# Patient Record
Sex: FEMALE | Race: WHITE | Employment: OTHER | URBAN - METROPOLITAN AREA
[De-identification: names, ages, dates, MRNs, and addresses within clinical notes are randomized per-mention and may not be internally consistent; named-entity substitution may affect disease eponyms.]

---

## 2017-07-14 PROBLEM — I48.0 PAF (PAROXYSMAL ATRIAL FIBRILLATION) (HCC): Status: ACTIVE | Noted: 2017-07-14

## 2019-05-29 ENCOUNTER — HOSPITAL ENCOUNTER (OUTPATIENT)
Dept: CT IMAGING | Age: 79
Discharge: HOME | End: 2019-05-29
Attending: SURGERY

## 2019-05-29 DIAGNOSIS — I65.23 BILATERAL CAROTID ARTERY STENOSIS: ICD-10-CM

## 2019-05-29 RX ORDER — SODIUM CHLORIDE 0.9 % (FLUSH) 0.9 %
10 SYRINGE (ML) INJECTION
Status: COMPLETED | OUTPATIENT
Start: 2019-05-29 | End: 2019-05-29

## 2019-05-29 RX ADMIN — Medication 10 ML: at 14:31

## 2019-06-10 ENCOUNTER — HOSPITAL ENCOUNTER (OUTPATIENT)
Dept: SURGERY | Age: 79
Discharge: HOME | End: 2019-06-10

## 2019-06-10 ENCOUNTER — ANESTHESIA EVENT (OUTPATIENT)
Dept: SURGERY | Age: 79
DRG: 039 | End: 2019-06-10
Payer: MEDICARE

## 2019-06-10 VITALS
TEMPERATURE: 98.3 F | WEIGHT: 162.19 LBS | HEIGHT: 63 IN | HEART RATE: 89 BPM | SYSTOLIC BLOOD PRESSURE: 129 MMHG | OXYGEN SATURATION: 95 % | RESPIRATION RATE: 16 BRPM | BODY MASS INDEX: 28.74 KG/M2 | DIASTOLIC BLOOD PRESSURE: 72 MMHG

## 2019-06-10 NOTE — PERIOP NOTES
Patient verified name and     Order for consent was found in EHR and matches case posting; patient verified. Type 111 surgery, walk in  assessment complete. Labs per surgeon: cbc,bmp, hgba1c results from 2019 are noted in care everywhere. Saúl Saver at Dr Chip Garzon notified and order received that those lab values are acceptable and no needed to re-draw additional lab work. Type and screen to be done on day of surgery  Labs per anesthesia protocol: POC glucose  EKG: EKG from 19 noted in [facility]. Cardiac clearance note including order to hold Plavix from [address] [place] [address] noted in [facility]     Hibiclens and instructions given per hospital policy. Patient provided with and instructed on educational handouts including Guide to Surgery, Pain Management, Hand Hygiene, Blood Transfusion Education, and [facility] Brochure. Patient answered medical/surgical history questions at their best of ability. All prior to admission medications documented in [facility]. Original medication prescription bottle was visualized during patient appointment. Patient instructed to hold all vitamins 7 days prior to surgery and NSAIDS 5 days prior to surgery, patient verbalized understanding. Prescription medications to hold: Metformin day of surgery    Patient instructed to continue previous medications as prescribed prior to surgery and to take the following medications the day of surgery according to anesthesia guidelines with a small sip of water: Norpace, Propanol, Amlodipine, Zetia, Aspirin and Isosorbide. Patient teach back successful and patient demonstrates knowledge of instructions.

## 2019-06-12 NOTE — PERIOP NOTES
Component Name  6/1/2019 10/18/2018 2/28/2018 7/17/2017 1/26/2017 8/24/2016 3/30/2016 7/20/2015 4/3/2015 8/29/2014 5/27/2014 5/19/2014     138 139 139 138 139 136 139 144 140 139 136 138   5.0 4.5 4.3 4.2 4.3 4.5 3.8 5.6 (H) 3.6 4.4 4 4.2   104 105 103 104 102 99 (L) 107 106 104 103 102 102   24 23 25 24 26 28 21             12 12 14 16 15 16 12 16 12 14 14 14   10.4 10.2 10.1 10.0 9.7 10.3 9.5 10.7 (H) 9.7 9.6 9.8 9.7   1.03 0.91 0.94 0.97 0.89 0.94 0.76 1.09 0.82 0.9 0.83 0.93   128 (H) 89 159 (H) 91 86 71 (L) 67 (L) 121 (H) 143 104 104 184   10 11 11 10 11 9 11 12 16 12 11 12   53 (L) 61 60 58 (L) 64 60 78             61 71 69 67 74                 23 27   23 20 21   17   22 18 16   25 32   27 20 19   14   16 16 13   92 87   80 79 88   83   93 97 92   7.6 7.4   7.5 6.9 7.4               3.9 3.9   3.9 3.5 4.0   4.0   3.7 3.9 3.5   0.4 0.5   0.6 0.2 0.4   0.4   0.3 0.4 0.3                 26 20 25 24 24                 49 (L) 69 62 68 59                 60 83 74 82 72                 7.7   7.3 7.5 7.2   Sodium   Potassium   Chloride   CO2   BUN   Calcium   Creatinine   Glucose   Anion Gap   eGFR Non-   eGFR    AST   ALT   Alkaline Phosphatase   Protein, Total   Albumin   Bilirubin, Total   Co2   Gfr Mdrd Non Af Amer   Gfr Mdrd Af Amer   Protein

## 2019-06-12 NOTE — PERIOP NOTES
Component Name  6/1/2019 10/18/2018 7/17/2017 1/26/2017 8/24/2016 3/30/2016 4/3/2015 5/19/2014     92.5 92.9 89.8 89.6 90.6 88.8 81.9 83.2   15.1 15.7 13.4 13.7 13.1 14.7 16.1 15.4   9.4 8.9 11.3 8.5 10.1 7.9 9.8 9.7   16.0 15.8 16.0 15.0 16.0 14.6 14.4 14.8   230 226 211 237 213 199 215 221   48.1 (H) 49.5 (H) 48.4 (H) 44.6 48.4 (H) 44.0 42.5 43.5   33.4 32.0 33.0 33.6 33.2 33.2 33.8 34   5.20 5.32 (H) 5.39 (H) 4.98 5.34 (H) 4.95 5.19 5.23   30.9 29.7 29.6 30.1 30.1 29.5 27.7 28.3   6.7 (L) 6.6 (L) 6.7 (L) 6.9 7.4 8.0 8 7.7   MCV   RDW   WBC   Hemoglobin   Platelets   Hematocrit   MCHC   RBC   MCH

## 2019-06-13 ENCOUNTER — ANESTHESIA (OUTPATIENT)
Dept: SURGERY | Age: 79
DRG: 039 | End: 2019-06-13
Payer: MEDICARE

## 2019-06-13 ENCOUNTER — APPOINTMENT (OUTPATIENT)
Dept: ULTRASOUND IMAGING | Age: 79
DRG: 039 | End: 2019-06-13
Attending: SURGERY
Payer: MEDICARE

## 2019-06-13 ENCOUNTER — HOSPITAL ENCOUNTER (INPATIENT)
Age: 79
LOS: 1 days | Discharge: HOME | DRG: 039 | End: 2019-06-14
Attending: SURGERY | Admitting: SURGERY
Payer: MEDICARE

## 2019-06-13 DIAGNOSIS — I65.21 CAROTID STENOSIS, RIGHT: Primary | ICD-10-CM

## 2019-06-13 DIAGNOSIS — I65.29 CAROTID ARTERY STENOSIS: ICD-10-CM

## 2019-06-13 LAB
ABO + RH BLD: NORMAL
ACT BLD: 131 SECS (ref 70–128)
BLOOD GROUP ANTIBODIES SERPL: NORMAL
GLUCOSE BLD STRIP.AUTO-MCNC: 138 MG/DL (ref 65–100)
GLUCOSE BLD STRIP.AUTO-MCNC: 99 MG/DL (ref 65–100)
SPECIMEN EXP DATE BLD: NORMAL

## 2019-06-13 PROCEDURE — C1768 GRAFT, VASCULAR: HCPCS | Performed by: SURGERY

## 2019-06-13 PROCEDURE — 85347 COAGULATION TIME ACTIVATED: CPT

## 2019-06-13 PROCEDURE — 77030032490 HC SLV COMPR SCD KNE COVD -B: Performed by: SURGERY

## 2019-06-13 PROCEDURE — 77030002986 HC SUT PROL J&J -A: Performed by: SURGERY

## 2019-06-13 PROCEDURE — 77030020407 HC IV BLD WRMR ST 3M -A: Performed by: ANESTHESIOLOGY

## 2019-06-13 PROCEDURE — 77030018836 HC SOL IRR NACL ICUM -A: Performed by: SURGERY

## 2019-06-13 PROCEDURE — 76060000036 HC ANESTHESIA 2.5 TO 3 HR: Performed by: SURGERY

## 2019-06-13 PROCEDURE — 74011250636 HC RX REV CODE- 250/636

## 2019-06-13 PROCEDURE — 77030016570 HC BLNKT BAIR HGGR 3M -B: Performed by: ANESTHESIOLOGY

## 2019-06-13 PROCEDURE — 77030031139 HC SUT VCRL2 J&J -A: Performed by: SURGERY

## 2019-06-13 PROCEDURE — 77030002933 HC SUT MCRYL J&J -A: Performed by: SURGERY

## 2019-06-13 PROCEDURE — 77030020263 HC SOL INJ SOD CL0.9% LFCR 1000ML

## 2019-06-13 PROCEDURE — 74011000250 HC RX REV CODE- 250: Performed by: ANESTHESIOLOGY

## 2019-06-13 PROCEDURE — 03CK0ZZ EXTIRPATION OF MATTER FROM RIGHT INTERNAL CAROTID ARTERY, OPEN APPROACH: ICD-10-PCS | Performed by: SURGERY

## 2019-06-13 PROCEDURE — 76010000222 HC CV SURG 2 TO 2.5 HR INTENSV-TIER 1: Performed by: SURGERY

## 2019-06-13 PROCEDURE — 77030018824 HC SHNT CAR ARGY COVD -B: Performed by: SURGERY

## 2019-06-13 PROCEDURE — 74011250636 HC RX REV CODE- 250/636: Performed by: ANESTHESIOLOGY

## 2019-06-13 PROCEDURE — 77030013567 HC DRN WND RESERV BARD -A: Performed by: SURGERY

## 2019-06-13 PROCEDURE — 94760 N-INVAS EAR/PLS OXIMETRY 1: CPT

## 2019-06-13 PROCEDURE — 77030002987 HC SUT PROL J&J -B: Performed by: SURGERY

## 2019-06-13 PROCEDURE — 77030002996 HC SUT SLK J&J -A: Performed by: SURGERY

## 2019-06-13 PROCEDURE — 77030037088 HC TUBE ENDOTRACH ORAL NSL COVD-A: Performed by: ANESTHESIOLOGY

## 2019-06-13 PROCEDURE — 03UK0KZ SUPPLEMENT RIGHT INTERNAL CAROTID ARTERY WITH NONAUTOLOGOUS TISSUE SUBSTITUTE, OPEN APPROACH: ICD-10-PCS | Performed by: SURGERY

## 2019-06-13 PROCEDURE — 77030012406 HC DRN WND PENRS BARD -A: Performed by: SURGERY

## 2019-06-13 PROCEDURE — 86900 BLOOD TYPING SEROLOGIC ABO: CPT

## 2019-06-13 PROCEDURE — 74011250637 HC RX REV CODE- 250/637: Performed by: ANESTHESIOLOGY

## 2019-06-13 PROCEDURE — 77030018673: Performed by: SURGERY

## 2019-06-13 PROCEDURE — 77030034888 HC SUT PROL 2 J&J -B: Performed by: SURGERY

## 2019-06-13 PROCEDURE — 77010033678 HC OXYGEN DAILY

## 2019-06-13 PROCEDURE — 03CM0ZZ EXTIRPATION OF MATTER FROM RIGHT EXTERNAL CAROTID ARTERY, OPEN APPROACH: ICD-10-PCS | Performed by: SURGERY

## 2019-06-13 PROCEDURE — 77030039425 HC BLD LARYNG TRULITE DISP TELE -A: Performed by: ANESTHESIOLOGY

## 2019-06-13 PROCEDURE — 77030014008 HC SPNG HEMSTAT J&J -C: Performed by: SURGERY

## 2019-06-13 PROCEDURE — 65610000001 HC ROOM ICU GENERAL

## 2019-06-13 PROCEDURE — 74011250636 HC RX REV CODE- 250/636: Performed by: NURSE PRACTITIONER

## 2019-06-13 PROCEDURE — 76210000016 HC OR PH I REC 1 TO 1.5 HR: Performed by: SURGERY

## 2019-06-13 PROCEDURE — 03CH0ZZ EXTIRPATION OF MATTER FROM RIGHT COMMON CAROTID ARTERY, OPEN APPROACH: ICD-10-PCS | Performed by: SURGERY

## 2019-06-13 PROCEDURE — 77030013794 HC KT TRNSDUC BLD EDWD -B: Performed by: ANESTHESIOLOGY

## 2019-06-13 PROCEDURE — 74011000250 HC RX REV CODE- 250: Performed by: SURGERY

## 2019-06-13 PROCEDURE — 74011000250 HC RX REV CODE- 250

## 2019-06-13 PROCEDURE — 77030039266 HC ADH SKN EXOFIN S2SG -A: Performed by: SURGERY

## 2019-06-13 PROCEDURE — 74011000258 HC RX REV CODE- 258

## 2019-06-13 PROCEDURE — 77030010512 HC APPL CLP LIG J&J -C: Performed by: SURGERY

## 2019-06-13 PROCEDURE — 82962 GLUCOSE BLOOD TEST: CPT

## 2019-06-13 PROCEDURE — 77030005401 HC CATH RAD ARRO -A: Performed by: ANESTHESIOLOGY

## 2019-06-13 PROCEDURE — 74011250636 HC RX REV CODE- 250/636: Performed by: SURGERY

## 2019-06-13 PROCEDURE — 77030019908 HC STETH ESOPH SIMS -A: Performed by: ANESTHESIOLOGY

## 2019-06-13 PROCEDURE — 74011250637 HC RX REV CODE- 250/637: Performed by: SURGERY

## 2019-06-13 PROCEDURE — 77030013292 HC BOWL MX PRSM J&J -A: Performed by: ANESTHESIOLOGY

## 2019-06-13 DEVICE — XENOSURE BIOLOGIC PATCH, 0.8CM X 8CM, EIFU
Type: IMPLANTABLE DEVICE | Site: CAROTID | Status: FUNCTIONAL
Brand: XENOSURE BIOLOGIC PATCH

## 2019-06-13 RX ORDER — GLYCOPYRROLATE 0.2 MG/ML
INJECTION INTRAMUSCULAR; INTRAVENOUS AS NEEDED
Status: DISCONTINUED | OUTPATIENT
Start: 2019-06-13 | End: 2019-06-13 | Stop reason: HOSPADM

## 2019-06-13 RX ORDER — ISOSORBIDE MONONITRATE 30 MG/1
30 TABLET, EXTENDED RELEASE ORAL DAILY
Status: DISCONTINUED | OUTPATIENT
Start: 2019-06-14 | End: 2019-06-14 | Stop reason: HOSPADM

## 2019-06-13 RX ORDER — SODIUM CHLORIDE, SODIUM LACTATE, POTASSIUM CHLORIDE, CALCIUM CHLORIDE 600; 310; 30; 20 MG/100ML; MG/100ML; MG/100ML; MG/100ML
75 INJECTION, SOLUTION INTRAVENOUS CONTINUOUS
Status: DISCONTINUED | OUTPATIENT
Start: 2019-06-13 | End: 2019-06-13 | Stop reason: HOSPADM

## 2019-06-13 RX ORDER — SODIUM CHLORIDE 0.9 % (FLUSH) 0.9 %
5-40 SYRINGE (ML) INJECTION AS NEEDED
Status: DISCONTINUED | OUTPATIENT
Start: 2019-06-13 | End: 2019-06-13 | Stop reason: HOSPADM

## 2019-06-13 RX ORDER — CEFAZOLIN SODIUM/WATER 2 G/20 ML
2 SYRINGE (ML) INTRAVENOUS ONCE
Status: COMPLETED | OUTPATIENT
Start: 2019-06-13 | End: 2019-06-13

## 2019-06-13 RX ORDER — HYDROCODONE BITARTRATE AND ACETAMINOPHEN 5; 325 MG/1; MG/1
1 TABLET ORAL
Status: DISCONTINUED | OUTPATIENT
Start: 2019-06-13 | End: 2019-06-14 | Stop reason: HOSPADM

## 2019-06-13 RX ORDER — PROPOFOL 10 MG/ML
INJECTION, EMULSION INTRAVENOUS AS NEEDED
Status: DISCONTINUED | OUTPATIENT
Start: 2019-06-13 | End: 2019-06-13 | Stop reason: HOSPADM

## 2019-06-13 RX ORDER — SODIUM CHLORIDE 0.9 % (FLUSH) 0.9 %
5-40 SYRINGE (ML) INJECTION EVERY 8 HOURS
Status: DISCONTINUED | OUTPATIENT
Start: 2019-06-13 | End: 2019-06-14 | Stop reason: HOSPADM

## 2019-06-13 RX ORDER — SODIUM CHLORIDE 0.9 % (FLUSH) 0.9 %
5-40 SYRINGE (ML) INJECTION EVERY 8 HOURS
Status: DISCONTINUED | OUTPATIENT
Start: 2019-06-13 | End: 2019-06-13 | Stop reason: HOSPADM

## 2019-06-13 RX ORDER — LIDOCAINE HYDROCHLORIDE 10 MG/ML
0.1 INJECTION INFILTRATION; PERINEURAL AS NEEDED
Status: DISCONTINUED | OUTPATIENT
Start: 2019-06-13 | End: 2019-06-13 | Stop reason: HOSPADM

## 2019-06-13 RX ORDER — LIDOCAINE HYDROCHLORIDE 10 MG/ML
INJECTION INFILTRATION; PERINEURAL AS NEEDED
Status: DISCONTINUED | OUTPATIENT
Start: 2019-06-13 | End: 2019-06-13 | Stop reason: HOSPADM

## 2019-06-13 RX ORDER — MIDAZOLAM HYDROCHLORIDE 1 MG/ML
2 INJECTION, SOLUTION INTRAMUSCULAR; INTRAVENOUS ONCE
Status: DISCONTINUED | OUTPATIENT
Start: 2019-06-13 | End: 2019-06-13 | Stop reason: HOSPADM

## 2019-06-13 RX ORDER — LIDOCAINE HYDROCHLORIDE 20 MG/ML
INJECTION, SOLUTION EPIDURAL; INFILTRATION; INTRACAUDAL; PERINEURAL AS NEEDED
Status: DISCONTINUED | OUTPATIENT
Start: 2019-06-13 | End: 2019-06-13 | Stop reason: HOSPADM

## 2019-06-13 RX ORDER — GUAIFENESIN 100 MG/5ML
81 LIQUID (ML) ORAL DAILY
Status: DISCONTINUED | OUTPATIENT
Start: 2019-06-14 | End: 2019-06-14 | Stop reason: HOSPADM

## 2019-06-13 RX ORDER — FAMOTIDINE 20 MG/1
20 TABLET, FILM COATED ORAL ONCE
Status: COMPLETED | OUTPATIENT
Start: 2019-06-13 | End: 2019-06-13

## 2019-06-13 RX ORDER — HEPARIN SODIUM 1000 [USP'U]/ML
INJECTION, SOLUTION INTRAVENOUS; SUBCUTANEOUS AS NEEDED
Status: DISCONTINUED | OUTPATIENT
Start: 2019-06-13 | End: 2019-06-13 | Stop reason: HOSPADM

## 2019-06-13 RX ORDER — PROPRANOLOL HYDROCHLORIDE 40 MG/1
80 TABLET ORAL DAILY
Status: DISCONTINUED | OUTPATIENT
Start: 2019-06-14 | End: 2019-06-14 | Stop reason: HOSPADM

## 2019-06-13 RX ORDER — SODIUM CHLORIDE 9 MG/ML
75 INJECTION, SOLUTION INTRAVENOUS CONTINUOUS
Status: DISCONTINUED | OUTPATIENT
Start: 2019-06-13 | End: 2019-06-14 | Stop reason: HOSPADM

## 2019-06-13 RX ORDER — ONDANSETRON 2 MG/ML
4 INJECTION INTRAMUSCULAR; INTRAVENOUS
Status: DISCONTINUED | OUTPATIENT
Start: 2019-06-13 | End: 2019-06-14 | Stop reason: HOSPADM

## 2019-06-13 RX ORDER — MORPHINE SULFATE 2 MG/ML
2 INJECTION, SOLUTION INTRAMUSCULAR; INTRAVENOUS
Status: DISCONTINUED | OUTPATIENT
Start: 2019-06-13 | End: 2019-06-14 | Stop reason: HOSPADM

## 2019-06-13 RX ORDER — HYDROMORPHONE HYDROCHLORIDE 2 MG/ML
0.5 INJECTION, SOLUTION INTRAMUSCULAR; INTRAVENOUS; SUBCUTANEOUS
Status: DISCONTINUED | OUTPATIENT
Start: 2019-06-13 | End: 2019-06-13 | Stop reason: HOSPADM

## 2019-06-13 RX ORDER — HEPARIN SODIUM 5000 [USP'U]/ML
INJECTION, SOLUTION INTRAVENOUS; SUBCUTANEOUS AS NEEDED
Status: DISCONTINUED | OUTPATIENT
Start: 2019-06-13 | End: 2019-06-13 | Stop reason: HOSPADM

## 2019-06-13 RX ORDER — PROTAMINE SULFATE 10 MG/ML
INJECTION, SOLUTION INTRAVENOUS AS NEEDED
Status: DISCONTINUED | OUTPATIENT
Start: 2019-06-13 | End: 2019-06-13 | Stop reason: HOSPADM

## 2019-06-13 RX ORDER — ROCURONIUM BROMIDE 10 MG/ML
INJECTION, SOLUTION INTRAVENOUS AS NEEDED
Status: DISCONTINUED | OUTPATIENT
Start: 2019-06-13 | End: 2019-06-13 | Stop reason: HOSPADM

## 2019-06-13 RX ORDER — OXYCODONE HYDROCHLORIDE 5 MG/1
10 TABLET ORAL
Status: DISCONTINUED | OUTPATIENT
Start: 2019-06-13 | End: 2019-06-13 | Stop reason: HOSPADM

## 2019-06-13 RX ORDER — EPHEDRINE SULFATE 50 MG/ML
INJECTION, SOLUTION INTRAVENOUS AS NEEDED
Status: DISCONTINUED | OUTPATIENT
Start: 2019-06-13 | End: 2019-06-13 | Stop reason: HOSPADM

## 2019-06-13 RX ORDER — BUPIVACAINE HYDROCHLORIDE 2.5 MG/ML
INJECTION, SOLUTION EPIDURAL; INFILTRATION; INTRACAUDAL AS NEEDED
Status: DISCONTINUED | OUTPATIENT
Start: 2019-06-13 | End: 2019-06-13 | Stop reason: HOSPADM

## 2019-06-13 RX ORDER — NEOSTIGMINE METHYLSULFATE 1 MG/ML
INJECTION INTRAVENOUS AS NEEDED
Status: DISCONTINUED | OUTPATIENT
Start: 2019-06-13 | End: 2019-06-13 | Stop reason: HOSPADM

## 2019-06-13 RX ORDER — FENTANYL CITRATE 50 UG/ML
100 INJECTION, SOLUTION INTRAMUSCULAR; INTRAVENOUS ONCE
Status: DISCONTINUED | OUTPATIENT
Start: 2019-06-13 | End: 2019-06-13 | Stop reason: HOSPADM

## 2019-06-13 RX ORDER — ONDANSETRON 2 MG/ML
INJECTION INTRAMUSCULAR; INTRAVENOUS AS NEEDED
Status: DISCONTINUED | OUTPATIENT
Start: 2019-06-13 | End: 2019-06-13 | Stop reason: HOSPADM

## 2019-06-13 RX ORDER — SODIUM CHLORIDE 9 MG/ML
INJECTION, SOLUTION INTRAVENOUS
Status: DISCONTINUED | OUTPATIENT
Start: 2019-06-13 | End: 2019-06-13 | Stop reason: HOSPADM

## 2019-06-13 RX ORDER — SODIUM CHLORIDE 0.9 % (FLUSH) 0.9 %
5-40 SYRINGE (ML) INJECTION AS NEEDED
Status: DISCONTINUED | OUTPATIENT
Start: 2019-06-13 | End: 2019-06-14 | Stop reason: HOSPADM

## 2019-06-13 RX ORDER — AMLODIPINE BESYLATE 5 MG/1
5 TABLET ORAL DAILY
Status: DISCONTINUED | OUTPATIENT
Start: 2019-06-14 | End: 2019-06-14 | Stop reason: HOSPADM

## 2019-06-13 RX ORDER — METFORMIN HYDROCHLORIDE 500 MG/1
500 TABLET ORAL 2 TIMES DAILY WITH MEALS
Status: DISCONTINUED | OUTPATIENT
Start: 2019-06-13 | End: 2019-06-14 | Stop reason: HOSPADM

## 2019-06-13 RX ORDER — OXYCODONE HYDROCHLORIDE 5 MG/1
5 TABLET ORAL
Status: DISCONTINUED | OUTPATIENT
Start: 2019-06-13 | End: 2019-06-13 | Stop reason: HOSPADM

## 2019-06-13 RX ORDER — FENTANYL CITRATE 50 UG/ML
INJECTION, SOLUTION INTRAMUSCULAR; INTRAVENOUS AS NEEDED
Status: DISCONTINUED | OUTPATIENT
Start: 2019-06-13 | End: 2019-06-13 | Stop reason: HOSPADM

## 2019-06-13 RX ORDER — CEFAZOLIN SODIUM/WATER 2 G/20 ML
2 SYRINGE (ML) INTRAVENOUS EVERY 8 HOURS
Status: COMPLETED | OUTPATIENT
Start: 2019-06-13 | End: 2019-06-14

## 2019-06-13 RX ADMIN — HYDROMORPHONE HYDROCHLORIDE 0.5 MG: 2 INJECTION INTRAMUSCULAR; INTRAVENOUS; SUBCUTANEOUS at 13:18

## 2019-06-13 RX ADMIN — Medication 2 G: at 10:33

## 2019-06-13 RX ADMIN — GLYCOPYRROLATE 0.3 MG: 0.2 INJECTION INTRAMUSCULAR; INTRAVENOUS at 12:34

## 2019-06-13 RX ADMIN — NEOSTIGMINE METHYLSULFATE 3 MG: 1 INJECTION INTRAVENOUS at 12:34

## 2019-06-13 RX ADMIN — SODIUM CHLORIDE 75 ML/HR: 900 INJECTION, SOLUTION INTRAVENOUS at 14:45

## 2019-06-13 RX ADMIN — PROPOFOL 110 MG: 10 INJECTION, EMULSION INTRAVENOUS at 10:48

## 2019-06-13 RX ADMIN — Medication 10 ML: at 23:15

## 2019-06-13 RX ADMIN — HEPARIN SODIUM 8000 UNITS: 1000 INJECTION, SOLUTION INTRAVENOUS; SUBCUTANEOUS at 11:29

## 2019-06-13 RX ADMIN — PROPOFOL 30 MG: 10 INJECTION, EMULSION INTRAVENOUS at 11:55

## 2019-06-13 RX ADMIN — FENTANYL CITRATE 50 MCG: 50 INJECTION, SOLUTION INTRAMUSCULAR; INTRAVENOUS at 10:36

## 2019-06-13 RX ADMIN — PROTAMINE SULFATE 40 MG: 10 INJECTION, SOLUTION INTRAVENOUS at 12:13

## 2019-06-13 RX ADMIN — PROMETHAZINE HYDROCHLORIDE 3.25 MG: 25 INJECTION INTRAMUSCULAR; INTRAVENOUS at 13:10

## 2019-06-13 RX ADMIN — EPHEDRINE SULFATE 10 MG: 50 INJECTION, SOLUTION INTRAVENOUS at 10:58

## 2019-06-13 RX ADMIN — EPHEDRINE SULFATE 10 MG: 50 INJECTION, SOLUTION INTRAVENOUS at 11:07

## 2019-06-13 RX ADMIN — ROCURONIUM BROMIDE 50 MG: 10 INJECTION, SOLUTION INTRAVENOUS at 10:48

## 2019-06-13 RX ADMIN — Medication 2 G: at 19:50

## 2019-06-13 RX ADMIN — HYDROCODONE BITARTRATE AND ACETAMINOPHEN 1 TABLET: 5; 325 TABLET ORAL at 18:37

## 2019-06-13 RX ADMIN — MORPHINE SULFATE 2 MG: 2 INJECTION, SOLUTION INTRAMUSCULAR; INTRAVENOUS at 23:44

## 2019-06-13 RX ADMIN — ONDANSETRON 4 MG: 2 INJECTION INTRAMUSCULAR; INTRAVENOUS at 12:20

## 2019-06-13 RX ADMIN — PROTAMINE SULFATE 10 MG: 10 INJECTION, SOLUTION INTRAVENOUS at 12:12

## 2019-06-13 RX ADMIN — PROPOFOL 20 MG: 10 INJECTION, EMULSION INTRAVENOUS at 12:28

## 2019-06-13 RX ADMIN — FENTANYL CITRATE 50 MCG: 50 INJECTION, SOLUTION INTRAMUSCULAR; INTRAVENOUS at 10:48

## 2019-06-13 RX ADMIN — SODIUM CHLORIDE, SODIUM LACTATE, POTASSIUM CHLORIDE, AND CALCIUM CHLORIDE 75 ML/HR: 600; 310; 30; 20 INJECTION, SOLUTION INTRAVENOUS at 09:50

## 2019-06-13 RX ADMIN — LIDOCAINE HYDROCHLORIDE 70 MG: 20 INJECTION, SOLUTION EPIDURAL; INFILTRATION; INTRACAUDAL; PERINEURAL at 10:48

## 2019-06-13 RX ADMIN — FAMOTIDINE 20 MG: 20 TABLET ORAL at 09:50

## 2019-06-13 RX ADMIN — Medication 10 ML: at 15:09

## 2019-06-13 RX ADMIN — METFORMIN HYDROCHLORIDE 500 MG: 500 TABLET ORAL at 17:28

## 2019-06-13 RX ADMIN — HYDROMORPHONE HYDROCHLORIDE 0.5 MG: 2 INJECTION INTRAMUSCULAR; INTRAVENOUS; SUBCUTANEOUS at 13:25

## 2019-06-13 RX ADMIN — SODIUM CHLORIDE: 9 INJECTION, SOLUTION INTRAVENOUS at 10:55

## 2019-06-13 RX ADMIN — ROCURONIUM BROMIDE 10 MG: 10 INJECTION, SOLUTION INTRAVENOUS at 11:53

## 2019-06-13 NOTE — ANESTHESIA PROCEDURE NOTES
Arterial Line Placement    Start time: 6/13/2019 10:43 AM  End time: 6/13/2019 10:47 AM  Performed by: Savannah Thorne MD  Authorized by: Savannah Thorne MD     Pre-Procedure  Indications:  Arterial pressure monitoring and blood sampling  Preanesthetic Checklist: patient identified, risks and benefits discussed, anesthesia consent, patient being monitored and patient being monitored      Procedure:   Prep:  ChloraPrep  Seldinger Technique?: Yes    Orientation:  Left  Location:  Radial artery  Catheter size:  20 G  Number of attempts:  1  Cont Cardiac Output Sensor: No      Assessment:   Post-procedure:  Line secured and sterile dressing applied  Patient Tolerance:  Patient tolerated the procedure well with no immediate complications

## 2019-06-13 NOTE — ANESTHESIA PREPROCEDURE EVALUATION
Relevant Problems   No relevant active problems       Anesthetic History               Review of Systems / Medical History  Patient summary reviewed, nursing notes reviewed and pertinent labs reviewed    Pulmonary    COPD: mild               Neuro/Psych         TIA     Cardiovascular    Hypertension: well controlled        Dysrhythmias : PVC  CAD and CABG    Exercise tolerance: >4 METS     GI/Hepatic/Renal     GERD: well controlled           Endo/Other    Diabetes: type 2    Arthritis     Other Findings   Comments: Tonsillar mass on CT- seen by ENT- she is scheduled for tonsillectomy after carotid- no vocal cord or laryngeal involvement per his nasal endoscopy report         Physical Exam    Airway  Mallampati: II  TM Distance: > 6 cm  Neck ROM: normal range of motion   Mouth opening: Normal     Cardiovascular    Rhythm: regular  Rate: normal         Dental    Dentition: Full upper dentures and Lower partial plate     Pulmonary        Wheezes:bibasilar         Abdominal         Other Findings            Anesthetic Plan    ASA: 3  Anesthesia type: general    Monitoring Plan: Arterial line      Induction: Intravenous  Anesthetic plan and risks discussed with: Patient

## 2019-06-13 NOTE — BRIEF OP NOTE
[address]   [address] [address]  [phone] FAX: [phone]    Brief Op Note Template Note    Pre-Op Diagnosis: Carotid stenosis, right [I65.21]    Post-Op Diagnosis:  Carotid stenosis, right [I65.21]    Procedures: Procedure(s):  CAROTID ARTERY ENDARTERECTOMY RIGHT    Surgeon: Cindy Garces MD    Assistants: Surgeon(s): Roberto Rg MD      Anesthesia:  General     Findings: Right ICA plaque    Tourniquet Time:  * No tourniquets in log *    Estimated Blood Loss:     10 cc         Specimens: Right ICA plaque           Implants:    Implant Name Type Inv. Item Serial No.  Lot No. LRB No. Used [facility] VASC PERIPATCH 0.8X8CM Mozella Gayathri  PATCH VASC [address] 0.8X8CM [facility] [employer] [id number] Right 1 Implanted       Complications: None               Signed: Cindy Garces MD      Elements of this note have been dictated using speech recognition software. As a result, errors of speech recognition may have occurred.

## 2019-06-13 NOTE — PROGRESS NOTES
Initial visit was made, emotional support and a spiritual presence were provided for the patient and her family.  card was left with the patient.         [employer]

## 2019-06-13 NOTE — PROGRESS NOTES
Received to room 3107 from PACU. Patient alert and oriented. Neuro exam complete and WNL, see documentation for details. NSR on monitor. Respirations even, non labored. SAT 98% on 2L NC. Breath sounds clear to auscultation. Currently denies pain. Ice pack in place to R Carotid incision. Incision well approximated, minimal swelling and bruising noted. Temporal pulse palpable. Arterial waveform dampened, Dr. Mary Kate Damon states may D/C line. Will continue to monitor.

## 2019-06-13 NOTE — PROGRESS NOTES
Dual skin assessment complete with Alycia Hewitt RN. No skin breakdown noted. R carotid incision C/D/I, minimal swelling noted.

## 2019-06-13 NOTE — PROGRESS NOTES
Pt seen in ICU s/p CAROTID ARTERY ENDARTERECTOMY RIGHT by Dr. Daniel Paige. Pt is alert and oriented currently. Confirms demographics. Son, DIL, and GS at bedside. No needs voiced for d/c at present. CM to follow for any needs per MD.     Care Management Interventions  PCP Verified by CM: Yes(confirms Dr. Frederic Peña as PCP)  Mode of Transport at Discharge:  Other (see comment)  Transition of Care Consult (CM Consult): Discharge Planning  Discharge Durable Medical Equipment: (glucometer)  Current Support Network: Own Home, Lives Alone(sonFernanda, lives nearby -[phone])  Confirm Follow Up Transport: Self  Plan discussed with Pt/Family/Caregiver: Yes  Freedom of Choice Offered: Yes  [employer] Information Provided?: (confirms MCR/supplemental plan - able to get rx)  Discharge Location  Discharge Placement: Home

## 2019-06-13 NOTE — PERIOP NOTES
TRANSFER - OUT REPORT:    Verbal report given to Berna(name) on [address] [place] [address]  being transferred to [facility](unit) for routine post - op       Report consisted of patients Situation, Background, Assessment and   Recommendations(SBAR). Information from the following report(s) OR Summary was reviewed with the receiving nurse. Lines:   Peripheral IV 06/13/19 Left Hand (Active)   Site Assessment Clean, dry, & intact 6/13/2019  1:38 PM   Phlebitis Assessment 0 6/13/2019  1:38 PM   Infiltration Assessment 0 6/13/2019  1:38 PM   Dressing Status Clean, dry, & intact 6/13/2019  1:38 PM   Dressing Type Transparent;Tape 6/13/2019  1:38 PM   Hub Color/Line Status Patent; Infusing 6/13/2019  1:38 PM   Alcohol Cap Used No 6/13/2019  1:38 PM       Peripheral IV 06/13/19 Right Wrist (Active)   Site Assessment Clean, dry, & intact 6/13/2019  1:38 PM   Phlebitis Assessment 0 6/13/2019  1:38 PM   Infiltration Assessment 0 6/13/2019  1:38 PM   Dressing Status Clean, dry, & intact 6/13/2019  1:38 PM   Dressing Type Transparent;Tape 6/13/2019  1:38 PM   Hub Color/Line Status Patent; Infusing;Green 6/13/2019  1:38 PM   Alcohol Cap Used No 6/13/2019  1:38 PM       Arterial Line 06/13/19 Left Radial artery (Active)   Site Assessment Clean, dry, & intact 6/13/2019  1:38 PM   Dressing Status Clean, dry, & intact 6/13/2019  1:38 PM   Dressing Type Transparent 6/13/2019  1:38 PM   Line Status Intact and in place 6/13/2019  1:38 PM   Treatment Zeroed or re-zeroed 6/13/2019  1:00 PM   Affected Extremity/Extremities Pulses palpable;Color distal to insertion site pink (or appropriate for race) 6/13/2019  1:00 PM        Opportunity for questions and clarification was provided. Patient transported with:   Monitor  O2 @ 2 liters  Registered Nurse  Tech    VTE prophylaxis orders have been written for [address] [place] [address]. Patient and family given floor number and nurses name.   Family updated re: pt status after security code verified.

## 2019-06-13 NOTE — ANESTHESIA POSTPROCEDURE EVALUATION
Procedure(s):  CAROTID ARTERY ENDARTERECTOMY RIGHT. general    Anesthesia Post Evaluation      Multimodal analgesia: multimodal analgesia used between 6 hours prior to anesthesia start to PACU discharge  Patient location during evaluation: PACU  Patient participation: complete - patient participated  Level of consciousness: responsive to verbal stimuli and awake  Pain management: adequate  Airway patency: patent  Anesthetic complications: no  Cardiovascular status: acceptable  Respiratory status: acceptable, spontaneous ventilation and nonlabored ventilation  Hydration status: acceptable  Post anesthesia nausea and vomiting:  none      Vitals Value Taken Time   /60 6/13/2019  1:33 PM   Temp 37 °C (98.6 °F) 6/13/2019  1:00 PM   Pulse 57 6/13/2019  1:37 PM   Resp 17 6/13/2019  1:00 PM   SpO2 97 % 6/13/2019  1:37 PM   Vitals shown include unvalidated device data.

## 2019-06-13 NOTE — PROGRESS NOTES
TRANSFER - IN REPORT:    Verbal report received from [address] [place] [address] RN(name) on [address]  being received from [facility]) for routine post - op      Report consisted of patients Situation, Background, Assessment and   Recommendations(SBAR). Information from the following report(s) SBAR, Kardex, Procedure Summary, MAR and Cardiac Rhythm NSR was reviewed with the receiving nurse. Opportunity for questions and clarification was provided. Assessment completed upon patients arrival to unit and care assumed.

## 2019-06-14 VITALS
HEIGHT: 60 IN | BODY MASS INDEX: 31.77 KG/M2 | DIASTOLIC BLOOD PRESSURE: 63 MMHG | HEART RATE: 72 BPM | SYSTOLIC BLOOD PRESSURE: 140 MMHG | WEIGHT: 161.82 LBS | RESPIRATION RATE: 16 BRPM | TEMPERATURE: 98.5 F | OXYGEN SATURATION: 93 %

## 2019-06-14 PROCEDURE — 74011250637 HC RX REV CODE- 250/637: Performed by: SURGERY

## 2019-06-14 PROCEDURE — 77030020263 HC SOL INJ SOD CL0.9% LFCR 1000ML

## 2019-06-14 PROCEDURE — 94760 N-INVAS EAR/PLS OXIMETRY 1: CPT

## 2019-06-14 PROCEDURE — 74011250636 HC RX REV CODE- 250/636: Performed by: SURGERY

## 2019-06-14 PROCEDURE — 77010033678 HC OXYGEN DAILY

## 2019-06-14 RX ORDER — HYDROCODONE BITARTRATE AND ACETAMINOPHEN 5; 325 MG/1; MG/1
1 TABLET ORAL
Qty: 30 TAB | Refills: 0 | Status: SHIPPED | OUTPATIENT
Start: 2019-06-14 | End: 2019-06-19

## 2019-06-14 RX ADMIN — PROPRANOLOL HYDROCHLORIDE 80 MG: 40 TABLET ORAL at 07:39

## 2019-06-14 RX ADMIN — MORPHINE SULFATE 2 MG: 2 INJECTION, SOLUTION INTRAMUSCULAR; INTRAVENOUS at 04:03

## 2019-06-14 RX ADMIN — METFORMIN HYDROCHLORIDE 500 MG: 500 TABLET ORAL at 07:39

## 2019-06-14 RX ADMIN — SODIUM CHLORIDE 75 ML/HR: 900 INJECTION, SOLUTION INTRAVENOUS at 04:07

## 2019-06-14 RX ADMIN — ASPIRIN 81 MG 81 MG: 81 TABLET ORAL at 07:39

## 2019-06-14 RX ADMIN — Medication 2 G: at 06:00

## 2019-06-14 RX ADMIN — ISOSORBIDE MONONITRATE 30 MG: 30 TABLET, EXTENDED RELEASE ORAL at 07:39

## 2019-06-14 RX ADMIN — HYDROCODONE BITARTRATE AND ACETAMINOPHEN 1 TABLET: 5; 325 TABLET ORAL at 06:49

## 2019-06-14 RX ADMIN — Medication 10 ML: at 05:31

## 2019-06-14 RX ADMIN — AMLODIPINE BESYLATE 5 MG: 5 TABLET ORAL at 07:40

## 2019-06-14 NOTE — CONSULTS
LEAPFROG PROTOCOL NOTE    Sarah Tinajero  6/14/2019    The patient is currently in the critical care setting managed by Dr. Chris Mathew with right carotid artery endarterectomy. The patient's chart is reviewed and the patient is discussed with the staff. O2 sat 92% on room air. Patient is currently hemodynamically stable. Patient has no needs identified for Intensivist management in the critical care setting at this time. Please notify us if can be of assistance. No charge billed to the patient. Thank you.     Miguel Horta NP

## 2019-06-14 NOTE — PROGRESS NOTES
Discharge instructions provided to pt. Opportunity provided for questions. Per Dr. Sukhdev Flores, pt to stop taking plavix. Pt educated on med list.      Prescription for Norco provided to pt. Pt requesting to take scheduled cardiac/BP meds prior to discharge. Reviewed follow up appts.

## 2019-06-14 NOTE — PROGRESS NOTES
Bedside shift change report given to Brandi Carpio (oncoming nurse) by LEISA Husain (offgoing nurse). Report included the following information SBAR, Kardex, Procedure Summary, Intake/Output, MAR, Accordion, Recent Results, Med Rec Status, Cardiac Rhythm NSR and Alarm Parameters .

## 2019-06-14 NOTE — PROGRESS NOTES
[address]   [address] FAX: [phone]         VASCULAR SURGERY FLOOR PROGRESS NOTE    Admit Date: 2019  POD: 1 Day Post-Op    Procedure:  Procedure(s):  CAROTID ARTERY ENDARTERECTOMY RIGHT    Subjective:     Patient has no new complaints and has no complaints. Objective:     Vitals:  Blood pressure 159/65, pulse 75, temperature 98.5 °F (36.9 °C), resp. rate 16, height 5' (1.524 m), weight 161 lb 13.1 oz (73.4 kg), SpO2 92 %. Temp (24hrs), Av.1 °F (36.7 °C), Min:97.5 °F (36.4 °C), Max:98.6 °F (37 °C)      Intake / Output:    Intake/Output Summary (Last 24 hours) at 2019 0704  Last data filed at 2019 0531  Gross per 24 hour   Intake 2407.5 ml   Output 200 ml   Net 2207.5 ml       Physical Exam:    Constitutional: she appears well-developed. No distress. HENT:   Head: Atraumatic. Eyes: Pupils are equal, round, and reactive to light. Neck: Normal range of motion. Cardiovascular: Regular rhythm. Pulmonary/Chest: Effort normal and breath sounds normal. No respiratory distress. Abdominal: Soft. Bowel sounds are normal. she exhibits no distension. There is no tenderness. There is no guarding. No hernia. Musculoskeletal: Normal range of motion. Neurological: She is alert. CN II- XII grossly intact  Vascular: neruo intact    Labs: No results for input(s): HGB, WBC, K, GLU, HGBEXT in the last 72 hours.     No lab exists for component:  CREA    Data Review     Assessment:     Patient Active Problem List    Diagnosis Date Noted    Carotid stenosis, right 2019    PAF (paroxysmal atrial fibrillation) ([facility] [address] 2017    S/P CABG (coronary artery bypass graft) 2016    Subclavian arterial stenosis ([facility] [place] [address] 10/21/2015    HLD (hyperlipidemia) 10/21/2015    Coronary atherosclerosis of native coronary artery 10/21/2015    Type II diabetes mellitus ([facility] [address] 10/21/2015    Essential hypertension 10/21/2015    Carotid stenosis without infarct 10/21/2015    Calculus of ureter rt ureter 01/11/2013       Plan/Recommendations/Medical Decision Making:     D/c home follow up 2 weeks  Elements of this note have been dictated using speech recognition software. As a result, errors of speech recognition may have occurred.

## 2019-06-14 NOTE — PROGRESS NOTES
Bedside shift report received from Antoine, [facility]. Pt resting quietly in bed, oriented x4, follows commands appropriately. Afebrile, NSR on monitor, SBP 140s, O2 sat 96% on RA. Skin assessed. R neck incision intact with dermabond. Pt c/o pain - medicated as seen in MAR.

## 2019-06-14 NOTE — INTERDISCIPLINARY ROUNDS
Interdisciplinary team rounds were held 6/14/2019 with the following team members:Care Management, Nursing, Nurse Practitioner, Nutrition, Palliative Care, Pharmacy, Physical Therapy, Physician, Respiratory Therapy and Clinical Coordinator. Plan of care discussed. See clinical pathway and/or care plan for interventions and desired outcomes.

## 2019-06-14 NOTE — OP NOTES
[facility]  OPERATIVE REPORT    Name:  Linus Crowe  MR#:  [id number]  :  1940  ACCOUNT #:  [de-identified]  DATE OF SERVICE:  2019    CLINICAL SERVICE:  Vascular Surgery. PREOPERATIVE DIAGNOSIS:  Symptomatic right carotid stenosis, greater than 80%. POSTOPERATIVE DIAGNOSIS:  Symptomatic right carotid stenosis, greater than 80%. PROCEDURE PERFORMED:  Right carotid endarterectomy with Bovine pericardial patch closure. SURGEON:  Winnie Shepherd MD    ASSISTANT:      ANESTHESIA:  General.    COMPLICATIONS:  None. SPECIMENS REMOVED:  Right carotid plaque. IMPLANTS:      ESTIMATED BLOOD LOSS:      INDICATION FOR PROCEDURE:  This is a 82-year-old female with history of carotid disease who presented to my office with worsening TIA symptoms on the right. She underwent CTA of the neck confirming she had 80% stenosis of ICA. 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 I had a long discussion with the ENT doctor. We elected to proceed with an endarterectomy first and four weeks postop to undergo a biopsy of that lesion. At that time, she will be offered Plavix and dual antiplatelet therapy. PROCEDURE IN DETAIL:  After getting informed consent, the patient was brought to the operating room. General anesthesia was then induced. Preop antibiotics were given before skin incision. The patient's right neck was then prepped and draped in normals sterile fashion. Incision was made over the anterior portion of the sternocleidomastoid. We dissected down to the subcutaneous tissue. Retractors were used to retract the sternocleidomastoid laterally. We exposed in the carotid sheet. The common carotid artery was then taken controlled with John tourniquets. The superior thyroid was also controlled with vessel loops and the external controlled with John tourniquets. Self-retaining retractors were then used for the distal exposure.   We were able to identify the vagus nerve and hypoglossal nerve and kept that out of harms way. The distal ICA was controlled with vessel loops. The patient was heparinized with 8000 units of heparin. We got proximal and distal control of the ICA and the common appeared occluded. We used an 11-blade on the distal common and we extended with Gifford scissors to the heavily diseased proximal ICA occlusion. There was normal-appearing intima. A #8 [place] shunt was then placed into the ICA and to the common, which were checked for Doppler signals. We then started endarterectomy plane at the distal common carotid artery with a [place] elevator. We transected it distally. We did eversion technique to remove the plaque from the external.  We then fed up the plaque in one piece, leaving a smooth endpoint. We then used two tacking incisions on the posterior aspect of the distal end point. All remaining fine debris were removed with fine forceps. We then brought into the field a Bovine pericardial patch  cm and did a patch angioplasty with 6-0 Prolene proximally. We cut the patch to appropriate size to rent it distally. Prior to tying up, we removed the shunt. We backbled the ICA and we backbled the ECA. We placed our anastomosis. We saw flow first into the external carotid artery, then to the common, and ICA. We then checked for Doppler signals of the ICA, external, and common, which were all normal.  We reversed the patient with 50 mg of Protamine. We held pressure for 5 minutes. ACT was 138. We then closed the wound with the 2-0 Vicryl, 3-0 Vicryl, and 4-0 Monocryl. The patient was extubated and returned to the PACU in stable condition.         MD ABRAN Ricks/[email or web address]/  D:  06/13/2019 12:57  T:  06/13/2019 22:43  JOB #:  [id number]

## 2019-06-14 NOTE — PROGRESS NOTES
Pt escorted to car via wheelchair. Daughter to take pt home. Prescription for norco in pt's possession.

## 2019-06-14 NOTE — DISCHARGE INSTRUCTIONS
Patient Education        Carotid Endarterectomy: What to Expect at Home  Your Recovery  A carotid endarterectomy (say \"paulina-RAW-aniyah rodrigueshj-qvk-grs-LACEY-jun-cheryl\") is surgery to remove fatty build-up (plaque) from one of the carotid arteries. There are two carotid arteries--one on each side of the neck--that supply blood to the brain. When plaque builds up in either artery, it can make it hard for blood to flow to the brain. This surgery may lower your risk of having a stroke. You may have a sore throat for a few days. You can expect the cut (incision) in your neck to be sore for about a week. The area around the incision may also be swollen and bruised at first. The area in front of the incision may be numb. This usually gets better after 6 to 12 months. Your doctor closed the incision in your neck with stitches. The stitches will be removed 7 to 10 days after surgery, or you may have stitches that dissolve on their own. You may feel more tired than usual for several weeks after surgery. You will probably be able to go back to work or your usual activities in 1 to 2 weeks. This care sheet gives you a general idea about how long it will take for you to recover. But each person recovers at a different pace. Follow the steps below to feel better as quickly as possible. How can you care for yourself at home? Activity    · Rest when you feel tired. Getting enough sleep will help you recover.     · Try to walk each day. Start by walking a little more than you did the day before. Bit by bit, increase the amount you walk. Walking boosts blood flow and helps prevent pneumonia and constipation.     · Avoid strenuous activities, such as bicycle riding, jogging, weight lifting, or aerobic exercise. Your doctor will tell you when it's okay to do strenuous activity.     · For 1 to 2 weeks, avoid lifting anything that would make you strain.  This may include a child, heavy grocery bags and milk containers, a heavy briefcase or backpack, cat litter or dog food bags, or a vacuum .     · Ask your doctor when you can drive again.     · You will probably need to take 1 to 2 weeks off from work. It depends on the type of work you do and how you feel.     · You may shower and take baths as usual. But do not soak the incision for the first 2 weeks, or until your doctor tells you it is okay. Pat the incision dry.     · Your doctor will tell you when you can have sex again. Diet    · You can eat your normal diet. If your stomach is upset, try bland, low-fat foods like plain rice, broiled chicken, toast, and yogurt.     · Drink plenty of fluids (unless your doctor tells you not to).     · You may notice that your bowel movements are not regular right after your surgery. This is common. Try to avoid constipation and straining with bowel movements. You may want to take a fiber supplement every day. If you have not had a bowel movement after a couple of days, ask your doctor about taking a mild laxative. Medicines    · Your doctor will tell you if and when you can restart your medicines. He or she will also give you instructions about taking any new medicines.     · If you take blood thinners, such as warfarin (Coumadin), clopidogrel (Plavix), or aspirin, be sure to talk to your doctor. He or she will tell you if and when to start taking those medicines again. Make sure that you understand exactly what your doctor wants you to do.     · Your doctor may advise you to take aspirin when you go home. This helps prevent blood clots. Take your medicines exactly as prescribed. Call your doctor if you think you are having a problem with your medicine.     · Be safe with medicines. Take pain medicines exactly as directed. ? If the doctor gave you a prescription medicine for pain, take it as prescribed. ? If you are not taking a prescription pain medicine, ask your doctor if you can take an over-the-counter medicine.   ? Do not take two or more pain medicines at the same time unless the doctor told you to. Many pain medicines have acetaminophen, which is Tylenol. Too much acetaminophen (Tylenol) can be harmful.     · If you think your pain medicine is making you sick to your stomach:  ? Take your medicine after meals (unless your doctor has told you not to). ? Ask your doctor for a different pain medicine.     · If your doctor prescribed antibiotics, take them as directed. Do not stop taking them just because you feel better. You need to take the full course of antibiotics.     · If you take a blood thinner, such as aspirin, be sure you get instructions about how to take your medicine safely. Blood thinners can cause serious bleeding problems. Incision care    · If you have strips of tape over your incision, leave the tape on for a week or until it falls off.     · Wash the area daily with water and pat it dry. Other cleaning products, such as hydrogen peroxide, can make the wound heal more slowly. You may cover the area with a gauze bandage if it weeps or rubs against clothing. Change the bandage every day.     · Keep the area clean and dry. Follow-up care is a key part of your treatment and safety. Be sure to make and go to all appointments, and call your doctor if you are having problems. It's also a good idea to know your test results and keep a list of the medicines you take. When should you call for help? Call 911 anytime you think you may need emergency care. For example, call if:    · You passed out (lost consciousness).     · You have severe trouble breathing.     · You have a tight bulge in your neck on the side where the surgery was done.     · You have symptoms of a stroke. These may include:  ? Sudden numbness, tingling, weakness, or loss of movement in your face, arm, or leg, especially on only one side of your body. ? Sudden vision changes. ? Sudden trouble speaking.   ? Sudden confusion or trouble understanding simple statements. ? Sudden problems with walking or balance. ? A sudden, severe headache that is different from past headaches.     · You have chest pain or pressure. This may occur with:  ? Sweating. ? Shortness of breath. ? Nausea or vomiting. ? Pain that spreads from the chest to the neck, jaw, or one or both shoulders or arms. ? Dizziness or lightheadedness. ? A fast or uneven pulse. After calling 911, chew 1 adult-strength or 2 to 4 low-dose aspirin. Wait for an ambulance. Do not try to drive yourself.    Call your doctor now or seek immediate medical care if:    · You have pain that does not get better after you take pain medicine.     · You have loose stitches, or your incision comes open.     · Bright red blood has soaked through the bandage over your incision.     · You have signs of infection, such as:  ? Increased pain, swelling, warmth, or redness. ? Red streaks leading from the incision. ? Pus draining from the incision. ? A fever.    Watch closely for any changes in your health, and be sure to contact your doctor if you have any problems. Where can you learn more? Go to http://jd-tyrell.info/. Enter Z588 in the search box to learn more about \"Carotid Endarterectomy: What to Expect at Home. \"  Current as of: July 22, 2018  Content Version: 11.9  © 8059-2881 [employer]. Care instructions adapted under license by [employer] (which disclaims liability or warranty for this information). If you have questions about a medical condition or this instruction, always ask your healthcare professional. [place] [address] any warranty or liability for your use of this information.

## 2019-07-25 ENCOUNTER — HOSPITAL ENCOUNTER (OUTPATIENT)
Dept: SURGERY | Age: 79
Discharge: HOME | End: 2019-07-25

## 2019-07-25 VITALS — HEIGHT: 63 IN | BODY MASS INDEX: 29.06 KG/M2 | WEIGHT: 164 LBS

## 2019-07-25 NOTE — PERIOP NOTES
Patient verified name and . Order for consent was found in EHR and matches case posting; patient verifies procedure. Type 1b surgery, PAT phone  assessment complete. Orders was received. Labs per surgeon: none  Labs per anesthesia protocol: none      Patient answered medical/surgical history questions at their best of ability. All prior to admission medications documented in [facility]. Patient instructed to take the following medications the day of surgery according to anesthesia guidelines with a small sip of water: Amlodipine, ASA 81mg, Norpace, Imdur and Inderal; . Hold all vitamins 7 days prior to surgery and NSAIDS 5 days prior to surgery. Prescription meds to hold:none    Patient instructed on the following:  Arrive at A Entrance, time of arrival to be called the day before by 1700  NPO after midnight including gum, mints, and ice chips  Responsible adult must drive patient to the hospital, stay during surgery, and patient will need supervision 24 hours after anesthesia  Use hibiclens (states she has some left from surgery in 2019) in shower the night before surgery and on the morning of surgery  All piercings must be removed prior to arrival.    Leave all valuables (money and jewelry) at home but bring insurance card and ID on       DOS. Do not wear make-up, nail polish, lotions, cologne, perfumes, powders, or oil on skin. Patient teach back successful and patient demonstrates knowledge of instruction.

## 2019-07-31 ENCOUNTER — ANESTHESIA EVENT (OUTPATIENT)
Dept: SURGERY | Age: 79
End: 2019-07-31
Payer: MEDICARE

## 2019-08-01 ENCOUNTER — HOSPITAL ENCOUNTER (OUTPATIENT)
Age: 79
Setting detail: OUTPATIENT SURGERY
Discharge: HOME | End: 2019-08-01
Attending: OTOLARYNGOLOGY | Admitting: OTOLARYNGOLOGY
Payer: MEDICARE

## 2019-08-01 ENCOUNTER — ANESTHESIA (OUTPATIENT)
Dept: SURGERY | Age: 79
End: 2019-08-01
Payer: MEDICARE

## 2019-08-01 VITALS
SYSTOLIC BLOOD PRESSURE: 130 MMHG | DIASTOLIC BLOOD PRESSURE: 62 MMHG | OXYGEN SATURATION: 94 % | RESPIRATION RATE: 18 BRPM | BODY MASS INDEX: 28.18 KG/M2 | HEART RATE: 66 BPM | TEMPERATURE: 97.5 F | WEIGHT: 159.1 LBS

## 2019-08-01 LAB — GLUCOSE BLD STRIP.AUTO-MCNC: 158 MG/DL (ref 65–100)

## 2019-08-01 PROCEDURE — 77030039425 HC BLD LARYNG TRULITE DISP TELE -A: Performed by: ANESTHESIOLOGY

## 2019-08-01 PROCEDURE — 74011250636 HC RX REV CODE- 250/636: Performed by: ANESTHESIOLOGY

## 2019-08-01 PROCEDURE — 77030002996 HC SUT SLK J&J -A: Performed by: OTOLARYNGOLOGY

## 2019-08-01 PROCEDURE — 77030012840 HC ELECTRD COAG SUC CNMD -C: Performed by: OTOLARYNGOLOGY

## 2019-08-01 PROCEDURE — 77030020782 HC GWN BAIR PAWS FLX 3M -B: Performed by: ANESTHESIOLOGY

## 2019-08-01 PROCEDURE — 88304 TISSUE EXAM BY PATHOLOGIST: CPT

## 2019-08-01 PROCEDURE — 74011250636 HC RX REV CODE- 250/636

## 2019-08-01 PROCEDURE — 74011000250 HC RX REV CODE- 250: Performed by: ANESTHESIOLOGY

## 2019-08-01 PROCEDURE — 76210000026 HC REC RM PH II 1 TO 1.5 HR: Performed by: OTOLARYNGOLOGY

## 2019-08-01 PROCEDURE — 74011250636 HC RX REV CODE- 250/636: Performed by: OTOLARYNGOLOGY

## 2019-08-01 PROCEDURE — 76010000138 HC OR TIME 0.5 TO 1 HR: Performed by: OTOLARYNGOLOGY

## 2019-08-01 PROCEDURE — 82962 GLUCOSE BLOOD TEST: CPT

## 2019-08-01 PROCEDURE — 88305 TISSUE EXAM BY PATHOLOGIST: CPT

## 2019-08-01 PROCEDURE — 76210000063 HC OR PH I REC FIRST 0.5 HR: Performed by: OTOLARYNGOLOGY

## 2019-08-01 PROCEDURE — 77030037088 HC TUBE ENDOTRACH ORAL NSL COVD-A: Performed by: ANESTHESIOLOGY

## 2019-08-01 PROCEDURE — 77030032490 HC SLV COMPR SCD KNE COVD -B: Performed by: OTOLARYNGOLOGY

## 2019-08-01 PROCEDURE — 77030011267 HC ELECTRD BLD COVD -A: Performed by: OTOLARYNGOLOGY

## 2019-08-01 PROCEDURE — 76060000032 HC ANESTHESIA 0.5 TO 1 HR: Performed by: OTOLARYNGOLOGY

## 2019-08-01 RX ORDER — DEXAMETHASONE SODIUM PHOSPHATE 4 MG/ML
6 INJECTION, SOLUTION INTRA-ARTICULAR; INTRALESIONAL; INTRAMUSCULAR; INTRAVENOUS; SOFT TISSUE ONCE
Status: COMPLETED | OUTPATIENT
Start: 2019-08-01 | End: 2019-08-01

## 2019-08-01 RX ORDER — MIDAZOLAM HYDROCHLORIDE 1 MG/ML
2 INJECTION, SOLUTION INTRAMUSCULAR; INTRAVENOUS
Status: DISCONTINUED | OUTPATIENT
Start: 2019-08-01 | End: 2019-08-01 | Stop reason: HOSPADM

## 2019-08-01 RX ORDER — FENTANYL CITRATE 50 UG/ML
INJECTION, SOLUTION INTRAMUSCULAR; INTRAVENOUS AS NEEDED
Status: DISCONTINUED | OUTPATIENT
Start: 2019-08-01 | End: 2019-08-01 | Stop reason: HOSPADM

## 2019-08-01 RX ORDER — FENTANYL CITRATE 50 UG/ML
100 INJECTION, SOLUTION INTRAMUSCULAR; INTRAVENOUS ONCE
Status: DISCONTINUED | OUTPATIENT
Start: 2019-08-01 | End: 2019-08-01 | Stop reason: HOSPADM

## 2019-08-01 RX ORDER — LIDOCAINE HYDROCHLORIDE 20 MG/ML
INJECTION, SOLUTION EPIDURAL; INFILTRATION; INTRACAUDAL; PERINEURAL AS NEEDED
Status: DISCONTINUED | OUTPATIENT
Start: 2019-08-01 | End: 2019-08-01 | Stop reason: HOSPADM

## 2019-08-01 RX ORDER — PROPOFOL 10 MG/ML
INJECTION, EMULSION INTRAVENOUS AS NEEDED
Status: DISCONTINUED | OUTPATIENT
Start: 2019-08-01 | End: 2019-08-01 | Stop reason: HOSPADM

## 2019-08-01 RX ORDER — SUCCINYLCHOLINE CHLORIDE 20 MG/ML
INJECTION INTRAMUSCULAR; INTRAVENOUS AS NEEDED
Status: DISCONTINUED | OUTPATIENT
Start: 2019-08-01 | End: 2019-08-01 | Stop reason: HOSPADM

## 2019-08-01 RX ORDER — SODIUM CHLORIDE, SODIUM LACTATE, POTASSIUM CHLORIDE, CALCIUM CHLORIDE 600; 310; 30; 20 MG/100ML; MG/100ML; MG/100ML; MG/100ML
50 INJECTION, SOLUTION INTRAVENOUS CONTINUOUS
Status: DISCONTINUED | OUTPATIENT
Start: 2019-08-01 | End: 2019-08-01 | Stop reason: HOSPADM

## 2019-08-01 RX ORDER — MIDAZOLAM HYDROCHLORIDE 1 MG/ML
2 INJECTION, SOLUTION INTRAMUSCULAR; INTRAVENOUS ONCE
Status: DISCONTINUED | OUTPATIENT
Start: 2019-08-01 | End: 2019-08-01 | Stop reason: HOSPADM

## 2019-08-01 RX ORDER — LIDOCAINE HYDROCHLORIDE 10 MG/ML
0.1 INJECTION INFILTRATION; PERINEURAL AS NEEDED
Status: DISCONTINUED | OUTPATIENT
Start: 2019-08-01 | End: 2019-08-01 | Stop reason: HOSPADM

## 2019-08-01 RX ORDER — OXYCODONE HYDROCHLORIDE 5 MG/1
5 TABLET ORAL
Status: DISCONTINUED | OUTPATIENT
Start: 2019-08-01 | End: 2019-08-01 | Stop reason: HOSPADM

## 2019-08-01 RX ORDER — HYDROMORPHONE HYDROCHLORIDE 2 MG/ML
0.5 INJECTION, SOLUTION INTRAMUSCULAR; INTRAVENOUS; SUBCUTANEOUS
Status: DISCONTINUED | OUTPATIENT
Start: 2019-08-01 | End: 2019-08-01 | Stop reason: HOSPADM

## 2019-08-01 RX ORDER — ALBUTEROL SULFATE 0.83 MG/ML
2.5 SOLUTION RESPIRATORY (INHALATION) AS NEEDED
Status: DISCONTINUED | OUTPATIENT
Start: 2019-08-01 | End: 2019-08-01 | Stop reason: HOSPADM

## 2019-08-01 RX ORDER — LIDOCAINE HYDROCHLORIDE 10 MG/ML
INJECTION INFILTRATION; PERINEURAL AS NEEDED
Status: DISCONTINUED | OUTPATIENT
Start: 2019-08-01 | End: 2019-08-01 | Stop reason: HOSPADM

## 2019-08-01 RX ORDER — SODIUM CHLORIDE, SODIUM LACTATE, POTASSIUM CHLORIDE, CALCIUM CHLORIDE 600; 310; 30; 20 MG/100ML; MG/100ML; MG/100ML; MG/100ML
75 INJECTION, SOLUTION INTRAVENOUS CONTINUOUS
Status: DISCONTINUED | OUTPATIENT
Start: 2019-08-01 | End: 2019-08-01 | Stop reason: HOSPADM

## 2019-08-01 RX ADMIN — SUCCINYLCHOLINE CHLORIDE 160 MG: 20 INJECTION INTRAMUSCULAR; INTRAVENOUS at 08:03

## 2019-08-01 RX ADMIN — SODIUM CHLORIDE, SODIUM LACTATE, POTASSIUM CHLORIDE, AND CALCIUM CHLORIDE: 600; 310; 30; 20 INJECTION, SOLUTION INTRAVENOUS at 07:15

## 2019-08-01 RX ADMIN — HYDROMORPHONE HYDROCHLORIDE 0.5 MG: 2 INJECTION INTRAMUSCULAR; INTRAVENOUS; SUBCUTANEOUS at 08:55

## 2019-08-01 RX ADMIN — FENTANYL CITRATE 25 MCG: 50 INJECTION, SOLUTION INTRAMUSCULAR; INTRAVENOUS at 08:11

## 2019-08-01 RX ADMIN — DEXAMETHASONE SODIUM PHOSPHATE 6 MG: 4 INJECTION, SOLUTION INTRAMUSCULAR; INTRAVENOUS at 10:30

## 2019-08-01 RX ADMIN — LIDOCAINE HYDROCHLORIDE 60 MG: 20 INJECTION, SOLUTION EPIDURAL; INFILTRATION; INTRACAUDAL; PERINEURAL at 08:02

## 2019-08-01 RX ADMIN — HYDROMORPHONE HYDROCHLORIDE 0.5 MG: 2 INJECTION INTRAMUSCULAR; INTRAVENOUS; SUBCUTANEOUS at 08:50

## 2019-08-01 RX ADMIN — PROPOFOL 150 MG: 10 INJECTION, EMULSION INTRAVENOUS at 08:02

## 2019-08-01 RX ADMIN — PROMETHAZINE HYDROCHLORIDE 3.25 MG: 25 INJECTION INTRAMUSCULAR; INTRAVENOUS at 10:08

## 2019-08-01 RX ADMIN — FENTANYL CITRATE 75 MCG: 50 INJECTION, SOLUTION INTRAMUSCULAR; INTRAVENOUS at 08:02

## 2019-08-01 NOTE — ANESTHESIA POSTPROCEDURE EVALUATION
Procedure(s):  TONSILLECTOMY  DIRECT LARYNGOSCOPY/ TELESCOPIC DIRECT LARYNGOSCOPY/ TELESCOPIC BRONCHOSCOPY/ ESOPHAGOSCOPY/ RIGHT LARYNGEAL BIOPSY.     general    Anesthesia Post Evaluation      Multimodal analgesia: multimodal analgesia used between 6 hours prior to anesthesia start to PACU discharge  Patient location during evaluation: PACU  Patient participation: complete - patient participated  Level of consciousness: awake and responsive to verbal stimuli  Pain management: adequate  Airway patency: patent  Anesthetic complications: no  Cardiovascular status: acceptable  Respiratory status: acceptable, spontaneous ventilation and nonlabored ventilation  Hydration status: acceptable  Post anesthesia nausea and vomiting:  none      Vitals Value Taken Time   /85 8/1/2019  8:45 AM   Temp 36.4 °C (97.5 °F) 8/1/2019  8:35 AM   Pulse 60 8/1/2019  8:45 AM   Resp 18 8/1/2019  8:45 AM   SpO2 95 % 8/1/2019  8:45 AM

## 2019-08-01 NOTE — BRIEF OP NOTE
BRIEF OPERATIVE NOTE    Date of Procedure: 8/1/2019   Preoperative Diagnosis: Lesion of tonsil [J35.9]  Postoperative Diagnosis: Lesion of tonsil [J35.9]    Procedure(s):  TONSILLECTOMY  DIRECT LARYNGOSCOPY/ TELESCOPIC DIRECT LARYNGOSCOPY/ TELESCOPIC BRONCHOSCOPY/ ESOPHAGOSCOPY/ DL with right laryngeal  BIOPSY  Surgeon(s) and Role:     * Scotty Lewis MD - Primary         Surgical Assistant: none    Surgical Staff:  Circ-1: Petros Rincon RN  Scrub Tech-1: Manny Vega  Scrub Tech-2: Brandi Mccollum  Event Time In Time Out   Incision Start 0805    Incision Close 0827      Anesthesia: General   Estimated Blood Loss: 10 cc  Specimens:   ID Type Source Tests Collected by Time Destination   1 : right laryngeal cyst Preservative   Scotty Lewis MD 8/1/2019 1624 Pathology   2 : tonsils Preservative   Scotty Lewis MD 8/1/2019 0820 Pathology      Findings: Lesion involving most of right tonsil  Cystic right anterior laryngeal [id number]    Complications: none  Implants: * No implants in log *

## 2019-08-01 NOTE — DISCHARGE INSTRUCTIONS
Tonsillectomy: What to Expect at Home  Your Recovery  A tonsillectomy is surgery to remove the tonsils. Sometimes the adenoids are removed during the same surgery. The tonsils and adenoids are in the throat. Your doctor did the surgery through your mouth. Most adults have a lot of throat pain for 1 to 2 weeks or longer. The pain may get worse before it gets better. The pain in your throat can also make your ears hurt. You may have good days and bad days. Most people find that they have the most pain in the first 8 days. You probably will feel tired for 1 to 2 weeks. You may have bad breath for up to 2 weeks. You may be able to go back to work or your usual routine in 1 to 2 weeks. There will be a white coating in your throat where the tonsils were. The coating is like a scab, and it usually starts to come off in 5 to 10 days. It is usually gone in 10 to 16 days. You may see some blood in your spit as the coating comes off. After surgery, you may snore or breathe through your mouth at night. This usually gets better 1 to 2 weeks after surgery. Mouth breathing can make your mouth and throat dry or sore. Place a humidifier by your bed when you sleep. This may make it easier for you to breathe. Follow the directions for cleaning the machine. At first, your voice may sound different. Your voice probably will return to normal in 2 to 6 weeks. It is common for people to lose weight after this surgery, because it can hurt to swallow food at first. As long as you are drinking plenty of liquids, this is okay. You will probably gain the weight back when you begin to eat normally again. This care sheet gives you a general idea about how long it will take for you to recover. But each person recovers at a different pace. Follow the steps below to get better as quickly as possible. How can you care for yourself at home? Activity    · Rest when you feel tired.  Getting enough sleep will help you recover.     · Try to walk each day. Start by walking a little more than you did the day before. Bit by bit, increase the amount you walk. Walking boosts blood flow and helps prevent pneumonia and constipation.     · Avoid strenuous activities, such as bicycle riding, jogging, weight lifting, or aerobic exercise, for 2 weeks or until your doctor says it is okay.     · For 2 weeks, avoid lifting anything that would make you strain. This may include a child, heavy grocery bags and milk containers, a heavy briefcase or backpack, cat litter or dog food bags, or a vacuum .     · Avoid dirt, dust, and heat for 2 weeks after surgery. These things can irritate your throat.     · For about 1 week, try to avoid crowds or people who you know have a cold or the flu. This can help prevent you from getting an infection.     · You may bathe as usual.     · Ask your doctor when you can drive again.     · You will probably need to take 1 to 2 weeks off from work. It depends on the type of work you do and how you feel. Diet    · Drink plenty of fluids to avoid becoming dehydrated.     · If it is painful to swallow, start out with Popsicles, ice cream, or cold or room-temperature drinks. Do not eat or drink red food items, such as red juice or red Jell-O. The color may make you think you are bleeding. Avoid hot drinks, soda pop, orange or tomato juice, and other acidic foods that can sting the throat. These may make throat pain worse and cause bleeding.     · For 2 weeks, choose soft foods like pudding, yogurt, canned or cooked fruit, scrambled eggs, and mashed potatoes. Avoid eating hard or scratchy foods like chips or raw vegetables.     · You may notice that your bowel movements are not regular right after your surgery. This is common. Try to avoid constipation and straining with bowel movements. You may want to take a fiber supplement every day.  If you have not had a bowel movement after a couple of days, ask your doctor about taking a mild laxative. Medicines    · Your doctor will tell you if and when you can restart your medicines. He or she will also give you instructions about taking any new medicines.     · If you take blood thinners, such as warfarin (Coumadin), clopidogrel (Plavix), or aspirin, be sure to talk to your doctor. He or she will tell you if and when to start taking those medicines again. Make sure that you understand exactly what your doctor wants you to do.     · Be safe with medicines. Take pain medicines exactly as directed. ? If the doctor gave you a prescription medicine for pain, take it as prescribed. ? If you are not taking a prescription pain medicine, ask your doctor if you can take an over-the-counter medicine.     · If you think your pain medicine is making you sick to your stomach:  ? Take your pain medicine after meals (unless your doctor has told you not to). ? Ask your doctor for a different pain medicine.     · If your doctor prescribed antibiotics, take them as directed. Do not stop taking them just because you feel better. You need to take the full course of antibiotics. Follow-up care is a key part of your treatment and safety. Be sure to make and go to all appointments, and call your doctor if you are having problems. It's also a good idea to know your test results and keep a list of the medicines you take. When should you call for help? Call 911 anytime you think you may need emergency care. For example, call if:    · You passed out (lost consciousness).     · You have severe trouble breathing.     · You have a lot of bleeding.    Call your doctor now or seek immediate medical care if:    · You have signs of infection, such as:  ? Increased pain, swelling, warmth, or redness. ? Red streaks leading from the area. ? Pus draining from the area.   ? A fever.     · You are bleeding.     · You are too sick to your stomach to drink any fluids.     · You cannot keep down fluids.     · You have new pain, or your pain gets worse.    Watch closely for changes in your health, and be sure to contact your doctor if:    · You do not get better as expected. Where can you learn more? Go to http://jd-tyrell.info/. Enter I553 in the search box to learn more about \"Tonsillectomy: What to Expect at Home. \"  Current as of: October 21, 2018  Content Version: 12.1  © 1129-3455 [employer]. Care instructions adapted under license by [employer] (which disclaims liability or warranty for this information). If you have questions about a medical condition or this instruction, always ask your healthcare professional. [address] any warranty or liability for your use of this information. Patient Education        Bronchoscopy: What to Expect at Home  Your Recovery    Bronchoscopy lets your doctor look at your airway through a tube called a bronchoscope. Afterward, you may feel tired for 1 or 2 days. Your mouth may feel very dry for several hours after the procedure. You may also have a sore throat and a hoarse voice for a few days. Sucking on throat lozenges or gargling with warm salt water may help soothe your sore throat. If a sample of tissue (biopsy) was taken, you may spit up a small amount of blood or have bloody saliva. This is normal.  This care sheet gives you a general idea about how long it will take for you to recover. But each person recovers at a different pace. Follow the steps below to get better as quickly as possible. How can you care for yourself at home? Activity    · Do not eat anything for 2 hours after the procedure.     · Rest when you feel tired. Getting enough sleep will help you recover.     · Avoid strenuous activities, such as bicycle riding, jogging, weight lifting, or aerobic exercise, until your doctor says it is okay.     · Ask your doctor when you can drive again. Diet    · You can eat your normal diet.  If your stomach is upset, try bland, low-fat foods like plain rice, broiled chicken, toast, and yogurt.     · If it is painful to swallow, start out with cold drinks, flavored ice pops, and ice cream. Next, try soft foods like pudding, yogurt, canned or cooked fruit, scrambled eggs, and mashed potatoes. Avoid eating hard or scratchy foods like chips or raw vegetables. Avoid orange or tomato juice and other acidic foods that can sting the throat.     · Drink plenty of fluids to avoid becoming dehydrated (unless your doctor tells you not to). Medicines    · Take pain medicines exactly as directed. ? If the doctor gave you a prescription medicine for pain, take it as prescribed. ? If you are not taking a prescription pain medicine, ask your doctor if you can take an over-the-counter medicine.     · If you think your pain medicine is making you sick to your stomach:  ? Take your medicine after meals (unless your doctor has told you not to). ? Ask your doctor for a different pain medicine.     · If your doctor prescribed antibiotics, take them as directed. Do not stop taking them just because you feel better. You need to take the full course of antibiotics. Follow-up care is a key part of your treatment and safety. Be sure to make and go to all appointments, and call your doctor if you are having problems. It's also a good idea to know your test results and keep a list of the medicines you take. When should you call for help? Call 911 anytime you think you may need emergency care.  For example, call if:    · You passed out (lost consciousness).     · You have sudden chest pain and shortness of breath.     · You cough up large amounts of bright red blood.     · You have severe pain in your chest.     · You have severe trouble breathing.    Call your doctor now or seek immediate medical care if:    · You cough up more than a few tablespoons of blood.     · You have pain that does not get better after you take pain medicine.     · You have a fever over 100°F.     · You still sound hoarse after a few days.     · You have bubbles under the skin around the collarbone. These may crackle and pop when you press on them.    Watch closely for changes in your health, and be sure to contact your doctor if you have any problems. Where can you learn more? Go to http://jd-tyrell.info/. Enter U354 in the search box to learn more about \"Bronchoscopy: What to Expect at Home. \"  Current as of: September 5, 2018  Content Version: 12.1  © 9664-8554 [employer]. Care instructions adapted under license by [employer] (which disclaims liability or warranty for this information). If you have questions about a medical condition or this instruction, always ask your healthcare professional. [address] any warranty or liability for your use of this information.

## 2019-08-01 NOTE — ANESTHESIA PREPROCEDURE EVALUATION
Relevant Problems   No relevant active problems       Anesthetic History               Review of Systems / Medical History  Patient summary reviewed, nursing notes reviewed and pertinent labs reviewed    Pulmonary    COPD: mild               Neuro/Psych         TIA     Cardiovascular    Hypertension: well controlled        Dysrhythmias : PVC  CAD and CABG    Exercise tolerance: >4 METS     GI/Hepatic/Renal     GERD: well controlled           Endo/Other    Diabetes: type 2    Arthritis     Other Findings   Comments: Tonsillar mass on CT- seen by ENT- she is scheduled for tonsillectomy after carotid- no vocal cord or laryngeal involvement per his nasal endoscopy report           Physical Exam    Airway  Mallampati: II  TM Distance: > 6 cm  Neck ROM: normal range of motion   Mouth opening: Normal     Cardiovascular    Rhythm: regular  Rate: normal         Dental    Dentition: Full upper dentures and Lower partial plate     Pulmonary        Wheezes:bibasilar         Abdominal         Other Findings            Anesthetic Plan    ASA: 3  Anesthesia type: general          Induction: Intravenous  Anesthetic plan and risks discussed with: Patient

## 2019-08-02 NOTE — OP NOTES
[address] [address]  OPERATIVE REPORT    Name:  Robert Ahn  MR#:  [id number]  :  1940  ACCOUNT #:  [de-identified]  DATE OF SERVICE:  2019    PREOPERATIVE DIAGNOSIS:  Right tonsil lesion. POSTOPERATIVE DIAGNOSES:  Right tonsil lesion and right laryngeal lesion. PROCEDURE PERFORMED:  Direct laryngoscopy, telescopic direct laryngoscopy, telescopic bronchoscopy, esophagoscopy, direct laryngoscopy with right laryngeal biopsy, and tonsillectomy. SURGEON:  Alirio Jang. Surendra Hutchins MD    ASSISTANT:  none. ANESTHESIA:  General.    COMPLICATIONS:  none. SPECIMENS REMOVED:  Right laryngeal lesion and tonsils . IMPLANTS:  none. ESTIMATED BLOOD LOSS:  10 mL. FINDINGS:  The patient had 3+ enlarged right tonsil with significant induration and obvious tonsillar lesion. The left tonsil was 1 to 2+, enlarged. She had a cystic area in the anterior right ventricle, which was filled with fluid. OPERATION:  The patient was placed in supine position. General anesthesia was induced. Direct laryngoscopy was performed. No lesions were noted of the base of tongue, vallecula, epiglottis, posterior or lateral pharyngeal walls, postcricoid area or piriform sinus. Telescopic laryngoscopy was performed. The vocal cords appeared clear. There was a cystic mass approximately 3 to 4 mm in the right anterior ventricle. Telescopic bronchoscopy was performed. There were no lesions identified. The tracheal arches were easily visualized with no edema, erythema, or cobblestoning. The patient was intubated by myself. Cervical esophagoscopy was performed without difficulty. No lesions were identified. Then, direct laryngoscopy was performed. The lesion in the right ventricle was removed and was cystic in nature with fluid. The vocal cords appeared normal.  Then, a Chiqui-Neo mouth gag was inserted and the right tonsil was retracted from the fossa and removed with Juan Carlos-coated Bovie.   Hemostasis was maintained with electrocautery. Procedure was repeated on the left tonsil. After maintaining hemostasis in the tonsillar fossa, the nasopharynx was vigorously irrigated with normal saline. There being no further bleeding, the patient was awakened and transferred to recovery room in good condition.       Deb Bernard MD      [id number]  D:  08/01/2019 9:34  T:  08/01/2019 10:30  JOB #:  [id number]

## 2019-10-02 ENCOUNTER — HOSPITAL ENCOUNTER (OUTPATIENT)
Dept: SURGERY | Age: 79
Discharge: HOME | End: 2019-10-02
Payer: MEDICARE

## 2019-10-02 ENCOUNTER — ANESTHESIA EVENT (OUTPATIENT)
Dept: SURGERY | Age: 79
DRG: 068 | End: 2019-10-02
Payer: MEDICARE

## 2019-10-02 VITALS
DIASTOLIC BLOOD PRESSURE: 69 MMHG | SYSTOLIC BLOOD PRESSURE: 142 MMHG | HEART RATE: 98 BPM | HEIGHT: 63 IN | WEIGHT: 159.13 LBS | OXYGEN SATURATION: 98 % | RESPIRATION RATE: 18 BRPM | TEMPERATURE: 98.3 F | BODY MASS INDEX: 28.2 KG/M2

## 2019-10-02 LAB
ANION GAP SERPL CALC-SCNC: 7 MMOL/L (ref 7–16)
BUN SERPL-MCNC: 13 MG/DL (ref 8–23)
CALCIUM SERPL-MCNC: 9.3 MG/DL (ref 8.3–10.4)
CHLORIDE SERPL-SCNC: 106 MMOL/L (ref 98–107)
CO2 SERPL-SCNC: 28 MMOL/L (ref 21–32)
CREAT SERPL-MCNC: 0.87 MG/DL (ref 0.6–1)
ERYTHROCYTE [DISTWIDTH] IN BLOOD BY AUTOMATED COUNT: 13.9 % (ref 11.9–14.6)
GLUCOSE BLD STRIP.AUTO-MCNC: 77 MG/DL (ref 65–100)
GLUCOSE SERPL-MCNC: 76 MG/DL (ref 65–100)
HCT VFR BLD AUTO: 48.6 % (ref 35.8–46.3)
HGB BLD-MCNC: 15.9 G/DL (ref 11.7–15.4)
MCH RBC QN AUTO: 29.9 PG (ref 26.1–32.9)
MCHC RBC AUTO-ENTMCNC: 32.7 G/DL (ref 31.4–35)
MCV RBC AUTO: 91.5 FL (ref 79.6–97.8)
NRBC # BLD: 0 K/UL (ref 0–0.2)
PLATELET # BLD AUTO: 227 K/UL (ref 150–450)
PMV BLD AUTO: 9.8 FL (ref 9.4–12.3)
POTASSIUM SERPL-SCNC: 4.2 MMOL/L (ref 3.5–5.1)
RBC # BLD AUTO: 5.31 M/UL (ref 4.05–5.2)
SODIUM SERPL-SCNC: 141 MMOL/L (ref 136–145)
WBC # BLD AUTO: 8.4 K/UL (ref 4.3–11.1)

## 2019-10-02 PROCEDURE — 85027 COMPLETE CBC AUTOMATED: CPT

## 2019-10-02 PROCEDURE — 82962 GLUCOSE BLOOD TEST: CPT

## 2019-10-02 PROCEDURE — 80048 BASIC METABOLIC PNL TOTAL CA: CPT

## 2019-10-02 NOTE — PERIOP NOTES
Recent Results (from the past 12 hour(s))   GLUCOSE, POC    Collection Time: 10/02/19  3:14 PM   Result Value Ref Range    Glucose (POC) 77 65 - 100 mg/dL   CBC W/O DIFF    Collection Time: 10/02/19  3:16 PM   Result Value Ref Range    WBC 8.4 4.3 - 11.1 K/uL    RBC 5.31 (H) 4.05 - 5.2 M/uL    HGB 15.9 (H) 11.7 - 15.4 g/dL    HCT 48.6 (H) 35.8 - 46.3 %    MCV 91.5 79.6 - 97.8 FL    MCH 29.9 26.1 - 32.9 PG    MCHC 32.7 31.4 - 35.0 g/dL    RDW 13.9 11.9 - 14.6 %    PLATELET 924 790 - 072 K/uL    MPV 9.8 9.4 - 12.3 FL    ABSOLUTE NRBC 0.00 0.0 - 0.2 K/uL   METABOLIC PANEL, BASIC    Collection Time: 10/02/19  3:16 PM   Result Value Ref Range    Sodium 141 136 - 145 mmol/L    Potassium 4.2 3.5 - 5.1 mmol/L    Chloride 106 98 - 107 mmol/L    CO2 28 21 - 32 mmol/L    Anion gap 7 7 - 16 mmol/L    Glucose 76 65 - 100 mg/dL    BUN 13 8 - 23 MG/DL    Creatinine 0.87 0.6 - 1.0 MG/DL    GFR est AA >60 >60 ml/min/1.73m2    GFR est non-AA >60 >60 ml/min/1.73m2    Calcium 9.3 8.3 - 10.4 MG/DL   Reviewed

## 2019-10-02 NOTE — PERIOP NOTES
Patient verified name and . Patient provided medical/health information and PTA medications to the best of their ability. TYPE  CASE:2  Order for consent  found in EHR and matches case posting. Labs per surgeon:cbc,bmp. Results:pending  Labs per anesthesia protocol: hgb. Results pending  EKG  :  19  Dr Nay Puga in to see pt. Chart reviewed , pt approved for surgery. Patient provided with and instructed on education handouts including Guide to Surgery, blood transfusions, pain management, and hand hygiene for the family and community, and [facility] brochure. Hibiclens and instructions given per hospital policy. Instructed patient to continue previous medications as prescribed prior to surgery unless otherwise directed and to take the following medications the day of surgery according to anesthesia guidelines : Norpace, Propranolol, Amlodipine, ASA, Isosorbide . Instructed patient to hold  the following medications: No Metformin morning of surgery. Original medication prescription bottles not visualized during patient appointment. Patient teach back successful and patient demonstrates knowledge of instruction.

## 2019-10-08 RX ORDER — NALOXONE HYDROCHLORIDE 0.4 MG/ML
0.1 INJECTION, SOLUTION INTRAMUSCULAR; INTRAVENOUS; SUBCUTANEOUS AS NEEDED
Status: CANCELLED | OUTPATIENT
Start: 2019-10-08

## 2019-10-08 RX ORDER — HYDROMORPHONE HYDROCHLORIDE 2 MG/ML
0.5 INJECTION, SOLUTION INTRAMUSCULAR; INTRAVENOUS; SUBCUTANEOUS
Status: CANCELLED | OUTPATIENT
Start: 2019-10-08

## 2019-10-08 RX ORDER — SODIUM CHLORIDE, SODIUM LACTATE, POTASSIUM CHLORIDE, CALCIUM CHLORIDE 600; 310; 30; 20 MG/100ML; MG/100ML; MG/100ML; MG/100ML
125 INJECTION, SOLUTION INTRAVENOUS CONTINUOUS
Status: CANCELLED | OUTPATIENT
Start: 2019-10-08

## 2019-10-08 RX ORDER — OXYCODONE HYDROCHLORIDE 5 MG/1
5 TABLET ORAL
Status: CANCELLED | OUTPATIENT
Start: 2019-10-08 | End: 2019-10-09

## 2019-10-09 ENCOUNTER — ANESTHESIA (OUTPATIENT)
Dept: SURGERY | Age: 79
DRG: 068 | End: 2019-10-09
Payer: MEDICARE

## 2019-10-09 ENCOUNTER — HOSPITAL ENCOUNTER (OUTPATIENT)
Age: 79
Setting detail: OUTPATIENT SURGERY
Discharge: HOME | DRG: 068 | End: 2019-10-09
Attending: SURGERY | Admitting: SURGERY
Payer: MEDICARE

## 2019-10-09 VITALS
SYSTOLIC BLOOD PRESSURE: 149 MMHG | BODY MASS INDEX: 28.21 KG/M2 | WEIGHT: 159.25 LBS | HEART RATE: 97 BPM | DIASTOLIC BLOOD PRESSURE: 65 MMHG | OXYGEN SATURATION: 97 % | HEIGHT: 63 IN | TEMPERATURE: 98.4 F | RESPIRATION RATE: 18 BRPM

## 2019-10-09 LAB
ABO + RH BLD: NORMAL
BLOOD BANK CMNT PATIENT-IMP: NORMAL
BLOOD GROUP ANTIBODIES SERPL: NORMAL
GLUCOSE BLD STRIP.AUTO-MCNC: 176 MG/DL (ref 65–100)
SPECIMEN EXP DATE BLD: NORMAL

## 2019-10-09 PROCEDURE — 86900 BLOOD TYPING SEROLOGIC ABO: CPT

## 2019-10-09 PROCEDURE — 82962 GLUCOSE BLOOD TEST: CPT

## 2019-10-09 PROCEDURE — 65270000029 HC RM PRIVATE

## 2019-10-09 PROCEDURE — 74011250636 HC RX REV CODE- 250/636: Performed by: ANESTHESIOLOGY

## 2019-10-09 PROCEDURE — 74011250637 HC RX REV CODE- 250/637: Performed by: ANESTHESIOLOGY

## 2019-10-09 RX ORDER — SODIUM CHLORIDE 0.9 % (FLUSH) 0.9 %
5-40 SYRINGE (ML) INJECTION EVERY 8 HOURS
Status: DISCONTINUED | OUTPATIENT
Start: 2019-10-09 | End: 2019-10-09 | Stop reason: HOSPADM

## 2019-10-09 RX ORDER — CEFAZOLIN SODIUM/WATER 2 G/20 ML
2 SYRINGE (ML) INTRAVENOUS ONCE
Status: DISCONTINUED | OUTPATIENT
Start: 2019-10-09 | End: 2019-10-09 | Stop reason: HOSPADM

## 2019-10-09 RX ORDER — ACETAMINOPHEN 500 MG
1000 TABLET ORAL ONCE
Status: COMPLETED | OUTPATIENT
Start: 2019-10-09 | End: 2019-10-09

## 2019-10-09 RX ORDER — SODIUM CHLORIDE, SODIUM LACTATE, POTASSIUM CHLORIDE, CALCIUM CHLORIDE 600; 310; 30; 20 MG/100ML; MG/100ML; MG/100ML; MG/100ML
125 INJECTION, SOLUTION INTRAVENOUS CONTINUOUS
Status: DISCONTINUED | OUTPATIENT
Start: 2019-10-09 | End: 2019-10-09 | Stop reason: HOSPADM

## 2019-10-09 RX ORDER — SODIUM CHLORIDE 0.9 % (FLUSH) 0.9 %
5-40 SYRINGE (ML) INJECTION AS NEEDED
Status: DISCONTINUED | OUTPATIENT
Start: 2019-10-09 | End: 2019-10-09 | Stop reason: HOSPADM

## 2019-10-09 RX ORDER — LIDOCAINE HYDROCHLORIDE 10 MG/ML
0.1 INJECTION INFILTRATION; PERINEURAL AS NEEDED
Status: DISCONTINUED | OUTPATIENT
Start: 2019-10-09 | End: 2019-10-09 | Stop reason: HOSPADM

## 2019-10-09 RX ADMIN — ACETAMINOPHEN 1000 MG: 500 TABLET, FILM COATED ORAL at 06:12

## 2019-10-09 RX ADMIN — SODIUM CHLORIDE, SODIUM LACTATE, POTASSIUM CHLORIDE, AND CALCIUM CHLORIDE 125 ML/HR: 600; 310; 30; 20 INJECTION, SOLUTION INTRAVENOUS at 06:12

## 2019-10-09 NOTE — PROGRESS NOTES
Spiritual Care Visit, initial visit. Visited with patient at bedside. Prayed for patient's healing and health. Visit by Nia Arora, Staff .  Ashley., Eva.B., B.A.

## 2019-10-15 ENCOUNTER — ANESTHESIA EVENT (OUTPATIENT)
Dept: SURGERY | Age: 79
DRG: 038 | End: 2019-10-15
Payer: MEDICARE

## 2019-10-15 RX ORDER — SODIUM CHLORIDE 0.9 % (FLUSH) 0.9 %
5-40 SYRINGE (ML) INJECTION EVERY 8 HOURS
Status: CANCELLED | OUTPATIENT
Start: 2019-10-15

## 2019-10-15 RX ORDER — SODIUM CHLORIDE 0.9 % (FLUSH) 0.9 %
5-40 SYRINGE (ML) INJECTION AS NEEDED
Status: CANCELLED | OUTPATIENT
Start: 2019-10-15

## 2019-10-15 RX ORDER — CEFAZOLIN SODIUM/WATER 2 G/20 ML
2 SYRINGE (ML) INTRAVENOUS ONCE
Status: CANCELLED | OUTPATIENT
Start: 2019-10-15 | End: 2019-10-15

## 2019-10-16 ENCOUNTER — APPOINTMENT (OUTPATIENT)
Dept: CT IMAGING | Age: 79
DRG: 038 | End: 2019-10-16
Attending: PSYCHIATRY & NEUROLOGY
Payer: MEDICARE

## 2019-10-16 ENCOUNTER — HOSPITAL ENCOUNTER (INPATIENT)
Age: 79
LOS: 1 days | Discharge: HOME | DRG: 038 | End: 2019-10-17
Attending: SURGERY | Admitting: SURGERY
Payer: MEDICARE

## 2019-10-16 ENCOUNTER — ANESTHESIA (OUTPATIENT)
Dept: SURGERY | Age: 79
DRG: 038 | End: 2019-10-16
Payer: MEDICARE

## 2019-10-16 DIAGNOSIS — R40.2413 GLASGOW COMA SCALE TOTAL SCORE 13-15, AT HOSPITAL ADMISSION: ICD-10-CM

## 2019-10-16 DIAGNOSIS — I65.22 STENOSIS OF LEFT CAROTID ARTERY: Primary | ICD-10-CM

## 2019-10-16 PROBLEM — Z48.89 OBSERVATION AFTER SURGERY: Status: ACTIVE | Noted: 2019-10-16

## 2019-10-16 LAB
ABO + RH BLD: NORMAL
ACT BLD: 131 SECS (ref 70–128)
BASE DEFICIT BLD-SCNC: 1 MMOL/L
BLOOD BANK CMNT PATIENT-IMP: NORMAL
BLOOD GROUP ANTIBODIES SERPL: NORMAL
CA-I BLD-MCNC: 1.23 MMOL/L (ref 1.12–1.32)
GLUCOSE BLD STRIP.AUTO-MCNC: 112 MG/DL (ref 65–100)
GLUCOSE BLD STRIP.AUTO-MCNC: 140 MG/DL (ref 65–100)
GLUCOSE BLD STRIP.AUTO-MCNC: 173 MG/DL (ref 65–100)
HCO3 BLD-SCNC: 23.7 MMOL/L (ref 22–26)
PCO2 BLD: 39.5 MMHG (ref 35–45)
PH BLD: 7.39 [PH] (ref 7.35–7.45)
PO2 BLD: 238 MMHG (ref 75–100)
POTASSIUM BLD-SCNC: 3.5 MMOL/L (ref 3.5–5.1)
SAO2 % BLD: 100 % (ref 95–98)
SODIUM BLD-SCNC: 139 MMOL/L (ref 136–145)
SPECIMEN EXP DATE BLD: NORMAL

## 2019-10-16 PROCEDURE — 74011000250 HC RX REV CODE- 250: Performed by: SURGERY

## 2019-10-16 PROCEDURE — 74011250637 HC RX REV CODE- 250/637: Performed by: SURGERY

## 2019-10-16 PROCEDURE — 03UN0KZ SUPPLEMENT LEFT EXTERNAL CAROTID ARTERY WITH NONAUTOLOGOUS TISSUE SUBSTITUTE, OPEN APPROACH: ICD-10-PCS | Performed by: SURGERY

## 2019-10-16 PROCEDURE — 74011000250 HC RX REV CODE- 250: Performed by: ANESTHESIOLOGY

## 2019-10-16 PROCEDURE — 4A133J1 MONITORING OF ARTERIAL PULSE, PERIPHERAL, PERCUTANEOUS APPROACH: ICD-10-PCS | Performed by: ANESTHESIOLOGY

## 2019-10-16 PROCEDURE — 77030019605

## 2019-10-16 PROCEDURE — 85347 COAGULATION TIME ACTIVATED: CPT

## 2019-10-16 PROCEDURE — 76010000173 HC OR TIME 3 TO 3.5 HR INTENSV-TIER 1: Performed by: SURGERY

## 2019-10-16 PROCEDURE — 74011000250 HC RX REV CODE- 250

## 2019-10-16 PROCEDURE — 76060000037 HC ANESTHESIA 3 TO 3.5 HR: Performed by: SURGERY

## 2019-10-16 PROCEDURE — 77030018836 HC SOL IRR NACL ICUM -A: Performed by: SURGERY

## 2019-10-16 PROCEDURE — 03CL0ZZ EXTIRPATION OF MATTER FROM LEFT INTERNAL CAROTID ARTERY, OPEN APPROACH: ICD-10-PCS | Performed by: SURGERY

## 2019-10-16 PROCEDURE — 77030018673: Performed by: SURGERY

## 2019-10-16 PROCEDURE — 77030019908 HC STETH ESOPH SIMS -A: Performed by: NURSE ANESTHETIST, CERTIFIED REGISTERED

## 2019-10-16 PROCEDURE — 65610000001 HC ROOM ICU GENERAL

## 2019-10-16 PROCEDURE — 76210000006 HC OR PH I REC 0.5 TO 1 HR: Performed by: SURGERY

## 2019-10-16 PROCEDURE — 82962 GLUCOSE BLOOD TEST: CPT

## 2019-10-16 PROCEDURE — 77030018824 HC SHNT CAR ARGY COVD -B: Performed by: SURGERY

## 2019-10-16 PROCEDURE — 77030002986 HC SUT PROL J&J -A: Performed by: SURGERY

## 2019-10-16 PROCEDURE — 77030034888 HC SUT PROL 2 J&J -B: Performed by: SURGERY

## 2019-10-16 PROCEDURE — 77030020263 HC SOL INJ SOD CL0.9% LFCR 1000ML

## 2019-10-16 PROCEDURE — 74011250636 HC RX REV CODE- 250/636: Performed by: SURGERY

## 2019-10-16 PROCEDURE — 82803 BLOOD GASES ANY COMBINATION: CPT

## 2019-10-16 PROCEDURE — 77030008542 HC TBNG MON PRSS EDWD -A: Performed by: NURSE ANESTHETIST, CERTIFIED REGISTERED

## 2019-10-16 PROCEDURE — 77030005401 HC CATH RAD ARRO -A: Performed by: NURSE ANESTHETIST, CERTIFIED REGISTERED

## 2019-10-16 PROCEDURE — 03UL0KZ SUPPLEMENT LEFT INTERNAL CAROTID ARTERY WITH NONAUTOLOGOUS TISSUE SUBSTITUTE, OPEN APPROACH: ICD-10-PCS | Performed by: SURGERY

## 2019-10-16 PROCEDURE — 77030016570 HC BLNKT BAIR HGGR 3M -B: Performed by: NURSE ANESTHETIST, CERTIFIED REGISTERED

## 2019-10-16 PROCEDURE — 99223 1ST HOSP IP/OBS HIGH 75: CPT | Performed by: PSYCHIATRY & NEUROLOGY

## 2019-10-16 PROCEDURE — 74011250636 HC RX REV CODE- 250/636

## 2019-10-16 PROCEDURE — 77030013794 HC KT TRNSDUC BLD EDWD -B: Performed by: NURSE ANESTHETIST, CERTIFIED REGISTERED

## 2019-10-16 PROCEDURE — 03HY32Z INSERTION OF MONITORING DEVICE INTO UPPER ARTERY, PERCUTANEOUS APPROACH: ICD-10-PCS | Performed by: ANESTHESIOLOGY

## 2019-10-16 PROCEDURE — 77030031139 HC SUT VCRL2 J&J -A: Performed by: SURGERY

## 2019-10-16 PROCEDURE — 77030010512 HC APPL CLP LIG J&J -C: Performed by: SURGERY

## 2019-10-16 PROCEDURE — 70450 CT HEAD/BRAIN W/O DYE: CPT

## 2019-10-16 PROCEDURE — 03CN0ZZ EXTIRPATION OF MATTER FROM LEFT EXTERNAL CAROTID ARTERY, OPEN APPROACH: ICD-10-PCS | Performed by: SURGERY

## 2019-10-16 PROCEDURE — 86900 BLOOD TYPING SEROLOGIC ABO: CPT

## 2019-10-16 PROCEDURE — 0042T CT PERF W CBF: CPT

## 2019-10-16 PROCEDURE — 77030039266 HC ADH SKN EXOFIN S2SG -A: Performed by: SURGERY

## 2019-10-16 PROCEDURE — 74011636320 HC RX REV CODE- 636/320: Performed by: SURGERY

## 2019-10-16 PROCEDURE — 77030002933 HC SUT MCRYL J&J -A: Performed by: SURGERY

## 2019-10-16 PROCEDURE — 74011000258 HC RX REV CODE- 258: Performed by: SURGERY

## 2019-10-16 PROCEDURE — 4A133B1 MONITORING OF ARTERIAL PRESSURE, PERIPHERAL, PERCUTANEOUS APPROACH: ICD-10-PCS | Performed by: ANESTHESIOLOGY

## 2019-10-16 PROCEDURE — 77030039425 HC BLD LARYNG TRULITE DISP TELE -A: Performed by: NURSE ANESTHETIST, CERTIFIED REGISTERED

## 2019-10-16 PROCEDURE — 77030002996 HC SUT SLK J&J -A: Performed by: SURGERY

## 2019-10-16 PROCEDURE — C1768 GRAFT, VASCULAR: HCPCS | Performed by: SURGERY

## 2019-10-16 PROCEDURE — 70496 CT ANGIOGRAPHY HEAD: CPT

## 2019-10-16 PROCEDURE — 82947 ASSAY GLUCOSE BLOOD QUANT: CPT

## 2019-10-16 PROCEDURE — 77030037088 HC TUBE ENDOTRACH ORAL NSL COVD-A: Performed by: NURSE ANESTHETIST, CERTIFIED REGISTERED

## 2019-10-16 PROCEDURE — 74011250636 HC RX REV CODE- 250/636: Performed by: ANESTHESIOLOGY

## 2019-10-16 PROCEDURE — 77030013292 HC BOWL MX PRSM J&J -A: Performed by: NURSE ANESTHETIST, CERTIFIED REGISTERED

## 2019-10-16 DEVICE — XENOSURE BIOLOGIC PATCH, 0.8CM X 8CM, EIFU
Type: IMPLANTABLE DEVICE | Site: CAROTID | Status: FUNCTIONAL
Brand: XENOSURE BIOLOGIC PATCH

## 2019-10-16 RX ORDER — PROMETHAZINE HYDROCHLORIDE 25 MG/ML
INJECTION, SOLUTION INTRAMUSCULAR; INTRAVENOUS
Status: ACTIVE
Start: 2019-10-16 | End: 2019-10-17

## 2019-10-16 RX ORDER — DEXAMETHASONE SODIUM PHOSPHATE 4 MG/ML
INJECTION, SOLUTION INTRA-ARTICULAR; INTRALESIONAL; INTRAMUSCULAR; INTRAVENOUS; SOFT TISSUE AS NEEDED
Status: DISCONTINUED | OUTPATIENT
Start: 2019-10-16 | End: 2019-10-16 | Stop reason: HOSPADM

## 2019-10-16 RX ORDER — PROPOFOL 10 MG/ML
INJECTION, EMULSION INTRAVENOUS AS NEEDED
Status: DISCONTINUED | OUTPATIENT
Start: 2019-10-16 | End: 2019-10-16 | Stop reason: HOSPADM

## 2019-10-16 RX ORDER — ISOSORBIDE MONONITRATE 30 MG/1
30 TABLET, EXTENDED RELEASE ORAL
Status: CANCELLED | OUTPATIENT
Start: 2019-10-17

## 2019-10-16 RX ORDER — SODIUM CHLORIDE 0.9 % (FLUSH) 0.9 %
5-40 SYRINGE (ML) INJECTION EVERY 8 HOURS
Status: DISCONTINUED | OUTPATIENT
Start: 2019-10-16 | End: 2019-10-16 | Stop reason: HOSPADM

## 2019-10-16 RX ORDER — HEPARIN SODIUM 1000 [USP'U]/ML
INJECTION, SOLUTION INTRAVENOUS; SUBCUTANEOUS AS NEEDED
Status: DISCONTINUED | OUTPATIENT
Start: 2019-10-16 | End: 2019-10-16 | Stop reason: HOSPADM

## 2019-10-16 RX ORDER — MORPHINE SULFATE 2 MG/ML
2 INJECTION, SOLUTION INTRAMUSCULAR; INTRAVENOUS
Status: DISCONTINUED | OUTPATIENT
Start: 2019-10-16 | End: 2019-10-17 | Stop reason: HOSPADM

## 2019-10-16 RX ORDER — SODIUM CHLORIDE 0.9 % (FLUSH) 0.9 %
10 SYRINGE (ML) INJECTION
Status: COMPLETED | OUTPATIENT
Start: 2019-10-16 | End: 2019-10-16

## 2019-10-16 RX ORDER — NALOXONE HYDROCHLORIDE 0.4 MG/ML
0.2 INJECTION, SOLUTION INTRAMUSCULAR; INTRAVENOUS; SUBCUTANEOUS
Status: DISCONTINUED | OUTPATIENT
Start: 2019-10-16 | End: 2019-10-16 | Stop reason: HOSPADM

## 2019-10-16 RX ORDER — NALOXONE HYDROCHLORIDE 0.4 MG/ML
0.2 INJECTION, SOLUTION INTRAMUSCULAR; INTRAVENOUS; SUBCUTANEOUS AS NEEDED
Status: DISCONTINUED | OUTPATIENT
Start: 2019-10-16 | End: 2019-10-16 | Stop reason: HOSPADM

## 2019-10-16 RX ORDER — PROPRANOLOL HYDROCHLORIDE 40 MG/1
80 TABLET ORAL
Status: CANCELLED | OUTPATIENT
Start: 2019-10-17

## 2019-10-16 RX ORDER — MIDAZOLAM HYDROCHLORIDE 1 MG/ML
2 INJECTION, SOLUTION INTRAMUSCULAR; INTRAVENOUS
Status: DISCONTINUED | OUTPATIENT
Start: 2019-10-16 | End: 2019-10-16 | Stop reason: HOSPADM

## 2019-10-16 RX ORDER — CEFAZOLIN SODIUM/WATER 2 G/20 ML
2 SYRINGE (ML) INTRAVENOUS EVERY 8 HOURS
Status: COMPLETED | OUTPATIENT
Start: 2019-10-16 | End: 2019-10-17

## 2019-10-16 RX ORDER — LIDOCAINE HYDROCHLORIDE 10 MG/ML
INJECTION INFILTRATION; PERINEURAL AS NEEDED
Status: DISCONTINUED | OUTPATIENT
Start: 2019-10-16 | End: 2019-10-16 | Stop reason: HOSPADM

## 2019-10-16 RX ORDER — HYDROCODONE BITARTRATE AND ACETAMINOPHEN 5; 325 MG/1; MG/1
1 TABLET ORAL
Status: DISCONTINUED | OUTPATIENT
Start: 2019-10-16 | End: 2019-10-17 | Stop reason: HOSPADM

## 2019-10-16 RX ORDER — SODIUM CHLORIDE 0.9 % (FLUSH) 0.9 %
5-40 SYRINGE (ML) INJECTION EVERY 8 HOURS
Status: DISCONTINUED | OUTPATIENT
Start: 2019-10-16 | End: 2019-10-17 | Stop reason: HOSPADM

## 2019-10-16 RX ORDER — FENTANYL CITRATE 50 UG/ML
INJECTION, SOLUTION INTRAMUSCULAR; INTRAVENOUS AS NEEDED
Status: DISCONTINUED | OUTPATIENT
Start: 2019-10-16 | End: 2019-10-16 | Stop reason: HOSPADM

## 2019-10-16 RX ORDER — HEPARIN SODIUM 5000 [USP'U]/ML
INJECTION, SOLUTION INTRAVENOUS; SUBCUTANEOUS AS NEEDED
Status: DISCONTINUED | OUTPATIENT
Start: 2019-10-16 | End: 2019-10-16 | Stop reason: HOSPADM

## 2019-10-16 RX ORDER — EPHEDRINE SULFATE 50 MG/ML
INJECTION, SOLUTION INTRAVENOUS AS NEEDED
Status: DISCONTINUED | OUTPATIENT
Start: 2019-10-16 | End: 2019-10-16 | Stop reason: HOSPADM

## 2019-10-16 RX ORDER — BUPIVACAINE HYDROCHLORIDE 2.5 MG/ML
INJECTION, SOLUTION EPIDURAL; INFILTRATION; INTRACAUDAL AS NEEDED
Status: DISCONTINUED | OUTPATIENT
Start: 2019-10-16 | End: 2019-10-16 | Stop reason: HOSPADM

## 2019-10-16 RX ORDER — CEFAZOLIN SODIUM/WATER 2 G/20 ML
2 SYRINGE (ML) INTRAVENOUS ONCE
Status: COMPLETED | OUTPATIENT
Start: 2019-10-16 | End: 2019-10-16

## 2019-10-16 RX ORDER — APREPITANT 40 MG/1
40 CAPSULE ORAL ONCE
Status: COMPLETED | OUTPATIENT
Start: 2019-10-16 | End: 2019-10-16

## 2019-10-16 RX ORDER — OXYCODONE HYDROCHLORIDE 5 MG/1
5 TABLET ORAL
Status: DISCONTINUED | OUTPATIENT
Start: 2019-10-16 | End: 2019-10-16 | Stop reason: HOSPADM

## 2019-10-16 RX ORDER — NEOSTIGMINE METHYLSULFATE 1 MG/ML
INJECTION INTRAVENOUS AS NEEDED
Status: DISCONTINUED | OUTPATIENT
Start: 2019-10-16 | End: 2019-10-16 | Stop reason: HOSPADM

## 2019-10-16 RX ORDER — ONDANSETRON 2 MG/ML
4 INJECTION INTRAMUSCULAR; INTRAVENOUS
Status: DISCONTINUED | OUTPATIENT
Start: 2019-10-16 | End: 2019-10-16 | Stop reason: HOSPADM

## 2019-10-16 RX ORDER — PROTAMINE SULFATE 10 MG/ML
INJECTION, SOLUTION INTRAVENOUS AS NEEDED
Status: DISCONTINUED | OUTPATIENT
Start: 2019-10-16 | End: 2019-10-16 | Stop reason: HOSPADM

## 2019-10-16 RX ORDER — MIDAZOLAM HYDROCHLORIDE 1 MG/ML
2 INJECTION, SOLUTION INTRAMUSCULAR; INTRAVENOUS ONCE
Status: DISCONTINUED | OUTPATIENT
Start: 2019-10-16 | End: 2019-10-16 | Stop reason: HOSPADM

## 2019-10-16 RX ORDER — SODIUM CHLORIDE 0.9 % (FLUSH) 0.9 %
5-40 SYRINGE (ML) INJECTION AS NEEDED
Status: DISCONTINUED | OUTPATIENT
Start: 2019-10-16 | End: 2019-10-16 | Stop reason: HOSPADM

## 2019-10-16 RX ORDER — SODIUM CHLORIDE, SODIUM LACTATE, POTASSIUM CHLORIDE, CALCIUM CHLORIDE 600; 310; 30; 20 MG/100ML; MG/100ML; MG/100ML; MG/100ML
INJECTION, SOLUTION INTRAVENOUS
Status: DISCONTINUED | OUTPATIENT
Start: 2019-10-16 | End: 2019-10-16 | Stop reason: HOSPADM

## 2019-10-16 RX ORDER — SODIUM CHLORIDE 0.9 % (FLUSH) 0.9 %
5-40 SYRINGE (ML) INJECTION AS NEEDED
Status: DISCONTINUED | OUTPATIENT
Start: 2019-10-16 | End: 2019-10-17 | Stop reason: HOSPADM

## 2019-10-16 RX ORDER — GLYCOPYRROLATE 0.2 MG/ML
INJECTION INTRAMUSCULAR; INTRAVENOUS AS NEEDED
Status: DISCONTINUED | OUTPATIENT
Start: 2019-10-16 | End: 2019-10-16 | Stop reason: HOSPADM

## 2019-10-16 RX ORDER — ONDANSETRON 2 MG/ML
4 INJECTION INTRAMUSCULAR; INTRAVENOUS
Status: DISCONTINUED | OUTPATIENT
Start: 2019-10-16 | End: 2019-10-17 | Stop reason: HOSPADM

## 2019-10-16 RX ORDER — AMLODIPINE BESYLATE 5 MG/1
5 TABLET ORAL DAILY
Status: CANCELLED | OUTPATIENT
Start: 2019-10-17

## 2019-10-16 RX ORDER — HYDROMORPHONE HYDROCHLORIDE 2 MG/ML
0.5 INJECTION, SOLUTION INTRAMUSCULAR; INTRAVENOUS; SUBCUTANEOUS
Status: DISCONTINUED | OUTPATIENT
Start: 2019-10-16 | End: 2019-10-16 | Stop reason: HOSPADM

## 2019-10-16 RX ORDER — FENTANYL CITRATE 50 UG/ML
100 INJECTION, SOLUTION INTRAMUSCULAR; INTRAVENOUS ONCE
Status: DISCONTINUED | OUTPATIENT
Start: 2019-10-16 | End: 2019-10-16 | Stop reason: HOSPADM

## 2019-10-16 RX ORDER — SODIUM CHLORIDE, SODIUM LACTATE, POTASSIUM CHLORIDE, CALCIUM CHLORIDE 600; 310; 30; 20 MG/100ML; MG/100ML; MG/100ML; MG/100ML
25 INJECTION, SOLUTION INTRAVENOUS CONTINUOUS
Status: DISCONTINUED | OUTPATIENT
Start: 2019-10-16 | End: 2019-10-16 | Stop reason: HOSPADM

## 2019-10-16 RX ORDER — LIDOCAINE HYDROCHLORIDE 10 MG/ML
0.1 INJECTION INFILTRATION; PERINEURAL AS NEEDED
Status: DISCONTINUED | OUTPATIENT
Start: 2019-10-16 | End: 2019-10-16 | Stop reason: HOSPADM

## 2019-10-16 RX ORDER — GUAIFENESIN 100 MG/5ML
81 LIQUID (ML) ORAL
Status: CANCELLED | OUTPATIENT
Start: 2019-10-17

## 2019-10-16 RX ORDER — SODIUM CHLORIDE, SODIUM LACTATE, POTASSIUM CHLORIDE, CALCIUM CHLORIDE 600; 310; 30; 20 MG/100ML; MG/100ML; MG/100ML; MG/100ML
75 INJECTION, SOLUTION INTRAVENOUS CONTINUOUS
Status: DISCONTINUED | OUTPATIENT
Start: 2019-10-16 | End: 2019-10-16 | Stop reason: HOSPADM

## 2019-10-16 RX ORDER — ROCURONIUM BROMIDE 10 MG/ML
INJECTION, SOLUTION INTRAVENOUS AS NEEDED
Status: DISCONTINUED | OUTPATIENT
Start: 2019-10-16 | End: 2019-10-16 | Stop reason: HOSPADM

## 2019-10-16 RX ORDER — LIDOCAINE HYDROCHLORIDE 20 MG/ML
INJECTION, SOLUTION EPIDURAL; INFILTRATION; INTRACAUDAL; PERINEURAL AS NEEDED
Status: DISCONTINUED | OUTPATIENT
Start: 2019-10-16 | End: 2019-10-16 | Stop reason: HOSPADM

## 2019-10-16 RX ORDER — SODIUM CHLORIDE 9 MG/ML
75 INJECTION, SOLUTION INTRAVENOUS CONTINUOUS
Status: DISCONTINUED | OUTPATIENT
Start: 2019-10-16 | End: 2019-10-17 | Stop reason: HOSPADM

## 2019-10-16 RX ADMIN — PROMETHAZINE HYDROCHLORIDE 6.25 MG: 25 INJECTION INTRAMUSCULAR; INTRAVENOUS at 13:41

## 2019-10-16 RX ADMIN — PROTAMINE SULFATE 5 MG: 10 INJECTION, SOLUTION INTRAVENOUS at 12:52

## 2019-10-16 RX ADMIN — EPHEDRINE SULFATE 10 MG: 50 INJECTION, SOLUTION INTRAVENOUS at 10:41

## 2019-10-16 RX ADMIN — SODIUM CHLORIDE 75 ML/HR: 900 INJECTION, SOLUTION INTRAVENOUS at 16:46

## 2019-10-16 RX ADMIN — PROTAMINE SULFATE 10 MG: 10 INJECTION, SOLUTION INTRAVENOUS at 13:00

## 2019-10-16 RX ADMIN — Medication 10 ML: at 21:37

## 2019-10-16 RX ADMIN — Medication 0.5 G: at 10:46

## 2019-10-16 RX ADMIN — ROCURONIUM BROMIDE 10 MG: 10 INJECTION, SOLUTION INTRAVENOUS at 11:19

## 2019-10-16 RX ADMIN — Medication 0.5 G: at 10:42

## 2019-10-16 RX ADMIN — FENTANYL CITRATE 50 MCG: 50 INJECTION, SOLUTION INTRAMUSCULAR; INTRAVENOUS at 10:28

## 2019-10-16 RX ADMIN — Medication 0.5 G: at 10:26

## 2019-10-16 RX ADMIN — PROPOFOL 10 MG: 10 INJECTION, EMULSION INTRAVENOUS at 12:13

## 2019-10-16 RX ADMIN — HYDROCODONE BITARTRATE AND ACETAMINOPHEN 1 TABLET: 5; 325 TABLET ORAL at 21:37

## 2019-10-16 RX ADMIN — PROPOFOL 30 MG: 10 INJECTION, EMULSION INTRAVENOUS at 12:16

## 2019-10-16 RX ADMIN — FENTANYL CITRATE 50 MCG: 50 INJECTION, SOLUTION INTRAMUSCULAR; INTRAVENOUS at 11:39

## 2019-10-16 RX ADMIN — PROTAMINE SULFATE 5 MG: 10 INJECTION, SOLUTION INTRAVENOUS at 12:59

## 2019-10-16 RX ADMIN — PROTAMINE SULFATE 10 MG: 10 INJECTION, SOLUTION INTRAVENOUS at 12:55

## 2019-10-16 RX ADMIN — PROPOFOL 50 MG: 10 INJECTION, EMULSION INTRAVENOUS at 11:21

## 2019-10-16 RX ADMIN — Medication 2 G: at 18:52

## 2019-10-16 RX ADMIN — PROPOFOL 100 MG: 10 INJECTION, EMULSION INTRAVENOUS at 10:35

## 2019-10-16 RX ADMIN — EPHEDRINE SULFATE 5 MG: 50 INJECTION, SOLUTION INTRAVENOUS at 12:11

## 2019-10-16 RX ADMIN — HEPARIN SODIUM 4000 UNITS: 1000 INJECTION, SOLUTION INTRAVENOUS; SUBCUTANEOUS at 12:26

## 2019-10-16 RX ADMIN — SODIUM CHLORIDE, SODIUM LACTATE, POTASSIUM CHLORIDE, CALCIUM CHLORIDE: 600; 310; 30; 20 INJECTION, SOLUTION INTRAVENOUS at 10:25

## 2019-10-16 RX ADMIN — EPHEDRINE SULFATE 5 MG: 50 INJECTION, SOLUTION INTRAVENOUS at 12:40

## 2019-10-16 RX ADMIN — LIDOCAINE HYDROCHLORIDE 60 MG: 20 INJECTION, SOLUTION EPIDURAL; INFILTRATION; INTRACAUDAL; PERINEURAL at 10:35

## 2019-10-16 RX ADMIN — NEOSTIGMINE METHYLSULFATE 1 MG: 1 INJECTION INTRAVENOUS at 13:05

## 2019-10-16 RX ADMIN — GLYCOPYRROLATE 0.2 MG: 0.2 INJECTION INTRAMUSCULAR; INTRAVENOUS at 13:07

## 2019-10-16 RX ADMIN — EPHEDRINE SULFATE 5 MG: 50 INJECTION, SOLUTION INTRAVENOUS at 11:43

## 2019-10-16 RX ADMIN — PROTAMINE SULFATE 10 MG: 10 INJECTION, SOLUTION INTRAVENOUS at 12:57

## 2019-10-16 RX ADMIN — EPHEDRINE SULFATE 5 MG: 50 INJECTION, SOLUTION INTRAVENOUS at 12:35

## 2019-10-16 RX ADMIN — PROTAMINE SULFATE 10 MG: 10 INJECTION, SOLUTION INTRAVENOUS at 12:58

## 2019-10-16 RX ADMIN — SODIUM CHLORIDE, SODIUM LACTATE, POTASSIUM CHLORIDE, AND CALCIUM CHLORIDE 25 ML/HR: 600; 310; 30; 20 INJECTION, SOLUTION INTRAVENOUS at 08:38

## 2019-10-16 RX ADMIN — EPHEDRINE SULFATE 5 MG: 50 INJECTION, SOLUTION INTRAVENOUS at 12:52

## 2019-10-16 RX ADMIN — APREPITANT 40 MG: 40 CAPSULE ORAL at 09:22

## 2019-10-16 RX ADMIN — DEXAMETHASONE SODIUM PHOSPHATE 10 MG: 4 INJECTION, SOLUTION INTRA-ARTICULAR; INTRALESIONAL; INTRAMUSCULAR; INTRAVENOUS; SOFT TISSUE at 10:40

## 2019-10-16 RX ADMIN — NEOSTIGMINE METHYLSULFATE 1 MG: 1 INJECTION INTRAVENOUS at 13:07

## 2019-10-16 RX ADMIN — IOPAMIDOL 75 ML: 755 INJECTION, SOLUTION INTRAVENOUS at 15:30

## 2019-10-16 RX ADMIN — GLYCOPYRROLATE 0.2 MG: 0.2 INJECTION INTRAMUSCULAR; INTRAVENOUS at 13:03

## 2019-10-16 RX ADMIN — HEPARIN SODIUM 8000 UNITS: 1000 INJECTION, SOLUTION INTRAVENOUS; SUBCUTANEOUS at 11:38

## 2019-10-16 RX ADMIN — EPHEDRINE SULFATE 5 MG: 50 INJECTION, SOLUTION INTRAVENOUS at 10:45

## 2019-10-16 RX ADMIN — EPHEDRINE SULFATE 5 MG: 50 INJECTION, SOLUTION INTRAVENOUS at 11:55

## 2019-10-16 RX ADMIN — Medication 10 ML: at 22:00

## 2019-10-16 RX ADMIN — SODIUM CHLORIDE 100 ML: 900 INJECTION, SOLUTION INTRAVENOUS at 15:29

## 2019-10-16 RX ADMIN — FENTANYL CITRATE 50 MCG: 50 INJECTION, SOLUTION INTRAMUSCULAR; INTRAVENOUS at 10:35

## 2019-10-16 RX ADMIN — Medication 10 ML: at 15:30

## 2019-10-16 RX ADMIN — HYDROCODONE BITARTRATE AND ACETAMINOPHEN 1 TABLET: 5; 325 TABLET ORAL at 17:42

## 2019-10-16 RX ADMIN — GLYCOPYRROLATE 0.2 MG: 0.2 INJECTION INTRAMUSCULAR; INTRAVENOUS at 13:05

## 2019-10-16 RX ADMIN — ROCURONIUM BROMIDE 40 MG: 10 INJECTION, SOLUTION INTRAVENOUS at 10:35

## 2019-10-16 RX ADMIN — PROPOFOL 10 MG: 10 INJECTION, EMULSION INTRAVENOUS at 12:46

## 2019-10-16 RX ADMIN — Medication 0.5 G: at 10:44

## 2019-10-16 RX ADMIN — NEOSTIGMINE METHYLSULFATE 1 MG: 1 INJECTION INTRAVENOUS at 13:03

## 2019-10-16 RX ADMIN — FENTANYL CITRATE 50 MCG: 50 INJECTION, SOLUTION INTRAMUSCULAR; INTRAVENOUS at 11:07

## 2019-10-16 NOTE — PROGRESS NOTES
LEAPFROG EVALUATION: [facility]    The patient is currently in the ICU post carotid endarterectomy. Management by Dr Eda Latham. Patient is currently hemodynamically stable. Patient has no needs identified for Intensivist management in the ICU setting at this time. Please notify us if can be of assistance. No charge billed to the patient. Thank you.     Visit Vitals  /67   Pulse 67   Temp 97.7 °F (36.5 °C)   Resp 16   Wt 157 lb 3.2 oz (71.3 kg)   SpO2 96%   BMI 27.85 kg/m²         Dez Arvizu, BURAK

## 2019-10-16 NOTE — ANESTHESIA PROCEDURE NOTES
Arterial Line Placement    Start time: 10/16/2019 10:43 AM  End time: 10/16/2019 10:45 AM  Performed by: Matias Miles MD  Authorized by: Matias Miles MD     Pre-Procedure  Timeout Time: 10:43        Procedure:   Prep:  Chlorhexidine  Seldinger Technique?: Yes    Orientation:  Left  Location:  Radial artery  Catheter size:  20 G  Number of attempts:  1  Cont Cardiac Output Sensor: No      Assessment:   Post-procedure:  Line secured and sterile dressing applied  Patient Tolerance:  Patient tolerated the procedure well with no immediate complications  Comment:   Ultrasound used

## 2019-10-16 NOTE — BRIEF OP NOTE
[address]   [address] [id number]  [phone] FAX: [phone]    Brief Op Note Template Note    Pre-Op Diagnosis: Carotid stenosis, left [I65.22]    Post-Op Diagnosis:  Carotid stenosis, left [I65.22]    Procedures: Procedure(s):  CAROTID ARTERY ENDARTERECTOMY LEFT    Surgeon: Yobany Huynh MD    Assistants: Surgeon(s): Dann Preciado MD      Anesthesia:  General     Findings:LICA plaque    Tourniquet Time:  * No tourniquets in log *    Estimated Blood Loss:               Specimens: Plaque           Implants:  * No implants in log *    Complications: None               Signed: Yobany Huynh MD      Elements of this note have been dictated using speech recognition software. As a result, errors of speech recognition may have occurred.

## 2019-10-16 NOTE — PROGRESS NOTES
Dual skin assessment completed with Melvi Menjivar RN. Patient with left neck incision, bruising noted around incision, skin glue intact. No bleeding noted. Heels intact, sacrum intact. Scattered bruising on arms, no other breakdown noted.

## 2019-10-16 NOTE — PROGRESS NOTES
responded to code S. Unable to locate . Please consult Spiritual Care as needed. Jorge Luis Greenfield.

## 2019-10-16 NOTE — PERIOP NOTES
TRANSFER - OUT REPORT:    Verbal report given to Kymberly  on [address] [place] [address]  being transferred to [facility]  for routine post - op       Report consisted of patients Situation, Background, Assessment and   Recommendations(SBAR). Information from the following report(s) SBAR, OR Summary and Procedure Summary was reviewed with the receiving nurse. Lines:   Peripheral IV 10/16/19 Right Hand (Active)   Site Assessment Clean, dry, & intact 10/16/2019  1:37 PM   Phlebitis Assessment 0 10/16/2019  1:37 PM   Infiltration Assessment 0 10/16/2019  1:37 PM   Dressing Status Clean, dry, & intact 10/16/2019  1:37 PM   Dressing Type Tape;Transparent 10/16/2019  1:37 PM   Hub Color/Line Status Pink; Infusing 10/16/2019  1:37 PM   Alcohol Cap Used No 10/16/2019  8:54 AM       Peripheral IV 10/16/19 Right Arm (Active)   Site Assessment Clean, dry, & intact 10/16/2019  1:37 PM   Phlebitis Assessment 0 10/16/2019  1:37 PM   Infiltration Assessment 0 10/16/2019  1:37 PM   Dressing Status Clean, dry, & intact 10/16/2019  1:37 PM   Dressing Type Tape;Transparent 10/16/2019  1:37 PM   Hub Color/Line Status Green; Infusing 10/16/2019  1:37 PM        Opportunity for questions and clarification was provided. Patient transported with:   Monitor  O2 @ 2 liters  Registered Nurse   NIH @ 30 min intervals at this time. Pt coming from CT scan after Code S was called. NIH was conducted at bedside dually. VTE prophylaxis orders have been written for [address] [place] [address]. Patient and family given floor number and nurses name. Family updated re: pt status after security code verified.

## 2019-10-16 NOTE — PROGRESS NOTES
- Visited as requested, to offer spiritual support and prayer prior to surgery.     Anabel Gaffney MDiv, [address] [place] [address]

## 2019-10-16 NOTE — PERIOP NOTES
Code S called after speaking with family at bedside and [facility] PACU Supervisor. The H&P states the pupils were equal and reactive this morning. The pre-op RN is no longer here to consult. Pt being transferred to CT and then to the ICU for continuing care.

## 2019-10-16 NOTE — CONSULTS
Consult    Patient: Tiffanie Orourke MRN: [id number]     YOB: 1940  Age: 70 y.o. Sex: female      Subjective:      Tiffanie Orourke is a 70 y.o. female who is being seen for code S. The patient underwent left CEA today with Dr. Pollo Mayo. After surgery, she was noted to have let sided facial droop and unequal pupils. Exact last known normal is unclear, however it was at least prior to surgery, which was early this morning. A code S was called at 1500. Neurology arrived to the bedside within two minutes. Initial NIHSS was 1. A CT of the head was obtained and was negative for acute changes. CTA was obtained and was negative for large vessel occlusion.      Past Medical History:   Diagnosis Date    Arrhythmia age 29    stays controlled with med per pt--- will request cardio records    Arthritis     hand and spine    CAD (coronary artery disease) 2012    cabg     Calculus of kidney     Carotid stenosis without infarct 10/21/2015    Coronary atherosclerosis of native coronary artery 10/21/2015    Diabetes ([facility] [address]     type2--- sqbs avg am--120--- hypo at 70's hgba1c 6.1 on 06/1/2019    Essential hypertension 10/21/2015    managed with medication    GERD (gastroesophageal reflux disease)     controlled with meds    HLD (hyperlipidemia) 10/21/2015    Insomnia     Right ureteral stone     Subclavian artery stenosis ([facility] [address] 10/21/2015     Past Surgical History:   Procedure Laterality Date    BREAST SURGERY PROCEDURE UNLISTED Right     lump removed    CARDIAC SURG PROCEDURE UNLIST  2012    CABG x 4 vessals    HX CAROTID ENDARTERECTOMY Right 06/13/2019    HX CARPAL TUNNEL RELEASE Right 06/13/2016    HX CARPAL TUNNEL RELEASE Left 10/24/2016    HX CORONARY ARTERY BYPASS GRAFT  2012    in [facility]-had a LIMA graft    HX HEENT      HX HYSTERECTOMY      HX OPEN CHOLECYSTECTOMY  20 yrs ago    HX UROLOGICAL  30 yrs ago    \" kidney surg\" r/t stone    HX UROLOGICAL 2013    cystoscopy, Right retrograde pyelogram, right ureteroscopy      Family History   Problem Relation Age of Onset    Heart Disease Mother     No Known Problems Father      Social History     Tobacco Use    Smoking status: Former Smoker     Packs/day: 1.00     Years: 50.00     Pack years: 50.00     Last attempt to quit: 2011     Years since quittin.7    Smokeless tobacco: Never Used    Tobacco comment: around 2nd hand smoke   Substance Use Topics    Alcohol use: No     Alcohol/week: 0.0 standard drinks      Current Facility-Administered Medications   Medication Dose Route Frequency Provider Last Rate Last Dose    lactated Ringers infusion  75 mL/hr IntraVENous CONTINUOUS Moraima Laboy MD        oxyCODONE IR (ROXICODONE) tablet 5 mg  5 mg Oral ONCE PRN Moraima Laboy MD        HYDROmorphone (PF) (DILAUDID) injection 0.5 mg  0.5 mg IntraVENous Multiple Moraima Laboy MD        naloxone [facility]) injection 0.2 mg  0.2 mg IntraVENous PRN Moraima Laboy MD        promethazine [facility]) with saline injection 6.25 mg  6.25 mg IntraVENous Q15MIN PRN Moraima Laboy MD   6.25 mg at 10/16/19 1341    promethazine (PHENERGAN) 25 mg/mL injection             sodium chloride (NS) flush 5-40 mL  5-40 mL IntraVENous Q8H Parish Cavazos MD        sodium chloride (NS) flush 5-40 mL  5-40 mL IntraVENous PRN Parish Cavazos MD        0.9% sodium chloride infusion  75 mL/hr IntraVENous CONTINUOUS Parish Cavazos MD            Allergies   Allergen Reactions    Adhesive Rash     If stays on for a long time       Review of Systems:  Not obtained due to emergent situation         Objective:     Vitals:    10/16/19 1408 10/16/19 1413 10/16/19 1427 10/16/19 1442   BP: 138/63 130/61 134/58 149/67   Pulse: 63 65 66 67   Resp:    Temp:       SpO2: 99% 99% (!) 76% 96%   Weight:            Physical Exam:  General - Well developed, well nourished, in no apparent distress. Pleasant and conversent. HEENT - Normocephalic, atraumatic. Conjunctiva, tympanic membranes, and oropharynx are clear. Neck - Supple without masses, no bruits   Cardiovascular - Regular rate and rhythm. Normal S1, S2 without murmurs, rubs, or gallops. Lungs - Clear to auscultation. Abdomen - Soft, nontender with normal bowel sounds. Extremities - Peripheral pulses intact. No edema and no rashes. NIHSS   NIHSS Score: 1  1a-Level of Consciousness 0  1b-What is Month/Age 0  1c-Open/Close Eyes&Hand 0  2 -Best Gaze 0  3 -Visual Fields 0  4 -Facial Palsy 1  5a-Motor-Left Arm 0  5b-Motor-Right Arm 0  6a-Motor-Left Leg 0  6b-Motor-Right Leg 0  7 -Limb Ataxia 0  8 -Sensory 0  9 -Best Language 0  10-Dysarthria 0  11-Extinction/Inattention 0    No results found for: CHOL, CHOLPOCT, CHOLX, CHLST, CHOLV, HDL, HDLPOC, HDLP, LDL, LDLCPOC, LDLC, DLDLP, VLDLC, VLDL, TGLX, TRIGL, TRIGP, TGLPOCT, CHHD, CHHDX     No results found for: HBA1C, HGBE8, PQV3RUPS, UPF8WFZX       CT Results (most recent):  Results from Hospital Encounter encounter on 10/16/19   CT CODE NEURO HEAD WO CONTRAST    Narrative NONCONTRAST HEAD CT    CLINICAL HISTORY:  Code stroke with right facial droop and unequal pupils status  post carotid endarterectomy. TECHNIQUE:  Axial images were obtained with spiral technique. Radiation dose  reduction was achieved using one or all of the following techniques: automated  exposure control, weight-based dosing, iterative reconstruction. COMPARISON:  None. REPORT:   Standard noncontrast head CT demonstrates no definite intracranial  mass effect or hemorrhage. There is a well-circumscribed 8 mm lacunar infarct  in the right basal ganglia. Extensive white matter hypodensities are most  consistent with small vessel ischemic disease. The ventricles are normal in  size and configuration, accounting for the patient's age. Orbits  and paranasal  sinuses are clear where imaged.  Bone windows demonstrate no definite fracture or  destruction. Impression IMPRESSION:     EXTENSIVE SMALL VESSEL ISCHEMIC DISEASE WITH NO ACUTE  INTRACRANIAL ABNORMALITY IDENTIFIED AT NONCONTRAST CT WITH NO PRIOR FOR  COMPARISON. Results for orders placed or performed in visit on 04/18/19   AMB POC EKG ROUTINE W/ 12 LEADS, INTER & REP    Impression    Normal sinus rhythm at 60. Nonspecific T wave changes   Results for orders placed or performed during the hospital encounter of 05/15/14   EKG, 12 LEAD, INITIAL   Result Value Ref Range    Systolic BP  mmHg    Diastolic BP  mmHg    Ventricular Rate 62 BPM    Atrial Rate 62 BPM    P-R Interval 154 ms    QRS Duration 88 ms    Q-T Interval 444 ms    QTC Calculation (Bezet) 450 ms    Calculated P Axis 83 degrees    Calculated R Axis 74 degrees    Calculated T Axis 71 degrees    Diagnosis       Normal sinus rhythm  Right atrial enlargement  Borderline ECG  No previous ECGs available  Confirmed by Wendy Upton ([id number]) on 5/15/2014 11:08:11 PM       Most recent CTA:  Results from [place] encounter on 10/16/19   CT CODE NEURO HEAD WO CONTRAST    Narrative NONCONTRAST HEAD CT    CLINICAL HISTORY:  Code stroke with right facial droop and unequal pupils status  post carotid endarterectomy. TECHNIQUE:  Axial images were obtained with spiral technique. Radiation dose  reduction was achieved using one or all of the following techniques: automated  exposure control, weight-based dosing, iterative reconstruction. COMPARISON:  None. REPORT:   Standard noncontrast head CT demonstrates no definite intracranial  mass effect or hemorrhage. There is a well-circumscribed 8 mm lacunar infarct  in the right basal ganglia. Extensive white matter hypodensities are most  consistent with small vessel ischemic disease. The ventricles are normal in  size and configuration, accounting for the patient's age. Orbits  and paranasal  sinuses are clear where imaged.  Bone windows demonstrate no definite fracture or  destruction. Impression IMPRESSION:     EXTENSIVE SMALL VESSEL ISCHEMIC DISEASE WITH NO ACUTE  INTRACRANIAL ABNORMALITY IDENTIFIED AT NONCONTRAST CT WITH NO PRIOR FOR  COMPARISON. Assessment:     70year old  seen as a code S with left facial droop after CEA. Initial NIHSS was 1. CT of the head was obtained and was negative for acute abnormality. CTA of head neck was obtained and was negative for large vessel occlusion. The patient was not a candidate for alteplase because she was not within the 4.5 hour window. The patient was not a candidate for mechanical thrombectomy, as no large vessel occlusion was identified on CTA. Plan:     No further acute neurologic workup needed at this time     Management per primary team     Signed By: Viri Branham NP     October 16, 2019      I spent 50 minutes with the patient. Over 50% of that time was spent face-to-face with the patient and family. Attending note    Patient seen and examined in the PACU. I performed the clinical evaluation for the current stroke and I did not find any striking lateralizing findings. She is clearly somewhat blunted from a cognitive standpoint which is probably worsened by anesthesia    Review of the CT brain scan demonstrates the presence of abundant periventricular leukomalacia and age-related change. This underlying pre-existing brain disease makes the patient clearly more susceptible to anesthesia effect.   CT angiogram demonstrates no vascular occlusion  Discussed with Dr. Suleman Ross and we are agreed on management

## 2019-10-16 NOTE — PROGRESS NOTES
TRANSFER - IN REPORT:    Verbal report received from Pam Flor on Gerard Greene  being received from PACU for routine post - op      Report consisted of patients Situation, Background, Assessment and   Recommendations(SBAR). Information from the following report(s) SBAR, Kardex, OR Summary, Intake/Output, MAR, Recent Results, Med Rec Status, Cardiac Rhythm NSR and Dual Neuro Assessment was reviewed with the receiving nurse. Opportunity for questions and clarification was provided. Assessment completed upon patients arrival to unit and care assumed.

## 2019-10-16 NOTE — PERIOP NOTES
Pt's mouth is dropping to the right side and her pupils are unequal.  Sophia Friedmans CRNA to bedside and Dr. Mckeon Denys to bedside to compare findings before and after surgery as there is nothing documented in her assessment regarding either. Dr. Linda Orozco stated he was okay with patient's status and to continue care as planned. No new orders received from Dr. Linda Orozco. Dr. Favio Dejesus also at bedside. He advised the ART line is not working and  that it should be discontinued.

## 2019-10-16 NOTE — ANESTHESIA PREPROCEDURE EVALUATION
Relevant Problems   No relevant active problems       Anesthetic History     PONV          Review of Systems / Medical History  Patient summary reviewed and pertinent labs reviewed    Pulmonary  Within defined limits                 Neuro/Psych   Within defined limits           Cardiovascular    Hypertension: well controlled        Dysrhythmias (NSR)   CAD, PAD (L ICA 89%), CABG (2012 x 3) and hyperlipidemia    Exercise tolerance: <4 METS  Comments: EKG shows NSR  Denies CP, SOB or changes in functional status   GI/Hepatic/Renal     GERD: well controlled    Renal disease: stones       Endo/Other    Diabetes: well controlled, type 2    Arthritis     Other Findings   Comments: Had R CEA and tonsillectomy recently with no problem  Cyst found on tonsils           Physical Exam    Airway  Mallampati: II  TM Distance: 4 - 6 cm  Neck ROM: normal range of motion   Mouth opening: Normal     Cardiovascular  Regular rate and rhythm,  S1 and S2 normal,  no murmur, click, rub, or gallop  Rhythm: regular  Rate: normal         Dental    Dentition: Lower partial plate and Full upper dentures     Pulmonary  Breath sounds clear to auscultation               Abdominal  GI exam deferred       Other Findings            Anesthetic Plan    ASA: 3  Anesthesia type: general    Monitoring Plan: Arterial line      Induction: Intravenous  Anesthetic plan and risks discussed with: Patient and Family

## 2019-10-16 NOTE — PROGRESS NOTES
With patient in CT for Code S.  Dr. Joanne Mosley ordered CTA. Patient has hand IVs.  #20g INT placed in right ac x 1 attempt. Site c/d/i. 20ml ns flush rapid push to check line. Line patent, no s/s of infiltration. Studies in process.

## 2019-10-17 VITALS
TEMPERATURE: 98.7 F | RESPIRATION RATE: 17 BRPM | WEIGHT: 157.2 LBS | DIASTOLIC BLOOD PRESSURE: 69 MMHG | SYSTOLIC BLOOD PRESSURE: 161 MMHG | BODY MASS INDEX: 27.85 KG/M2 | HEART RATE: 77 BPM | OXYGEN SATURATION: 95 %

## 2019-10-17 PROCEDURE — 74011250636 HC RX REV CODE- 250/636: Performed by: SURGERY

## 2019-10-17 PROCEDURE — 77030020263 HC SOL INJ SOD CL0.9% LFCR 1000ML

## 2019-10-17 PROCEDURE — 74011250637 HC RX REV CODE- 250/637: Performed by: SURGERY

## 2019-10-17 RX ORDER — HYDROCODONE BITARTRATE AND ACETAMINOPHEN 5; 325 MG/1; MG/1
1 TABLET ORAL
Qty: 25 TAB | Refills: 0 | Status: SHIPPED | OUTPATIENT
Start: 2019-10-17 | End: 2019-10-20

## 2019-10-17 RX ADMIN — Medication 10 ML: at 05:17

## 2019-10-17 RX ADMIN — HYDROCODONE BITARTRATE AND ACETAMINOPHEN 1 TABLET: 5; 325 TABLET ORAL at 03:22

## 2019-10-17 RX ADMIN — Medication 2 G: at 03:12

## 2019-10-17 RX ADMIN — SODIUM CHLORIDE 75 ML/HR: 900 INJECTION, SOLUTION INTRAVENOUS at 05:16

## 2019-10-17 NOTE — PROGRESS NOTES
Report received from [facility] and dual neuro assessment performed; NIH 2 d/t R facial droop with R pupil > L, and L facial numbness present, otherwise intact. A/O x4,  equal and strong, no drift present. L neck incision CDI with some ecchymosis present and very tender to touch. SR on monitor, BP in range, SpO2 95% on room air. IVF @ 75/hr. Denies pain or other needs at this time. To continue to monitor.

## 2019-10-17 NOTE — DISCHARGE INSTRUCTIONS
MD Instructions:  Wound care:  - Keep incision dry until Monday, 10/21/2019. - Thereafter, wash neck wound daily with liquid antibacterial soap (for example, Dial); use fingers or soft washcloth gently then pat area dry. - Keep incision clean, may cover with gauze. - Do not apply lotions, creams or ointments to incisions. - Tissue adhesive (\"skin glue\") used over incision will flake or peel off on its own. Diet:  - As tolerated before surgery. Activity:  - Don't overdo your activity throughout the day for the next 14 days - no prolonged standing, no lifting more than 8 lb. - Keep legs propped up when not walking.  - Do not drive or drink alcohol while taking narcotics. - Starting Monday, you may shower - no tub baths, soaking or swimming. Medications:  - Use prescription pain medications if needed; if you do not wish to use narcotic pain medication or require less pain control, you may take acetaminophen (Tylenol, for example) or naproxen (Aleve, for example) following instructions on the label.  - Resume other home medications. Follow up in the office with Dr. Leslie Galvan on 11/1/2019 at 9:45 AM.    If problems or questions arise, please call our office at [phone].

## 2019-10-17 NOTE — OP NOTES
[address] [facility]  OPERATIVE REPORT    Name:  Aziza Mcelroy  MR#:  [id number]  :  1940  ACCOUNT #:  [de-identified]  DATE OF SERVICE:  10/16/2019      CLINICAL SERVICE:  Vascular Surgery. PREOPERATIVE DIAGNOSIS:  Left carotid stenosis, greater than 80%. POSTOPERATIVE DIAGNOSIS:  Left carotid stenosis, greater than 80%. PROCEDURE PERFORMED:  Left carotid endarterectomy. SURGEON:  Katlyn Reddy. Yanet Delgado MD    ASSISTANT:      ANESTHESIA:  General.    COMPLICATIONS:  None. SPECIMENS REMOVED:  None. IMPLANTS:      ESTIMATED BLOOD LOSS:      INDICATIONS FOR PROCEDURE:  This is a 82-year-old female with history of bilateral carotid disease status post staged right carotid endarterectomy about a month or two ago. She presented today for endarterectomy of her left asymptomatic     PROCEDURE IN DETAIL:  After getting informed consent, the patient was brought to the operating room. Anesthesia was then induced. Preop antibiotics were given before skin incision. The patient's left neck was then prepped and draped in normal sterile fashion. Incision was made over the border of the anterior sternocleidomastoid. We dissected down through the subcutaneous tissue. The sternocleidomastoid was retracted laterally exposing the carotid sheath. The internal jugular vein was identified. The facial vein was then divided with 2-0 silk ligatures. After we exposed the carotid, the common carotid artery was controlled with John tourniquets. The external carotid artery was controlled with John tourniquets. The ICA was also dissected up to normal-appearing intima. We got the vessel loop around distal.  On dissection, we did try and able to involve the posterior branch of the ICA which we got control with multiple 6-0 Prolenes. Once we got control, the patient was heparinized with 10,000 units of heparin. We allowed it to circulate for 5 minutes. We got proximal and distal control with clamps.   A 11-blade was used to do an arteriotomy. We extended with Gifford scissors due to heavily diseased post proximal ICA plaque up to normal-appearing intima. A #8 [place] shunt was then placed into the ICA and then the common, it was checked for Doppler signals. We started our endarterectomy plane at the distal common with a [place] elevator. We dissected removing the plaque from the intima. We transected it proximally and then, we did eversion technique to remove the plaque from the external.  The plaque was removed in one full piece leaving a smooth intima. We did require multiple tacking stitches on the posterior aspect of the distal dissection. We then brought a bovine pericardial patch. We did a patch angioplasty using 6-0 Prolenes proximally, ran it both sides and then ran four times on the end-to-side anastomosis. We removed the shunt. We back flushed the ICA, forward flushed the common and then the external.  We finished our anastomosis restoring flow first into the external and then to the ICA. The patient had good Doppler signal of the ICA, common and external.  Hypoglossal was identified and kept out of harm's way throughout the whole procedure. We then reversed the patient with 50 mg of protamine. We held pressure for 5 minutes. We closed with 2-0 Vicryl's, 3-0 Vicryl's and 4-0 Monocryl. The patient was then extubated and returned to the PACU in stable condition.       MD ABRAN Galeana/[email or web address]  D:  10/16/2019 13:42  T:  10/16/2019 22:51  JOB #:  [id number]

## 2019-10-17 NOTE — PERIOP NOTES
Pt left the PACU at 1512 and was delivered to ICU at [phone] - there was no NIH or other assessments documented during this time as the patient was on the CT table or in transit.

## 2019-10-17 NOTE — PROGRESS NOTES
[address]   [address] [facility] FAX: [phone]         VASCULAR SURGERY FLOOR PROGRESS NOTE    Admit Date: 10/16/2019  POD: 1 Day Post-Op    Procedure:  Procedure(s):  CAROTID ARTERY ENDARTERECTOMY LEFT    Subjective:     Patient has no new complaints. Objective:     Vitals:  Blood pressure 150/66, pulse 70, temperature 98 °F (36.7 °C), resp. rate 12, weight 157 lb 3.2 oz (71.3 kg), SpO2 93 %. Temp (24hrs), Av.9 °F (36.6 °C), Min:97.7 °F (36.5 °C), Max:98.1 °F (36.7 °C)      Intake / Output:    Intake/Output Summary (Last 24 hours) at 10/17/2019 0656  Last data filed at 10/17/2019 0601  Gross per 24 hour   Intake 1690 ml   Output 1700 ml   Net -10 ml       Physical Exam:    Constitutional: she appears well-developed. No distress. HENT:   Head: Atraumatic. Eyes: Pupils are equal, round, and reactive to light. Neck: Normal range of motion. Cardiovascular: Regular rhythm. Pulmonary/Chest: Effort normal and breath sounds normal. No respiratory distress. Abdominal: Soft. Bowel sounds are normal. she exhibits no distension. There is no tenderness. There is no guarding. No hernia. Musculoskeletal: Normal range of motion. Neurological: She is alert. CN II- XII grossly intact  Vascular: neruo inact    Labs: No results for input(s): HGB, WBC, K, GLU, HGBEXT in the last 72 hours.     No lab exists for component:  CREA    Data Review     Assessment:     Patient Active Problem List    Diagnosis Date Noted    Observation after surgery 10/16/2019    Stenosis of left carotid artery 10/16/2019    Carotid stenosis, right 2019    PAF (paroxysmal atrial fibrillation) ([facility] [address] 2017    S/P CABG (coronary artery bypass graft) 2016    Subclavian arterial stenosis ([address] 10/21/2015    HLD (hyperlipidemia) 10/21/2015    Coronary atherosclerosis of native coronary artery 10/21/2015    Type II diabetes mellitus ([address] 10/21/2015    Essential hypertension 10/21/2015    Carotid stenosis without infarct 10/21/2015    Calculus of ureter rt ureter 01/11/2013       Plan/Recommendations/Medical Decision Making:     D/c home follow up 2 weeks    Elements of this note have been dictated using speech recognition software. As a result, errors of speech recognition may have occurred.

## 2019-10-17 NOTE — PROGRESS NOTES
Chart reviewed s/p admission to ICU s/p left  by Dr. Kary Miranda. PCP and insurance. No needs voiced for d/c per Pt, RN, or MD. D/C home with family. Care Management Interventions  PCP Verified by CM: Yes(Dash)  Mode of Transport at Discharge:  Other (see comment)  Transition of Care Consult (CM Consult): Discharge Planning  Confirm Follow Up Transport: Family  Freedom of Choice Offered: Yes  [address] Provided?: (MCR/supplemental)  Discharge Location  Discharge Placement: Home

## 2019-10-17 NOTE — PROGRESS NOTES
Bedside shift change report given to Pastor Brian RN (oncoming nurse) by Francisco Aguilar RN (offgoing nurse). Report included the following information SBAR, Kardex, Intake/Output, MAR, Recent Results, Med Rec Status and Cardiac Rhythm NSR.

## 2019-10-17 NOTE — PROGRESS NOTES
DISCHARGE SUMMARY from Nurse    PATIENT INSTRUCTIONS:    After general anesthesia or intravenous sedation, for 24 hours or while taking prescription Narcotics:  · Limit your activities  · Do not drive and operate hazardous machinery  · Do not make important personal or business decisions  · Do  not drink alcoholic beverages  · If you have not urinated within 8 hours after discharge, please contact your surgeon on call. Report the following to your surgeon:  · Excessive pain, swelling, redness or odor of or around the surgical area  · Temperature over 100.5  · Nausea and vomiting lasting longer than 4 hours or if unable to take medications  · Any signs of decreased circulation or nerve impairment to extremity: change in color, persistent  numbness, tingling, coldness or increase pain  · Any questions    What to do at Home:  Recommended activity: No lifting, Driving, or Strenuous exercise for 2 weeks, No driving while on analgesics, No heavy lifting for 2 weeks and See surgical instructions,     If you experience any of the following symptoms redness, swelling, or drainage at the surgical site, please follow up with Dr. Luana Chávez office. *  Please give a list of your current medications to your Primary Care Provider. *  Please update this list whenever your medications are discontinued, doses are      changed, or new medications (including over-the-counter products) are added. *  Please carry medication information at all times in case of emergency situations. These are general instructions for a healthy lifestyle:    No smoking/ No tobacco products/ Avoid exposure to second hand smoke  Surgeon General's Warning:  Quitting smoking now greatly reduces serious risk to your health.     Obesity, smoking, and sedentary lifestyle greatly increases your risk for illness    A healthy diet, regular physical exercise & weight monitoring are important for maintaining a healthy lifestyle    You may be retaining fluid if you have a history of heart failure or if you experience any of the following symptoms:  Weight gain of 3 pounds or more overnight or 5 pounds in a week, increased swelling in our hands or feet or shortness of breath while lying flat in bed. Please call your doctor as soon as you notice any of these symptoms; do not wait until your next office visit. PATIENTS DENTURES SENT HOME WITH PATIENT. The discharge information has been reviewed with the patient. The patient verbalized understanding. Discharge medications reviewed with the patient and appropriate educational materials and side effects teaching were provided.   ___________________________________________________________________________________________________________________________________

## 2019-10-22 NOTE — CDMP QUERY
Patient was admitted for a carotid endarterectomy. After surgery patient was 
noted to have left-sided facial droop. Neurology was consulted, who noted no CVA 
and no further acute neurological workup was needed this time. After study, can 
the suspected etiology of the patient's facial droop be further specified as: TIA due to carotid endarterectomy TIA unrelated to carotid endarterectomy Left-sided facial droop as a complication of the carotid endarterectomy Left-sided facial droop unrelated to the carotid endarterectomy Other_______ Unable to determine Thanks, [facility] Team 
[phone]

## 2020-01-27 PROBLEM — E11.51 TYPE 2 DIABETES MELLITUS WITH PERIPHERAL VASCULAR DISEASE (HCC): Status: ACTIVE | Noted: 2020-01-27

## 2021-09-14 NOTE — ANESTHESIA POSTPROCEDURE EVALUATION
Procedure(s):  CAROTID ARTERY ENDARTERECTOMY LEFT. general    Anesthesia Post Evaluation      Multimodal analgesia: multimodal analgesia used between 6 hours prior to anesthesia start to PACU discharge  Patient location during evaluation: bedside  Patient participation: complete - patient participated  Level of consciousness: awake and alert  Pain score: 1  Pain management: adequate  Airway patency: patent  Anesthetic complications: no  Cardiovascular status: acceptable  Respiratory status: acceptable  Hydration status: acceptable  Comments: Patient doing well. Continue care on floor. Post anesthesia nausea and vomiting:  none      Vitals Value Taken Time   /61 10/16/2019  2:13 PM   Temp 36.5 °C (97.7 °F) 10/16/2019  1:37 PM   Pulse 64 10/16/2019  2:16 PM   Resp 16 10/16/2019  2:13 PM   SpO2 99 % 10/16/2019  2:16 PM   Vitals shown include unvalidated device data.
 used

## 2022-03-18 PROBLEM — I65.21 CAROTID STENOSIS, RIGHT: Status: ACTIVE | Noted: 2019-06-13

## 2022-03-18 PROBLEM — Z48.89 OBSERVATION AFTER SURGERY: Status: ACTIVE | Noted: 2019-10-16

## 2022-03-19 PROBLEM — I65.22 STENOSIS OF LEFT CAROTID ARTERY: Status: ACTIVE | Noted: 2019-10-16

## 2022-03-19 PROBLEM — E11.51 TYPE 2 DIABETES MELLITUS WITH PERIPHERAL VASCULAR DISEASE (HCC): Status: ACTIVE | Noted: 2020-01-27

## 2022-03-19 PROBLEM — I48.0 PAF (PAROXYSMAL ATRIAL FIBRILLATION) (HCC): Status: ACTIVE | Noted: 2017-07-14

## 2022-10-18 ENCOUNTER — OFFICE VISIT (OUTPATIENT)
Dept: VASCULAR SURGERY | Age: 82
End: 2022-10-18
Payer: MEDICARE

## 2022-10-18 VITALS
SYSTOLIC BLOOD PRESSURE: 90 MMHG | HEIGHT: 63 IN | WEIGHT: 152 LBS | TEMPERATURE: 97.4 F | DIASTOLIC BLOOD PRESSURE: 57 MMHG | BODY MASS INDEX: 26.93 KG/M2 | HEART RATE: 65 BPM | OXYGEN SATURATION: 98 %

## 2022-10-18 DIAGNOSIS — I73.9 PVD (PERIPHERAL VASCULAR DISEASE) WITH CLAUDICATION (HCC): ICD-10-CM

## 2022-10-18 DIAGNOSIS — I73.9 PVD (PERIPHERAL VASCULAR DISEASE) (HCC): Primary | ICD-10-CM

## 2022-10-18 PROCEDURE — G8427 DOCREV CUR MEDS BY ELIG CLIN: HCPCS | Performed by: SURGERY

## 2022-10-18 PROCEDURE — G8417 CALC BMI ABV UP PARAM F/U: HCPCS | Performed by: SURGERY

## 2022-10-18 PROCEDURE — 99214 OFFICE O/P EST MOD 30 MIN: CPT | Performed by: SURGERY

## 2022-10-18 PROCEDURE — 1090F PRES/ABSN URINE INCON ASSESS: CPT | Performed by: SURGERY

## 2022-10-18 PROCEDURE — G8400 PT W/DXA NO RESULTS DOC: HCPCS | Performed by: SURGERY

## 2022-10-18 PROCEDURE — 1123F ACP DISCUSS/DSCN MKR DOCD: CPT | Performed by: SURGERY

## 2022-10-18 PROCEDURE — G8484 FLU IMMUNIZE NO ADMIN: HCPCS | Performed by: SURGERY

## 2022-10-18 PROCEDURE — 4004F PT TOBACCO SCREEN RCVD TLK: CPT | Performed by: SURGERY

## 2022-10-18 RX ORDER — DULOXETIN HYDROCHLORIDE 30 MG/1
30 CAPSULE, DELAYED RELEASE ORAL DAILY
COMMUNITY
Start: 2022-10-11

## 2022-10-18 ASSESSMENT — PATIENT HEALTH QUESTIONNAIRE - PHQ9
SUM OF ALL RESPONSES TO PHQ QUESTIONS 1-9: 0
SUM OF ALL RESPONSES TO PHQ QUESTIONS 1-9: 0
SUM OF ALL RESPONSES TO PHQ9 QUESTIONS 1 & 2: 0
SUM OF ALL RESPONSES TO PHQ QUESTIONS 1-9: 0
1. LITTLE INTEREST OR PLEASURE IN DOING THINGS: 0
2. FEELING DOWN, DEPRESSED OR HOPELESS: 0
SUM OF ALL RESPONSES TO PHQ QUESTIONS 1-9: 0

## 2022-10-18 NOTE — PROGRESS NOTES
[address] FAX: 541 [facility] Carlos Pires  : 1940    Chief Complaint:     History of Present Illness:   Patient follows up today for follow-up first prevascular disease. Patient has history of bilateral carotid endarterectomy. She is chronic bilateral SFA stenosis denies any claudication or rest pain symptoms. CURRENT MEDICATIONS:  Current Outpatient Medications   Medication Sig Dispense Refill    DULoxetine (CYMBALTA) 30 MG extended release capsule Take 30 mg by mouth daily      alirocumab (PRALUENT) 75 MG/ML SOAJ injection pen Inject 75 mg into the skin      amLODIPine (NORVASC) 10 MG tablet Take 10 mg by mouth daily      aspirin 81 MG chewable tablet Take 81 mg by mouth      disopyramide (NORPACE) 150 MG capsule Take 150 mg by mouth every 6 hours      isosorbide mononitrate (IMDUR) 30 MG extended release tablet Take 30 mg by mouth      metFORMIN (GLUCOPHAGE) 500 MG tablet Take 500 mg by mouth 2 times daily (with meals)      propranolol (INDERAL) 80 MG tablet Take 80 mg by mouth daily       No current facility-administered medications for this visit.        Past Medical History:   Diagnosis Date    Arrhythmia age 29    stays controlled with med per pt--- will request cardio records    Arthritis     hand and spine    CAD (coronary artery disease)     cabg     Calculus of kidney     Carotid stenosis 10/21/2015    Coronary atherosclerosis of native coronary artery 10/21/2015    Diabetes ([facility] [address]     type2--- sqbs avg am--120--- hypo at 70's hgba1c 6.1 on 2019    Essential hypertension 10/21/2015    managed with medication    GERD (gastroesophageal reflux disease)     controlled with meds    HLD (hyperlipidemia) 10/21/2015    Insomnia     Right ureteral stone     Subclavian artery stenosis ([facility] [address] 10/21/2015       Physical Examination:   Height: 5' 3\" (1.6 m), Weight: 152 lb (68.9 kg), BP: (!) 90/57    Constitutional: she appears well-developed. No distress. HENT:   Head: Atraumatic. Eyes: Pupils are equal, round, and reactive to light. Neck: Normal range of motion. Cardiovascular: Regular rhythm. Pulmonary/Chest: Effort normal and breath sounds normal. No respiratory distress. Abdominal: Soft. Bowel sounds are normal. she exhibits no distension. There is no tenderness. There is no guarding. No hernia. Musculoskeletal: Normal range of motion. Neurological: She is alert. CN II- XII grossly intact   Vascular: Pulses nonpalpable pedal pulses palp    Imaging:  Duplex studies show ABIs of 0.72 on the right with distal SFA stenosis with toe pressures of 93 mmHg  Left FLORIN 0.83 with distal SFA stenosis and toe pressure 100 mmHg        Recommendations/Plans:   Ms. Miguel Angel Martinez is a [de-identified]y.o. year old female with history for vascular disease with bilateral SFA stenosis with adequate perfusion's. She denies any claudication or rest pain symptoms. She will follow-up in 6 months with a duplex study. Addie Garcia MD    Elements of this note have been dictated using speech recognition software. As a result, errors of speech recognition may have occurred.     20 minutes of time was spent on this encounter including chart review, assessment, evaluation and coordination of patient care

## 2022-12-02 ENCOUNTER — OFFICE VISIT (OUTPATIENT)
Dept: CARDIOLOGY CLINIC | Age: 82
End: 2022-12-02

## 2022-12-02 VITALS
WEIGHT: 149 LBS | BODY MASS INDEX: 26.4 KG/M2 | HEART RATE: 58 BPM | HEIGHT: 63 IN | DIASTOLIC BLOOD PRESSURE: 70 MMHG | SYSTOLIC BLOOD PRESSURE: 109 MMHG

## 2022-12-02 DIAGNOSIS — I65.22 STENOSIS OF LEFT CAROTID ARTERY: ICD-10-CM

## 2022-12-02 DIAGNOSIS — I77.1 SUBCLAVIAN ARTERIAL STENOSIS (HCC): ICD-10-CM

## 2022-12-02 DIAGNOSIS — I25.10 ATHEROSCLEROSIS OF NATIVE CORONARY ARTERY OF NATIVE HEART WITHOUT ANGINA PECTORIS: Primary | ICD-10-CM

## 2022-12-02 DIAGNOSIS — I48.0 PAF (PAROXYSMAL ATRIAL FIBRILLATION) (HCC): ICD-10-CM

## 2022-12-02 DIAGNOSIS — E78.00 PURE HYPERCHOLESTEROLEMIA: ICD-10-CM

## 2022-12-02 DIAGNOSIS — Z95.1 S/P CABG (CORONARY ARTERY BYPASS GRAFT): ICD-10-CM

## 2022-12-02 ASSESSMENT — ENCOUNTER SYMPTOMS
SHORTNESS OF BREATH: 0
ABDOMINAL PAIN: 0

## 2022-12-02 NOTE — PROGRESS NOTES
[address], [address] [employer] [facility], [address] [place] [address]  PHONE: [phone]    Carlos Zepeda  1940      SUBJECTIVE:   Natalia Capps is a [de-identified] y.o. female seen for a follow up visit regarding the following:     Chief Complaint   Patient presents with    Coronary Artery Disease       HPI:    30-year-old female comes back for follow-up has had longstanding CAD has had previous bypass surgery in [facility] and she had atrial fibs been on Norpace for years but is worked very well for she is always wanted to stay on it since this worked so well she has not had any recurrent rhythms at all she knows of. We therefore did not change at all these years and she is tolerated it well. She had peripheral vascular disease she is at hyperlipidemia she does not really want to take any medication. She will take statins I got her approved for Praluent she decided she would not take it. She quit smoking some point in time. She has no complaints of chest pain. She has been back to vascular she has distal leg disease treated medically and stable      Past Medical History, Past Surgical History, Family history, Social History, and Medications were all reviewed with the patient today and updated as necessary.        No Known Allergies  Past Medical History:   Diagnosis Date    Arrhythmia age 29    stays controlled with med per pt--- will request cardio records    Arthritis     hand and spine    CAD (coronary artery disease) 2012    cabg     Calculus of kidney     Carotid stenosis 10/21/2015    Coronary atherosclerosis of native coronary artery 10/21/2015    Diabetes ([address]     type2--- sqbs avg am--120--- hypo at 70's hgba1c 6.1 on 06/1/2019    Essential hypertension 10/21/2015    managed with medication    GERD (gastroesophageal reflux disease)     controlled with meds    HLD (hyperlipidemia) 10/21/2015    Insomnia     Right ureteral stone     Subclavian artery stenosis ([address] 10/21/2015     Past Surgical History:   Procedure Laterality Date    BREAST SURGERY Right     lump removed    CAROTID ENDARTERECTOMY Left 10/16/2019    CAROTID ENDARTERECTOMY Right 2019    CARPAL TUNNEL RELEASE Left 10/24/2016    CARPAL TUNNEL RELEASE Right 2016    CHOLECYSTECTOMY, OPEN  20 yrs ago    CORONARY ARTERY BYPASS GRAFT  2012    in [facility]-had a LIMA graft    HEENT      HYSTERECTOMY      [place] CARDIAC SURG PROCEDURE UNLIST      CABG x 4 vessals    UROLOGICAL SURGERY  2013    cystoscopy, Right retrograde pyelogram, right ureteroscopy    UROLOGICAL SURGERY  30 yrs ago    \" kidney surg\" r/t stone     Family History   Problem Relation Age of Onset    No Known Problems Father     Heart Disease Mother       Social History     Tobacco Use    Smoking status: Former     Packs/day: 1.00     Types: Cigarettes     Quit date: 2011     Years since quittin.9    Smokeless tobacco: Never    Tobacco comments:     Quit smoking: around 2nd hand smoke   Substance Use Topics    Alcohol use: No     Alcohol/week: 0.0 standard drinks       ROS:    Review of Systems   Constitutional: Negative for decreased appetite and malaise/fatigue. Cardiovascular:  Negative for chest pain, dyspnea on exertion, leg swelling and palpitations. Respiratory:  Negative for shortness of breath. Gastrointestinal:  Negative for abdominal pain. PHYSICAL EXAM:    /70   Pulse 58   Ht 5' 3\" (1.6 m)   Wt 149 lb (67.6 kg)   BMI 26.39 kg/m²        Wt Readings from Last 3 Encounters:   22 149 lb (67.6 kg)   10/18/22 152 lb (68.9 kg)   10/12/22 152 lb (68.9 kg)     BP Readings from Last 3 Encounters:   22 109/70   10/18/22 (!) 90/57   10/12/22 (!) 181/76         Physical Exam  Constitutional:       General: She is not in acute distress. Cardiovascular:      Rate and Rhythm: Normal rate and regular rhythm. Heart sounds: No murmur heard.   Pulmonary:      Effort: Pulmonary effort is normal.      Breath sounds: Normal breath sounds. No rales. Musculoskeletal:         General: No swelling. Neurological:      Mental Status: She is alert. Medical problems and test results were reviewed with the patient today. Results for orders placed or performed in visit on 12/02/22   EKG 12 Lead    Impression    Sinus rhythm rate of 58. RSR prime pattern. Minor nonspecific T wave change     Lab Results   Component Value Date/Time     10/02/2019 03:16 PM    K 4.2 10/02/2019 03:16 PM     10/02/2019 03:16 PM    CO2 28 10/02/2019 03:16 PM    BUN 13 10/02/2019 03:16 PM    GFRAA >60 10/02/2019 03:16 PM         ASSESSMENT and Roverto Gunjan was seen today for coronary artery disease. Diagnoses and all orders for this visit:    Coronary atherosclerosis of native coronary artery longstanding CAD post CABG is done very well with no angina reported. She is really not wanting any further testing does not want to take cholesterol medicines to lower risk  -     EKG 12 Lead    Stenosis of left carotid artery followed by vascular surgery no current symptoms    PAF (paroxysmal atrial fibrillation) ([address] she been on Norpace for years starting in [facility] is done very well    Subclavian arterial stenosis ([address] currently stable. S/P CABG (coronary artery bypass graft) is an old graft certainly could have progressive disease as he has had no symptoms    Pure hypercholesterolemia cholesterol runs high we again talked about trying an oral medication I got her approved for Praluent and she does not want to take      [unfilled]      No follow-up provider specified.     Patria Santoyo MD  12/2/2022  5:26 PM

## 2023-03-07 ENCOUNTER — TELEPHONE (OUTPATIENT)
Dept: CARDIOLOGY CLINIC | Age: 83
End: 2023-03-07

## 2023-03-07 NOTE — TELEPHONE ENCOUNTER
MEDICATION REFILL REQUEST    Pt is out of med and please call when sent      Name of Medication:  Disopyramide  Dose:  150 mg  Frequency:  4 daily  Quantity:  360  Days' supply:             Pharmacy Name/Location:  [facility] [address] on [address] [place] [address].

## 2023-03-08 ENCOUNTER — OFFICE VISIT (OUTPATIENT)
Dept: CARDIOLOGY CLINIC | Age: 83
End: 2023-03-08
Payer: MEDICARE

## 2023-03-08 VITALS
WEIGHT: 150 LBS | BODY MASS INDEX: 26.58 KG/M2 | HEIGHT: 63 IN | HEART RATE: 64 BPM | SYSTOLIC BLOOD PRESSURE: 120 MMHG | DIASTOLIC BLOOD PRESSURE: 88 MMHG

## 2023-03-08 DIAGNOSIS — I25.10 ASCVD (ARTERIOSCLEROTIC CARDIOVASCULAR DISEASE): Primary | ICD-10-CM

## 2023-03-08 DIAGNOSIS — I10 ESSENTIAL HYPERTENSION: ICD-10-CM

## 2023-03-08 DIAGNOSIS — I48.0 PAF (PAROXYSMAL ATRIAL FIBRILLATION) (HCC): ICD-10-CM

## 2023-03-08 DIAGNOSIS — I73.9 PAD (PERIPHERAL ARTERY DISEASE) (HCC): ICD-10-CM

## 2023-03-08 PROCEDURE — G8428 CUR MEDS NOT DOCUMENT: HCPCS | Performed by: INTERNAL MEDICINE

## 2023-03-08 PROCEDURE — G8484 FLU IMMUNIZE NO ADMIN: HCPCS | Performed by: INTERNAL MEDICINE

## 2023-03-08 PROCEDURE — 1123F ACP DISCUSS/DSCN MKR DOCD: CPT | Performed by: INTERNAL MEDICINE

## 2023-03-08 PROCEDURE — G8400 PT W/DXA NO RESULTS DOC: HCPCS | Performed by: INTERNAL MEDICINE

## 2023-03-08 PROCEDURE — 3079F DIAST BP 80-89 MM HG: CPT | Performed by: INTERNAL MEDICINE

## 2023-03-08 PROCEDURE — 99214 OFFICE O/P EST MOD 30 MIN: CPT | Performed by: INTERNAL MEDICINE

## 2023-03-08 PROCEDURE — 1036F TOBACCO NON-USER: CPT | Performed by: INTERNAL MEDICINE

## 2023-03-08 PROCEDURE — 3074F SYST BP LT 130 MM HG: CPT | Performed by: INTERNAL MEDICINE

## 2023-03-08 PROCEDURE — 1090F PRES/ABSN URINE INCON ASSESS: CPT | Performed by: INTERNAL MEDICINE

## 2023-03-08 PROCEDURE — G8417 CALC BMI ABV UP PARAM F/U: HCPCS | Performed by: INTERNAL MEDICINE

## 2023-03-08 RX ORDER — ISOSORBIDE MONONITRATE 30 MG/1
30 TABLET, EXTENDED RELEASE ORAL DAILY
Qty: 90 TABLET | Refills: 3 | Status: SHIPPED | OUTPATIENT
Start: 2023-03-08

## 2023-03-08 RX ORDER — PROPRANOLOL HYDROCHLORIDE 80 MG/1
80 TABLET ORAL DAILY
Qty: 90 TABLET | Refills: 3 | Status: SHIPPED | OUTPATIENT
Start: 2023-03-08

## 2023-03-08 RX ORDER — AMLODIPINE BESYLATE 10 MG/1
10 TABLET ORAL DAILY
Qty: 90 TABLET | Refills: 3 | Status: SHIPPED | OUTPATIENT
Start: 2023-03-08

## 2023-03-08 RX ORDER — DISOPYRAMIDE PHOSPHATE 150 MG/1
150 CAPSULE ORAL EVERY 6 HOURS
Qty: 360 CAPSULE | Refills: 3 | Status: SHIPPED | OUTPATIENT
Start: 2023-03-08

## 2023-03-08 NOTE — PROGRESS NOTES
[facility] CARDIOLOGY  [address], [facility] [address] [place] [address]  PHONE: [phone]        23        NAME:  Jose Sheriff  : 1940  MRN: [id number]     CHIEF COMPLAINT:    Coronary Artery Disease         SUBJECTIVE:       No cp or palp       Medications were all reviewed with the patient today and updated as necessary. Current Outpatient Medications   Medication Sig    disopyramide (NORPACE) 150 MG capsule Take 1 capsule by mouth in the morning and 1 capsule at noon and 1 capsule in the evening and 1 capsule before bedtime. amLODIPine (NORVASC) 10 MG tablet Take 1 tablet by mouth daily    isosorbide mononitrate (IMDUR) 30 MG extended release tablet Take 1 tablet by mouth daily    propranolol (INDERAL) 80 MG tablet Take 1 tablet by mouth daily    DULoxetine (CYMBALTA) 30 MG extended release capsule Take 30 mg by mouth daily    aspirin 81 MG chewable tablet Take 81 mg by mouth    metFORMIN (GLUCOPHAGE) 500 MG tablet Take 500 mg by mouth 2 times daily (with meals)    alirocumab (PRALUENT) 75 MG/ML SOAJ injection pen Inject 75 mg into the skin (Patient not taking: Reported on 3/8/2023)     No current facility-administered medications for this visit. No Known Allergies        PHYSICAL EXAM:     Wt Readings from Last 3 Encounters:   23 150 lb (68 kg)   22 149 lb (67.6 kg)   10/18/22 152 lb (68.9 kg)     BP Readings from Last 3 Encounters:   23 120/88   22 109/70   10/18/22 (!) 90/57       /88   Pulse 64   Ht 5' 3\" (1.6 m)   Wt 150 lb (68 kg)   BMI 26.57 kg/m²     Physical Exam  Vitals reviewed. HENT:      Head: Normocephalic and atraumatic. Eyes:      Extraocular Movements: Extraocular movements intact. Pupils: Pupils are equal, round, and reactive to light. Cardiovascular:      Rate and Rhythm: Normal rate. Heart sounds: Normal heart sounds.    Pulmonary:      Effort: Pulmonary effort is normal.      Breath sounds: Normal breath sounds. Abdominal:      General: Abdomen is flat. Palpations: Abdomen is soft. There is no mass. Musculoskeletal:         General: Normal range of motion. Cervical back: Normal range of motion. Skin:     General: Skin is warm and dry. Neurological:      General: No focal deficit present. Mental Status: She is alert and oriented to person, place, and time. Psychiatric:         Mood and Affect: Mood normal.         RECENT LABS AND RECORDS REVIEW    A1c 6.1      ASSESSMENT and Vivek Hernandez was seen today for coronary artery disease. Diagnoses and all orders for this visit:    ASCVD (arteriosclerotic cardiovascular disease)  -     isosorbide mononitrate (IMDUR) 30 MG extended release tablet; Take 1 tablet by mouth daily  -     propranolol (INDERAL) 80 MG tablet; Take 1 tablet by mouth daily    PAF (paroxysmal atrial fibrillation) ([facility])  -     disopyramide (NORPACE) 150 MG capsule; Take 1 capsule by mouth in the morning and 1 capsule at noon and 1 capsule in the evening and 1 capsule before bedtime.  -     propranolol (INDERAL) 80 MG tablet; Take 1 tablet by mouth daily    Essential hypertension  -     amLODIPine (NORVASC) 10 MG tablet; Take 1 tablet by mouth daily  -     propranolol (INDERAL) 80 MG tablet; Take 1 tablet by mouth daily    PAD (peripheral artery disease) ([address]     Return in about 6 months (around 9/8/2023).        Randy Olivo MD  3/8/2023  7:06 PM

## 2023-03-09 ENCOUNTER — TELEPHONE (OUTPATIENT)
Dept: CARDIOLOGY CLINIC | Age: 83
End: 2023-03-09

## 2023-03-09 NOTE — TELEPHONE ENCOUNTER
PA started and pending for the disopyramide. I called the pt and let her know, she stated she is out but will call the pharmacy to see if they can supply a few days worth until we get a response. I told her I will keep her updated.

## 2023-03-09 NOTE — TELEPHONE ENCOUNTER
Pt daughter states that they are having problem getting disopyramide from the pharmacy. States they told her pt needs doctors approval and to contact us.  Pt took her last pill this morning

## 2023-03-10 NOTE — TELEPHONE ENCOUNTER
I called pt and told her that we had to file an appeal because the PA for the disopyramide got denied. Pt stated she got some medication from [address]. I told her I will keep her updated.

## 2023-03-10 NOTE — TELEPHONE ENCOUNTER
Patient is calling again about the PA. It is kind of stressful for the patient. Please call and advise.

## 2023-03-10 NOTE — TELEPHONE ENCOUNTER
Request was Denied, please contact the plan at [phone] I called and did an appeal, was told we would hear back within 72 hours.

## 2023-03-15 NOTE — TELEPHONE ENCOUNTER
I called [facility] [phone] they stated the denial was appealed and approved 3/9/2023-3/9/2024. Disopyramide 150 mg.  I called the pharmacy and let them know so that the pt could get the prescription today, pharmacist stated the pt had already picked up the prescription yesterday.

## 2023-07-24 ENCOUNTER — OFFICE VISIT (OUTPATIENT)
Dept: VASCULAR SURGERY | Age: 83
End: 2023-07-24
Payer: MEDICARE

## 2023-07-24 VITALS
SYSTOLIC BLOOD PRESSURE: 146 MMHG | WEIGHT: 153 LBS | DIASTOLIC BLOOD PRESSURE: 66 MMHG | HEART RATE: 57 BPM | BODY MASS INDEX: 27.11 KG/M2 | OXYGEN SATURATION: 93 % | HEIGHT: 63 IN

## 2023-07-24 DIAGNOSIS — I73.9 PAD (PERIPHERAL ARTERY DISEASE) (HCC): ICD-10-CM

## 2023-07-24 PROCEDURE — 3074F SYST BP LT 130 MM HG: CPT | Performed by: NURSE PRACTITIONER

## 2023-07-24 PROCEDURE — G8427 DOCREV CUR MEDS BY ELIG CLIN: HCPCS | Performed by: NURSE PRACTITIONER

## 2023-07-24 PROCEDURE — 1090F PRES/ABSN URINE INCON ASSESS: CPT | Performed by: NURSE PRACTITIONER

## 2023-07-24 PROCEDURE — 1036F TOBACCO NON-USER: CPT | Performed by: NURSE PRACTITIONER

## 2023-07-24 PROCEDURE — 99213 OFFICE O/P EST LOW 20 MIN: CPT | Performed by: NURSE PRACTITIONER

## 2023-07-24 PROCEDURE — 3078F DIAST BP <80 MM HG: CPT | Performed by: NURSE PRACTITIONER

## 2023-07-24 PROCEDURE — G8400 PT W/DXA NO RESULTS DOC: HCPCS | Performed by: NURSE PRACTITIONER

## 2023-07-24 PROCEDURE — 1123F ACP DISCUSS/DSCN MKR DOCD: CPT | Performed by: NURSE PRACTITIONER

## 2023-07-24 PROCEDURE — G8417 CALC BMI ABV UP PARAM F/U: HCPCS | Performed by: NURSE PRACTITIONER

## 2023-07-26 PROBLEM — I73.9 PAD (PERIPHERAL ARTERY DISEASE) (HCC): Status: ACTIVE | Noted: 2023-07-26

## 2023-07-26 NOTE — PROGRESS NOTES
(normal) Doppler waveforms. Middle Superficial Femoral Artery: Multiphasic (normal) Doppler waveforms. Distal Superficial Femoral Artery: Multiphasic (normal) Doppler waveforms. Proximal Popliteal Artery: Monophasic Doppler waveforms. Distal Popliteal Artery: Monophasic Doppler waveforms. Anterior Tibial Artery: Monophasic Doppler waveforms. Peroneal Artery: monophasic Doppler waveforms. Left Lower Arterial    Proximal Common Femoral Artery: Multiphasic (normal) Doppler waveforms. Profunda Artery: Multiphasic (normal) Doppler waveforms. .   Proximal Superficial Femoral Artery: Multiphasic (normal) Doppler waveforms. Middle Superficial Femoral Artery: Multiphasic (normal) Doppler waveforms. Distal Superficial Femoral Artery: Multiphasic (normal) Doppler waveforms. Proximal Popliteal Artery: Multiphasic (normal) Doppler waveforms. Distal Popliteal Artery: Multiphasic (normal) Doppler waveforms. Anterior Tibial Artery: Monophasic Doppler waveforms. Posterior Tibial Artery: Multiphasic (normal) Doppler waveforms. Peroneal Artery: Monophasic Doppler waveforms.        Iliac Artery Measurements     PSV   Rt DAWSON Prox 385 cm/s          Lt DAWSON Prox 272 cm/s          Rt EIA Prox 231 cm/s          Lt EIA Prox 229 cm/s            Right Lower Arterial Measurements     PSV Castro Ratio   CFA Prox 307 cm/s           PFA Prox 164 cm/s           SFA Prox 175 cm/s        0.6          SFA Mid 291 cm/s        1.7          SFA Dist 376 cm/s        1.29          Pop Prox 83.2 cm/s        0.22          Pop Dist 66.3 cm/s              PTA Dist 39.4 cm/s           CARMEN Dist 21.7 cm/s           Matilda Dist 33.5 cm/s             Left Lower Arterial Measurements     PSV Castro Ratio   CFA Prox 232 cm/s           PFA Prox 218 cm/s           SFA Prox 130 cm/s        0.56          SFA Mid 184 cm/s        1.42          SFA Dist 96.9 cm/s        0.53          Pop Prox 56.4 cm/s        0.58          Pop Dist 85.8 cm/s

## 2023-09-15 ENCOUNTER — OFFICE VISIT (OUTPATIENT)
Age: 83
End: 2023-09-15
Payer: MEDICARE

## 2023-09-15 VITALS
SYSTOLIC BLOOD PRESSURE: 138 MMHG | DIASTOLIC BLOOD PRESSURE: 62 MMHG | BODY MASS INDEX: 26.75 KG/M2 | WEIGHT: 151 LBS | HEART RATE: 59 BPM | HEIGHT: 63 IN

## 2023-09-15 DIAGNOSIS — R53.83 MALAISE AND FATIGUE: ICD-10-CM

## 2023-09-15 DIAGNOSIS — R53.83 MALAISE AND FATIGUE: Primary | ICD-10-CM

## 2023-09-15 DIAGNOSIS — R53.81 MALAISE AND FATIGUE: Primary | ICD-10-CM

## 2023-09-15 DIAGNOSIS — R53.81 MALAISE AND FATIGUE: ICD-10-CM

## 2023-09-15 DIAGNOSIS — I48.0 PAF (PAROXYSMAL ATRIAL FIBRILLATION) (HCC): ICD-10-CM

## 2023-09-15 DIAGNOSIS — I25.10 ASCVD (ARTERIOSCLEROTIC CARDIOVASCULAR DISEASE): ICD-10-CM

## 2023-09-15 LAB — NT PRO BNP: 2159 PG/ML

## 2023-09-15 PROCEDURE — G8417 CALC BMI ABV UP PARAM F/U: HCPCS | Performed by: INTERNAL MEDICINE

## 2023-09-15 PROCEDURE — 1090F PRES/ABSN URINE INCON ASSESS: CPT | Performed by: INTERNAL MEDICINE

## 2023-09-15 PROCEDURE — 3074F SYST BP LT 130 MM HG: CPT | Performed by: INTERNAL MEDICINE

## 2023-09-15 PROCEDURE — 93000 ELECTROCARDIOGRAM COMPLETE: CPT | Performed by: INTERNAL MEDICINE

## 2023-09-15 PROCEDURE — 1036F TOBACCO NON-USER: CPT | Performed by: INTERNAL MEDICINE

## 2023-09-15 PROCEDURE — G8400 PT W/DXA NO RESULTS DOC: HCPCS | Performed by: INTERNAL MEDICINE

## 2023-09-15 PROCEDURE — 3078F DIAST BP <80 MM HG: CPT | Performed by: INTERNAL MEDICINE

## 2023-09-15 PROCEDURE — 99214 OFFICE O/P EST MOD 30 MIN: CPT | Performed by: INTERNAL MEDICINE

## 2023-09-15 PROCEDURE — 1123F ACP DISCUSS/DSCN MKR DOCD: CPT | Performed by: INTERNAL MEDICINE

## 2023-09-15 PROCEDURE — G8428 CUR MEDS NOT DOCUMENT: HCPCS | Performed by: INTERNAL MEDICINE

## 2023-09-15 RX ORDER — METOPROLOL SUCCINATE 25 MG/1
25 TABLET, EXTENDED RELEASE ORAL DAILY
Qty: 30 TABLET | Refills: 3 | Status: CANCELLED | OUTPATIENT
Start: 2023-09-15

## 2023-09-23 ENCOUNTER — APPOINTMENT (OUTPATIENT)
Dept: GENERAL RADIOLOGY | Age: 83
DRG: 871 | End: 2023-09-23
Payer: MEDICARE

## 2023-09-23 ENCOUNTER — HOSPITAL ENCOUNTER (INPATIENT)
Age: 83
LOS: 2 days | Discharge: HOME HEALTH | DRG: 871 | End: 2023-09-28
Attending: STUDENT IN AN ORGANIZED HEALTH CARE EDUCATION/TRAINING PROGRAM | Admitting: HOSPITALIST
Payer: MEDICARE

## 2023-09-23 DIAGNOSIS — J96.01 ACUTE RESPIRATORY FAILURE WITH HYPOXIA (HCC): ICD-10-CM

## 2023-09-23 DIAGNOSIS — R09.02 HYPOXIA: ICD-10-CM

## 2023-09-23 DIAGNOSIS — R06.03 RESPIRATORY DISTRESS: ICD-10-CM

## 2023-09-23 DIAGNOSIS — R79.89 ELEVATED TROPONIN: ICD-10-CM

## 2023-09-23 DIAGNOSIS — E11.51 TYPE 2 DIABETES MELLITUS WITH PERIPHERAL VASCULAR DISEASE (HCC): Primary | ICD-10-CM

## 2023-09-23 DIAGNOSIS — D72.829 LEUKOCYTOSIS, UNSPECIFIED TYPE: ICD-10-CM

## 2023-09-23 DIAGNOSIS — R07.9 CHEST PAIN: ICD-10-CM

## 2023-09-23 DIAGNOSIS — I50.9 ACUTE HEART FAILURE, UNSPECIFIED HEART FAILURE TYPE (HCC): ICD-10-CM

## 2023-09-23 LAB
ALBUMIN SERPL-MCNC: 3.2 G/DL (ref 3.2–4.6)
ALBUMIN/GLOB SERPL: 0.7 (ref 0.4–1.6)
ALP SERPL-CCNC: 79 U/L (ref 50–136)
ALT SERPL-CCNC: 20 U/L (ref 12–65)
ANION GAP SERPL CALC-SCNC: 13 MMOL/L (ref 2–11)
ARTERIAL PATENCY WRIST A: POSITIVE
AST SERPL-CCNC: 22 U/L (ref 15–37)
BASE DEFICIT BLD-SCNC: 4.8 MMOL/L
BASOPHILS # BLD: 0.1 K/UL (ref 0–0.2)
BASOPHILS NFR BLD: 0 % (ref 0–2)
BDY SITE: ABNORMAL
BILIRUB SERPL-MCNC: 1.1 MG/DL (ref 0.2–1.1)
BUN SERPL-MCNC: 15 MG/DL (ref 8–23)
CALCIUM SERPL-MCNC: 9 MG/DL (ref 8.3–10.4)
CHLORIDE SERPL-SCNC: 104 MMOL/L (ref 101–110)
CO2 SERPL-SCNC: 19 MMOL/L (ref 21–32)
CREAT SERPL-MCNC: 1.4 MG/DL (ref 0.6–1)
DIFFERENTIAL METHOD BLD: ABNORMAL
EOSINOPHIL # BLD: 0.1 K/UL (ref 0–0.8)
EOSINOPHIL NFR BLD: 0 % (ref 0.5–7.8)
ERYTHROCYTE [DISTWIDTH] IN BLOOD BY AUTOMATED COUNT: 12.7 % (ref 11.9–14.6)
GAS FLOW.O2 O2 DELIVERY SYS: ABNORMAL
GLOBULIN SER CALC-MCNC: 4.3 G/DL (ref 2.8–4.5)
GLUCOSE SERPL-MCNC: 341 MG/DL (ref 65–100)
HCO3 BLD-SCNC: 20 MMOL/L (ref 22–26)
HCT VFR BLD AUTO: 44.2 % (ref 35.8–46.3)
HGB BLD-MCNC: 14.9 G/DL (ref 11.7–15.4)
IMM GRANULOCYTES # BLD AUTO: 0.3 K/UL (ref 0–0.5)
IMM GRANULOCYTES NFR BLD AUTO: 2 % (ref 0–5)
LACTATE SERPL-SCNC: 3.6 MMOL/L (ref 0.4–2)
LYMPHOCYTES # BLD: 1.8 K/UL (ref 0.5–4.6)
LYMPHOCYTES NFR BLD: 10 % (ref 13–44)
MCH RBC QN AUTO: 30.7 PG (ref 26.1–32.9)
MCHC RBC AUTO-ENTMCNC: 33.7 G/DL (ref 31.4–35)
MCV RBC AUTO: 91.1 FL (ref 82–102)
MONOCYTES # BLD: 1.8 K/UL (ref 0.1–1.3)
MONOCYTES NFR BLD: 9 % (ref 4–12)
NEUTS SEG # BLD: 15.1 K/UL (ref 1.7–8.2)
NEUTS SEG NFR BLD: 79 % (ref 43–78)
NRBC # BLD: 0 K/UL (ref 0–0.2)
PCO2 BLD: 35.8 MMHG (ref 35–45)
PH BLD: 7.36 (ref 7.35–7.45)
PLATELET # BLD AUTO: 284 K/UL (ref 150–450)
PMV BLD AUTO: 10.1 FL (ref 9.4–12.3)
PO2 BLD: 167 MMHG (ref 75–100)
POC FIO2: 12
POTASSIUM SERPL-SCNC: 5 MMOL/L (ref 3.5–5.1)
PROT SERPL-MCNC: 7.5 G/DL (ref 6.3–8.2)
RBC # BLD AUTO: 4.85 M/UL (ref 4.05–5.2)
SAO2 % BLD: 99.4 % (ref 95–98)
SARS-COV-2 RDRP RESP QL NAA+PROBE: NOT DETECTED
SERVICE CMNT-IMP: ABNORMAL
SODIUM SERPL-SCNC: 136 MMOL/L (ref 133–143)
SOURCE: NORMAL
SPECIMEN TYPE: ABNORMAL
TROPONIN I SERPL HS-MCNC: 1288.9 PG/ML (ref 0–14)
WBC # BLD AUTO: 19.2 K/UL (ref 4.3–11.1)

## 2023-09-23 PROCEDURE — 71045 X-RAY EXAM CHEST 1 VIEW: CPT

## 2023-09-23 PROCEDURE — 93005 ELECTROCARDIOGRAM TRACING: CPT | Performed by: STUDENT IN AN ORGANIZED HEALTH CARE EDUCATION/TRAINING PROGRAM

## 2023-09-23 PROCEDURE — 87635 SARS-COV-2 COVID-19 AMP PRB: CPT

## 2023-09-23 PROCEDURE — 96366 THER/PROPH/DIAG IV INF ADDON: CPT

## 2023-09-23 PROCEDURE — 96375 TX/PRO/DX INJ NEW DRUG ADDON: CPT

## 2023-09-23 PROCEDURE — 96368 THER/DIAG CONCURRENT INF: CPT

## 2023-09-23 PROCEDURE — 83880 ASSAY OF NATRIURETIC PEPTIDE: CPT

## 2023-09-23 PROCEDURE — 82803 BLOOD GASES ANY COMBINATION: CPT

## 2023-09-23 PROCEDURE — 83605 ASSAY OF LACTIC ACID: CPT

## 2023-09-23 PROCEDURE — 85025 COMPLETE CBC W/AUTO DIFF WBC: CPT

## 2023-09-23 PROCEDURE — 96365 THER/PROPH/DIAG IV INF INIT: CPT

## 2023-09-23 PROCEDURE — 6360000002 HC RX W HCPCS: Performed by: STUDENT IN AN ORGANIZED HEALTH CARE EDUCATION/TRAINING PROGRAM

## 2023-09-23 PROCEDURE — 36600 WITHDRAWAL OF ARTERIAL BLOOD: CPT

## 2023-09-23 PROCEDURE — 84484 ASSAY OF TROPONIN QUANT: CPT

## 2023-09-23 PROCEDURE — 84145 PROCALCITONIN (PCT): CPT

## 2023-09-23 PROCEDURE — 99285 EMERGENCY DEPT VISIT HI MDM: CPT

## 2023-09-23 PROCEDURE — 80053 COMPREHEN METABOLIC PANEL: CPT

## 2023-09-23 RX ORDER — ASPIRIN 81 MG/1
324 TABLET, CHEWABLE ORAL
Status: COMPLETED | OUTPATIENT
Start: 2023-09-24 | End: 2023-09-24

## 2023-09-23 RX ORDER — ALBUTEROL SULFATE 2.5 MG/3ML
10 SOLUTION RESPIRATORY (INHALATION)
Status: COMPLETED | OUTPATIENT
Start: 2023-09-23 | End: 2023-09-23

## 2023-09-23 RX ADMIN — ALBUTEROL SULFATE 10 MG: 2.5 SOLUTION RESPIRATORY (INHALATION) at 23:19

## 2023-09-23 ASSESSMENT — LIFESTYLE VARIABLES
HOW MANY STANDARD DRINKS CONTAINING ALCOHOL DO YOU HAVE ON A TYPICAL DAY: 1 OR 2
HOW OFTEN DO YOU HAVE A DRINK CONTAINING ALCOHOL: MONTHLY OR LESS

## 2023-09-23 ASSESSMENT — PAIN - FUNCTIONAL ASSESSMENT: PAIN_FUNCTIONAL_ASSESSMENT: NONE - DENIES PAIN

## 2023-09-24 ENCOUNTER — APPOINTMENT (OUTPATIENT)
Dept: NON INVASIVE DIAGNOSTICS | Age: 83
DRG: 871 | End: 2023-09-24
Attending: HOSPITALIST
Payer: MEDICARE

## 2023-09-24 PROBLEM — I21.4 NSTEMI (NON-ST ELEVATED MYOCARDIAL INFARCTION) (HCC): Status: ACTIVE | Noted: 2023-09-24

## 2023-09-24 PROBLEM — R06.03 RESPIRATORY DISTRESS: Status: ACTIVE | Noted: 2023-09-24

## 2023-09-24 PROBLEM — I50.9 ACUTE HEART FAILURE, UNSPECIFIED HEART FAILURE TYPE (HCC): Status: ACTIVE | Noted: 2023-09-24

## 2023-09-24 PROBLEM — I25.700 CORONARY ARTERY DISEASE INVOLVING CORONARY BYPASS GRAFT OF NATIVE HEART WITH UNSTABLE ANGINA PECTORIS (HCC): Status: ACTIVE | Noted: 2023-09-24

## 2023-09-24 PROBLEM — J96.01 ACUTE RESPIRATORY FAILURE WITH HYPOXIA (HCC): Status: ACTIVE | Noted: 2023-09-24

## 2023-09-24 LAB
APTT PPP: 31.8 SEC (ref 24.5–34.2)
ECHO AO ASC DIAM: 3.2 CM
ECHO AO ASCENDING AORTA INDEX: 1.69 CM/M2
ECHO AO ROOT DIAM: 2.9 CM
ECHO AO ROOT INDEX: 1.53 CM/M2
ECHO AV AREA PEAK VELOCITY: 1.8 CM2
ECHO AV AREA VTI: 2.1 CM2
ECHO AV AREA/BSA PEAK VELOCITY: 1 CM2/M2
ECHO AV AREA/BSA VTI: 1.1 CM2/M2
ECHO AV MEAN GRADIENT: 4 MMHG
ECHO AV MEAN VELOCITY: 0.9 M/S
ECHO AV PEAK GRADIENT: 8 MMHG
ECHO AV PEAK VELOCITY: 1.4 M/S
ECHO AV VELOCITY RATIO: 0.71
ECHO AV VTI: 22.2 CM
ECHO BSA: 1.95 M2
ECHO EST RA PRESSURE: 3 MMHG
ECHO IVC PROX: 1.5 CM
ECHO LA AREA 2C: 25 CM2
ECHO LA AREA 4C: 22.5 CM2
ECHO LA DIAMETER INDEX: 2.28 CM/M2
ECHO LA DIAMETER: 4.3 CM
ECHO LA MAJOR AXIS: 5.7 CM
ECHO LA MINOR AXIS: 5.6 CM
ECHO LA TO AORTIC ROOT RATIO: 1.48
ECHO LA VOL 2C: 88 ML (ref 22–52)
ECHO LA VOL 4C: 67 ML (ref 22–52)
ECHO LA VOL BP: 77 ML (ref 22–52)
ECHO LA VOL/BSA BIPLANE: 41 ML/M2 (ref 16–34)
ECHO LA VOLUME INDEX A2C: 47 ML/M2 (ref 16–34)
ECHO LA VOLUME INDEX A4C: 35 ML/M2 (ref 16–34)
ECHO LV E' LATERAL VELOCITY: 12 CM/S
ECHO LV E' SEPTAL VELOCITY: 7 CM/S
ECHO LV EDV A2C: 81 ML
ECHO LV EDV A4C: 61 ML
ECHO LV EDV INDEX A4C: 32 ML/M2
ECHO LV EDV NDEX A2C: 43 ML/M2
ECHO LV EJECTION FRACTION A2C: 58 %
ECHO LV EJECTION FRACTION A4C: 49 %
ECHO LV EJECTION FRACTION BIPLANE: 53 % (ref 55–100)
ECHO LV ESV A2C: 34 ML
ECHO LV ESV A4C: 31 ML
ECHO LV ESV INDEX A2C: 18 ML/M2
ECHO LV ESV INDEX A4C: 16 ML/M2
ECHO LV FRACTIONAL SHORTENING: 20 % (ref 28–44)
ECHO LV INTERNAL DIMENSION DIASTOLE INDEX: 2.96 CM/M2
ECHO LV INTERNAL DIMENSION DIASTOLIC: 5.6 CM (ref 3.9–5.3)
ECHO LV INTERNAL DIMENSION SYSTOLIC INDEX: 2.38 CM/M2
ECHO LV INTERNAL DIMENSION SYSTOLIC: 4.5 CM
ECHO LV IVSD: 0.8 CM (ref 0.6–0.9)
ECHO LV MASS 2D: 191.6 G (ref 67–162)
ECHO LV MASS INDEX 2D: 101.4 G/M2 (ref 43–95)
ECHO LV POSTERIOR WALL DIASTOLIC: 1 CM (ref 0.6–0.9)
ECHO LV RELATIVE WALL THICKNESS RATIO: 0.36
ECHO LVOT AREA: 2.5 CM2
ECHO LVOT AV VTI INDEX: 0.84
ECHO LVOT DIAM: 1.8 CM
ECHO LVOT MEAN GRADIENT: 2 MMHG
ECHO LVOT PEAK GRADIENT: 4 MMHG
ECHO LVOT PEAK VELOCITY: 1 M/S
ECHO LVOT STROKE VOLUME INDEX: 25 ML/M2
ECHO LVOT SV: 47.3 ML
ECHO LVOT VTI: 18.6 CM
ECHO MV E DECELERATION TIME (DT): 187 MS
ECHO MV E VELOCITY: 1.05 M/S
ECHO MV E/E' LATERAL: 8.75
ECHO MV E/E' RATIO (AVERAGED): 11.88
ECHO MV E/E' SEPTAL: 15
ECHO MV REGURGITANT PEAK GRADIENT: 77 MMHG
ECHO MV REGURGITANT PEAK VELOCITY: 4.4 M/S
ECHO PV MAX VELOCITY: 0.8 M/S
ECHO PV PEAK GRADIENT: 3 MMHG
ECHO RIGHT VENTRICULAR SYSTOLIC PRESSURE (RVSP): 33 MMHG
ECHO RV BASAL DIMENSION: 4.2 CM
ECHO RV TAPSE: 1.5 CM (ref 1.7–?)
ECHO TV REGURGITANT MAX VELOCITY: 2.73 M/S
ECHO TV REGURGITANT PEAK GRADIENT: 30 MMHG
EKG ATRIAL RATE: 300 BPM
EKG ATRIAL RATE: 85 BPM
EKG ATRIAL RATE: 95 BPM
EKG DIAGNOSIS: NORMAL
EKG P AXIS: -84 DEGREES
EKG P AXIS: 67 DEGREES
EKG P AXIS: 72 DEGREES
EKG P-R INTERVAL: 173 MS
EKG P-R INTERVAL: 183 MS
EKG Q-T INTERVAL: 417 MS
EKG Q-T INTERVAL: 449 MS
EKG Q-T INTERVAL: 458 MS
EKG QRS DURATION: 110 MS
EKG QRS DURATION: 125 MS
EKG QRS DURATION: 92 MS
EKG QTC CALCULATION (BAZETT): 487 MS
EKG QTC CALCULATION (BAZETT): 519 MS
EKG QTC CALCULATION (BAZETT): 531 MS
EKG R AXIS: -37 DEGREES
EKG R AXIS: 18 DEGREES
EKG R AXIS: 240 DEGREES
EKG T AXIS: 103 DEGREES
EKG T AXIS: 184 DEGREES
EKG T AXIS: 79 DEGREES
EKG VENTRICULAR RATE: 68 BPM
EKG VENTRICULAR RATE: 84 BPM
EKG VENTRICULAR RATE: 93 BPM
ERYTHROCYTE [DISTWIDTH] IN BLOOD BY AUTOMATED COUNT: 12.5 % (ref 11.9–14.6)
GLUCOSE BLD STRIP.AUTO-MCNC: 150 MG/DL (ref 65–100)
GLUCOSE BLD STRIP.AUTO-MCNC: 160 MG/DL (ref 65–100)
GLUCOSE BLD STRIP.AUTO-MCNC: 192 MG/DL (ref 65–100)
GLUCOSE BLD STRIP.AUTO-MCNC: 221 MG/DL (ref 65–100)
GLUCOSE BLD STRIP.AUTO-MCNC: 293 MG/DL (ref 65–100)
HCT VFR BLD AUTO: 39.5 % (ref 35.8–46.3)
HGB BLD-MCNC: 13.2 G/DL (ref 11.7–15.4)
INR PPP: 1.1
LACTATE SERPL-SCNC: 1.7 MMOL/L (ref 0.4–2)
MCH RBC QN AUTO: 30.5 PG (ref 26.1–32.9)
MCHC RBC AUTO-ENTMCNC: 33.4 G/DL (ref 31.4–35)
MCV RBC AUTO: 91.2 FL (ref 82–102)
MRSA DNA SPEC QL NAA+PROBE: NOT DETECTED
NRBC # BLD: 0 K/UL (ref 0–0.2)
NT PRO BNP: 2651 PG/ML
PLATELET # BLD AUTO: 211 K/UL (ref 150–450)
PMV BLD AUTO: 9.9 FL (ref 9.4–12.3)
PROCALCITONIN SERPL-MCNC: <0.05 NG/ML (ref 0–0.49)
PROTHROMBIN TIME: 15.1 SEC (ref 12.6–14.3)
RBC # BLD AUTO: 4.33 M/UL (ref 4.05–5.2)
S AUREUS CPE NOSE QL NAA+PROBE: NOT DETECTED
SERVICE CMNT-IMP: ABNORMAL
TROPONIN I SERPL HS-MCNC: 1464.6 PG/ML (ref 0–14)
TROPONIN I SERPL HS-MCNC: 1840.2 PG/ML (ref 0–14)
UFH PPP CHRO-ACNC: 0.18 IU/ML (ref 0.3–0.7)
UFH PPP CHRO-ACNC: 0.49 IU/ML (ref 0.3–0.7)
UFH PPP CHRO-ACNC: <0.09 IU/ML (ref 0.3–0.7)
WBC # BLD AUTO: 13.3 K/UL (ref 4.3–11.1)

## 2023-09-24 PROCEDURE — 93010 ELECTROCARDIOGRAM REPORT: CPT | Performed by: INTERNAL MEDICINE

## 2023-09-24 PROCEDURE — 6370000000 HC RX 637 (ALT 250 FOR IP): Performed by: HOSPITALIST

## 2023-09-24 PROCEDURE — 5A0945A ASSISTANCE WITH RESPIRATORY VENTILATION, 24-96 CONSECUTIVE HOURS, HIGH NASAL FLOW/VELOCITY: ICD-10-PCS | Performed by: HOSPITALIST

## 2023-09-24 PROCEDURE — G0378 HOSPITAL OBSERVATION PER HR: HCPCS

## 2023-09-24 PROCEDURE — 93306 TTE W/DOPPLER COMPLETE: CPT | Performed by: INTERNAL MEDICINE

## 2023-09-24 PROCEDURE — 6360000002 HC RX W HCPCS: Performed by: STUDENT IN AN ORGANIZED HEALTH CARE EDUCATION/TRAINING PROGRAM

## 2023-09-24 PROCEDURE — 85610 PROTHROMBIN TIME: CPT

## 2023-09-24 PROCEDURE — 2500000003 HC RX 250 WO HCPCS: Performed by: HOSPITALIST

## 2023-09-24 PROCEDURE — 6360000004 HC RX CONTRAST MEDICATION: Performed by: STUDENT IN AN ORGANIZED HEALTH CARE EDUCATION/TRAINING PROGRAM

## 2023-09-24 PROCEDURE — 6370000000 HC RX 637 (ALT 250 FOR IP): Performed by: STUDENT IN AN ORGANIZED HEALTH CARE EDUCATION/TRAINING PROGRAM

## 2023-09-24 PROCEDURE — 93005 ELECTROCARDIOGRAM TRACING: CPT | Performed by: INTERNAL MEDICINE

## 2023-09-24 PROCEDURE — 85027 COMPLETE CBC AUTOMATED: CPT

## 2023-09-24 PROCEDURE — 2580000003 HC RX 258: Performed by: HOSPITALIST

## 2023-09-24 PROCEDURE — 6370000000 HC RX 637 (ALT 250 FOR IP): Performed by: NURSE PRACTITIONER

## 2023-09-24 PROCEDURE — 86738 MYCOPLASMA ANTIBODY: CPT

## 2023-09-24 PROCEDURE — 99223 1ST HOSP IP/OBS HIGH 75: CPT | Performed by: INTERNAL MEDICINE

## 2023-09-24 PROCEDURE — 87449 NOS EACH ORGANISM AG IA: CPT

## 2023-09-24 PROCEDURE — 36415 COLL VENOUS BLD VENIPUNCTURE: CPT

## 2023-09-24 PROCEDURE — 93005 ELECTROCARDIOGRAM TRACING: CPT | Performed by: STUDENT IN AN ORGANIZED HEALTH CARE EDUCATION/TRAINING PROGRAM

## 2023-09-24 PROCEDURE — 87040 BLOOD CULTURE FOR BACTERIA: CPT

## 2023-09-24 PROCEDURE — C8929 TTE W OR WO FOL WCON,DOPPLER: HCPCS

## 2023-09-24 PROCEDURE — 87899 AGENT NOS ASSAY W/OPTIC: CPT

## 2023-09-24 PROCEDURE — 2580000003 HC RX 258: Performed by: STUDENT IN AN ORGANIZED HEALTH CARE EDUCATION/TRAINING PROGRAM

## 2023-09-24 PROCEDURE — 85520 HEPARIN ASSAY: CPT

## 2023-09-24 PROCEDURE — 2500000003 HC RX 250 WO HCPCS: Performed by: STUDENT IN AN ORGANIZED HEALTH CARE EDUCATION/TRAINING PROGRAM

## 2023-09-24 PROCEDURE — 87641 MR-STAPH DNA AMP PROBE: CPT

## 2023-09-24 PROCEDURE — 83605 ASSAY OF LACTIC ACID: CPT

## 2023-09-24 PROCEDURE — 82962 GLUCOSE BLOOD TEST: CPT

## 2023-09-24 PROCEDURE — 84484 ASSAY OF TROPONIN QUANT: CPT

## 2023-09-24 PROCEDURE — 2500000003 HC RX 250 WO HCPCS: Performed by: NURSE PRACTITIONER

## 2023-09-24 PROCEDURE — 85730 THROMBOPLASTIN TIME PARTIAL: CPT

## 2023-09-24 PROCEDURE — 6360000002 HC RX W HCPCS: Performed by: HOSPITALIST

## 2023-09-24 RX ORDER — HEPARIN SODIUM 10000 [USP'U]/100ML
5-30 INJECTION, SOLUTION INTRAVENOUS CONTINUOUS
Status: DISCONTINUED | OUTPATIENT
Start: 2023-09-24 | End: 2023-09-25

## 2023-09-24 RX ORDER — SODIUM CHLORIDE 9 MG/ML
INJECTION, SOLUTION INTRAVENOUS PRN
Status: DISCONTINUED | OUTPATIENT
Start: 2023-09-24 | End: 2023-09-28 | Stop reason: HOSPADM

## 2023-09-24 RX ORDER — ASPIRIN 81 MG/1
81 TABLET, CHEWABLE ORAL DAILY
Status: DISCONTINUED | OUTPATIENT
Start: 2023-09-25 | End: 2023-09-28 | Stop reason: HOSPADM

## 2023-09-24 RX ORDER — SODIUM CHLORIDE 0.9 % (FLUSH) 0.9 %
5-40 SYRINGE (ML) INJECTION PRN
Status: DISCONTINUED | OUTPATIENT
Start: 2023-09-24 | End: 2023-09-28 | Stop reason: HOSPADM

## 2023-09-24 RX ORDER — ONDANSETRON 4 MG/1
4 TABLET, ORALLY DISINTEGRATING ORAL EVERY 8 HOURS PRN
Status: DISCONTINUED | OUTPATIENT
Start: 2023-09-24 | End: 2023-09-28 | Stop reason: HOSPADM

## 2023-09-24 RX ORDER — ENOXAPARIN SODIUM 100 MG/ML
40 INJECTION SUBCUTANEOUS DAILY
Status: DISCONTINUED | OUTPATIENT
Start: 2023-09-24 | End: 2023-09-24

## 2023-09-24 RX ORDER — ROSUVASTATIN CALCIUM 20 MG/1
40 TABLET, COATED ORAL NIGHTLY
Status: DISCONTINUED | OUTPATIENT
Start: 2023-09-24 | End: 2023-09-28 | Stop reason: HOSPADM

## 2023-09-24 RX ORDER — PROPRANOLOL HYDROCHLORIDE 80 MG/1
80 CAPSULE, EXTENDED RELEASE ORAL DAILY
COMMUNITY

## 2023-09-24 RX ORDER — ACETAMINOPHEN 325 MG/1
650 TABLET ORAL EVERY 6 HOURS PRN
Status: DISCONTINUED | OUTPATIENT
Start: 2023-09-24 | End: 2023-09-28 | Stop reason: HOSPADM

## 2023-09-24 RX ORDER — ONDANSETRON 2 MG/ML
4 INJECTION INTRAMUSCULAR; INTRAVENOUS EVERY 6 HOURS PRN
Status: DISCONTINUED | OUTPATIENT
Start: 2023-09-24 | End: 2023-09-26

## 2023-09-24 RX ORDER — INSULIN LISPRO 100 [IU]/ML
0-4 INJECTION, SOLUTION INTRAVENOUS; SUBCUTANEOUS NIGHTLY
Status: DISCONTINUED | OUTPATIENT
Start: 2023-09-24 | End: 2023-09-28 | Stop reason: HOSPADM

## 2023-09-24 RX ORDER — INSULIN LISPRO 100 [IU]/ML
0-4 INJECTION, SOLUTION INTRAVENOUS; SUBCUTANEOUS
Status: DISCONTINUED | OUTPATIENT
Start: 2023-09-24 | End: 2023-09-28 | Stop reason: HOSPADM

## 2023-09-24 RX ORDER — HEPARIN SODIUM 1000 [USP'U]/ML
2000 INJECTION, SOLUTION INTRAVENOUS; SUBCUTANEOUS PRN
Status: DISCONTINUED | OUTPATIENT
Start: 2023-09-24 | End: 2023-09-25 | Stop reason: ALTCHOICE

## 2023-09-24 RX ORDER — POLYETHYLENE GLYCOL 3350 17 G/17G
17 POWDER, FOR SOLUTION ORAL DAILY PRN
Status: DISCONTINUED | OUTPATIENT
Start: 2023-09-24 | End: 2023-09-28 | Stop reason: HOSPADM

## 2023-09-24 RX ORDER — METOPROLOL TARTRATE 5 MG/5ML
5 INJECTION INTRAVENOUS ONCE
Status: COMPLETED | OUTPATIENT
Start: 2023-09-24 | End: 2023-09-24

## 2023-09-24 RX ORDER — HEPARIN SODIUM 1000 [USP'U]/ML
4000 INJECTION, SOLUTION INTRAVENOUS; SUBCUTANEOUS PRN
Status: DISCONTINUED | OUTPATIENT
Start: 2023-09-24 | End: 2023-09-25 | Stop reason: ALTCHOICE

## 2023-09-24 RX ORDER — DOXYCYCLINE HYCLATE 100 MG/1
100 CAPSULE ORAL EVERY 12 HOURS SCHEDULED
Status: DISCONTINUED | OUTPATIENT
Start: 2023-09-24 | End: 2023-09-28 | Stop reason: HOSPADM

## 2023-09-24 RX ORDER — SODIUM CHLORIDE 0.9 % (FLUSH) 0.9 %
5-40 SYRINGE (ML) INJECTION EVERY 12 HOURS SCHEDULED
Status: DISCONTINUED | OUTPATIENT
Start: 2023-09-24 | End: 2023-09-28 | Stop reason: HOSPADM

## 2023-09-24 RX ORDER — LANOLIN ALCOHOL/MO/W.PET/CERES
3 CREAM (GRAM) TOPICAL NIGHTLY PRN
Status: DISCONTINUED | OUTPATIENT
Start: 2023-09-24 | End: 2023-09-28 | Stop reason: HOSPADM

## 2023-09-24 RX ORDER — FUROSEMIDE 10 MG/ML
40 INJECTION INTRAMUSCULAR; INTRAVENOUS 2 TIMES DAILY
Status: DISCONTINUED | OUTPATIENT
Start: 2023-09-24 | End: 2023-09-26

## 2023-09-24 RX ADMIN — Medication 3 MG: at 22:11

## 2023-09-24 RX ADMIN — TUBERCULIN PURIFIED PROTEIN DERIVATIVE 5 UNITS: 5 INJECTION, SOLUTION INTRADERMAL at 13:50

## 2023-09-24 RX ADMIN — SODIUM CHLORIDE, PRESERVATIVE FREE 10 ML: 5 INJECTION INTRAVENOUS at 08:06

## 2023-09-24 RX ADMIN — HEPARIN SODIUM 11 UNITS/KG/HR: 10000 INJECTION, SOLUTION INTRAVENOUS at 05:00

## 2023-09-24 RX ADMIN — FUROSEMIDE 40 MG: 10 INJECTION, SOLUTION INTRAMUSCULAR; INTRAVENOUS at 16:51

## 2023-09-24 RX ADMIN — ASPIRIN 324 MG: 81 TABLET, CHEWABLE ORAL at 00:11

## 2023-09-24 RX ADMIN — DOXYCYCLINE HYCLATE 100 MG: 100 CAPSULE ORAL at 05:48

## 2023-09-24 RX ADMIN — CEFTRIAXONE SODIUM 2000 MG: 2 INJECTION, POWDER, FOR SOLUTION INTRAMUSCULAR; INTRAVENOUS at 23:32

## 2023-09-24 RX ADMIN — FUROSEMIDE 40 MG: 10 INJECTION, SOLUTION INTRAMUSCULAR; INTRAVENOUS at 08:06

## 2023-09-24 RX ADMIN — ROSUVASTATIN CALCIUM 40 MG: 20 TABLET, FILM COATED ORAL at 20:45

## 2023-09-24 RX ADMIN — HEPARIN SODIUM 2000 UNITS: 1000 INJECTION INTRAVENOUS; SUBCUTANEOUS at 11:58

## 2023-09-24 RX ADMIN — ONDANSETRON 4 MG: 4 TABLET, ORALLY DISINTEGRATING ORAL at 12:05

## 2023-09-24 RX ADMIN — AZITHROMYCIN MONOHYDRATE 500 MG: 500 INJECTION, POWDER, LYOPHILIZED, FOR SOLUTION INTRAVENOUS at 00:15

## 2023-09-24 RX ADMIN — CEFTRIAXONE 1000 MG: 1 INJECTION, POWDER, FOR SOLUTION INTRAMUSCULAR; INTRAVENOUS at 00:11

## 2023-09-24 RX ADMIN — SODIUM CHLORIDE, PRESERVATIVE FREE 10 ML: 5 INJECTION INTRAVENOUS at 20:46

## 2023-09-24 RX ADMIN — DOXYCYCLINE HYCLATE 100 MG: 100 CAPSULE ORAL at 20:46

## 2023-09-24 RX ADMIN — METOPROLOL TARTRATE 5 MG: 5 INJECTION INTRAVENOUS at 22:11

## 2023-09-24 RX ADMIN — SODIUM CHLORIDE, PRESERVATIVE FREE 0.6 ML: 5 INJECTION INTRAVENOUS at 11:50

## 2023-09-24 RX ADMIN — METOPROLOL TARTRATE 12.5 MG: 25 TABLET, FILM COATED ORAL at 12:44

## 2023-09-24 RX ADMIN — METOPROLOL TARTRATE 25 MG: 25 TABLET, FILM COATED ORAL at 23:32

## 2023-09-24 RX ADMIN — METOPROLOL TARTRATE 12.5 MG: 25 TABLET, FILM COATED ORAL at 18:29

## 2023-09-24 ASSESSMENT — ENCOUNTER SYMPTOMS
NAUSEA: 0
EYES NEGATIVE: 1
HEARTBURN: 0
SHORTNESS OF BREATH: 1
VOMITING: 0
ORTHOPNEA: 0

## 2023-09-24 ASSESSMENT — PAIN SCALES - GENERAL
PAINLEVEL_OUTOF10: 0

## 2023-09-24 ASSESSMENT — PAIN - FUNCTIONAL ASSESSMENT: PAIN_FUNCTIONAL_ASSESSMENT: 0-10

## 2023-09-24 NOTE — H&P
EKG shows ST depressions on lateral leads, will initiate low-dose heparin drip, will closely monitor. We will trend troponins, patient going to be n.p.o. for to make patient about cardiac catheterization. Diabetes type 2: Placed on sliding scale insulin, monitor blood sugars. Hold metformin    Dyslipidemia: Current on statins. Diet: Diet NPO  VTE prophylaxis: Heparin  Code status: Full Code      Non-peripheral Lines and Tubes (if present):             Hospital Problems:  Principal Problem:    Acute heart failure, unspecified heart failure type ([address])  Active Problems:    HLD (hyperlipidemia)    Type II diabetes mellitus (HCC)    PAD (peripheral artery disease) ([facility])    Acute respiratory failure with hypoxia ([facility])  Resolved Problems:    * No resolved hospital problems.  *      Past History:     Past Medical History:   Diagnosis Date    Arrhythmia age 29    stays controlled with med per pt--- will request cardio records    Arthritis     hand and spine    CAD (coronary artery disease) 2012    cabg     Calculus of kidney     Carotid stenosis 10/21/2015    Coronary atherosclerosis of native coronary artery 10/21/2015    Diabetes ([address])     type2--- sqbs avg am--120--- hypo at 70's hgba1c 6.1 on 06/1/2019    Essential hypertension 10/21/2015    managed with medication    GERD (gastroesophageal reflux disease)     controlled with meds    HLD (hyperlipidemia) 10/21/2015    Insomnia     Right ureteral stone     Subclavian artery stenosis ([address]) 10/21/2015       Past Surgical History:   Procedure Laterality Date    BREAST SURGERY Right     lump removed    CAROTID ENDARTERECTOMY Left 10/16/2019    CAROTID ENDARTERECTOMY Right 06/13/2019    CARPAL TUNNEL RELEASE Left 10/24/2016    CARPAL TUNNEL RELEASE Right 06/13/2016    CHOLECYSTECTOMY, OPEN  20 yrs ago    CORONARY ARTERY BYPASS GRAFT  2012    in [place] a LIMA graft    HEENT      HYSTERECTOMY (CERVIX STATUS UNKNOWN)      [place] UNLISTED PROCEDURE CARDIAC SURGERY

## 2023-09-24 NOTE — ED NOTES
TRANSFER - OUT REPORT:    Verbal report given to Mary Noble on Clara Chun  being transferred to [phone] for routine progression of patient care       Report consisted of patient's Situation, Background, Assessment and   Recommendations(SBAR). Information from the following report(s) Nurse Handoff Report, ED Encounter Summary, ED SBAR, Adult Overview, Intake/Output, MAR, and Recent Results was reviewed with the receiving nurse. Tyrone Fall Assessment:    Presents to emergency department  because of falls (Syncope, seizure, or loss of consciousness): No  Age > 70: Yes  Altered Mental Status, Intoxication with alcohol or substance confusion (Disorientation, impaired judgment, poor safety awaremess, or inability to follow instructions): No  Impaired Mobility: Ambulates or transfers with assistive devices or assistance; Unable to ambulate or transer.: No  Nursing Judgement: Yes          Lines:   Peripheral IV 09/23/23 Right Antecubital (Active)       Peripheral IV 09/23/23 Left Antecubital (Active)        Opportunity for questions and clarification was provided.       Patient transported with:  Registered Nurse           Jd Cortez RN  09/24/23 8514

## 2023-09-24 NOTE — ED NOTES
TRANSFER - OUT REPORT:    Verbal report given to [address], RN on [employer]  being transferred to [address] [place], [place] [id number] for routine progression of patient care       Report consisted of patient's Situation, Background, Assessment and   Recommendations(SBAR). Information from the following report(s) Nurse Handoff Report, Index, ED SBAR, Intake/Output, MAR, and Recent Results was reviewed with the receiving nurse. [place] Fall Assessment:    Presents to emergency department  because of falls (Syncope, seizure, or loss of consciousness): No  Age > 70: Yes  Altered Mental Status, Intoxication with alcohol or substance confusion (Disorientation, impaired judgment, poor safety awaremess, or inability to follow instructions): No  Impaired Mobility: Ambulates or transfers with assistive devices or assistance; Unable to ambulate or transer.: No  Nursing Judgement: Yes          Lines:   Peripheral IV 09/23/23 Right Antecubital (Active)       Peripheral IV 09/23/23 Left Antecubital (Active)        Opportunity for questions and clarification was provided.       Patient transported with:  O2 @ 4lpm and Registered Nurse           Barrie Velásquez RN  09/24/23 8841

## 2023-09-24 NOTE — ED NOTES
Updated patients daughter over the phone at this time     Barrie Velásquez, [address]  09/24/23 4764

## 2023-09-24 NOTE — ED NOTES
Verified heparin gtt with [employer] in [address] [facility], [address] [place] [address]  09/24/23 3800

## 2023-09-24 NOTE — ED PROVIDER NOTES
Emergency Department Provider Note       PCP: Jessica Gamez MD   Age: 82 y.o. Sex: female     DISPOSITION Decision To Admit 09/24/2023 01:42:46 AM       ICD-10-CM    1. Respiratory distress  R06.03       2. Hypoxia  R09.02       3. Elevated troponin  R77.8       4. Leukocytosis, unspecified type  D72.829           Medical Decision Making     Complexity of Problems Addressed:  1 or more acute illnesses that pose a threat to life or bodily function. Data Reviewed and Analyzed:   I independently ordered and reviewed each unique test.  I reviewed external records: provider visit note from outside specialist.       I independently ordered and interpreted the ED EKG in the absence of a Cardiologist.    Rate: 93  EKG Interpretation: EKG Interpretation: sinus rhythm, no evidence of arrhythmia  ST Segments: ST segment depression - NO STEMI    Repeat from 0021: rate 84, DANIELLE in Avr w/ diffuse depressions noted    I interpreted the X-rays no pneumothorax. Discussion of management or test interpretation. Jayshree Christensen appearing 81-year-old female presents to the emergency department via EMS. EMS reports call out was for shortness of breath. On arrival patient's O2 saturation was in the 60s. Improved with substantial oxygen administration. Patient also given DuoNeb in route. Patient reports for the last 1 week she has not been getting any better with URI symptoms. Reports cough which is mostly dry, intermittently productive of thick sputum. Denies chest pain or back pain. Denies swelling lower extremities. Denies history of lung problems. Denies exposure to anyone sick with similar symptoms. Patient with rhonchi and wheezing on exam, will give hour-long DuoNeb. A broad-based work-up was ordered including blood cultures. Patient maintaining normal O2 saturation on high flow nasal cannula approximately 12 L.   ABG with no normal pH, COVID was negative, white count is 19, stable H&H, normal electrolytes,

## 2023-09-25 LAB
ALBUMIN SERPL-MCNC: 2.9 G/DL (ref 3.2–4.6)
ALBUMIN/GLOB SERPL: 0.7 (ref 0.4–1.6)
ALP SERPL-CCNC: 68 U/L (ref 50–136)
ALT SERPL-CCNC: 31 U/L (ref 12–65)
ANION GAP SERPL CALC-SCNC: 6 MMOL/L (ref 2–11)
AST SERPL-CCNC: 34 U/L (ref 15–37)
BASOPHILS # BLD: 0.1 K/UL (ref 0–0.2)
BASOPHILS NFR BLD: 1 % (ref 0–2)
BILIRUB SERPL-MCNC: 0.3 MG/DL (ref 0.2–1.1)
BUN SERPL-MCNC: 16 MG/DL (ref 8–23)
CALCIUM SERPL-MCNC: 9 MG/DL (ref 8.3–10.4)
CHLORIDE SERPL-SCNC: 105 MMOL/L (ref 101–110)
CO2 SERPL-SCNC: 31 MMOL/L (ref 21–32)
CREAT SERPL-MCNC: 1 MG/DL (ref 0.6–1)
DIFFERENTIAL METHOD BLD: NORMAL
ECHO BSA: 1.95 M2
EOSINOPHIL # BLD: 0.2 K/UL (ref 0–0.8)
EOSINOPHIL NFR BLD: 2 % (ref 0.5–7.8)
ERYTHROCYTE [DISTWIDTH] IN BLOOD BY AUTOMATED COUNT: 12.5 % (ref 11.9–14.6)
GLOBULIN SER CALC-MCNC: 4 G/DL (ref 2.8–4.5)
GLUCOSE BLD STRIP.AUTO-MCNC: 163 MG/DL (ref 65–100)
GLUCOSE BLD STRIP.AUTO-MCNC: 166 MG/DL (ref 65–100)
GLUCOSE BLD STRIP.AUTO-MCNC: 213 MG/DL (ref 65–100)
GLUCOSE BLD STRIP.AUTO-MCNC: 303 MG/DL (ref 65–100)
GLUCOSE SERPL-MCNC: 206 MG/DL (ref 65–100)
HCT VFR BLD AUTO: 42.1 % (ref 35.8–46.3)
HGB BLD-MCNC: 14.4 G/DL (ref 11.7–15.4)
IMM GRANULOCYTES # BLD AUTO: 0.1 K/UL (ref 0–0.5)
IMM GRANULOCYTES NFR BLD AUTO: 1 % (ref 0–5)
LYMPHOCYTES # BLD: 1.9 K/UL (ref 0.5–4.6)
LYMPHOCYTES NFR BLD: 21 % (ref 13–44)
MCH RBC QN AUTO: 30.8 PG (ref 26.1–32.9)
MCHC RBC AUTO-ENTMCNC: 34.2 G/DL (ref 31.4–35)
MCV RBC AUTO: 90.1 FL (ref 82–102)
MM INDURATION, POC: 0 MM (ref 0–5)
MONOCYTES # BLD: 1.1 K/UL (ref 0.1–1.3)
MONOCYTES NFR BLD: 12 % (ref 4–12)
NEUTS SEG # BLD: 5.9 K/UL (ref 1.7–8.2)
NEUTS SEG NFR BLD: 63 % (ref 43–78)
NRBC # BLD: 0 K/UL (ref 0–0.2)
PLATELET # BLD AUTO: 232 K/UL (ref 150–450)
PMV BLD AUTO: 9.8 FL (ref 9.4–12.3)
POTASSIUM SERPL-SCNC: 3.7 MMOL/L (ref 3.5–5.1)
PPD, POC: NEGATIVE
PROT SERPL-MCNC: 6.9 G/DL (ref 6.3–8.2)
RBC # BLD AUTO: 4.67 M/UL (ref 4.05–5.2)
SERVICE CMNT-IMP: ABNORMAL
SODIUM SERPL-SCNC: 142 MMOL/L (ref 133–143)
UFH PPP CHRO-ACNC: 0.42 IU/ML (ref 0.3–0.7)
WBC # BLD AUTO: 9.2 K/UL (ref 4.3–11.1)

## 2023-09-25 PROCEDURE — 2709999900 HC NON-CHARGEABLE SUPPLY: Performed by: INTERNAL MEDICINE

## 2023-09-25 PROCEDURE — 2500000003 HC RX 250 WO HCPCS: Performed by: INTERNAL MEDICINE

## 2023-09-25 PROCEDURE — 80053 COMPREHEN METABOLIC PANEL: CPT

## 2023-09-25 PROCEDURE — 93459 L HRT ART/GRFT ANGIO: CPT | Performed by: INTERNAL MEDICINE

## 2023-09-25 PROCEDURE — 2580000003 HC RX 258: Performed by: INTERNAL MEDICINE

## 2023-09-25 PROCEDURE — 94761 N-INVAS EAR/PLS OXIMETRY MLT: CPT

## 2023-09-25 PROCEDURE — 85520 HEPARIN ASSAY: CPT

## 2023-09-25 PROCEDURE — 2580000003 HC RX 258: Performed by: HOSPITALIST

## 2023-09-25 PROCEDURE — 2700000000 HC OXYGEN THERAPY PER DAY

## 2023-09-25 PROCEDURE — B2131ZZ FLUOROSCOPY OF MULTIPLE CORONARY ARTERY BYPASS GRAFTS USING LOW OSMOLAR CONTRAST: ICD-10-PCS | Performed by: INTERNAL MEDICINE

## 2023-09-25 PROCEDURE — 97530 THERAPEUTIC ACTIVITIES: CPT

## 2023-09-25 PROCEDURE — 99152 MOD SED SAME PHYS/QHP 5/>YRS: CPT | Performed by: INTERNAL MEDICINE

## 2023-09-25 PROCEDURE — 6360000002 HC RX W HCPCS: Performed by: STUDENT IN AN ORGANIZED HEALTH CARE EDUCATION/TRAINING PROGRAM

## 2023-09-25 PROCEDURE — 97165 OT EVAL LOW COMPLEX 30 MIN: CPT

## 2023-09-25 PROCEDURE — 6360000002 HC RX W HCPCS: Performed by: HOSPITALIST

## 2023-09-25 PROCEDURE — 36225 PLACE CATH SUBCLAVIAN ART: CPT | Performed by: INTERNAL MEDICINE

## 2023-09-25 PROCEDURE — 97161 PT EVAL LOW COMPLEX 20 MIN: CPT

## 2023-09-25 PROCEDURE — B2181ZZ FLUOROSCOPY OF LEFT INTERNAL MAMMARY BYPASS GRAFT USING LOW OSMOLAR CONTRAST: ICD-10-PCS | Performed by: INTERNAL MEDICINE

## 2023-09-25 PROCEDURE — 6370000000 HC RX 637 (ALT 250 FOR IP): Performed by: NURSE PRACTITIONER

## 2023-09-25 PROCEDURE — 6370000000 HC RX 637 (ALT 250 FOR IP): Performed by: HOSPITALIST

## 2023-09-25 PROCEDURE — C1769 GUIDE WIRE: HCPCS | Performed by: INTERNAL MEDICINE

## 2023-09-25 PROCEDURE — 6360000004 HC RX CONTRAST MEDICATION: Performed by: INTERNAL MEDICINE

## 2023-09-25 PROCEDURE — B2151ZZ FLUOROSCOPY OF LEFT HEART USING LOW OSMOLAR CONTRAST: ICD-10-PCS | Performed by: INTERNAL MEDICINE

## 2023-09-25 PROCEDURE — C1894 INTRO/SHEATH, NON-LASER: HCPCS | Performed by: INTERNAL MEDICINE

## 2023-09-25 PROCEDURE — 36415 COLL VENOUS BLD VENIPUNCTURE: CPT

## 2023-09-25 PROCEDURE — 2500000003 HC RX 250 WO HCPCS: Performed by: HOSPITALIST

## 2023-09-25 PROCEDURE — G0378 HOSPITAL OBSERVATION PER HR: HCPCS | Performed by: STUDENT IN AN ORGANIZED HEALTH CARE EDUCATION/TRAINING PROGRAM

## 2023-09-25 PROCEDURE — 4A023N7 MEASUREMENT OF CARDIAC SAMPLING AND PRESSURE, LEFT HEART, PERCUTANEOUS APPROACH: ICD-10-PCS | Performed by: INTERNAL MEDICINE

## 2023-09-25 PROCEDURE — 97535 SELF CARE MNGMENT TRAINING: CPT

## 2023-09-25 PROCEDURE — G0378 HOSPITAL OBSERVATION PER HR: HCPCS

## 2023-09-25 PROCEDURE — 85025 COMPLETE CBC W/AUTO DIFF WBC: CPT

## 2023-09-25 PROCEDURE — 6370000000 HC RX 637 (ALT 250 FOR IP): Performed by: INTERNAL MEDICINE

## 2023-09-25 PROCEDURE — 82962 GLUCOSE BLOOD TEST: CPT

## 2023-09-25 PROCEDURE — 99153 MOD SED SAME PHYS/QHP EA: CPT | Performed by: INTERNAL MEDICINE

## 2023-09-25 PROCEDURE — 6360000002 HC RX W HCPCS: Performed by: INTERNAL MEDICINE

## 2023-09-25 PROCEDURE — B2111ZZ FLUOROSCOPY OF MULTIPLE CORONARY ARTERIES USING LOW OSMOLAR CONTRAST: ICD-10-PCS | Performed by: INTERNAL MEDICINE

## 2023-09-25 RX ORDER — DEXTROSE MONOHYDRATE 100 MG/ML
INJECTION, SOLUTION INTRAVENOUS CONTINUOUS PRN
Status: DISCONTINUED | OUTPATIENT
Start: 2023-09-25 | End: 2023-09-28 | Stop reason: HOSPADM

## 2023-09-25 RX ORDER — ISOSORBIDE MONONITRATE 30 MG/1
30 TABLET, EXTENDED RELEASE ORAL DAILY
Status: DISCONTINUED | OUTPATIENT
Start: 2023-09-25 | End: 2023-09-28 | Stop reason: HOSPADM

## 2023-09-25 RX ORDER — PROPRANOLOL HYDROCHLORIDE 80 MG/1
80 CAPSULE, EXTENDED RELEASE ORAL DAILY
Status: DISCONTINUED | OUTPATIENT
Start: 2023-09-25 | End: 2023-09-25

## 2023-09-25 RX ORDER — METOPROLOL SUCCINATE 25 MG/1
25 TABLET, EXTENDED RELEASE ORAL DAILY
Status: DISCONTINUED | OUTPATIENT
Start: 2023-09-25 | End: 2023-09-28 | Stop reason: HOSPADM

## 2023-09-25 RX ORDER — SODIUM CHLORIDE 9 MG/ML
INJECTION, SOLUTION INTRAVENOUS CONTINUOUS
Status: DISCONTINUED | OUTPATIENT
Start: 2023-09-25 | End: 2023-09-25

## 2023-09-25 RX ORDER — ACETAMINOPHEN 325 MG/1
650 TABLET ORAL EVERY 4 HOURS PRN
Status: DISCONTINUED | OUTPATIENT
Start: 2023-09-25 | End: 2023-09-28 | Stop reason: HOSPADM

## 2023-09-25 RX ORDER — SODIUM CHLORIDE 0.9 % (FLUSH) 0.9 %
5-40 SYRINGE (ML) INJECTION PRN
Status: DISCONTINUED | OUTPATIENT
Start: 2023-09-25 | End: 2023-09-28 | Stop reason: HOSPADM

## 2023-09-25 RX ORDER — MIDAZOLAM HYDROCHLORIDE 1 MG/ML
INJECTION INTRAMUSCULAR; INTRAVENOUS PRN
Status: DISCONTINUED | OUTPATIENT
Start: 2023-09-25 | End: 2023-09-25 | Stop reason: HOSPADM

## 2023-09-25 RX ORDER — SODIUM CHLORIDE 0.9 % (FLUSH) 0.9 %
5-40 SYRINGE (ML) INJECTION EVERY 12 HOURS SCHEDULED
Status: DISCONTINUED | OUTPATIENT
Start: 2023-09-25 | End: 2023-09-28 | Stop reason: HOSPADM

## 2023-09-25 RX ORDER — DISOPYRAMIDE PHOSPHATE 150 MG/1
150 CAPSULE ORAL 4 TIMES DAILY
Status: DISCONTINUED | OUTPATIENT
Start: 2023-09-25 | End: 2023-09-26

## 2023-09-25 RX ORDER — METOCLOPRAMIDE HYDROCHLORIDE 5 MG/ML
10 INJECTION INTRAMUSCULAR; INTRAVENOUS EVERY 4 HOURS PRN
Status: DISCONTINUED | OUTPATIENT
Start: 2023-09-25 | End: 2023-09-28 | Stop reason: HOSPADM

## 2023-09-25 RX ORDER — HEPARIN SODIUM 200 [USP'U]/100ML
INJECTION, SOLUTION INTRAVENOUS CONTINUOUS PRN
Status: COMPLETED | OUTPATIENT
Start: 2023-09-25 | End: 2023-09-25

## 2023-09-25 RX ORDER — LIDOCAINE HYDROCHLORIDE 10 MG/ML
INJECTION, SOLUTION INFILTRATION; PERINEURAL PRN
Status: DISCONTINUED | OUTPATIENT
Start: 2023-09-25 | End: 2023-09-25 | Stop reason: HOSPADM

## 2023-09-25 RX ADMIN — ISOSORBIDE MONONITRATE 30 MG: 30 TABLET, EXTENDED RELEASE ORAL at 17:29

## 2023-09-25 RX ADMIN — DOXYCYCLINE HYCLATE 100 MG: 100 CAPSULE ORAL at 07:55

## 2023-09-25 RX ADMIN — METOPROLOL TARTRATE 25 MG: 25 TABLET, FILM COATED ORAL at 04:54

## 2023-09-25 RX ADMIN — SODIUM CHLORIDE, PRESERVATIVE FREE 5 ML: 5 INJECTION INTRAVENOUS at 20:31

## 2023-09-25 RX ADMIN — SODIUM CHLORIDE, PRESERVATIVE FREE 5 ML: 5 INJECTION INTRAVENOUS at 20:32

## 2023-09-25 RX ADMIN — FUROSEMIDE 40 MG: 10 INJECTION, SOLUTION INTRAMUSCULAR; INTRAVENOUS at 07:55

## 2023-09-25 RX ADMIN — INSULIN LISPRO 1 UNITS: 100 INJECTION, SOLUTION INTRAVENOUS; SUBCUTANEOUS at 12:23

## 2023-09-25 RX ADMIN — HEPARIN SODIUM 13 UNITS/KG/HR: 10000 INJECTION, SOLUTION INTRAVENOUS at 04:05

## 2023-09-25 RX ADMIN — METOPROLOL SUCCINATE 25 MG: 25 TABLET, FILM COATED, EXTENDED RELEASE ORAL at 17:29

## 2023-09-25 RX ADMIN — SODIUM CHLORIDE, PRESERVATIVE FREE 10 ML: 5 INJECTION INTRAVENOUS at 07:56

## 2023-09-25 RX ADMIN — ONDANSETRON 4 MG: 4 TABLET, ORALLY DISINTEGRATING ORAL at 07:55

## 2023-09-25 RX ADMIN — ASPIRIN 81 MG: 81 TABLET, CHEWABLE ORAL at 07:55

## 2023-09-25 RX ADMIN — Medication 3 MG: at 22:42

## 2023-09-25 RX ADMIN — METOCLOPRAMIDE 10 MG: 5 INJECTION, SOLUTION INTRAMUSCULAR; INTRAVENOUS at 09:10

## 2023-09-25 RX ADMIN — METOCLOPRAMIDE 10 MG: 5 INJECTION, SOLUTION INTRAMUSCULAR; INTRAVENOUS at 13:31

## 2023-09-25 RX ADMIN — FUROSEMIDE 40 MG: 10 INJECTION, SOLUTION INTRAMUSCULAR; INTRAVENOUS at 17:29

## 2023-09-25 RX ADMIN — DOXYCYCLINE HYCLATE 100 MG: 100 CAPSULE ORAL at 20:30

## 2023-09-25 RX ADMIN — DISOPYRAMIDE PHOSPHATE 150 MG: 150 CAPSULE ORAL at 20:27

## 2023-09-25 RX ADMIN — INSULIN LISPRO 4 UNITS: 100 INJECTION, SOLUTION INTRAVENOUS; SUBCUTANEOUS at 20:32

## 2023-09-25 RX ADMIN — ROSUVASTATIN CALCIUM 40 MG: 20 TABLET, FILM COATED ORAL at 20:27

## 2023-09-25 RX ADMIN — ONDANSETRON 4 MG: 4 TABLET, ORALLY DISINTEGRATING ORAL at 00:12

## 2023-09-25 ASSESSMENT — PAIN SCALES - GENERAL: PAINLEVEL_OUTOF10: 0

## 2023-09-25 NOTE — PLAN OF CARE
Problem: Discharge Planning  Goal: Discharge to home or other facility with appropriate resources  Outcome: Progressing  Flowsheets (Taken 9/25/2023 0755)  Discharge to home or other facility with appropriate resources:   Identify barriers to discharge with patient and caregiver   Arrange for needed discharge resources and transportation as appropriate   Identify discharge learning needs (meds, wound care, etc)   Refer to discharge planning if patient needs post-hospital services based on physician order or complex needs related to functional status, cognitive ability or social support system     Problem: Pain  Goal: Verbalizes/displays adequate comfort level or baseline comfort level  Outcome: Progressing     Problem: Safety - Adult  Goal: Free from fall injury  Outcome: Progressing  Flowsheets (Taken 9/25/2023 0755)  Free From Fall Injury: Instruct family/caregiver on patient safety     Problem: Chronic Conditions and Co-morbidities  Goal: Patient's chronic conditions and co-morbidity symptoms are monitored and maintained or improved  Outcome: Progressing  Flowsheets (Taken 9/25/2023 0755)  Care Plan - Patient's Chronic Conditions and Co-Morbidity Symptoms are Monitored and Maintained or Improved:   Monitor and assess patient's chronic conditions and comorbid symptoms for stability, deterioration, or improvement   Collaborate with multidisciplinary team to address chronic and comorbid conditions and prevent exacerbation or deterioration   Update acute care plan with appropriate goals if chronic or comorbid symptoms are exacerbated and prevent overall improvement and discharge

## 2023-09-25 NOTE — CARE COORDINATION
Patient admitted with acute heart failure. Cardiology consulted. NSTEMI symptoms. C scheduled. CM met with patient, her daughter, and daughter's trisha'. Patient verified PCP, demographic, and insurance coverage. Patient reports she lives independently in her house that she shares with her daughter and daughter's trisha'. No DME. No supportive history of home health or SNF. CM offered [facility] referral as patient was interested in a new MD. Patient is considering referral.   09/25/23 7351   Service Assessment   Patient Orientation Alert and Oriented   Cognition Alert   History Provided By Patient   Primary Caregiver Self   Accompanied By/Relationship Daughter and her Андрей Hernandez Children;Friends/Neighbors   Patient's Healthcare Decision Maker is: Legal Next of Kin   PCP Verified by CM Yes  ([facility])   Last Visit to PCP Within last 3 months   Prior Functional Level Independent in ADLs/IADLs   Current Functional Level Independent in ADLs/IADLs   Can patient return to prior living arrangement Yes   Ability to make needs known: Good   Family able to assist with home care needs: Yes   Would you like for me to discuss the discharge plan with any other family members/significant others, and if so, who? No   Financial Resources Medicare; Other (Comment)  ([employer] [place])   [employer] None   Social/Functional History   Lives With Daughter; Other (comment)  (Daughter's segundoancee)   Type of [id number] None   Receives Help From Family   ADL Assistance Independent   Homemaking Assistance Independent   Ambulation Assistance Independent   Transfer Assistance Independent   Active  No   Patient's  Info Daughter drives   Mode of Transportation Car   Occupation Retired   Discharge Planning   Type of [address] Family Members   Current Services Prior To Admission None   Potential Assistance Needed N/A   DME Ordered?  No   Potential Assistance Purchasing

## 2023-09-26 PROBLEM — R79.89 ELEVATED TROPONIN: Status: ACTIVE | Noted: 2023-09-24

## 2023-09-26 PROBLEM — A41.9 SEPSIS (HCC): Status: ACTIVE | Noted: 2023-09-26

## 2023-09-26 PROBLEM — I50.9 ACUTE HEART FAILURE, UNSPECIFIED HEART FAILURE TYPE (HCC): Status: RESOLVED | Noted: 2023-09-24 | Resolved: 2023-09-26

## 2023-09-26 LAB
ALBUMIN SERPL-MCNC: 3 G/DL (ref 3.2–4.6)
ALBUMIN/GLOB SERPL: 0.7 (ref 0.4–1.6)
ALP SERPL-CCNC: 71 U/L (ref 50–136)
ALT SERPL-CCNC: 43 U/L (ref 12–65)
ANION GAP SERPL CALC-SCNC: 8 MMOL/L (ref 2–11)
AST SERPL-CCNC: 34 U/L (ref 15–37)
BASOPHILS # BLD: 0.1 K/UL (ref 0–0.2)
BASOPHILS NFR BLD: 1 % (ref 0–2)
BILIRUB SERPL-MCNC: 0.2 MG/DL (ref 0.2–1.1)
BUN SERPL-MCNC: 17 MG/DL (ref 8–23)
CALCIUM SERPL-MCNC: 9.4 MG/DL (ref 8.3–10.4)
CHLORIDE SERPL-SCNC: 104 MMOL/L (ref 101–110)
CO2 SERPL-SCNC: 30 MMOL/L (ref 21–32)
CREAT SERPL-MCNC: 1 MG/DL (ref 0.6–1)
DIFFERENTIAL METHOD BLD: ABNORMAL
EOSINOPHIL # BLD: 0.2 K/UL (ref 0–0.8)
EOSINOPHIL NFR BLD: 2 % (ref 0.5–7.8)
ERYTHROCYTE [DISTWIDTH] IN BLOOD BY AUTOMATED COUNT: 12.3 % (ref 11.9–14.6)
GLOBULIN SER CALC-MCNC: 4.2 G/DL (ref 2.8–4.5)
GLUCOSE BLD STRIP.AUTO-MCNC: 138 MG/DL (ref 65–100)
GLUCOSE BLD STRIP.AUTO-MCNC: 179 MG/DL (ref 65–100)
GLUCOSE BLD STRIP.AUTO-MCNC: 225 MG/DL (ref 65–100)
GLUCOSE BLD STRIP.AUTO-MCNC: 272 MG/DL (ref 65–100)
GLUCOSE SERPL-MCNC: 137 MG/DL (ref 65–100)
HCT VFR BLD AUTO: 42.2 % (ref 35.8–46.3)
HGB BLD-MCNC: 14.4 G/DL (ref 11.7–15.4)
IMM GRANULOCYTES # BLD AUTO: 0.1 K/UL (ref 0–0.5)
IMM GRANULOCYTES NFR BLD AUTO: 1 % (ref 0–5)
LYMPHOCYTES # BLD: 1.5 K/UL (ref 0.5–4.6)
LYMPHOCYTES NFR BLD: 19 % (ref 13–44)
MAGNESIUM SERPL-MCNC: 2 MG/DL (ref 1.8–2.4)
MCH RBC QN AUTO: 30.6 PG (ref 26.1–32.9)
MCHC RBC AUTO-ENTMCNC: 34.1 G/DL (ref 31.4–35)
MCV RBC AUTO: 89.6 FL (ref 82–102)
MM INDURATION, POC: 0 MM (ref 0–5)
MONOCYTES # BLD: 1.1 K/UL (ref 0.1–1.3)
MONOCYTES NFR BLD: 14 % (ref 4–12)
NEUTS SEG # BLD: 5.2 K/UL (ref 1.7–8.2)
NEUTS SEG NFR BLD: 63 % (ref 43–78)
NRBC # BLD: 0 K/UL (ref 0–0.2)
PLATELET # BLD AUTO: 263 K/UL (ref 150–450)
PMV BLD AUTO: 9.8 FL (ref 9.4–12.3)
POTASSIUM SERPL-SCNC: 3.2 MMOL/L (ref 3.5–5.1)
PPD, POC: NEGATIVE
PROT SERPL-MCNC: 7.2 G/DL (ref 6.3–8.2)
RBC # BLD AUTO: 4.71 M/UL (ref 4.05–5.2)
SERVICE CMNT-IMP: ABNORMAL
SODIUM SERPL-SCNC: 142 MMOL/L (ref 133–143)
WBC # BLD AUTO: 8 K/UL (ref 4.3–11.1)

## 2023-09-26 PROCEDURE — 6370000000 HC RX 637 (ALT 250 FOR IP): Performed by: PHYSICIAN ASSISTANT

## 2023-09-26 PROCEDURE — 85025 COMPLETE CBC W/AUTO DIFF WBC: CPT

## 2023-09-26 PROCEDURE — 2580000003 HC RX 258: Performed by: HOSPITALIST

## 2023-09-26 PROCEDURE — 6370000000 HC RX 637 (ALT 250 FOR IP): Performed by: NURSE PRACTITIONER

## 2023-09-26 PROCEDURE — 6360000002 HC RX W HCPCS: Performed by: INTERNAL MEDICINE

## 2023-09-26 PROCEDURE — 2580000003 HC RX 258: Performed by: INTERNAL MEDICINE

## 2023-09-26 PROCEDURE — 36415 COLL VENOUS BLD VENIPUNCTURE: CPT

## 2023-09-26 PROCEDURE — 6370000000 HC RX 637 (ALT 250 FOR IP): Performed by: INTERNAL MEDICINE

## 2023-09-26 PROCEDURE — 1100000000 HC RM PRIVATE

## 2023-09-26 PROCEDURE — 6370000000 HC RX 637 (ALT 250 FOR IP): Performed by: STUDENT IN AN ORGANIZED HEALTH CARE EDUCATION/TRAINING PROGRAM

## 2023-09-26 PROCEDURE — 82962 GLUCOSE BLOOD TEST: CPT

## 2023-09-26 PROCEDURE — G0378 HOSPITAL OBSERVATION PER HR: HCPCS

## 2023-09-26 PROCEDURE — 6360000002 HC RX W HCPCS: Performed by: HOSPITALIST

## 2023-09-26 PROCEDURE — 83735 ASSAY OF MAGNESIUM: CPT

## 2023-09-26 PROCEDURE — 80053 COMPREHEN METABOLIC PANEL: CPT

## 2023-09-26 PROCEDURE — 99223 1ST HOSP IP/OBS HIGH 75: CPT | Performed by: INTERNAL MEDICINE

## 2023-09-26 PROCEDURE — 6370000000 HC RX 637 (ALT 250 FOR IP): Performed by: HOSPITALIST

## 2023-09-26 PROCEDURE — 99232 SBSQ HOSP IP/OBS MODERATE 35: CPT | Performed by: INTERNAL MEDICINE

## 2023-09-26 RX ORDER — LORAZEPAM 0.5 MG/1
0.5 TABLET ORAL EVERY 4 HOURS PRN
Status: DISCONTINUED | OUTPATIENT
Start: 2023-09-26 | End: 2023-09-28 | Stop reason: HOSPADM

## 2023-09-26 RX ORDER — FUROSEMIDE 10 MG/ML
40 INJECTION INTRAMUSCULAR; INTRAVENOUS DAILY
Status: DISCONTINUED | OUTPATIENT
Start: 2023-09-26 | End: 2023-09-27

## 2023-09-26 RX ORDER — POTASSIUM CHLORIDE 20 MEQ/1
40 TABLET, EXTENDED RELEASE ORAL 2 TIMES DAILY WITH MEALS
Status: COMPLETED | OUTPATIENT
Start: 2023-09-26 | End: 2023-09-26

## 2023-09-26 RX ADMIN — POTASSIUM CHLORIDE 40 MEQ: 1500 TABLET, EXTENDED RELEASE ORAL at 08:39

## 2023-09-26 RX ADMIN — SODIUM CHLORIDE, PRESERVATIVE FREE 10 ML: 5 INJECTION INTRAVENOUS at 08:41

## 2023-09-26 RX ADMIN — DOXYCYCLINE HYCLATE 100 MG: 100 CAPSULE ORAL at 08:41

## 2023-09-26 RX ADMIN — LORAZEPAM 0.5 MG: 0.5 TABLET ORAL at 20:13

## 2023-09-26 RX ADMIN — LORAZEPAM 0.5 MG: 0.5 TABLET ORAL at 14:14

## 2023-09-26 RX ADMIN — INSULIN LISPRO 2 UNITS: 100 INJECTION, SOLUTION INTRAVENOUS; SUBCUTANEOUS at 12:12

## 2023-09-26 RX ADMIN — Medication 3 MG: at 20:09

## 2023-09-26 RX ADMIN — ROSUVASTATIN CALCIUM 40 MG: 20 TABLET, FILM COATED ORAL at 20:09

## 2023-09-26 RX ADMIN — SODIUM CHLORIDE, PRESERVATIVE FREE 10 ML: 5 INJECTION INTRAVENOUS at 08:38

## 2023-09-26 RX ADMIN — DRONEDARONE 400 MG: 400 TABLET, FILM COATED ORAL at 16:38

## 2023-09-26 RX ADMIN — SODIUM CHLORIDE, PRESERVATIVE FREE 10 ML: 5 INJECTION INTRAVENOUS at 20:21

## 2023-09-26 RX ADMIN — CEFTRIAXONE SODIUM 2000 MG: 2 INJECTION, POWDER, FOR SOLUTION INTRAMUSCULAR; INTRAVENOUS at 00:18

## 2023-09-26 RX ADMIN — POTASSIUM CHLORIDE 40 MEQ: 1500 TABLET, EXTENDED RELEASE ORAL at 16:38

## 2023-09-26 RX ADMIN — CEFTRIAXONE SODIUM 2000 MG: 2 INJECTION, POWDER, FOR SOLUTION INTRAMUSCULAR; INTRAVENOUS at 23:29

## 2023-09-26 RX ADMIN — ISOSORBIDE MONONITRATE 30 MG: 30 TABLET, EXTENDED RELEASE ORAL at 08:40

## 2023-09-26 RX ADMIN — DISOPYRAMIDE PHOSPHATE 150 MG: 150 CAPSULE ORAL at 08:38

## 2023-09-26 RX ADMIN — METOPROLOL SUCCINATE 25 MG: 25 TABLET, FILM COATED, EXTENDED RELEASE ORAL at 08:40

## 2023-09-26 RX ADMIN — INSULIN LISPRO 1 UNITS: 100 INJECTION, SOLUTION INTRAVENOUS; SUBCUTANEOUS at 16:36

## 2023-09-26 RX ADMIN — DISOPYRAMIDE PHOSPHATE 150 MG: 150 CAPSULE ORAL at 12:12

## 2023-09-26 RX ADMIN — FUROSEMIDE 40 MG: 10 INJECTION, SOLUTION INTRAMUSCULAR; INTRAVENOUS at 08:39

## 2023-09-26 RX ADMIN — ASPIRIN 81 MG: 81 TABLET, CHEWABLE ORAL at 08:39

## 2023-09-26 RX ADMIN — DOXYCYCLINE HYCLATE 100 MG: 100 CAPSULE ORAL at 20:09

## 2023-09-26 RX ADMIN — APIXABAN 5 MG: 5 TABLET, FILM COATED ORAL at 20:09

## 2023-09-26 ASSESSMENT — ENCOUNTER SYMPTOMS
EYES NEGATIVE: 1
GASTROINTESTINAL NEGATIVE: 1
ALLERGIC/IMMUNOLOGIC NEGATIVE: 1
SHORTNESS OF BREATH: 1

## 2023-09-26 ASSESSMENT — PAIN SCALES - GENERAL
PAINLEVEL_OUTOF10: 0
PAINLEVEL_OUTOF10: 0

## 2023-09-26 NOTE — CONSULTS
Not on file       Current Facility-Administered Medications   Medication Dose Route Frequency    furosemide (LASIX) injection 40 mg  40 mg IntraVENous Daily    potassium chloride (KLOR-CON M) extended release tablet 40 mEq  40 mEq Oral BID WC    apixaban (ELIQUIS) tablet 5 mg  5 mg Oral BID    disopyramide (NORPACE) capsule 150 mg (Patient Supplied)  150 mg Oral 4x daily    glucose chewable tablet 16 g  4 tablet Oral PRN    dextrose bolus 10% 125 mL  125 mL IntraVENous PRN    Or    dextrose bolus 10% 250 mL  250 mL IntraVENous PRN    glucagon (rDNA) injection 1 mg  1 mg SubCUTAneous PRN    dextrose 10 % infusion   IntraVENous Continuous PRN    metoclopramide (REGLAN) injection 10 mg  10 mg IntraVENous Q4H PRN    metoprolol succinate (TOPROL XL) extended release tablet 25 mg  25 mg Oral Daily    isosorbide mononitrate (IMDUR) extended release tablet 30 mg  30 mg Oral Daily    sodium chloride flush 0.9 % injection 5-40 mL  5-40 mL IntraVENous 2 times per day    sodium chloride flush 0.9 % injection 5-40 mL  5-40 mL IntraVENous PRN    acetaminophen (TYLENOL) tablet 650 mg  650 mg Oral Q4H PRN    sodium chloride flush 0.9 % injection 5-40 mL  5-40 mL IntraVENous 2 times per day    sodium chloride flush 0.9 % injection 5-40 mL  5-40 mL IntraVENous PRN    0.9 % sodium chloride infusion   IntraVENous PRN    ondansetron (ZOFRAN-ODT) disintegrating tablet 4 mg  4 mg Oral Q8H PRN    Or    ondansetron (ZOFRAN) injection 4 mg  4 mg IntraVENous Q6H PRN    polyethylene glycol (GLYCOLAX) packet 17 g  17 g Oral Daily PRN    acetaminophen (TYLENOL) tablet 650 mg  650 mg Oral Q6H PRN    Or    acetaminophen (TYLENOL) suppository 650 mg  650 mg Rectal Q6H PRN    cefTRIAXone (ROCEPHIN) 2,000 mg in sodium chloride 0.9 % 50 mL IVPB (mini-bag)  2,000 mg IntraVENous Q24H    doxycycline hyclate (VIBRAMYCIN) capsule 100 mg  100 mg Oral 2 times per day    insulin lispro (HUMALOG) injection vial 0-4 Units  0-4 Units SubCUTAneous TID 
degrees    T Axis 79 degrees    Diagnosis       Sinus rhythm  IVCD, consider atypical RBBB  Repol abnrm suggests ischemia, diffuse leads     Arterial Blood Gas, POC    Collection Time: 09/23/23 11:26 PM   Result Value Ref Range    DEVICE NASAL CANNULA      pH, Arterial, POC 7.36 7.35 - 7.45      pCO2, Arterial, POC 35.8 35 - 45 MMHG    pO2, Arterial,  (H) 75 - 100 MMHG    HCO3, Mixed 20.0 (L) 22 - 26 MMOL/L    SO2c, Arterial, POC 99.4 (H) 95 - 98 %    BASE DEFICIT (POC) 4.8 mmol/L    POC Kristian's Test Positive      Site LEFT RADIAL      Specimen type: ARTERIAL      Performed by: SimpsonAnnaRRROSETTA     FIO2 12     EKG 12 Lead    Collection Time: 09/24/23 12:21 AM   Result Value Ref Range    Ventricular Rate 84 BPM    Atrial Rate 85 BPM    P-R Interval 173 ms    QRS Duration 110 ms    Q-T Interval 449 ms    QTc Calculation (Bazett) 531 ms    P Axis 67 degrees    R Axis -37 degrees    T Axis 103 degrees    Diagnosis       Sinus rhythm  Consider left atrial enlargement  Left axis deviation  Repol abnrm, severe global ischemia (LM/MVD)  Prolonged QT interval     Lactate, Sepsis    Collection Time: 09/24/23 12:37 AM   Result Value Ref Range    Lactic Acid, Sepsis 1.7 0.4 - 2.0 MMOL/L   Troponin    Collection Time: 09/24/23 12:37 AM   Result Value Ref Range    Troponin, High Sensitivity 1,464.6 (HH) 0 - 14 pg/mL   CBC    Collection Time: 09/24/23  4:44 AM   Result Value Ref Range    WBC 13.3 (H) 4.3 - 11.1 K/uL    RBC 4.33 4.05 - 5.2 M/uL    Hemoglobin 13.2 11.7 - 15.4 g/dL    Hematocrit 39.5 35.8 - 46.3 %    MCV 91.2 82 - 102 FL    MCH 30.5 26.1 - 32.9 PG    MCHC 33.4 31.4 - 35.0 g/dL    RDW 12.5 11.9 - 14.6 %    Platelets 374 538 - 630 K/uL    MPV 9.9 9.4 - 12.3 FL    nRBC 0.00 0.0 - 0.2 K/uL   APTT    Collection Time: 09/24/23  4:44 AM   Result Value Ref Range    PTT 31.8 24.5 - 34.2 SEC   Protime-INR    Collection Time: 09/24/23  4:44 AM   Result Value Ref Range    Protime 15.1 (H) 12.6 - 14.3 sec    INR 1.1

## 2023-09-26 NOTE — CARE COORDINATION
CM following for continued hospitalization. [facility] on 9/25 revealed severe CAD, s/p CABG. No indication for intervention. EP consult for Aflutter. PT recommending no further skilled therapy. OT recommending home health. CM will continue to monitor clinicals to assist with discharge needs.

## 2023-09-26 NOTE — PLAN OF CARE
Problem: Discharge Planning  Goal: Discharge to home or other facility with appropriate resources  Outcome: Progressing  Flowsheets  Taken 9/25/2023 2022 by Hiro Lang RN  Discharge to home or other facility with appropriate resources: Identify barriers to discharge with patient and caregiver  Taken 9/25/2023 1715 by Eduardo Cody RN  Discharge to home or other facility with appropriate resources:   Identify barriers to discharge with patient and caregiver   Arrange for needed discharge resources and transportation as appropriate   Identify discharge learning needs (meds, wound care, etc)   Refer to discharge planning if patient needs post-hospital services based on physician order or complex needs related to functional status, cognitive ability or social support system     Problem: Pain  Goal: Verbalizes/displays adequate comfort level or baseline comfort level  Outcome: Progressing     Problem: Safety - Adult  Goal: Free from fall injury  Outcome: Progressing  Flowsheets  Taken 9/25/2023 2022 by Hiro Lang RN  Free From Fall Injury: Instruct family/caregiver on patient safety  Taken 9/25/2023 1723 by Eduardo Cody RN  Free From Fall Injury: Instruct family/caregiver on patient safety     Problem: Chronic Conditions and Co-morbidities  Goal: Patient's chronic conditions and co-morbidity symptoms are monitored and maintained or improved  Outcome: Progressing  Flowsheets  Taken 9/25/2023 2022 by Khanh [facility] [employer] - Patient's Chronic Conditions and Co-Morbidity Symptoms are Monitored and Maintained or Improved: Monitor and assess patient's chronic conditions and comorbid symptoms for stability, deterioration, or improvement  Taken 9/25/2023 1715 by [facility]h [facility] - Patient's Chronic Conditions and Co-Morbidity Symptoms are Monitored and Maintained or Improved:   Monitor and assess patient's chronic conditions and comorbid symptoms for stability, deterioration, or improvement

## 2023-09-27 LAB
ALBUMIN SERPL-MCNC: 2.9 G/DL (ref 3.2–4.6)
ALBUMIN/GLOB SERPL: 0.8 (ref 0.4–1.6)
ALP SERPL-CCNC: 69 U/L (ref 50–136)
ALT SERPL-CCNC: 29 U/L (ref 12–65)
ANION GAP SERPL CALC-SCNC: 8 MMOL/L (ref 2–11)
AST SERPL-CCNC: 26 U/L (ref 15–37)
BASOPHILS # BLD: 0.1 K/UL (ref 0–0.2)
BASOPHILS NFR BLD: 1 % (ref 0–2)
BILIRUB SERPL-MCNC: 0.3 MG/DL (ref 0.2–1.1)
BUN SERPL-MCNC: 18 MG/DL (ref 8–23)
CALCIUM SERPL-MCNC: 9.2 MG/DL (ref 8.3–10.4)
CHLORIDE SERPL-SCNC: 106 MMOL/L (ref 101–110)
CO2 SERPL-SCNC: 27 MMOL/L (ref 21–32)
CREAT SERPL-MCNC: 1 MG/DL (ref 0.6–1)
DIFFERENTIAL METHOD BLD: ABNORMAL
EOSINOPHIL # BLD: 0.2 K/UL (ref 0–0.8)
EOSINOPHIL NFR BLD: 3 % (ref 0.5–7.8)
ERYTHROCYTE [DISTWIDTH] IN BLOOD BY AUTOMATED COUNT: 12.5 % (ref 11.9–14.6)
GLOBULIN SER CALC-MCNC: 3.8 G/DL (ref 2.8–4.5)
GLUCOSE BLD STRIP.AUTO-MCNC: 147 MG/DL (ref 65–100)
GLUCOSE BLD STRIP.AUTO-MCNC: 163 MG/DL (ref 65–100)
GLUCOSE BLD STRIP.AUTO-MCNC: 233 MG/DL (ref 65–100)
GLUCOSE BLD STRIP.AUTO-MCNC: 260 MG/DL (ref 65–100)
GLUCOSE SERPL-MCNC: 110 MG/DL (ref 65–100)
HCT VFR BLD AUTO: 40.6 % (ref 35.8–46.3)
HGB BLD-MCNC: 13.7 G/DL (ref 11.7–15.4)
IMM GRANULOCYTES # BLD AUTO: 0.1 K/UL (ref 0–0.5)
IMM GRANULOCYTES NFR BLD AUTO: 1 % (ref 0–5)
L PNEUMO1 AG UR QL IA: NEGATIVE
LYMPHOCYTES # BLD: 2.1 K/UL (ref 0.5–4.6)
LYMPHOCYTES NFR BLD: 25 % (ref 13–44)
M PNEUMO IGG SER IA-ACNC: 382 U/ML (ref 0–99)
M PNEUMO IGM SER IA-ACNC: <770 U/ML (ref 0–769)
MAGNESIUM SERPL-MCNC: 1.8 MG/DL (ref 1.8–2.4)
MCH RBC QN AUTO: 30.4 PG (ref 26.1–32.9)
MCHC RBC AUTO-ENTMCNC: 33.7 G/DL (ref 31.4–35)
MCV RBC AUTO: 90.2 FL (ref 82–102)
MM INDURATION, POC: 0 MM (ref 0–5)
MONOCYTES # BLD: 1.1 K/UL (ref 0.1–1.3)
MONOCYTES NFR BLD: 13 % (ref 4–12)
NEUTS SEG # BLD: 4.8 K/UL (ref 1.7–8.2)
NEUTS SEG NFR BLD: 57 % (ref 43–78)
NRBC # BLD: 0 K/UL (ref 0–0.2)
PLATELET # BLD AUTO: 252 K/UL (ref 150–450)
PMV BLD AUTO: 9.7 FL (ref 9.4–12.3)
POTASSIUM SERPL-SCNC: 4.2 MMOL/L (ref 3.5–5.1)
PPD, POC: NEGATIVE
PROT SERPL-MCNC: 6.7 G/DL (ref 6.3–8.2)
RBC # BLD AUTO: 4.5 M/UL (ref 4.05–5.2)
SERVICE CMNT-IMP: ABNORMAL
SODIUM SERPL-SCNC: 141 MMOL/L (ref 133–143)
SPECIMEN SOURCE: NORMAL
WBC # BLD AUTO: 8.3 K/UL (ref 4.3–11.1)

## 2023-09-27 PROCEDURE — 83735 ASSAY OF MAGNESIUM: CPT

## 2023-09-27 PROCEDURE — 36415 COLL VENOUS BLD VENIPUNCTURE: CPT

## 2023-09-27 PROCEDURE — 6370000000 HC RX 637 (ALT 250 FOR IP): Performed by: NURSE PRACTITIONER

## 2023-09-27 PROCEDURE — 85025 COMPLETE CBC W/AUTO DIFF WBC: CPT

## 2023-09-27 PROCEDURE — 6370000000 HC RX 637 (ALT 250 FOR IP): Performed by: HOSPITALIST

## 2023-09-27 PROCEDURE — 6370000000 HC RX 637 (ALT 250 FOR IP): Performed by: STUDENT IN AN ORGANIZED HEALTH CARE EDUCATION/TRAINING PROGRAM

## 2023-09-27 PROCEDURE — 82962 GLUCOSE BLOOD TEST: CPT

## 2023-09-27 PROCEDURE — 6370000000 HC RX 637 (ALT 250 FOR IP): Performed by: PHYSICIAN ASSISTANT

## 2023-09-27 PROCEDURE — 6370000000 HC RX 637 (ALT 250 FOR IP): Performed by: INTERNAL MEDICINE

## 2023-09-27 PROCEDURE — 1100000000 HC RM PRIVATE

## 2023-09-27 PROCEDURE — 99232 SBSQ HOSP IP/OBS MODERATE 35: CPT | Performed by: INTERNAL MEDICINE

## 2023-09-27 PROCEDURE — 97530 THERAPEUTIC ACTIVITIES: CPT

## 2023-09-27 PROCEDURE — 2580000003 HC RX 258: Performed by: INTERNAL MEDICINE

## 2023-09-27 PROCEDURE — 80053 COMPREHEN METABOLIC PANEL: CPT

## 2023-09-27 RX ORDER — FUROSEMIDE 20 MG/1
20 TABLET ORAL DAILY
Status: DISCONTINUED | OUTPATIENT
Start: 2023-09-27 | End: 2023-09-28 | Stop reason: HOSPADM

## 2023-09-27 RX ADMIN — ROSUVASTATIN CALCIUM 40 MG: 20 TABLET, FILM COATED ORAL at 20:47

## 2023-09-27 RX ADMIN — APIXABAN 5 MG: 5 TABLET, FILM COATED ORAL at 09:10

## 2023-09-27 RX ADMIN — ISOSORBIDE MONONITRATE 30 MG: 30 TABLET, EXTENDED RELEASE ORAL at 09:13

## 2023-09-27 RX ADMIN — DOXYCYCLINE HYCLATE 100 MG: 100 CAPSULE ORAL at 09:12

## 2023-09-27 RX ADMIN — INSULIN LISPRO 2 UNITS: 100 INJECTION, SOLUTION INTRAVENOUS; SUBCUTANEOUS at 16:50

## 2023-09-27 RX ADMIN — SODIUM CHLORIDE, PRESERVATIVE FREE 10 ML: 5 INJECTION INTRAVENOUS at 09:13

## 2023-09-27 RX ADMIN — APIXABAN 5 MG: 5 TABLET, FILM COATED ORAL at 20:47

## 2023-09-27 RX ADMIN — DRONEDARONE 400 MG: 400 TABLET, FILM COATED ORAL at 16:13

## 2023-09-27 RX ADMIN — DRONEDARONE 400 MG: 400 TABLET, FILM COATED ORAL at 09:11

## 2023-09-27 RX ADMIN — FUROSEMIDE 20 MG: 20 TABLET ORAL at 09:11

## 2023-09-27 RX ADMIN — METOPROLOL SUCCINATE 25 MG: 25 TABLET, FILM COATED, EXTENDED RELEASE ORAL at 09:13

## 2023-09-27 RX ADMIN — ASPIRIN 81 MG: 81 TABLET, CHEWABLE ORAL at 09:11

## 2023-09-27 RX ADMIN — LORAZEPAM 0.5 MG: 0.5 TABLET ORAL at 16:31

## 2023-09-27 RX ADMIN — DOXYCYCLINE HYCLATE 100 MG: 100 CAPSULE ORAL at 20:47

## 2023-09-27 RX ADMIN — SODIUM CHLORIDE, PRESERVATIVE FREE 10 ML: 5 INJECTION INTRAVENOUS at 20:48

## 2023-09-27 NOTE — CARE COORDINATION
CM met with patient to discuss therapy's recommendation for home health. Patient reports that she is normally independent and is motivated. CM offered home health nurse for medication management. Patient verbalized agreement for referral.  CM provided a list of home health agencies. Patient to review. Patient requests [facility] referral. CM sent referral.   Tentative discharge 9/28.

## 2023-09-28 ENCOUNTER — HOME HEALTH ADMISSION (OUTPATIENT)
Dept: HOME HEALTH SERVICES | Facility: HOME HEALTH | Age: 83
End: 2023-09-28
Payer: MEDICARE

## 2023-09-28 VITALS
HEART RATE: 65 BPM | DIASTOLIC BLOOD PRESSURE: 73 MMHG | OXYGEN SATURATION: 94 % | HEIGHT: 63 IN | SYSTOLIC BLOOD PRESSURE: 117 MMHG | WEIGHT: 144.4 LBS | TEMPERATURE: 98.3 F | RESPIRATION RATE: 16 BRPM | BODY MASS INDEX: 25.59 KG/M2

## 2023-09-28 PROBLEM — A41.9 SEPSIS (HCC): Status: RESOLVED | Noted: 2023-09-26 | Resolved: 2023-09-28

## 2023-09-28 PROBLEM — I21.A1 MYOCARDIAL INFARCTION DUE TO DEMAND ISCHEMIA (HCC): Status: RESOLVED | Noted: 2023-09-24 | Resolved: 2023-09-28

## 2023-09-28 PROBLEM — J96.01 ACUTE RESPIRATORY FAILURE WITH HYPOXIA (HCC): Status: RESOLVED | Noted: 2023-09-24 | Resolved: 2023-09-28

## 2023-09-28 PROBLEM — I21.A1 MYOCARDIAL INFARCTION DUE TO DEMAND ISCHEMIA (HCC): Status: ACTIVE | Noted: 2023-09-24

## 2023-09-28 PROBLEM — R06.03 RESPIRATORY DISTRESS: Status: RESOLVED | Noted: 2023-09-24 | Resolved: 2023-09-28

## 2023-09-28 LAB
ERYTHROCYTE [DISTWIDTH] IN BLOOD BY AUTOMATED COUNT: 12.3 % (ref 11.9–14.6)
FLUID CULTURE: NORMAL
GLUCOSE BLD STRIP.AUTO-MCNC: 165 MG/DL (ref 65–100)
GLUCOSE BLD STRIP.AUTO-MCNC: 279 MG/DL (ref 65–100)
HCT VFR BLD AUTO: 40.1 % (ref 35.8–46.3)
HGB BLD-MCNC: 13.8 G/DL (ref 11.7–15.4)
Lab: NORMAL
MCH RBC QN AUTO: 30.5 PG (ref 26.1–32.9)
MCHC RBC AUTO-ENTMCNC: 34.4 G/DL (ref 31.4–35)
MCV RBC AUTO: 88.7 FL (ref 82–102)
NRBC # BLD: 0 K/UL (ref 0–0.2)
ORGANISM ID: NORMAL
PLATELET # BLD AUTO: 249 K/UL (ref 150–450)
PMV BLD AUTO: 9.5 FL (ref 9.4–12.3)
RBC # BLD AUTO: 4.52 M/UL (ref 4.05–5.2)
S PNEUM AG SPEC QL LA: NEGATIVE
SERVICE CMNT-IMP: ABNORMAL
SERVICE CMNT-IMP: ABNORMAL
SPECIMEN SOURCE: NORMAL
SPECIMEN: NORMAL
WBC # BLD AUTO: 9.8 K/UL (ref 4.3–11.1)

## 2023-09-28 PROCEDURE — 82962 GLUCOSE BLOOD TEST: CPT

## 2023-09-28 PROCEDURE — 6370000000 HC RX 637 (ALT 250 FOR IP): Performed by: NURSE PRACTITIONER

## 2023-09-28 PROCEDURE — 6370000000 HC RX 637 (ALT 250 FOR IP): Performed by: INTERNAL MEDICINE

## 2023-09-28 PROCEDURE — 6360000002 HC RX W HCPCS: Performed by: HOSPITALIST

## 2023-09-28 PROCEDURE — 85027 COMPLETE CBC AUTOMATED: CPT

## 2023-09-28 PROCEDURE — 6370000000 HC RX 637 (ALT 250 FOR IP): Performed by: HOSPITALIST

## 2023-09-28 PROCEDURE — 36415 COLL VENOUS BLD VENIPUNCTURE: CPT

## 2023-09-28 PROCEDURE — 6370000000 HC RX 637 (ALT 250 FOR IP): Performed by: PHYSICIAN ASSISTANT

## 2023-09-28 PROCEDURE — 2580000003 HC RX 258: Performed by: HOSPITALIST

## 2023-09-28 RX ORDER — AMIODARONE HYDROCHLORIDE 200 MG/1
200 TABLET ORAL DAILY
Qty: 30 TABLET | Refills: 0 | Status: SHIPPED | OUTPATIENT
Start: 2023-09-28

## 2023-09-28 RX ORDER — FUROSEMIDE 20 MG/1
20 TABLET ORAL DAILY
Qty: 60 TABLET | Refills: 0 | Status: SHIPPED | OUTPATIENT
Start: 2023-09-29

## 2023-09-28 RX ORDER — ROSUVASTATIN CALCIUM 40 MG/1
40 TABLET, COATED ORAL NIGHTLY
Qty: 30 TABLET | Refills: 3 | Status: SHIPPED | OUTPATIENT
Start: 2023-09-28

## 2023-09-28 RX ORDER — L. ACIDOPHILUS/L.BULGARICUS 100MM CELL
1 GRANULES IN PACKET (EA) ORAL 2 TIMES DAILY
Qty: 6 EACH | Refills: 0 | Status: SHIPPED | OUTPATIENT
Start: 2023-09-28

## 2023-09-28 RX ORDER — DOXYCYCLINE HYCLATE 100 MG/1
100 CAPSULE ORAL EVERY 12 HOURS SCHEDULED
Qty: 1 CAPSULE | Refills: 0 | Status: SHIPPED | OUTPATIENT
Start: 2023-09-28 | End: 2023-09-29

## 2023-09-28 RX ORDER — AMIODARONE HYDROCHLORIDE 200 MG/1
200 TABLET ORAL DAILY
Status: DISCONTINUED | OUTPATIENT
Start: 2023-09-28 | End: 2023-09-28 | Stop reason: HOSPADM

## 2023-09-28 RX ORDER — CEPHALEXIN 500 MG/1
500 CAPSULE ORAL 4 TIMES DAILY
Qty: 2 CAPSULE | Refills: 0 | Status: SHIPPED | OUTPATIENT
Start: 2023-09-28

## 2023-09-28 RX ADMIN — METOPROLOL SUCCINATE 25 MG: 25 TABLET, FILM COATED, EXTENDED RELEASE ORAL at 08:12

## 2023-09-28 RX ADMIN — FUROSEMIDE 20 MG: 20 TABLET ORAL at 08:13

## 2023-09-28 RX ADMIN — ASPIRIN 81 MG: 81 TABLET, CHEWABLE ORAL at 08:12

## 2023-09-28 RX ADMIN — DOXYCYCLINE HYCLATE 100 MG: 100 CAPSULE ORAL at 08:13

## 2023-09-28 RX ADMIN — ISOSORBIDE MONONITRATE 30 MG: 30 TABLET, EXTENDED RELEASE ORAL at 08:12

## 2023-09-28 RX ADMIN — DRONEDARONE 400 MG: 400 TABLET, FILM COATED ORAL at 08:12

## 2023-09-28 RX ADMIN — CEFTRIAXONE SODIUM 2000 MG: 2 INJECTION, POWDER, FOR SOLUTION INTRAMUSCULAR; INTRAVENOUS at 00:27

## 2023-09-28 RX ADMIN — APIXABAN 5 MG: 5 TABLET, FILM COATED ORAL at 08:12

## 2023-09-28 NOTE — CARE COORDINATION
CM met with patient and Dr Anthony Pastrana to discuss discharge needs. Patient discharged on Multaq and Eliquis but unable to afford the prescriptions. Dr Anthony Pastrana adjusted prescriptions with plan to transition to Coumadin at follow up. Dr Anthony Pastrana agreeable to following home health through [facility] until patient is established with new PCP in [facility]. Referral sent on 9/27/23. Family given follow up number if not contacted. CM ordered home health and sent referral to [facility]. DCP: Home with [facility] RN. Daughter here to transport by car.

## 2023-09-28 NOTE — DISCHARGE SUMMARY
Hospitalist Discharge Summary   Admit Date:  2023 10:55 PM   DC Note date: 2023  Name:  Kasey Powell   Age:  82 y.o. Sex:  female  :  1940   MRN:  [id number]   Room:  [facility]  PCP:  Luis Macias MD    Presenting Complaint: Shortness of Breath     Initial Admission Diagnosis: Respiratory distress [R06.03]  Hypoxia [R09.02]  Elevated troponin [R77.8]  Acute respiratory failure with hypoxia (HCC) [J96.01]  Acute heart failure, unspecified heart failure type ([address]) [I50.9]  Leukocytosis, unspecified type [D72.829]  Sepsis ([address]) [A41.9]     Problem List for this Hospitalization (present on admission):    Principal Problem (Resolved):    Acute heart failure, unspecified heart failure type ([address])  Active Problems:    HLD (hyperlipidemia)    Type 2 diabetes mellitus with hyperglycemia, without long-term current use of insulin (HCC)    PAD (peripheral artery disease) (HCC)    Coronary artery disease involving coronary bypass graft of native heart with unstable angina pectoris ([address])  Resolved Problems:    Acute respiratory failure with hypoxia ([address])    Myocardial infarction due to demand ischemia [facility])    Respiratory distress    Sepsis [facility])      Hospital Course:  83F PMHx CAD s/p CABG and paroxysmal atrial fibrillation who presented on 23 with CC shortness of breath x2 weeks. Hypoxic when EMS arrived with SPO2 in 60s. Labs showed elevated troponin, proBNP 2561, creatinine 1.4, lactic acid 3.6, and WBC 19.2. She was started on supplemental oxygen, started on heparin infusion, given IV antibiotics, and admitted for further care. Cardiology was consulted for elevated troponin. LHC on  showed severe CAD but no active culprit for intervention. Given persistent atrial flutter, EP was consulted. Planned for DCCV on , but after she was started on dronedarone, she spontaneously converted to sinus rhythm. Her oxygen requirement returned to room air.  She was determined to be

## 2023-09-29 ENCOUNTER — HOME CARE VISIT (OUTPATIENT)
Dept: SCHEDULING | Facility: HOME HEALTH | Age: 83
End: 2023-09-29

## 2023-09-29 ENCOUNTER — TELEPHONE (OUTPATIENT)
Age: 83
End: 2023-09-29

## 2023-09-29 VITALS
HEART RATE: 56 BPM | DIASTOLIC BLOOD PRESSURE: 86 MMHG | SYSTOLIC BLOOD PRESSURE: 142 MMHG | OXYGEN SATURATION: 95 % | RESPIRATION RATE: 16 BRPM | TEMPERATURE: 97.5 F

## 2023-09-29 LAB
BACTERIA SPEC CULT: NORMAL
BACTERIA SPEC CULT: NORMAL
SERVICE CMNT-IMP: NORMAL
SERVICE CMNT-IMP: NORMAL

## 2023-09-29 PROCEDURE — G0299 HHS/HOSPICE OF RN EA 15 MIN: HCPCS

## 2023-09-29 PROCEDURE — 0221000100 HH NO PAY CLAIM PROCEDURE

## 2023-09-29 ASSESSMENT — ENCOUNTER SYMPTOMS
STOOL DESCRIPTION: SOFT FORMED
PAIN LOCATION - PAIN QUALITY: ACHING

## 2023-09-29 NOTE — TELEPHONE ENCOUNTER
Wallace w/[facility] home [facility] wants to know if  will follow her for home health after discharge from [facility] for acute heart failure,pneumonia,Afib. ?      2nd thing is he saw pt.today and her HR was little low at 56 and BP was elevated 142/86 left arm and 162/80 rt.arm. There were med  interactions noted between Amiodarone and Propranolol and Eliquis and ASA. She is on Lasix 20mg qday,Amiodarone 200mg qday,Propranolol 80mg qday,Norvasc 10mg qday and Imdur 30mg qday. Any med changes needed?

## 2023-09-29 NOTE — TELEPHONE ENCOUNTER
Son in law called in, no HANNAH.  He asked if the pt should continue the antibiotics the pt was prescribed at the hospital. I told him to call PCP and see what they suggest.

## 2023-09-29 NOTE — TELEPHONE ENCOUNTER
Wallace called on behalf of the pt to ask if Dr. Kimberly Castellano will follow the pt's [facility] orders? Also, states pt's HR was 56 today. Pt's BP in her right arm was 162/80 and her BP in her left arm was 142/86. Also reports potential drug interactions between the pt's aspirin and eliquis and the pt's amiodarone and propranolol.

## 2023-09-29 NOTE — TELEPHONE ENCOUNTER
Patients son in law called stating the patient has the following issues :    Prescribed (KEFLEX) 500 MG capsule by PCP  Pt took a tablet yesterday evening and at bedtime thus far  Need dosage clarification on whether to continue / discontinue the med? Please call and advise.

## 2023-09-30 ASSESSMENT — ENCOUNTER SYMPTOMS: DYSPNEA ACTIVITY LEVEL: AFTER AMBULATING 10 - 20 FT

## 2023-10-01 ENCOUNTER — HOME CARE VISIT (OUTPATIENT)
Dept: SCHEDULING | Facility: HOME HEALTH | Age: 83
End: 2023-10-01

## 2023-10-01 VITALS
OXYGEN SATURATION: 96 % | RESPIRATION RATE: 18 BRPM | HEART RATE: 60 BPM | TEMPERATURE: 98 F | SYSTOLIC BLOOD PRESSURE: 108 MMHG | DIASTOLIC BLOOD PRESSURE: 64 MMHG

## 2023-10-01 PROCEDURE — G0299 HHS/HOSPICE OF RN EA 15 MIN: HCPCS

## 2023-10-01 ASSESSMENT — ENCOUNTER SYMPTOMS
STOOL DESCRIPTION: SOFT FORMED
DYSPNEA ACTIVITY LEVEL: AFTER AMBULATING LESS THAN 10 FT
CONSTIPATION: 1

## 2023-10-02 ENCOUNTER — HOME CARE VISIT (OUTPATIENT)
Dept: HOME HEALTH SERVICES | Facility: HOME HEALTH | Age: 83
End: 2023-10-02

## 2023-10-02 NOTE — TELEPHONE ENCOUNTER
Neda Lynn MD  You 2 minutes ago (11:35 AM)       1. That will be fine for me to follow the home health   2. I never prescribed propranolol.   If the patient is on propranolol stop propranolol

## 2023-10-03 ENCOUNTER — HOME CARE VISIT (OUTPATIENT)
Dept: HOME HEALTH SERVICES | Facility: HOME HEALTH | Age: 83
End: 2023-10-03

## 2023-10-04 ENCOUNTER — HOME CARE VISIT (OUTPATIENT)
Dept: SCHEDULING | Facility: HOME HEALTH | Age: 83
End: 2023-10-04

## 2023-10-05 ENCOUNTER — OFFICE VISIT (OUTPATIENT)
Age: 83
End: 2023-10-05

## 2023-10-05 VITALS
HEIGHT: 63 IN | WEIGHT: 148 LBS | BODY MASS INDEX: 26.22 KG/M2 | DIASTOLIC BLOOD PRESSURE: 80 MMHG | HEART RATE: 96 BPM | SYSTOLIC BLOOD PRESSURE: 136 MMHG

## 2023-10-05 DIAGNOSIS — I25.10 ASCVD (ARTERIOSCLEROTIC CARDIOVASCULAR DISEASE): Primary | ICD-10-CM

## 2023-10-05 DIAGNOSIS — I73.9 PAD (PERIPHERAL ARTERY DISEASE) (HCC): ICD-10-CM

## 2023-10-05 DIAGNOSIS — I10 PRIMARY HYPERTENSION: ICD-10-CM

## 2023-10-05 DIAGNOSIS — I48.0 PAF (PAROXYSMAL ATRIAL FIBRILLATION) (HCC): ICD-10-CM

## 2023-10-05 RX ORDER — METOPROLOL SUCCINATE 50 MG/1
50 TABLET, EXTENDED RELEASE ORAL DAILY
Qty: 30 TABLET | Refills: 3 | Status: SHIPPED | OUTPATIENT
Start: 2023-10-05

## 2023-10-05 RX ORDER — AMIODARONE HYDROCHLORIDE 200 MG/1
200 TABLET ORAL DAILY
Qty: 30 TABLET | Refills: 0 | Status: SHIPPED | OUTPATIENT
Start: 2023-10-05

## 2023-10-05 RX ORDER — ROSUVASTATIN CALCIUM 20 MG/1
20 TABLET, COATED ORAL NIGHTLY
Qty: 30 TABLET | Refills: 3 | Status: SHIPPED | OUTPATIENT
Start: 2023-10-05

## 2023-10-05 NOTE — PROGRESS NOTES
round, and reactive to light. Cardiovascular:      Rate and Rhythm: Normal rate. Heart sounds: Normal heart sounds. Pulmonary:      Effort: Pulmonary effort is normal.      Breath sounds: Normal breath sounds. Abdominal:      General: Abdomen is flat. Palpations: Abdomen is soft. There is no mass. Musculoskeletal:         General: Normal range of motion. Cervical back: Normal range of motion. Skin:     General: Skin is warm and dry. Neurological:      General: No focal deficit present. Mental Status: She is alert and oriented to person, place, and time. Psychiatric:         Mood and Affect: Mood normal.           RECENT LABS AND RECORDS REVIEW    Ekg:  Sinus  Rhythm   -ST depression  -Nondiagnostic. ASSESSMENT and PLAN    Melonie Freed was seen today for atrial fibrillation and coronary artery disease. Diagnoses and all orders for this visit:    ASCVD (arteriosclerotic cardiovascular disease)  -     EKG 12 Lead  -     metoprolol succinate (TOPROL XL) 50 MG extended release tablet; Take 1 tablet by mouth daily  -     rosuvastatin (CRESTOR) 20 MG tablet; Take 1 tablet by mouth nightly    PAF (paroxysmal atrial fibrillation) ([facility])  -     metoprolol succinate (TOPROL XL) 50 MG extended release tablet; Take 1 tablet by mouth daily  -     apixaban (ELIQUIS) 5 MG TABS tablet; Take 1 tablet by mouth 2 times daily  -     amiodarone (CORDARONE) 200 MG tablet; Take 1 tablet by mouth daily    PAD (peripheral artery disease) ([facility])  -     rosuvastatin (CRESTOR) 20 MG tablet; Take 1 tablet by mouth nightly    Primary hypertension  -     metoprolol succinate (TOPROL XL) 50 MG extended release tablet; Take 1 tablet by mouth daily    Stop lasix  Add toprol 50  Dec rosava to 20    Return in about 3 months (around 1/5/2024).        Kiarra Cota MD  10/5/2023  3:11 PM

## 2023-10-06 ENCOUNTER — HOME CARE VISIT (OUTPATIENT)
Dept: SCHEDULING | Facility: HOME HEALTH | Age: 83
End: 2023-10-06

## 2023-10-06 PROCEDURE — G0299 HHS/HOSPICE OF RN EA 15 MIN: HCPCS

## 2023-10-07 VITALS
TEMPERATURE: 98 F | DIASTOLIC BLOOD PRESSURE: 62 MMHG | SYSTOLIC BLOOD PRESSURE: 138 MMHG | RESPIRATION RATE: 18 BRPM | OXYGEN SATURATION: 94 % | HEART RATE: 75 BPM

## 2023-10-07 ASSESSMENT — ENCOUNTER SYMPTOMS
STOOL DESCRIPTION: SOFT FORMED
DYSPNEA ACTIVITY LEVEL: AFTER AMBULATING 10 - 20 FT

## 2023-10-10 ENCOUNTER — HOME CARE VISIT (OUTPATIENT)
Dept: SCHEDULING | Facility: HOME HEALTH | Age: 83
End: 2023-10-10
Payer: MEDICARE

## 2023-10-10 VITALS
OXYGEN SATURATION: 98 % | TEMPERATURE: 98.2 F | SYSTOLIC BLOOD PRESSURE: 118 MMHG | HEART RATE: 68 BPM | DIASTOLIC BLOOD PRESSURE: 78 MMHG | RESPIRATION RATE: 16 BRPM

## 2023-10-10 PROCEDURE — G0299 HHS/HOSPICE OF RN EA 15 MIN: HCPCS

## 2023-10-10 ASSESSMENT — ENCOUNTER SYMPTOMS: DYSPNEA ACTIVITY LEVEL: AFTER AMBULATING MORE THAN 20 FT

## 2023-10-13 ENCOUNTER — HOME CARE VISIT (OUTPATIENT)
Dept: SCHEDULING | Facility: HOME HEALTH | Age: 83
End: 2023-10-13
Payer: MEDICARE

## 2023-10-13 VITALS
OXYGEN SATURATION: 99 % | DIASTOLIC BLOOD PRESSURE: 72 MMHG | HEART RATE: 56 BPM | SYSTOLIC BLOOD PRESSURE: 110 MMHG | TEMPERATURE: 98.1 F | RESPIRATION RATE: 18 BRPM

## 2023-10-13 PROCEDURE — G0299 HHS/HOSPICE OF RN EA 15 MIN: HCPCS

## 2023-10-17 ENCOUNTER — TELEPHONE (OUTPATIENT)
Age: 83
End: 2023-10-17

## 2023-10-17 ENCOUNTER — HOME CARE VISIT (OUTPATIENT)
Dept: SCHEDULING | Facility: HOME HEALTH | Age: 83
End: 2023-10-17
Payer: MEDICARE

## 2023-10-17 VITALS
DIASTOLIC BLOOD PRESSURE: 60 MMHG | RESPIRATION RATE: 18 BRPM | OXYGEN SATURATION: 98 % | HEART RATE: 66 BPM | TEMPERATURE: 98.6 F | SYSTOLIC BLOOD PRESSURE: 140 MMHG

## 2023-10-17 PROCEDURE — G0299 HHS/HOSPICE OF RN EA 15 MIN: HCPCS

## 2023-10-17 NOTE — TELEPHONE ENCOUNTER
Purvi from [facility] called and stated that the pt daughter advised her of shortness of breath on yesterday and Asymptomatic and had a episode of heart flutters that lasted about 2 hours. Pt was recently inpatient for 5 days and wanted to get a sooner appointment to see dr Waldemar Goldstein.  Pt daughter was wanting to know if she needed another echo seeing she just had one when she was admitted into the hospital. [phone] is Purvi from [facility] number and she would appreciate a call back

## 2023-10-17 NOTE — TELEPHONE ENCOUNTER
[facility] said that pt.was more SOB yesterday. Pt.reported fluttering in chest this am for about 2 hours. Pt.denied any fluttering at time of event. BP today 140/60  HR=64-66 and was irregular. Pt.was asymptomatic. I told [facility] that will have bouts of Afib w/PAF. We do get concerned if pt.becomes symptomatic or if HR is elevated and does not come down quickly w/rest.Pt.just seen 10/5 and on appropriate meds. Have pt.call the office for more Afib,symptomatic Afib or HR over 120 for an hour or more that does not come down w/rest and PRN. Hiliary v/u. [facility] ask if pt.should keep ECHO appt. since pt.just had an ECHO. I told Thu Espino \"no\" and cancelled upcoming ECHO that was orderd prior to hospital visit recently as pt.had an ECHO at hospital.

## 2023-10-19 ENCOUNTER — HOME CARE VISIT (OUTPATIENT)
Dept: SCHEDULING | Facility: HOME HEALTH | Age: 83
End: 2023-10-19
Payer: MEDICARE

## 2023-10-19 ENCOUNTER — HOME CARE VISIT (OUTPATIENT)
Dept: HOME HEALTH SERVICES | Facility: HOME HEALTH | Age: 83
End: 2023-10-19
Payer: MEDICARE

## 2023-10-19 VITALS
OXYGEN SATURATION: 99 % | TEMPERATURE: 98.5 F | DIASTOLIC BLOOD PRESSURE: 70 MMHG | SYSTOLIC BLOOD PRESSURE: 92 MMHG | HEART RATE: 60 BPM | RESPIRATION RATE: 17 BRPM

## 2023-10-19 PROCEDURE — G0299 HHS/HOSPICE OF RN EA 15 MIN: HCPCS

## 2023-10-19 ASSESSMENT — ENCOUNTER SYMPTOMS
STOOL DESCRIPTION: LIQUID
DYSPNEA ACTIVITY LEVEL: AFTER AMBULATING MORE THAN 20 FT
PAIN LOCATION - PAIN QUALITY: ACHING, SORE

## 2023-10-26 ENCOUNTER — HOME CARE VISIT (OUTPATIENT)
Dept: SCHEDULING | Facility: HOME HEALTH | Age: 83
End: 2023-10-26
Payer: MEDICARE

## 2023-10-26 VITALS
DIASTOLIC BLOOD PRESSURE: 60 MMHG | TEMPERATURE: 98.7 F | RESPIRATION RATE: 15 BRPM | OXYGEN SATURATION: 96 % | HEART RATE: 60 BPM | SYSTOLIC BLOOD PRESSURE: 130 MMHG

## 2023-10-26 PROCEDURE — G0299 HHS/HOSPICE OF RN EA 15 MIN: HCPCS

## 2023-10-26 ASSESSMENT — ENCOUNTER SYMPTOMS: PAIN LOCATION - PAIN QUALITY: ACHE, SORE

## 2023-10-31 ENCOUNTER — HOME CARE VISIT (OUTPATIENT)
Dept: SCHEDULING | Facility: HOME HEALTH | Age: 83
End: 2023-10-31
Payer: MEDICARE

## 2023-10-31 VITALS
RESPIRATION RATE: 18 BRPM | SYSTOLIC BLOOD PRESSURE: 130 MMHG | TEMPERATURE: 98.7 F | HEART RATE: 68 BPM | DIASTOLIC BLOOD PRESSURE: 62 MMHG | OXYGEN SATURATION: 99 %

## 2023-10-31 PROCEDURE — G0299 HHS/HOSPICE OF RN EA 15 MIN: HCPCS

## 2023-11-02 ENCOUNTER — TELEPHONE (OUTPATIENT)
Age: 83
End: 2023-11-02

## 2023-11-02 ENCOUNTER — HOME CARE VISIT (OUTPATIENT)
Dept: SCHEDULING | Facility: HOME HEALTH | Age: 83
End: 2023-11-02
Payer: MEDICARE

## 2023-11-02 VITALS
RESPIRATION RATE: 17 BRPM | HEART RATE: 62 BPM | TEMPERATURE: 98 F | OXYGEN SATURATION: 98 % | DIASTOLIC BLOOD PRESSURE: 58 MMHG | SYSTOLIC BLOOD PRESSURE: 128 MMHG

## 2023-11-02 DIAGNOSIS — R60.0 EDEMA OF LEFT LOWER EXTREMITY: Primary | ICD-10-CM

## 2023-11-02 DIAGNOSIS — M79.605 PAIN IN LEFT LEG: ICD-10-CM

## 2023-11-02 PROCEDURE — G0299 HHS/HOSPICE OF RN EA 15 MIN: HCPCS

## 2023-11-02 ASSESSMENT — ENCOUNTER SYMPTOMS
DYSPNEA ACTIVITY LEVEL: AFTER AMBULATING MORE THAN 20 FT
STOOL DESCRIPTION: FORMED

## 2023-11-02 NOTE — TELEPHONE ENCOUNTER
I placed orders for STAT venous and arterial ultrasounds. I notified pt. I also notified pt.if symptoms worsen before her test is scheduled to head to the ER. Pt.v/u.  I then called Radiology scheduling and notified of STAT orders and they will call pt.to schedule. I did not have  Nicky's number so I callled [facility] and the office was closed. I will try again tomorrow.

## 2023-11-02 NOTE — TELEPHONE ENCOUNTER
Nicky  nurse w/[facility] reports pt.has increasing pain in left upper thigh and groin area that has been increasing for about a week. She has a nodule on her left upper thigh groin area that has grown in size . BP is 100/80 on left side and 128/58 on rt.side. Very difficult to hear BP on the left. Nicky is concerned the pt.may have a blood clot and is seeking advice.

## 2023-11-03 ENCOUNTER — APPOINTMENT (OUTPATIENT)
Dept: GENERAL RADIOLOGY | Age: 83
End: 2023-11-03
Payer: MEDICARE

## 2023-11-03 ENCOUNTER — HOSPITAL ENCOUNTER (EMERGENCY)
Age: 83
Discharge: HOME | End: 2023-11-03
Attending: EMERGENCY MEDICINE
Payer: MEDICARE

## 2023-11-03 VITALS
DIASTOLIC BLOOD PRESSURE: 56 MMHG | SYSTOLIC BLOOD PRESSURE: 101 MMHG | HEART RATE: 58 BPM | BODY MASS INDEX: 26.22 KG/M2 | TEMPERATURE: 98.9 F | WEIGHT: 148 LBS | HEIGHT: 63 IN | RESPIRATION RATE: 18 BRPM | OXYGEN SATURATION: 97 %

## 2023-11-03 DIAGNOSIS — M54.32 SCIATICA OF LEFT SIDE: Primary | ICD-10-CM

## 2023-11-03 DIAGNOSIS — M16.12 OSTEOARTHRITIS OF LEFT HIP, UNSPECIFIED OSTEOARTHRITIS TYPE: ICD-10-CM

## 2023-11-03 PROCEDURE — 99284 EMERGENCY DEPT VISIT MOD MDM: CPT

## 2023-11-03 PROCEDURE — 73502 X-RAY EXAM HIP UNI 2-3 VIEWS: CPT

## 2023-11-03 PROCEDURE — 96372 THER/PROPH/DIAG INJ SC/IM: CPT

## 2023-11-03 PROCEDURE — 72100 X-RAY EXAM L-S SPINE 2/3 VWS: CPT

## 2023-11-03 PROCEDURE — 6360000002 HC RX W HCPCS: Performed by: EMERGENCY MEDICINE

## 2023-11-03 RX ORDER — METHOCARBAMOL 500 MG/1
500 TABLET, FILM COATED ORAL 3 TIMES DAILY PRN
Qty: 30 TABLET | Refills: 0 | Status: SHIPPED | OUTPATIENT
Start: 2023-11-03 | End: 2023-11-13

## 2023-11-03 RX ORDER — KETOROLAC TROMETHAMINE 30 MG/ML
30 INJECTION, SOLUTION INTRAMUSCULAR; INTRAVENOUS
Status: COMPLETED | OUTPATIENT
Start: 2023-11-03 | End: 2023-11-03

## 2023-11-03 RX ORDER — LIDOCAINE 50 MG/G
1 PATCH TOPICAL DAILY
Qty: 10 PATCH | Refills: 0 | Status: SHIPPED | OUTPATIENT
Start: 2023-11-03 | End: 2023-11-13

## 2023-11-03 RX ADMIN — KETOROLAC TROMETHAMINE 30 MG: 30 INJECTION, SOLUTION INTRAMUSCULAR; INTRAVENOUS at 12:19

## 2023-11-03 ASSESSMENT — PAIN SCALES - GENERAL: PAINLEVEL_OUTOF10: 8

## 2023-11-03 ASSESSMENT — PAIN DESCRIPTION - DESCRIPTORS: DESCRIPTORS: ACHING

## 2023-11-03 ASSESSMENT — ENCOUNTER SYMPTOMS: BACK PAIN: 1

## 2023-11-03 ASSESSMENT — LIFESTYLE VARIABLES
HOW MANY STANDARD DRINKS CONTAINING ALCOHOL DO YOU HAVE ON A TYPICAL DAY: PATIENT DOES NOT DRINK
HOW OFTEN DO YOU HAVE A DRINK CONTAINING ALCOHOL: NEVER

## 2023-11-03 ASSESSMENT — PAIN DESCRIPTION - LOCATION: LOCATION: HIP

## 2023-11-03 ASSESSMENT — PAIN DESCRIPTION - ORIENTATION: ORIENTATION: LEFT

## 2023-11-03 ASSESSMENT — PAIN - FUNCTIONAL ASSESSMENT: PAIN_FUNCTIONAL_ASSESSMENT: 0-10

## 2023-11-03 NOTE — DISCHARGE INSTRUCTIONS
Take the NSAID as prescribed with food for the next week then as needed. Do the stretching exercises like we discussed. Take the muscle relaxer at nighttime before going to bed as needed or up to 3 times a day if tolerated well.       Use the Lidoderm patch over the affected area 12 hours on and a minimum of 12 hours off before putting a new patch on your body

## 2023-11-03 NOTE — ED PROVIDER NOTES
Disp-90 tablet, R-3Normal      isosorbide mononitrate (IMDUR) 30 MG extended release tablet Take 1 tablet by mouth daily, Disp-90 tablet, R-3Normal      aspirin 81 MG chewable tablet Take 1 tablet by mouthHistorical Med      metFORMIN (GLUCOPHAGE) 500 MG tablet Take 1 tablet by mouth 2 times daily (with meals)Historical Med       !! - Potential duplicate medications found. Please discuss with provider. Results for orders placed or performed during the hospital encounter of 11/03/23   XR LUMBAR SPINE (2-3 VIEWS)    Narrative    Examination: XR LUMBAR SPINE (2-3 VIEWS), XR HIP LEFT (2-3 VIEWS)    INDICATION: Low back pain, left hip pain    COMPARISON: None    FINDINGS:   Lumbar spine: There are 5 nonrib-bearing lumbar-type vertebral bodies. Alignment  is maintained. Vertebral body heights are preserved. No radiographic evidence  of fracture. Multilevel degenerative disc disease and facet arthropathy. Osteopenia. Atherosclerotic calcifications. Pelvis and left hip: No evidence of fracture or malalignment. There are moderate  bilateral hip degenerative changes with marginal osteophyte and joint space  narrowing. Mild bilateral SI joint and pubic symphysis degenerative changes. Osteopenia. Atherosclerotic calcifications. Surgical clips in the pelvis. Impression    Lumbar spine:  1. No acute radiographic abnormality. 2. Multilevel degenerative disc disease and facet arthropathy. Pelvis and left hip:  1. No acute osseous abnormality. 2. Moderate bilateral hip osteoarthrosis. XR HIP LEFT (2-3 VIEWS)    Narrative    Examination: XR LUMBAR SPINE (2-3 VIEWS), XR HIP LEFT (2-3 VIEWS)    INDICATION: Low back pain, left hip pain    COMPARISON: None    FINDINGS:   Lumbar spine: There are 5 nonrib-bearing lumbar-type vertebral bodies. Alignment  is maintained. Vertebral body heights are preserved. No radiographic evidence  of fracture.  Multilevel degenerative disc disease and facet

## 2023-11-03 NOTE — TELEPHONE ENCOUNTER
I spoke w/radiology scheduling pt.was scheduled for duplex LE's and arterial duplex for today. I called pt.to tell her this and she declined testing said she was going to the ER via EMS. I notified [facility] scheduling to cancel both arterial and venous duplex per pt.request.      I notified [facility] Nicky that pt. is heading to the ER declined out pt.testing.

## 2023-11-03 NOTE — ED TRIAGE NOTES
Per patient left leg pain that starts at hip to ankle. Denies injury. States hx dvt. Currently on blood thinners. No swelling noted. Cap refill less than 3 seconds.

## 2023-11-04 ENCOUNTER — HOME CARE VISIT (OUTPATIENT)
Dept: SCHEDULING | Facility: HOME HEALTH | Age: 83
End: 2023-11-04
Payer: MEDICARE

## 2023-11-04 VITALS
SYSTOLIC BLOOD PRESSURE: 100 MMHG | RESPIRATION RATE: 20 BRPM | HEART RATE: 68 BPM | TEMPERATURE: 97.5 F | DIASTOLIC BLOOD PRESSURE: 64 MMHG | OXYGEN SATURATION: 98 %

## 2023-11-04 PROCEDURE — G0299 HHS/HOSPICE OF RN EA 15 MIN: HCPCS

## 2023-11-07 ENCOUNTER — OFFICE VISIT (OUTPATIENT)
Dept: FAMILY MEDICINE CLINIC | Facility: CLINIC | Age: 83
End: 2023-11-07
Payer: MEDICARE

## 2023-11-07 VITALS
BODY MASS INDEX: 26.68 KG/M2 | HEART RATE: 59 BPM | DIASTOLIC BLOOD PRESSURE: 62 MMHG | HEIGHT: 63 IN | SYSTOLIC BLOOD PRESSURE: 144 MMHG | WEIGHT: 150.6 LBS | OXYGEN SATURATION: 97 %

## 2023-11-07 DIAGNOSIS — M54.31 SCIATICA OF RIGHT SIDE: ICD-10-CM

## 2023-11-07 DIAGNOSIS — I48.0 PAF (PAROXYSMAL ATRIAL FIBRILLATION) (HCC): ICD-10-CM

## 2023-11-07 DIAGNOSIS — I65.23 BILATERAL CAROTID ARTERY STENOSIS: ICD-10-CM

## 2023-11-07 DIAGNOSIS — E11.51 TYPE 2 DIABETES MELLITUS WITH PERIPHERAL VASCULAR DISEASE (HCC): ICD-10-CM

## 2023-11-07 DIAGNOSIS — I65.21 CAROTID STENOSIS, RIGHT: ICD-10-CM

## 2023-11-07 DIAGNOSIS — I25.10 ATHEROSCLEROSIS OF NATIVE CORONARY ARTERY OF NATIVE HEART WITHOUT ANGINA PECTORIS: Primary | ICD-10-CM

## 2023-11-07 DIAGNOSIS — I10 ESSENTIAL HYPERTENSION: ICD-10-CM

## 2023-11-07 PROBLEM — M65.4 TENDINITIS, DE QUERVAIN'S: Status: ACTIVE | Noted: 2021-06-04

## 2023-11-07 PROBLEM — M51.36 DDD (DEGENERATIVE DISC DISEASE), LUMBAR: Status: ACTIVE | Noted: 2023-06-01

## 2023-11-07 PROBLEM — B37.2 YEAST DERMATITIS: Status: ACTIVE | Noted: 2023-06-05

## 2023-11-07 PROBLEM — R06.09 DYSPNEA ON EXERTION: Status: ACTIVE | Noted: 2023-06-05

## 2023-11-07 PROBLEM — R33.9 RETENTION OF URINE: Status: ACTIVE | Noted: 2023-06-05

## 2023-11-07 PROBLEM — F33.0 MAJOR DEPRESSIVE DISORDER, RECURRENT EPISODE, MILD DEGREE (HCC): Status: ACTIVE | Noted: 2022-10-11

## 2023-11-07 PROBLEM — L82.1 SEBORRHEIC KERATOSES: Status: ACTIVE | Noted: 2023-01-20

## 2023-11-07 PROBLEM — H90.3 SENSORINEURAL HEARING LOSS (SNHL) OF BOTH EARS: Status: ACTIVE | Noted: 2018-10-18

## 2023-11-07 LAB
ALBUMIN SERPL-MCNC: 4.4 G/DL (ref 3.2–4.6)
ALBUMIN/GLOB SERPL: 1.3 (ref 0.4–1.6)
ALP SERPL-CCNC: 69 U/L (ref 50–136)
ALT SERPL-CCNC: 84 U/L (ref 12–65)
ANION GAP SERPL CALC-SCNC: 10 MMOL/L (ref 2–11)
AST SERPL-CCNC: 61 U/L (ref 15–37)
BASOPHILS # BLD: 0.1 K/UL (ref 0–0.2)
BASOPHILS NFR BLD: 1 % (ref 0–2)
BILIRUB SERPL-MCNC: 0.3 MG/DL (ref 0.2–1.1)
BUN SERPL-MCNC: 16 MG/DL (ref 8–23)
CALCIUM SERPL-MCNC: 9.8 MG/DL (ref 8.3–10.4)
CHLORIDE SERPL-SCNC: 104 MMOL/L (ref 101–110)
CHOLEST SERPL-MCNC: 167 MG/DL
CO2 SERPL-SCNC: 24 MMOL/L (ref 21–32)
CREAT SERPL-MCNC: 1 MG/DL (ref 0.6–1)
CREAT UR-MCNC: 182 MG/DL
DIFFERENTIAL METHOD BLD: ABNORMAL
EOSINOPHIL # BLD: 0.3 K/UL (ref 0–0.8)
EOSINOPHIL NFR BLD: 3 % (ref 0.5–7.8)
ERYTHROCYTE [DISTWIDTH] IN BLOOD BY AUTOMATED COUNT: 13.3 % (ref 11.9–14.6)
EST. AVERAGE GLUCOSE BLD GHB EST-MCNC: 126 MG/DL
GLOBULIN SER CALC-MCNC: 3.4 G/DL (ref 2.8–4.5)
GLUCOSE SERPL-MCNC: 123 MG/DL (ref 65–100)
HBA1C MFR BLD: 6 % (ref 4.8–5.6)
HCT VFR BLD AUTO: 48 % (ref 35.8–46.3)
HDLC SERPL-MCNC: 75 MG/DL (ref 40–60)
HDLC SERPL: 2.2
HGB BLD-MCNC: 15.4 G/DL (ref 11.7–15.4)
IMM GRANULOCYTES # BLD AUTO: 0 K/UL (ref 0–0.5)
IMM GRANULOCYTES NFR BLD AUTO: 1 % (ref 0–5)
LDLC SERPL CALC-MCNC: 62.8 MG/DL
LYMPHOCYTES # BLD: 2.7 K/UL (ref 0.5–4.6)
LYMPHOCYTES NFR BLD: 33 % (ref 13–44)
MCH RBC QN AUTO: 29.5 PG (ref 26.1–32.9)
MCHC RBC AUTO-ENTMCNC: 32.1 G/DL (ref 31.4–35)
MCV RBC AUTO: 92 FL (ref 82–102)
MICROALBUMIN UR-MCNC: 6.28 MG/DL
MICROALBUMIN/CREAT UR-RTO: 35 MG/G (ref 0–30)
MONOCYTES # BLD: 0.8 K/UL (ref 0.1–1.3)
MONOCYTES NFR BLD: 10 % (ref 4–12)
NEUTS SEG # BLD: 4.3 K/UL (ref 1.7–8.2)
NEUTS SEG NFR BLD: 52 % (ref 43–78)
NRBC # BLD: 0 K/UL (ref 0–0.2)
PLATELET # BLD AUTO: 259 K/UL (ref 150–450)
PMV BLD AUTO: 9.7 FL (ref 9.4–12.3)
POTASSIUM SERPL-SCNC: 3.9 MMOL/L (ref 3.5–5.1)
PROT SERPL-MCNC: 7.8 G/DL (ref 6.3–8.2)
RBC # BLD AUTO: 5.22 M/UL (ref 4.05–5.2)
SODIUM SERPL-SCNC: 138 MMOL/L (ref 133–143)
TRIGL SERPL-MCNC: 146 MG/DL (ref 35–150)
VLDLC SERPL CALC-MCNC: 29.2 MG/DL (ref 6–23)
WBC # BLD AUTO: 8.2 K/UL (ref 4.3–11.1)

## 2023-11-07 PROCEDURE — 3044F HG A1C LEVEL LT 7.0%: CPT | Performed by: FAMILY MEDICINE

## 2023-11-07 PROCEDURE — 1090F PRES/ABSN URINE INCON ASSESS: CPT | Performed by: FAMILY MEDICINE

## 2023-11-07 PROCEDURE — G8427 DOCREV CUR MEDS BY ELIG CLIN: HCPCS | Performed by: FAMILY MEDICINE

## 2023-11-07 PROCEDURE — G8484 FLU IMMUNIZE NO ADMIN: HCPCS | Performed by: FAMILY MEDICINE

## 2023-11-07 PROCEDURE — 1036F TOBACCO NON-USER: CPT | Performed by: FAMILY MEDICINE

## 2023-11-07 PROCEDURE — G8417 CALC BMI ABV UP PARAM F/U: HCPCS | Performed by: FAMILY MEDICINE

## 2023-11-07 PROCEDURE — 3078F DIAST BP <80 MM HG: CPT | Performed by: FAMILY MEDICINE

## 2023-11-07 PROCEDURE — 1123F ACP DISCUSS/DSCN MKR DOCD: CPT | Performed by: FAMILY MEDICINE

## 2023-11-07 PROCEDURE — 3074F SYST BP LT 130 MM HG: CPT | Performed by: FAMILY MEDICINE

## 2023-11-07 PROCEDURE — 99204 OFFICE O/P NEW MOD 45 MIN: CPT | Performed by: FAMILY MEDICINE

## 2023-11-07 PROCEDURE — G8400 PT W/DXA NO RESULTS DOC: HCPCS | Performed by: FAMILY MEDICINE

## 2023-11-07 RX ORDER — BLACK COHOSH ROOT EXTRACT 80 MG
CAPSULE ORAL
COMMUNITY

## 2023-11-07 SDOH — ECONOMIC STABILITY: FOOD INSECURITY: WITHIN THE PAST 12 MONTHS, THE FOOD YOU BOUGHT JUST DIDN'T LAST AND YOU DIDN'T HAVE MONEY TO GET MORE.: NEVER TRUE

## 2023-11-07 SDOH — ECONOMIC STABILITY: HOUSING INSECURITY
IN THE LAST 12 MONTHS, WAS THERE A TIME WHEN YOU DID NOT HAVE A STEADY PLACE TO SLEEP OR SLEPT IN A SHELTER (INCLUDING NOW)?: NO

## 2023-11-07 SDOH — ECONOMIC STABILITY: INCOME INSECURITY: HOW HARD IS IT FOR YOU TO PAY FOR THE VERY BASICS LIKE FOOD, HOUSING, MEDICAL CARE, AND HEATING?: NOT HARD AT ALL

## 2023-11-07 SDOH — ECONOMIC STABILITY: FOOD INSECURITY: WITHIN THE PAST 12 MONTHS, YOU WORRIED THAT YOUR FOOD WOULD RUN OUT BEFORE YOU GOT MONEY TO BUY MORE.: NEVER TRUE

## 2023-11-07 ASSESSMENT — COLUMBIA-SUICIDE SEVERITY RATING SCALE - C-SSRS
4. HAVE YOU HAD THESE THOUGHTS AND HAD SOME INTENTION OF ACTING ON THEM?: NO
3. HAVE YOU BEEN THINKING ABOUT HOW YOU MIGHT KILL YOURSELF?: NO
5. HAVE YOU STARTED TO WORK OUT OR WORKED OUT THE DETAILS OF HOW TO KILL YOURSELF? DO YOU INTEND TO CARRY OUT THIS PLAN?: NO
7. DID THIS OCCUR IN THE LAST THREE MONTHS: NO

## 2023-11-07 ASSESSMENT — PATIENT HEALTH QUESTIONNAIRE - PHQ9
9. THOUGHTS THAT YOU WOULD BE BETTER OFF DEAD, OR OF HURTING YOURSELF: 0
8. MOVING OR SPEAKING SO SLOWLY THAT OTHER PEOPLE COULD HAVE NOTICED. OR THE OPPOSITE, BEING SO FIGETY OR RESTLESS THAT YOU HAVE BEEN MOVING AROUND A LOT MORE THAN USUAL: 0
2. FEELING DOWN, DEPRESSED OR HOPELESS: 2
SUM OF ALL RESPONSES TO PHQ QUESTIONS 1-9: 9
3. TROUBLE FALLING OR STAYING ASLEEP: 2
SUM OF ALL RESPONSES TO PHQ QUESTIONS 1-9: 9
SUM OF ALL RESPONSES TO PHQ QUESTIONS 1-9: 9
7. TROUBLE CONCENTRATING ON THINGS, SUCH AS READING THE NEWSPAPER OR WATCHING TELEVISION: 1
10. IF YOU CHECKED OFF ANY PROBLEMS, HOW DIFFICULT HAVE THESE PROBLEMS MADE IT FOR YOU TO DO YOUR WORK, TAKE CARE OF THINGS AT HOME, OR GET ALONG WITH OTHER PEOPLE: 1
5. POOR APPETITE OR OVEREATING: 1
6. FEELING BAD ABOUT YOURSELF - OR THAT YOU ARE A FAILURE OR HAVE LET YOURSELF OR YOUR FAMILY DOWN: 0
SUM OF ALL RESPONSES TO PHQ QUESTIONS 1-9: 9
SUM OF ALL RESPONSES TO PHQ9 QUESTIONS 1 & 2: 3
1. LITTLE INTEREST OR PLEASURE IN DOING THINGS: 1
4. FEELING TIRED OR HAVING LITTLE ENERGY: 2

## 2023-11-07 NOTE — PROGRESS NOTES
PROGRESS NOTE    SUBJECTIVE:   Karin Goode is a 82 y.o. female seen for a follow up visit regarding   Chief Complaint   Patient presents with    New Patient     Establish care  Was seen at ER on 11/3/23 for left sided sciatica; still complaining of pain. Increased pain when walking        HPI:  New patient, wanting to establish primary care here. Here today for follow-up after being in the emergency room where she presented with left-sided sciatica. Symptoms still present. She would like referral to a back specialist.  Patient has diabetes, is a known vasculopath with carotid disease, coronary disease, peripheral vascular disease. Has atrial fibrillation, is anticoagulated. Reviewed and updated this visit by provider:           Review of Systems   Respiratory: Negative. Cardiovascular: Negative. OBJECTIVE:  Vitals:    11/07/23 1406 11/07/23 1409   BP: 104/68 (!) 144/62   Site: Left Upper Arm Right Upper Arm   Cuff Size: Medium Adult Medium Adult   Pulse: 73 59   SpO2: 97% 97%   Weight: 68.3 kg (150 lb 9.6 oz)    Height: 1.6 m (5' 3\")         Physical Exam   General: Alert and oriented x3, well-appearing  Neck: No adenopathy, thyromegaly or thyroid nodules  Pulmonary: Normal effort, good airflow, no rales or rhonchi  CVS: Irregularly irregular rhythm with normal rate, normal S1, S2, no S3 or S4, 2/6 systolic outflow murmur; no carotid bruits, 2+ pedal pulses      Medical problems and test results were reviewed with the patient today.      Recent Results (from the past 672 hour(s))   Microalbumin / Creatinine Urine Ratio    Collection Time: 11/07/23  3:02 PM   Result Value Ref Range    Microalbumin, Random Urine 6.28 MG/DL    Creatinine, Ur 182.00 mg/dL    Microalbumin Creatinine Ratio 35 (H) 0 - 30 mg/g   Lipid Panel    Collection Time: 11/07/23  3:02 PM   Result Value Ref Range    Cholesterol, Total 167 <200 MG/DL    Triglycerides 146 35 - 150 MG/DL    HDL 75 (H) 40 - 60 MG/DL

## 2023-11-08 ASSESSMENT — ENCOUNTER SYMPTOMS: RESPIRATORY NEGATIVE: 1

## 2023-11-09 ENCOUNTER — HOME CARE VISIT (OUTPATIENT)
Dept: SCHEDULING | Facility: HOME HEALTH | Age: 83
End: 2023-11-09
Payer: MEDICARE

## 2023-11-09 VITALS
SYSTOLIC BLOOD PRESSURE: 154 MMHG | TEMPERATURE: 98.2 F | DIASTOLIC BLOOD PRESSURE: 60 MMHG | OXYGEN SATURATION: 96 % | HEART RATE: 62 BPM | RESPIRATION RATE: 16 BRPM

## 2023-11-09 PROCEDURE — G0299 HHS/HOSPICE OF RN EA 15 MIN: HCPCS

## 2023-11-10 DIAGNOSIS — E11.51 TYPE 2 DIABETES MELLITUS WITH PERIPHERAL VASCULAR DISEASE (HCC): Primary | ICD-10-CM

## 2023-11-13 ENCOUNTER — OFFICE VISIT (OUTPATIENT)
Dept: NEUROSURGERY | Age: 83
End: 2023-11-13
Payer: MEDICARE

## 2023-11-13 VITALS
DIASTOLIC BLOOD PRESSURE: 62 MMHG | HEIGHT: 63 IN | HEART RATE: 62 BPM | OXYGEN SATURATION: 96 % | BODY MASS INDEX: 26.75 KG/M2 | WEIGHT: 151 LBS | SYSTOLIC BLOOD PRESSURE: 114 MMHG | TEMPERATURE: 97 F

## 2023-11-13 DIAGNOSIS — M54.50 ACUTE MIDLINE LOW BACK PAIN WITHOUT SCIATICA: ICD-10-CM

## 2023-11-13 DIAGNOSIS — M54.16 LUMBAR RADICULOPATHY: Primary | ICD-10-CM

## 2023-11-13 PROCEDURE — 1036F TOBACCO NON-USER: CPT | Performed by: NURSE PRACTITIONER

## 2023-11-13 PROCEDURE — G8484 FLU IMMUNIZE NO ADMIN: HCPCS | Performed by: NURSE PRACTITIONER

## 2023-11-13 PROCEDURE — 3074F SYST BP LT 130 MM HG: CPT | Performed by: NURSE PRACTITIONER

## 2023-11-13 PROCEDURE — G8417 CALC BMI ABV UP PARAM F/U: HCPCS | Performed by: NURSE PRACTITIONER

## 2023-11-13 PROCEDURE — 99202 OFFICE O/P NEW SF 15 MIN: CPT | Performed by: NURSE PRACTITIONER

## 2023-11-13 PROCEDURE — G8427 DOCREV CUR MEDS BY ELIG CLIN: HCPCS | Performed by: NURSE PRACTITIONER

## 2023-11-13 PROCEDURE — 1090F PRES/ABSN URINE INCON ASSESS: CPT | Performed by: NURSE PRACTITIONER

## 2023-11-13 PROCEDURE — G8400 PT W/DXA NO RESULTS DOC: HCPCS | Performed by: NURSE PRACTITIONER

## 2023-11-13 PROCEDURE — 1123F ACP DISCUSS/DSCN MKR DOCD: CPT | Performed by: NURSE PRACTITIONER

## 2023-11-13 PROCEDURE — 3078F DIAST BP <80 MM HG: CPT | Performed by: NURSE PRACTITIONER

## 2023-11-13 RX ORDER — LANCETS 30 GAUGE
1 EACH MISCELLANEOUS 3 TIMES DAILY
Qty: 300 EACH | Refills: 1 | Status: SHIPPED | OUTPATIENT
Start: 2023-11-13 | End: 2024-05-11

## 2023-11-13 RX ORDER — GLUCOSAMINE HCL/CHONDROITIN SU 500-400 MG
1 CAPSULE ORAL 3 TIMES DAILY
Qty: 300 STRIP | Refills: 1 | Status: SHIPPED | OUTPATIENT
Start: 2023-11-13 | End: 2024-05-11

## 2023-11-13 RX ORDER — BLOOD-GLUCOSE METER
1 KIT MISCELLANEOUS DAILY
Qty: 1 KIT | Refills: 0 | Status: SHIPPED | OUTPATIENT
Start: 2023-11-13

## 2023-11-13 RX ORDER — GABAPENTIN 100 MG/1
200 CAPSULE ORAL 3 TIMES DAILY
Qty: 270 CAPSULE | Refills: 1 | Status: SHIPPED | OUTPATIENT
Start: 2023-11-13 | End: 2024-05-11

## 2023-11-13 NOTE — PROGRESS NOTES
pain.  I am also send the patient for lumbar MRI without contrast to check for evidence of nerve compromise. After about 2 weeks we discussed with the patient potentially increasing her gabapentin dose to 300 mg 3 times a day. The patient follow-up me after the MRI is complete to review the images. A total of 25 minutes was interview, patient consultation, and documentation. No diagnosis found. Notes are transcribed with [employer], a medical voice recording dictation service, and may contain minor errors.     Kristyn Light NP  [address] [place] [address]

## 2023-11-14 ENCOUNTER — TELEPHONE (OUTPATIENT)
Dept: FAMILY MEDICINE CLINIC | Facility: CLINIC | Age: 83
End: 2023-11-14

## 2023-11-14 ENCOUNTER — HOME CARE VISIT (OUTPATIENT)
Dept: SCHEDULING | Facility: HOME HEALTH | Age: 83
End: 2023-11-14
Payer: MEDICARE

## 2023-11-14 VITALS
OXYGEN SATURATION: 98 % | DIASTOLIC BLOOD PRESSURE: 60 MMHG | HEART RATE: 60 BPM | RESPIRATION RATE: 18 BRPM | SYSTOLIC BLOOD PRESSURE: 126 MMHG | TEMPERATURE: 98 F

## 2023-11-14 DIAGNOSIS — R74.8 ELEVATED LIVER ENZYMES: Primary | ICD-10-CM

## 2023-11-14 PROCEDURE — G0299 HHS/HOSPICE OF RN EA 15 MIN: HCPCS

## 2023-11-15 NOTE — TELEPHONE ENCOUNTER
Contacted Purvi and gave verbal orders to extend [facility],[address]. Purvi voiced understanding and thanks.

## 2023-11-16 ENCOUNTER — TELEPHONE (OUTPATIENT)
Dept: NEUROSURGERY | Age: 83
End: 2023-11-16

## 2023-11-16 NOTE — TELEPHONE ENCOUNTER
Spoke to Андрей Hudson with patient in the same room- he states the Neurontin is not effective for patient's ;leg pain - advised it might take up to 2 weeks to be therapeutically effective- the Neurontin is not causing sedation- the patient does have some left ankle swelling- advised to continue to monitor and place compression hose on

## 2023-11-16 NOTE — TELEPHONE ENCOUNTER
Son in law called and said that the gabapentin isn't providing any relief. Is there anything else that can be done.     Please advise

## 2023-11-19 ENCOUNTER — HOME CARE VISIT (OUTPATIENT)
Dept: HOME HEALTH SERVICES | Facility: HOME HEALTH | Age: 83
End: 2023-11-19
Payer: MEDICARE

## 2023-11-24 ENCOUNTER — HOME CARE VISIT (OUTPATIENT)
Dept: SCHEDULING | Facility: HOME HEALTH | Age: 83
End: 2023-11-24
Payer: MEDICARE

## 2023-11-24 PROCEDURE — G0299 HHS/HOSPICE OF RN EA 15 MIN: HCPCS

## 2023-11-25 ENCOUNTER — HOSPITAL ENCOUNTER (OUTPATIENT)
Dept: MRI IMAGING | Age: 83
End: 2023-11-25
Payer: MEDICARE

## 2023-11-25 DIAGNOSIS — M54.50 ACUTE MIDLINE LOW BACK PAIN WITHOUT SCIATICA: ICD-10-CM

## 2023-11-25 DIAGNOSIS — M54.16 LUMBAR RADICULOPATHY: ICD-10-CM

## 2023-11-25 PROCEDURE — 72148 MRI LUMBAR SPINE W/O DYE: CPT

## 2023-11-26 VITALS
DIASTOLIC BLOOD PRESSURE: 58 MMHG | RESPIRATION RATE: 16 BRPM | TEMPERATURE: 98.4 F | HEART RATE: 60 BPM | SYSTOLIC BLOOD PRESSURE: 136 MMHG | OXYGEN SATURATION: 96 %

## 2023-11-26 DIAGNOSIS — I48.0 PAF (PAROXYSMAL ATRIAL FIBRILLATION) (HCC): ICD-10-CM

## 2023-11-26 ASSESSMENT — ENCOUNTER SYMPTOMS
STOOL DESCRIPTION: PELLET LIKE
PAIN LOCATION - PAIN QUALITY: NUMBNESS, TINGLING

## 2023-11-27 ENCOUNTER — HOME CARE VISIT (OUTPATIENT)
Dept: HOME HEALTH SERVICES | Facility: HOME HEALTH | Age: 83
End: 2023-11-27
Payer: MEDICARE

## 2023-11-27 ENCOUNTER — TELEPHONE (OUTPATIENT)
Age: 83
End: 2023-11-27

## 2023-11-27 NOTE — TELEPHONE ENCOUNTER
MEDICATION REFILL REQUEST      Name of Medication:  Amiodarone  Dose:  200 mg  Frequency:  QD  Quantity:  ?  Days' supply:  ?       Pharmacy Name/Location:  call pt ,no pharmacy was left

## 2023-11-28 ENCOUNTER — HOME CARE VISIT (OUTPATIENT)
Dept: HOME HEALTH SERVICES | Facility: HOME HEALTH | Age: 83
End: 2023-11-28

## 2023-11-28 RX ORDER — AMIODARONE HYDROCHLORIDE 200 MG/1
200 TABLET ORAL DAILY
Qty: 30 TABLET | Refills: 1 | Status: SHIPPED | OUTPATIENT
Start: 2023-11-28

## 2023-11-29 ENCOUNTER — TELEPHONE (OUTPATIENT)
Dept: NEUROSURGERY | Age: 83
End: 2023-11-29

## 2023-11-29 NOTE — TELEPHONE ENCOUNTER
Spoke to son in law with patient in the room- moved appointment up and instructed to increase Neurontin to 300 mg TID- she states Voltaren greta is not effective

## 2023-11-29 NOTE — TELEPHONE ENCOUNTER
Son in law says the gabapentin is not working and she needs something else called in for pain    Please advise

## 2023-12-04 ENCOUNTER — OFFICE VISIT (OUTPATIENT)
Dept: FAMILY MEDICINE CLINIC | Facility: CLINIC | Age: 83
End: 2023-12-04
Payer: MEDICARE

## 2023-12-04 ENCOUNTER — OFFICE VISIT (OUTPATIENT)
Dept: NEUROSURGERY | Age: 83
End: 2023-12-04
Payer: MEDICARE

## 2023-12-04 ENCOUNTER — TELEPHONE (OUTPATIENT)
Dept: ORTHOPEDIC SURGERY | Age: 83
End: 2023-12-04

## 2023-12-04 ENCOUNTER — TELEPHONE (OUTPATIENT)
Age: 83
End: 2023-12-04

## 2023-12-04 VITALS
SYSTOLIC BLOOD PRESSURE: 117 MMHG | TEMPERATURE: 97.6 F | BODY MASS INDEX: 26.75 KG/M2 | DIASTOLIC BLOOD PRESSURE: 70 MMHG | OXYGEN SATURATION: 97 % | HEIGHT: 63 IN | HEART RATE: 71 BPM

## 2023-12-04 VITALS
OXYGEN SATURATION: 96 % | BODY MASS INDEX: 28.17 KG/M2 | HEART RATE: 72 BPM | HEIGHT: 63 IN | SYSTOLIC BLOOD PRESSURE: 120 MMHG | WEIGHT: 159 LBS | DIASTOLIC BLOOD PRESSURE: 64 MMHG

## 2023-12-04 DIAGNOSIS — E11.51 TYPE 2 DIABETES MELLITUS WITH PERIPHERAL VASCULAR DISEASE (HCC): Primary | ICD-10-CM

## 2023-12-04 DIAGNOSIS — M54.16 LUMBAR RADICULOPATHY: Primary | ICD-10-CM

## 2023-12-04 DIAGNOSIS — M48.061 NEURAL FORAMINAL STENOSIS OF LUMBAR SPINE: ICD-10-CM

## 2023-12-04 PROCEDURE — 1090F PRES/ABSN URINE INCON ASSESS: CPT | Performed by: NURSE PRACTITIONER

## 2023-12-04 PROCEDURE — 1036F TOBACCO NON-USER: CPT | Performed by: NURSE PRACTITIONER

## 2023-12-04 PROCEDURE — 1090F PRES/ABSN URINE INCON ASSESS: CPT | Performed by: FAMILY MEDICINE

## 2023-12-04 PROCEDURE — G8400 PT W/DXA NO RESULTS DOC: HCPCS | Performed by: FAMILY MEDICINE

## 2023-12-04 PROCEDURE — 1036F TOBACCO NON-USER: CPT | Performed by: FAMILY MEDICINE

## 2023-12-04 PROCEDURE — 99213 OFFICE O/P EST LOW 20 MIN: CPT | Performed by: NURSE PRACTITIONER

## 2023-12-04 PROCEDURE — 1123F ACP DISCUSS/DSCN MKR DOCD: CPT | Performed by: NURSE PRACTITIONER

## 2023-12-04 PROCEDURE — 3074F SYST BP LT 130 MM HG: CPT | Performed by: FAMILY MEDICINE

## 2023-12-04 PROCEDURE — G8427 DOCREV CUR MEDS BY ELIG CLIN: HCPCS | Performed by: FAMILY MEDICINE

## 2023-12-04 PROCEDURE — 3074F SYST BP LT 130 MM HG: CPT | Performed by: NURSE PRACTITIONER

## 2023-12-04 PROCEDURE — 99213 OFFICE O/P EST LOW 20 MIN: CPT | Performed by: FAMILY MEDICINE

## 2023-12-04 PROCEDURE — G8417 CALC BMI ABV UP PARAM F/U: HCPCS | Performed by: FAMILY MEDICINE

## 2023-12-04 PROCEDURE — G8400 PT W/DXA NO RESULTS DOC: HCPCS | Performed by: NURSE PRACTITIONER

## 2023-12-04 PROCEDURE — 3078F DIAST BP <80 MM HG: CPT | Performed by: FAMILY MEDICINE

## 2023-12-04 PROCEDURE — 1123F ACP DISCUSS/DSCN MKR DOCD: CPT | Performed by: FAMILY MEDICINE

## 2023-12-04 PROCEDURE — G8484 FLU IMMUNIZE NO ADMIN: HCPCS | Performed by: NURSE PRACTITIONER

## 2023-12-04 PROCEDURE — 3044F HG A1C LEVEL LT 7.0%: CPT | Performed by: FAMILY MEDICINE

## 2023-12-04 PROCEDURE — G8484 FLU IMMUNIZE NO ADMIN: HCPCS | Performed by: FAMILY MEDICINE

## 2023-12-04 PROCEDURE — G8417 CALC BMI ABV UP PARAM F/U: HCPCS | Performed by: NURSE PRACTITIONER

## 2023-12-04 PROCEDURE — G8427 DOCREV CUR MEDS BY ELIG CLIN: HCPCS | Performed by: NURSE PRACTITIONER

## 2023-12-04 PROCEDURE — 3078F DIAST BP <80 MM HG: CPT | Performed by: NURSE PRACTITIONER

## 2023-12-04 RX ORDER — INSULIN ASPART 100 [IU]/ML
INJECTION, SOLUTION INTRAVENOUS; SUBCUTANEOUS
Qty: 1 ADJUSTABLE DOSE PRE-FILLED PEN SYRINGE | Refills: 0 | Status: SHIPPED | OUTPATIENT
Start: 2023-12-04 | End: 2023-12-06

## 2023-12-04 ASSESSMENT — ENCOUNTER SYMPTOMS
BACK PAIN: 1
CHEST TIGHTNESS: 0
SHORTNESS OF BREATH: 0
WHEEZING: 1

## 2023-12-04 NOTE — TELEPHONE ENCOUNTER
Cardiac Clearance        Physician or Practice Requesting: Dr Cele Moyer  : Sheng Nell Phone Number: [phone]  Fax Number: [phone]  Date of Surgery/Procedure: 12/11/23  Type of Surgery or Procedure: Epidural Steroid Injection  Type of Anesthesia: Local  Type of Clearance Requested: Medication Hold Only  Medication to Hold: Eliquis  Days to Hold: 3 days prior

## 2023-12-04 NOTE — TELEPHONE ENCOUNTER
Spoke with patient's son in law and informed him that I could get the patient on the schedule for a injection, but would have to reach out to Dr. Marybel Sosa in regards to getting the okay for her to hold the Eliquis three days prior to the injection. He stated to understand. I have reached out to Dr. Moss Pals office in regards to the clearance for the medication hold.

## 2023-12-04 NOTE — PROGRESS NOTES
[facility] NOTE:   History of Present Illness:    CC: L spine MRI review    General An Annie Roberts is a 82 y.o. female here to review her lumbar MRI. Patient is having intense low back pain that radiates down into the left leg stopping about the mid calf. Patient called the office and had her gabapentin increased from twice daily to 3 times daily with no symptom improvement. Patient states that she does take Eliquis and aspirin and she is currently a diabetic taking metformin. The lumbar MRI reveals moderate L4-5 foraminal narrowing due to a left-sided disc bulge causing compression of the transversing left L5 nerve root.     Past Medical History:   Diagnosis Date    Acute respiratory failure with hypoxia ([address]) 9/24/2023    Arrhythmia age 29    stays controlled with med per pt--- will request cardio records    Arthritis     hand and spine    CAD (coronary artery disease) 2012    cabg     Calculus of kidney     Carotid stenosis 10/21/2015    Coronary atherosclerosis of native coronary artery 10/21/2015    Diabetes ([address])     type2--- sqbs avg am--120--- hypo at 70's hgba1c 6.1 on 06/1/2019    Essential hypertension 10/21/2015    managed with medication    GERD (gastroesophageal reflux disease)     controlled with meds    HLD (hyperlipidemia) 10/21/2015    Insomnia     Myocardial infarction due to demand ischemia ([address]) 9/24/2023    Respiratory distress 9/24/2023    Right ureteral stone     Sepsis ([address]) 9/26/2023    Subclavian artery stenosis ([address]) 10/21/2015      Past Surgical History:   Procedure Laterality Date    BREAST SURGERY Right     lump removed    CARDIAC PROCEDURE N/A 9/25/2023    Left heart cath / coronary angiography w grafts performed by Nida Montiel MD at [facility] CARDIAC CATH LAB    CAROTID ENDARTERECTOMY Left 10/16/2019    CAROTID ENDARTERECTOMY Right 06/13/2019    CARPAL TUNNEL RELEASE Left 10/24/2016    CARPAL TUNNEL RELEASE Right 06/13/2016

## 2023-12-04 NOTE — PROGRESS NOTES
PROGRESS NOTE    SUBJECTIVE:   Riya Sevilla is a 82 y.o. female seen for a follow up visit regarding   Chief Complaint   Patient presents with    Back Pain     Discuss back injections will elevate her blood sugars    Results     lab        HPI:  Ms. Everett Mc is a te 82year old woman who presents today for follow up. She saw neurology this morning and they recommended injections for her back pain, then recommended she talk to her PCP about a sliding scale of insulin for after the injection. She was hospitalized 2 months ago with pneumonia and sepsis, but has recovered well from this. She thinks she has some occasionally wheezing but otherwise is well. She mentioned her liver enzymes were elevated on her last lab work on 11/7, and inquired if this could be due to the amount of tylenol she has taken for her back pain. She has no other concerns to report at this time. Reviewed and updated this visit by provider:           Review of Systems   Respiratory:  Positive for wheezing. Negative for chest tightness and shortness of breath. Musculoskeletal:  Positive for back pain. OBJECTIVE:  Vitals:    12/04/23 1509   BP: 120/64   Site: Left Upper Arm   Position: Sitting   Cuff Size: Medium Adult   Pulse: 72   SpO2: 96%   Weight: 72.1 kg (159 lb)   Height: 1.6 m (5' 3\")        Physical Exam  Vitals reviewed. Constitutional:       General: She is not in acute distress. Appearance: Normal appearance. She is not toxic-appearing. HENT:      Head: Normocephalic and atraumatic. Cardiovascular:      Rate and Rhythm: Regular rhythm. Heart sounds: Normal heart sounds. No murmur heard. No friction rub. No gallop. Pulmonary:      Effort: Pulmonary effort is normal.      Breath sounds: Normal breath sounds. No wheezing, rhonchi or rales. Neurological:      General: No focal deficit present. Mental Status: She is alert and oriented to person, place, and time.  Mental

## 2023-12-05 ENCOUNTER — TELEPHONE (OUTPATIENT)
Dept: ORTHOPEDIC SURGERY | Age: 83
End: 2023-12-05

## 2023-12-05 VITALS
SYSTOLIC BLOOD PRESSURE: 98 MMHG | HEART RATE: 70 BPM | DIASTOLIC BLOOD PRESSURE: 60 MMHG | TEMPERATURE: 98.1 F | RESPIRATION RATE: 18 BRPM | OXYGEN SATURATION: 95 %

## 2023-12-05 ASSESSMENT — ENCOUNTER SYMPTOMS: PAIN LOCATION - PAIN QUALITY: ACHE

## 2023-12-06 ENCOUNTER — TELEPHONE (OUTPATIENT)
Dept: FAMILY MEDICINE CLINIC | Facility: CLINIC | Age: 83
End: 2023-12-06

## 2023-12-06 RX ORDER — INSULIN ASPART 100 [IU]/ML
INJECTION, SOLUTION INTRAVENOUS; SUBCUTANEOUS
Qty: 1 ADJUSTABLE DOSE PRE-FILLED PEN SYRINGE | Refills: 1 | Status: SHIPPED | OUTPATIENT
Start: 2023-12-06

## 2023-12-08 DIAGNOSIS — E11.51 TYPE 2 DIABETES MELLITUS WITH PERIPHERAL VASCULAR DISEASE (HCC): ICD-10-CM

## 2023-12-08 RX ORDER — BLOOD-GLUCOSE METER
KIT MISCELLANEOUS
Qty: 1 KIT | Refills: 0 | Status: SHIPPED | OUTPATIENT
Start: 2023-12-08

## 2023-12-08 NOTE — TELEPHONE ENCOUNTER
Per fax from pharmacy; needing clarification on Freestyle Kit. Reports unable to use \"Use as directed\" for directions for medicare billing. Needing exact dosing/instructions. Order pended. Please advise.

## 2023-12-11 ENCOUNTER — TELEPHONE (OUTPATIENT)
Dept: ORTHOPEDIC SURGERY | Age: 83
End: 2023-12-11

## 2023-12-11 RX ORDER — BETAMETHASONE SODIUM PHOSPHATE AND BETAMETHASONE ACETATE 3; 3 MG/ML; MG/ML
12 INJECTION, SUSPENSION INTRA-ARTICULAR; INTRALESIONAL; INTRAMUSCULAR; SOFT TISSUE ONCE
Status: CANCELLED | OUTPATIENT
Start: 2023-12-11 | End: 2023-12-11

## 2023-12-11 NOTE — TELEPHONE ENCOUNTER
Patient has been rescheduled to see Dr. Cristiano Putnam for her injection due to Dr. Juliana Boothe being out of the office.

## 2023-12-11 NOTE — TELEPHONE ENCOUNTER
Patient is calling back to reschedule her injection. Daughter also got on phone and stated they have been waiting for over 2 weeks and her mom's appointment keeps getting rescheduled. She states her mom is in pain and can't keep waiting to get an injection, and wants to know if someone else can give her the injection this week?

## 2023-12-12 ENCOUNTER — TELEPHONE (OUTPATIENT)
Dept: NEUROSURGERY | Age: 83
End: 2023-12-12

## 2023-12-12 ENCOUNTER — OFFICE VISIT (OUTPATIENT)
Dept: ORTHOPEDIC SURGERY | Age: 83
End: 2023-12-12
Payer: MEDICARE

## 2023-12-12 ENCOUNTER — HOME CARE VISIT (OUTPATIENT)
Dept: HOME HEALTH SERVICES | Facility: HOME HEALTH | Age: 83
End: 2023-12-12

## 2023-12-12 VITALS — HEIGHT: 63 IN | WEIGHT: 159 LBS | BODY MASS INDEX: 28.17 KG/M2

## 2023-12-12 DIAGNOSIS — M54.16 LUMBAR RADICULOPATHY: Primary | ICD-10-CM

## 2023-12-12 PROCEDURE — 62323 NJX INTERLAMINAR LMBR/SAC: CPT | Performed by: PHYSICAL MEDICINE & REHABILITATION

## 2023-12-12 RX ORDER — TRIAMCINOLONE ACETONIDE 40 MG/ML
100 INJECTION, SUSPENSION INTRA-ARTICULAR; INTRAMUSCULAR ONCE
Status: COMPLETED | OUTPATIENT
Start: 2023-12-12 | End: 2023-12-12

## 2023-12-12 RX ADMIN — TRIAMCINOLONE ACETONIDE 100 MG: 40 INJECTION, SUSPENSION INTRA-ARTICULAR; INTRAMUSCULAR at 12:02

## 2023-12-12 NOTE — PROGRESS NOTES
Date: 12/12/23   Name: Hansa Varner    Pre-Procedural Diagnosis:    Diagnosis Orders   1. Lumbar radiculopathy  XR INJ SPINE THER SUBST LUM/SAC W IMG    triamcinolone acetonide (KENALOG-40) injection 100 mg          Procedure: Lumbar Epidural Steroid Injection (DEEPAK) reattempt other level(s)    Precautions: [facility] Precautions spine injections: None. Patient denies any prior sensitivity to steroid, local anesthetic, contrast dye, iodine or shellfish. The procedure was discussed at length with the patient and informed consent was signed. The patient was placed in a prone position on the fluoroscopy table and the skin was prepped and draped in a routine sterile fashion. The areas to be injected were each anesthetized with approximately 5 cc of 1% Lidocaine. Initially a 22-gauge 3.5 inch spinal needle was carefully advanced under fluoroscopic guidance to the right L4-L5 interlaminar epidural space. There was inability to access the epidural space at this level. At this time the spinal needle was carefully advanced under fluoroscopic guidance to the right L5-S1 interlaminar epidural space. At this time 0.25 cc of omnipaque administered. .Once proper placement was confirmed, 1.5 cc of sterile water and 100 mg of  Kenalog were injected through the spinal needle. Fluoroscopic guidance was used intermittently over a 10-minute period to insure proper needle placement and patient safety. A hard copy of the fluoroscopic  images has been placed in the patient's chart. The patient was monitored after the procedure and discharged home without complication. A total of 2.5 cc of Kenalog were administered during this procedure.     Resume Meds:  N/A Resume Eliquis this PM.    Juana Gallego MD  12/12/23

## 2023-12-13 ENCOUNTER — TELEPHONE (OUTPATIENT)
Dept: NEUROSURGERY | Age: 83
End: 2023-12-13

## 2023-12-13 DIAGNOSIS — M48.061 NEURAL FORAMINAL STENOSIS OF LUMBAR SPINE: ICD-10-CM

## 2023-12-13 DIAGNOSIS — M54.50 ACUTE MIDLINE LOW BACK PAIN WITHOUT SCIATICA: ICD-10-CM

## 2023-12-13 DIAGNOSIS — M54.16 LUMBAR RADICULOPATHY: Primary | ICD-10-CM

## 2023-12-13 RX ORDER — GABAPENTIN 300 MG/1
CAPSULE ORAL
Qty: 120 CAPSULE | Refills: 2 | Status: SHIPPED | OUTPATIENT
Start: 2023-12-13 | End: 2024-01-13

## 2023-12-13 NOTE — TELEPHONE ENCOUNTER
Pt called stating that since you had her go up on her Gabapentin that now the pharmacy wont let her fill it? Please call, thanks.

## 2023-12-13 NOTE — TELEPHONE ENCOUNTER
Per patient's son in law- the patient's pain is no worse after the injection, he is adjusting her insulin with a sliding scale; and needing a refill on Neurontin 300/300/600 per standing orders

## 2023-12-27 ENCOUNTER — OFFICE VISIT (OUTPATIENT)
Dept: NEUROSURGERY | Age: 83
End: 2023-12-27
Payer: MEDICARE

## 2023-12-27 VITALS
OXYGEN SATURATION: 97 % | TEMPERATURE: 97.7 F | HEART RATE: 63 BPM | BODY MASS INDEX: 28.17 KG/M2 | HEIGHT: 63 IN | DIASTOLIC BLOOD PRESSURE: 69 MMHG | SYSTOLIC BLOOD PRESSURE: 115 MMHG

## 2023-12-27 DIAGNOSIS — M54.50 ACUTE LEFT-SIDED LOW BACK PAIN WITHOUT SCIATICA: ICD-10-CM

## 2023-12-27 DIAGNOSIS — M54.16 LUMBAR RADICULOPATHY: Primary | ICD-10-CM

## 2023-12-27 PROCEDURE — G8417 CALC BMI ABV UP PARAM F/U: HCPCS | Performed by: NURSE PRACTITIONER

## 2023-12-27 PROCEDURE — 3078F DIAST BP <80 MM HG: CPT | Performed by: NURSE PRACTITIONER

## 2023-12-27 PROCEDURE — 1123F ACP DISCUSS/DSCN MKR DOCD: CPT | Performed by: NURSE PRACTITIONER

## 2023-12-27 PROCEDURE — G8400 PT W/DXA NO RESULTS DOC: HCPCS | Performed by: NURSE PRACTITIONER

## 2023-12-27 PROCEDURE — G8484 FLU IMMUNIZE NO ADMIN: HCPCS | Performed by: NURSE PRACTITIONER

## 2023-12-27 PROCEDURE — 3074F SYST BP LT 130 MM HG: CPT | Performed by: NURSE PRACTITIONER

## 2023-12-27 PROCEDURE — 99214 OFFICE O/P EST MOD 30 MIN: CPT | Performed by: NURSE PRACTITIONER

## 2023-12-27 PROCEDURE — 1036F TOBACCO NON-USER: CPT | Performed by: NURSE PRACTITIONER

## 2023-12-27 PROCEDURE — G8427 DOCREV CUR MEDS BY ELIG CLIN: HCPCS | Performed by: NURSE PRACTITIONER

## 2023-12-27 PROCEDURE — 1090F PRES/ABSN URINE INCON ASSESS: CPT | Performed by: NURSE PRACTITIONER

## 2023-12-27 NOTE — PROGRESS NOTES
[facility] NOTE:   History of Present Illness:    CC: injection follow up    Arpan Ashford is a 82 y.o. female here to follow-up after having a left L4-5 epidural steroid injection with Dr. Heather Ta at [address] [place] [address]. Patient states that the injection has resolved about 50% of her pain which has allowed her to become slightly more active than she was before. Patient states that since she was wheelchair-bound for so long she feels very weak and getting up and cooking and doing everything for Amirah she could tell that her legs seemed weaker than normal.  Patient states she almost fell while trying to get into the car the other day due to this weakness. Patient increased her bedtime dose of gabapentin to 600 mg with no symptom change. Patient notes that the gabapentin does not seem to be effective in managing her pain but does make her hungrier than normal.  The lumbar MRI revealed some foraminal narrowing on the left at L4-5.     Past Medical History:   Diagnosis Date    Acute respiratory failure with hypoxia ([address]) 9/24/2023    Arrhythmia age 29    stays controlled with med per pt--- will request cardio records    Arthritis     hand and spine    CAD (coronary artery disease) 2012    cabg     Calculus of kidney     Carotid stenosis 10/21/2015    Coronary atherosclerosis of native coronary artery 10/21/2015    Diabetes ([address])     type2--- sqbs avg am--120--- hypo at 70's hgba1c 6.1 on 06/1/2019    Essential hypertension 10/21/2015    managed with medication    GERD (gastroesophageal reflux disease)     controlled with meds    HLD (hyperlipidemia) 10/21/2015    Insomnia     Myocardial infarction due to demand ischemia ([address]) 9/24/2023    Respiratory distress 9/24/2023    Right ureteral stone     Sepsis ([address]) 9/26/2023    Subclavian artery stenosis ([address]) 10/21/2015      Past Surgical History:   Procedure Laterality Date    BREAST SURGERY Right     lump removed

## 2023-12-28 ENCOUNTER — TELEPHONE (OUTPATIENT)
Dept: NEUROSURGERY | Age: 83
End: 2023-12-28

## 2023-12-28 ENCOUNTER — TELEPHONE (OUTPATIENT)
Dept: ORTHOPEDIC SURGERY | Age: 83
End: 2023-12-28

## 2023-12-28 NOTE — TELEPHONE ENCOUNTER
The  pts  family  has  called in   for another   injection. There is a referral in the  chart.   It  does not   say injection  only  but family  member- asked for another  injection        Please  call  pt

## 2023-12-28 NOTE — TELEPHONE ENCOUNTER
Clarified with [employer] pharmacist and pt's son in law- Neurontin dosage on 300, 300  and 600mg

## 2024-01-01 ENCOUNTER — TELEPHONE (OUTPATIENT)
Dept: FAMILY MEDICINE CLINIC | Facility: CLINIC | Age: 84
End: 2024-01-01

## 2024-01-02 ENCOUNTER — TELEPHONE (OUTPATIENT)
Dept: ORTHOPEDIC SURGERY | Age: 84
End: 2024-01-02

## 2024-01-02 NOTE — TELEPHONE ENCOUNTER
Merry-  pt   returned    your  call  to  schedule  the  injection  with  Doe     I  scheduled  it  for this  Thursday 1/4.   Her  relative  said  she  needed to  stop her  blood  thinner  3 days  prior  the  last  time.    So pt  was told not  to  take It  today  so  she  can  have  the  injection on Thursday    I  told  them  you  will call them again  if  you  needed to  change  anything

## 2024-01-04 ENCOUNTER — NURSE TRIAGE (OUTPATIENT)
Dept: OTHER | Facility: CLINIC | Age: 84
End: 2024-01-04

## 2024-01-04 ENCOUNTER — OFFICE VISIT (OUTPATIENT)
Dept: ORTHOPEDIC SURGERY | Age: 84
End: 2024-01-04
Payer: MEDICARE

## 2024-01-04 VITALS — WEIGHT: 159 LBS | BODY MASS INDEX: 28.17 KG/M2 | HEIGHT: 63 IN

## 2024-01-04 DIAGNOSIS — M54.16 LUMBAR RADICULOPATHY: Primary | ICD-10-CM

## 2024-01-04 PROCEDURE — 62323 NJX INTERLAMINAR LMBR/SAC: CPT | Performed by: PHYSICAL MEDICINE & REHABILITATION

## 2024-01-04 RX ORDER — TRIAMCINOLONE ACETONIDE 40 MG/ML
100 INJECTION, SUSPENSION INTRA-ARTICULAR; INTRAMUSCULAR ONCE
Status: COMPLETED | OUTPATIENT
Start: 2024-01-04 | End: 2024-01-04

## 2024-01-04 RX ADMIN — TRIAMCINOLONE ACETONIDE 100 MG: 40 INJECTION, SUSPENSION INTRA-ARTICULAR; INTRAMUSCULAR at 16:45

## 2024-01-04 NOTE — PROGRESS NOTES
Date: 01/04/24   Name: Vivien Zambrano    Pre-Procedural Diagnosis:    Diagnosis Orders   1. Lumbar radiculopathy  triamcinolone acetonide (KENALOG-40) injection 100 mg    XR INJ SPINE THER SUBST LUM/SAC W IMG          Procedure: Lumbar Epidural Steroid Injection (DEEPAK) reattempt other level(s)    Precautions: [facility] Precautions spine injections: None.  Patient denies any prior sensitivity to steroid, local anesthetic, contrast dye, iodine or shellfish.    The procedure was discussed at length with the patient and informed consent was signed. The patient was placed in a prone position on the fluoroscopy table and the skin was prepped and draped in a routine sterile fashion. The areas to be injected were each anesthetized with approximately 5 cc of 1% Lidocaine. Initially a 22-gauge 3.5 inch spinal needle was carefully advanced under fluoroscopic guidance to the left L4-L5 interlaminar epidural space. There was inability to access the epidural space at this level.  At this time the spinal needle was carefully advanced under fluoroscopic guidance to the left L5-S1 interlaminar epidural space. At this time 0.25 cc of omnipaque administered..Once proper placement was confirmed, 1.5 cc of sterile water and 100 mg of  Kenalog were injected through the spinal needle.     Fluoroscopic guidance was used intermittently over a 10-minute period to insure proper needle placement and patient safety. A hard copy of the fluoroscopic  images has been placed in the patient's chart. The patient was monitored after the procedure and discharged home without complication.     A total of 2.5 cc of Kenalog were administered during this procedure.    Resume Meds:  Patient to resume Eliquis this PM.    L DEBORAH FREEMAN JR, MD  01/04/24

## 2024-01-04 NOTE — TELEPHONE ENCOUNTER
Location of patient: [place]    Received call from Alecia at [facility] with Red Flag Complaint.    Subjective: Caller states \"She's got a wheezing and you can hear it really good when she's taking a deep breath at the start. It's been going on for a little while. She had Pneumonia back in October. We heard the wheezing in November and December. The Home Health nurse said it wasn't anything to be concerned about then. It kind of did go away, but definitely in the last week or a little over a week it's gotten to where we can hear it again.\"     Current Symptoms: wheezing, Pulse Ox 98%, Pulse 63, NO difficulty breathing, NO fever, NO chest pain, NO new onset back pain    Hx of bulging disc in back and currently receiving injections    Hx of Pneumonia and Sepsis in October- symptoms resolved and have worsened in the last week    Onset: 1 week ago; worsening    Associated Symptoms: reduced activity    Pain Severity: 8/10; throbbing; constant    Temperature: Denies    What has been tried: Nothing    LMP: NA Pregnant: NA    Recommended disposition: Go to Office Now. Patient requesting appointment for tomorrow. Writer reinforced being seen Now d/t concern for new onset wheezing and recent Pneumonia and sepsis. Writer informed caller of risks involved of patient delaying examination. Caller/patient verbalized understanding.    Care advice provided, patient verbalizes understanding; denies any other questions or concerns; instructed to call back for any new or worsening symptoms.    Patient/Caller agrees with recommended disposition; writer provided warm transfer to [facility] at [facility] for appointment scheduling.    Attention Provider:  Thank you for allowing me to participate in the care of your patient.  The patient was connected to triage in response to information provided to the [facility].  Please do not respond through this encounter as the response is not directed to a shared pool.      Reason for

## 2024-01-04 NOTE — TELEPHONE ENCOUNTER
Pt needs   [facility] [place]     Scheduled appt tomorrow but needs these for her inj today.

## 2024-01-15 ENCOUNTER — OFFICE VISIT (OUTPATIENT)
Dept: NEUROSURGERY | Age: 84
End: 2024-01-15
Payer: MEDICARE

## 2024-01-15 VITALS
HEIGHT: 63 IN | OXYGEN SATURATION: 97 % | SYSTOLIC BLOOD PRESSURE: 131 MMHG | DIASTOLIC BLOOD PRESSURE: 78 MMHG | TEMPERATURE: 97.4 F | BODY MASS INDEX: 28.17 KG/M2 | HEART RATE: 69 BPM

## 2024-01-15 DIAGNOSIS — M54.16 LUMBAR RADICULOPATHY: Primary | ICD-10-CM

## 2024-01-15 DIAGNOSIS — M54.50 ACUTE LEFT-SIDED LOW BACK PAIN WITHOUT SCIATICA: ICD-10-CM

## 2024-01-15 PROCEDURE — 1090F PRES/ABSN URINE INCON ASSESS: CPT | Performed by: NURSE PRACTITIONER

## 2024-01-15 PROCEDURE — G8417 CALC BMI ABV UP PARAM F/U: HCPCS | Performed by: NURSE PRACTITIONER

## 2024-01-15 PROCEDURE — 1036F TOBACCO NON-USER: CPT | Performed by: NURSE PRACTITIONER

## 2024-01-15 PROCEDURE — 99213 OFFICE O/P EST LOW 20 MIN: CPT | Performed by: NURSE PRACTITIONER

## 2024-01-15 PROCEDURE — 3078F DIAST BP <80 MM HG: CPT | Performed by: NURSE PRACTITIONER

## 2024-01-15 PROCEDURE — G8400 PT W/DXA NO RESULTS DOC: HCPCS | Performed by: NURSE PRACTITIONER

## 2024-01-15 PROCEDURE — G8484 FLU IMMUNIZE NO ADMIN: HCPCS | Performed by: NURSE PRACTITIONER

## 2024-01-15 PROCEDURE — G8427 DOCREV CUR MEDS BY ELIG CLIN: HCPCS | Performed by: NURSE PRACTITIONER

## 2024-01-15 PROCEDURE — 3075F SYST BP GE 130 - 139MM HG: CPT | Performed by: NURSE PRACTITIONER

## 2024-01-15 PROCEDURE — 1123F ACP DISCUSS/DSCN MKR DOCD: CPT | Performed by: NURSE PRACTITIONER

## 2024-01-15 RX ORDER — TRAMADOL HYDROCHLORIDE 50 MG/1
50 TABLET ORAL EVERY 6 HOURS PRN
Qty: 24 TABLET | Refills: 0 | Status: SHIPPED | OUTPATIENT
Start: 2024-01-15 | End: 2024-01-20

## 2024-01-15 NOTE — PROGRESS NOTES
[facility] NOTE:   History of Present Illness:    CC: second DEEPAK    Vivien Zambrano is a 83 y.o. female here to follow-up after having a second epidural steroid injection with Dr. Azar.  Patient had her second left L4-5 epidural steroid injection with Dr. Azar a little under 2 weeks ago.  Patient states that this injection has actually worsened her pain opposed to helping get control of the last little bit of pain she was experiencing.  Patient states that she feels like she is barely able to walk around because the left leg pain is so intense.  Discussed with patient that she may require surgery.  Patient states that she does not want a surgery and will not have one.  Discussed with patient that she has some muscle wasting developing in the top of her left thigh most likely attributed to lack of use.  Patient states that she is open to going to pain management to try and help with her pain opposed to undergoing a surgery.    Past Medical History:   Diagnosis Date    Acute respiratory failure with hypoxia (formerly [facility]) 9/24/2023    Arrhythmia age 32    stays controlled with med per pt--- will request cardio records    Arthritis     hand and spine    CAD (coronary artery disease) 2012    cabg     Calculus of kidney     Carotid stenosis 10/21/2015    Coronary atherosclerosis of native coronary artery 10/21/2015    Diabetes (formerly [facility])     type2--- sqbs avg am--120--- hypo at 70's hgba1c 6.1 on 06/1/2019    Essential hypertension 10/21/2015    managed with medication    GERD (gastroesophageal reflux disease)     controlled with meds    HLD (hyperlipidemia) 10/21/2015    Insomnia     Myocardial infarction due to demand ischemia (formerly [facility]) 9/24/2023    Respiratory distress 9/24/2023    Right ureteral stone     Sepsis (formerly [facility]) 9/26/2023    Subclavian artery stenosis (formerly [facility]) 10/21/2015      Past Surgical History:   Procedure Laterality Date    BREAST SURGERY Right     lump removed    CARDIAC

## 2024-01-22 DIAGNOSIS — E11.51 TYPE 2 DIABETES MELLITUS WITH PERIPHERAL VASCULAR DISEASE (HCC): Primary | ICD-10-CM

## 2024-01-22 NOTE — TELEPHONE ENCOUNTER
Pt needs refill on the insulin pen needles.    [facility] [place]    She had a steroid inj recently and it made her bs go up.

## 2024-01-27 DIAGNOSIS — I25.10 ASCVD (ARTERIOSCLEROTIC CARDIOVASCULAR DISEASE): ICD-10-CM

## 2024-01-27 DIAGNOSIS — I10 ESSENTIAL HYPERTENSION: ICD-10-CM

## 2024-01-27 DIAGNOSIS — I48.0 PAF (PAROXYSMAL ATRIAL FIBRILLATION) (HCC): ICD-10-CM

## 2024-01-27 DIAGNOSIS — I10 PRIMARY HYPERTENSION: ICD-10-CM

## 2024-01-29 DIAGNOSIS — I48.0 PAF (PAROXYSMAL ATRIAL FIBRILLATION) (HCC): ICD-10-CM

## 2024-01-29 DIAGNOSIS — I10 PRIMARY HYPERTENSION: ICD-10-CM

## 2024-01-29 DIAGNOSIS — I25.10 ASCVD (ARTERIOSCLEROTIC CARDIOVASCULAR DISEASE): ICD-10-CM

## 2024-01-29 RX ORDER — METOPROLOL SUCCINATE 50 MG/1
50 TABLET, EXTENDED RELEASE ORAL DAILY
Qty: 30 TABLET | Refills: 5 | Status: SHIPPED | OUTPATIENT
Start: 2024-01-29 | End: 2024-01-31 | Stop reason: SDUPTHER

## 2024-01-29 RX ORDER — AMIODARONE HYDROCHLORIDE 200 MG/1
200 TABLET ORAL DAILY
Qty: 30 TABLET | Refills: 5 | Status: SHIPPED | OUTPATIENT
Start: 2024-01-29 | End: 2024-01-31 | Stop reason: SDUPTHER

## 2024-01-29 RX ORDER — AMLODIPINE BESYLATE 10 MG/1
10 TABLET ORAL DAILY
Qty: 90 TABLET | Refills: 1 | Status: SHIPPED | OUTPATIENT
Start: 2024-01-29

## 2024-01-29 NOTE — TELEPHONE ENCOUNTER
Patient's child called and said that the patient needed her Amiodarone 200 mg and her Metoprolol 50 mg called in to [facility] in [place]

## 2024-01-29 NOTE — TELEPHONE ENCOUNTER
Requested Prescriptions     Pending Prescriptions Disp Refills    amiodarone (CORDARONE) 200 MG tablet [Pharmacy Med Name: Amiodarone HCl 200 MG Oral Tablet] 30 tablet 5     Sig: Take 1 tablet by mouth once daily    metoprolol succinate (TOPROL XL) 50 MG extended release tablet [Pharmacy Med Name: Metoprolol Succinate ER 50 MG Oral Tablet Extended Release 24 Hour] 30 tablet 5     Sig: Take 1 tablet by mouth once daily    amLODIPine (NORVASC) 10 MG tablet [Pharmacy Med Name: amLODIPine Besylate 10 MG Oral Tablet] 90 tablet 1     Sig: Take 1 tablet by mouth once daily

## 2024-01-31 ENCOUNTER — TELEPHONE (OUTPATIENT)
Age: 84
End: 2024-01-31

## 2024-01-31 NOTE — TELEPHONE ENCOUNTER
Pt is calling saying they can not afford eliquis and is wanting to know if Dr Moore will call in a different medication for her .Please call pt and let her know what Dr Moore says

## 2024-01-31 NOTE — TELEPHONE ENCOUNTER
Requested Prescriptions     Pending Prescriptions Disp Refills    metoprolol succinate (TOPROL XL) 50 MG extended release tablet 90 tablet 3     Sig: Take 1 tablet by mouth daily    amiodarone (CORDARONE) 200 MG tablet 90 tablet 3     Sig: Take 1 tablet by mouth daily

## 2024-02-01 ENCOUNTER — TELEPHONE (OUTPATIENT)
Age: 84
End: 2024-02-01

## 2024-02-01 RX ORDER — METOPROLOL SUCCINATE 50 MG/1
50 TABLET, EXTENDED RELEASE ORAL DAILY
Qty: 90 TABLET | Refills: 3 | Status: SHIPPED | OUTPATIENT
Start: 2024-02-01

## 2024-02-01 RX ORDER — AMIODARONE HYDROCHLORIDE 200 MG/1
200 TABLET ORAL DAILY
Qty: 90 TABLET | Refills: 3 | Status: SHIPPED | OUTPATIENT
Start: 2024-02-01

## 2024-02-01 NOTE — TELEPHONE ENCOUNTER
Pts son in law called in, I give him the website for [facility] and the ph#  [phone]    I told him we will set out samples and also a 30 day free card in case she has not used it previously. ND will set out samples and card at [facility]. Pts son in law will come pick them up.

## 2024-02-01 NOTE — TELEPHONE ENCOUNTER
Please call pt about her eliquis ,she missed a call yesterday and needs to speak with someone about this medication.she can not afford the Eliquis,Please call pt

## 2024-02-08 ENCOUNTER — TELEPHONE (OUTPATIENT)
Dept: NEUROSURGERY | Age: 84
End: 2024-02-08

## 2024-02-08 ENCOUNTER — TELEPHONE (OUTPATIENT)
Dept: ORTHOPEDIC SURGERY | Age: 84
End: 2024-02-08

## 2024-02-08 NOTE — TELEPHONE ENCOUNTER
I would request call a family member just discuss her case.  I performed 2 injections on her apparently 1 did not work and the other might of worsen things.  She was further evaluated and was referred to a comprehensive pain management provider, noting that spine surgical options were not desired or indicated.  I relayed this to the family member that from general review of things, this seems appropriate.  I gave a big disclaimer that was only doing injections on her so therefore am not seeing her in a clinical role to make any medical decisions.  I did tell them that I would not personally need to see her back as a patient because if injections were not beneficial, there is nothing further I can add or offer to her care, therefore suggesting that a formal comprehensive pain management referral would be the next step.

## 2024-02-08 NOTE — TELEPHONE ENCOUNTER
Called patient son in law:       DEEPAK in 12/2023 decreased weight substantially     DEEPAK in 01/2024 did not get much releif.      Family would like return call to discuss.

## 2024-02-08 NOTE — TELEPHONE ENCOUNTER
Spoke to son in law- patient still not interested in surgery- had 2 ESIs and is on pain med's- the patient has stopped taking Neurontin- offered physical therapy- they will follow up with pain management for perhaps further injection and adjustments to medication

## 2024-02-08 NOTE — TELEPHONE ENCOUNTER
Lemuel says his mother in law has a couple of questions for [facility]. Can you call her at [phone].

## 2024-02-26 ENCOUNTER — TELEPHONE (OUTPATIENT)
Age: 84
End: 2024-02-26

## 2024-02-26 NOTE — TELEPHONE ENCOUNTER
Pt's son-in-law called to see if the pt could get samples of Eliquis from the [facility] office. States the pt only has a week left of this medication.

## 2024-02-26 NOTE — TELEPHONE ENCOUNTER
Called back and said he left a message earlier that she has less than a weeks worth Eliquis Do we have any samples Please call

## 2024-02-27 NOTE — TELEPHONE ENCOUNTER
Tried calling Yadiel at the [phone] number, VM was full. Left vm for pt regarding eliquis samples being ready.

## 2024-03-08 ENCOUNTER — TELEPHONE (OUTPATIENT)
Dept: FAMILY MEDICINE CLINIC | Facility: CLINIC | Age: 84
End: 2024-03-08

## 2024-03-08 NOTE — TELEPHONE ENCOUNTER
Patient called and stated she needed a prescription for nausea has been sick to stomach and  not been feeling well

## 2024-03-11 ENCOUNTER — TELEPHONE (OUTPATIENT)
Dept: FAMILY MEDICINE CLINIC | Facility: CLINIC | Age: 84
End: 2024-03-11

## 2024-03-11 NOTE — TELEPHONE ENCOUNTER
Petra from [facility] called patient has been enrolled in complex care program. Patient has concerns has had some nausea and vomitting since starting lyrica and they do have concerns over trying different medications. CB # [phone].

## 2024-03-11 NOTE — TELEPHONE ENCOUNTER
Petra from [facility] called patient has been enrolled in complex care program. Patient has concerns has had some nausea and vomitting since starting lyrica and they do have concerns over trying different medications. CB # [phone].

## 2024-03-12 NOTE — TELEPHONE ENCOUNTER
Per Dr. Alanis; OK to d/c medication if believing it is causing symptoms. If not causing symptoms, needs appointment to be seen and evaluated for nausea.       LVM asking for return call regarding nausea.

## 2024-03-13 ENCOUNTER — TELEPHONE (OUTPATIENT)
Age: 84
End: 2024-03-13

## 2024-03-13 DIAGNOSIS — I48.0 PAF (PAROXYSMAL ATRIAL FIBRILLATION) (HCC): ICD-10-CM

## 2024-03-13 NOTE — PROGRESS NOTES
Medication verified and eScribed to preferred pharm., [facility]   Eliquids 5 mg #60 1 tab BID w/5RF.

## 2024-03-13 NOTE — TELEPHONE ENCOUNTER
Patients son returned call. Reports patient stopped Lyrica Saturday before last due to believing symptoms were related to medication. Reports nausea has gotten better but still having it along with abdominal discomfort. Scheduled appointment for next week. Son voiced thanks.

## 2024-03-13 NOTE — TELEPHONE ENCOUNTER
MEDICATION REFILL REQUEST      Name of Medication:  eliquis   Dose:  5 mg  Frequency:  twice a day   Quantity:    Days' supply:  30 with refills       Pharmacy Name/Location:  [facility]

## 2024-03-15 ENCOUNTER — TELEPHONE (OUTPATIENT)
Dept: FAMILY MEDICINE CLINIC | Facility: CLINIC | Age: 84
End: 2024-03-15

## 2024-03-15 ENCOUNTER — TELEPHONE (OUTPATIENT)
Age: 84
End: 2024-03-15

## 2024-03-15 NOTE — TELEPHONE ENCOUNTER
LVM letting patient know that handicap placard paperwork has been completed at placed at  for her to . Advised she would need appointment prior to having wheelchair order placed as insurance will require documentation along with face-to-face office notes. Advised to return call if she wanted an appointment.

## 2024-03-15 NOTE — TELEPHONE ENCOUNTER
Can't afford Eliquis and wants to see what else she can take Also wants info on David Rxs Please call

## 2024-03-21 ENCOUNTER — HOSPITAL ENCOUNTER (INPATIENT)
Age: 84
LOS: 5 days | Discharge: HOME | DRG: 871 | End: 2024-03-26
Attending: STUDENT IN AN ORGANIZED HEALTH CARE EDUCATION/TRAINING PROGRAM | Admitting: EMERGENCY MEDICINE
Payer: MEDICARE

## 2024-03-21 ENCOUNTER — APPOINTMENT (OUTPATIENT)
Dept: GENERAL RADIOLOGY | Age: 84
DRG: 871 | End: 2024-03-21
Payer: MEDICARE

## 2024-03-21 DIAGNOSIS — J96.01 ACUTE HYPOXIC RESPIRATORY FAILURE (HCC): ICD-10-CM

## 2024-03-21 DIAGNOSIS — A41.9 SEPTICEMIA (HCC): ICD-10-CM

## 2024-03-21 DIAGNOSIS — J18.9 PNEUMONIA OF BOTH LUNGS DUE TO INFECTIOUS ORGANISM, UNSPECIFIED PART OF LUNG: Primary | ICD-10-CM

## 2024-03-21 PROBLEM — R65.21 SEPTIC SHOCK (HCC): Status: ACTIVE | Noted: 2024-03-21

## 2024-03-21 LAB
ALBUMIN SERPL-MCNC: 3 G/DL (ref 3.2–4.6)
ALBUMIN/GLOB SERPL: 0.8 (ref 0.4–1.6)
ALP SERPL-CCNC: 58 U/L (ref 50–136)
ALT SERPL-CCNC: 19 U/L (ref 12–65)
ANION GAP SERPL CALC-SCNC: 10 MMOL/L (ref 2–11)
ARTERIAL PATENCY WRIST A: POSITIVE
AST SERPL-CCNC: 15 U/L (ref 15–37)
BASE DEFICIT BLD-SCNC: 9.2 MMOL/L
BASOPHILS # BLD: 0.1 K/UL (ref 0–0.2)
BASOPHILS NFR BLD: 0 % (ref 0–2)
BDY SITE: ABNORMAL
BILIRUB SERPL-MCNC: 0.5 MG/DL (ref 0.2–1.1)
BUN SERPL-MCNC: 10 MG/DL (ref 8–23)
CALCIUM SERPL-MCNC: 8.7 MG/DL (ref 8.3–10.4)
CHLORIDE SERPL-SCNC: 106 MMOL/L (ref 103–113)
CO2 SERPL-SCNC: 24 MMOL/L (ref 21–32)
CREAT SERPL-MCNC: 0.9 MG/DL (ref 0.6–1)
DIFFERENTIAL METHOD BLD: ABNORMAL
EKG ATRIAL RATE: 95 BPM
EKG DIAGNOSIS: NORMAL
EKG P AXIS: 70 DEGREES
EKG P-R INTERVAL: 187 MS
EKG Q-T INTERVAL: 382 MS
EKG QRS DURATION: 126 MS
EKG QTC CALCULATION (BAZETT): 478 MS
EKG R AXIS: 120 DEGREES
EKG T AXIS: 125 DEGREES
EKG VENTRICULAR RATE: 94 BPM
EOSINOPHIL # BLD: 0.1 K/UL (ref 0–0.8)
EOSINOPHIL NFR BLD: 0 % (ref 0.5–7.8)
ERYTHROCYTE [DISTWIDTH] IN BLOOD BY AUTOMATED COUNT: 13.4 % (ref 11.9–14.6)
GAS FLOW.O2 O2 DELIVERY SYS: ABNORMAL
GLOBULIN SER CALC-MCNC: 3.6 G/DL (ref 2.8–4.5)
GLUCOSE BLD STRIP.AUTO-MCNC: 182 MG/DL (ref 65–100)
GLUCOSE BLD STRIP.AUTO-MCNC: 205 MG/DL (ref 65–100)
GLUCOSE BLD STRIP.AUTO-MCNC: 265 MG/DL (ref 65–100)
GLUCOSE BLD STRIP.AUTO-MCNC: 330 MG/DL (ref 65–100)
GLUCOSE SERPL-MCNC: 145 MG/DL (ref 65–100)
HCO3 BLD-SCNC: 18.4 MMOL/L (ref 22–26)
HCT VFR BLD AUTO: 38.8 % (ref 35.8–46.3)
HGB BLD-MCNC: 12.3 G/DL (ref 11.7–15.4)
IMM GRANULOCYTES # BLD AUTO: 0.2 K/UL (ref 0–0.5)
IMM GRANULOCYTES NFR BLD AUTO: 1 % (ref 0–5)
INSPIRATION.DURATION SETTING TIME VENT: 0.9 SEC
IPAP/PIP/HIGH PEEP: 16
LACTATE SERPL-SCNC: 2.2 MMOL/L (ref 0.4–2)
LACTATE SERPL-SCNC: 2.9 MMOL/L (ref 0.4–2)
LACTATE SERPL-SCNC: 4.1 MMOL/L (ref 0.4–2)
LACTATE SERPL-SCNC: 4.4 MMOL/L (ref 0.4–2)
LACTATE SERPL-SCNC: 4.7 MMOL/L (ref 0.4–2)
LYMPHOCYTES # BLD: 4.1 K/UL (ref 0.5–4.6)
LYMPHOCYTES NFR BLD: 23 % (ref 13–44)
MAGNESIUM SERPL-MCNC: 3 MG/DL (ref 1.8–2.4)
MCH RBC QN AUTO: 28 PG (ref 26.1–32.9)
MCHC RBC AUTO-ENTMCNC: 31.7 G/DL (ref 31.4–35)
MCV RBC AUTO: 88.4 FL (ref 82–102)
MONOCYTES # BLD: 1.8 K/UL (ref 0.1–1.3)
MONOCYTES NFR BLD: 10 % (ref 4–12)
NEUTS SEG # BLD: 11.7 K/UL (ref 1.7–8.2)
NEUTS SEG NFR BLD: 66 % (ref 43–78)
NRBC # BLD: 0 K/UL (ref 0–0.2)
NT PRO BNP: 2685 PG/ML
O2/TOTAL GAS SETTING VFR VENT: 100 %
PCO2 BLD: 45.7 MMHG (ref 35–45)
PEEP RESPIRATORY: 8 CMH2O
PH BLD: 7.21 (ref 7.35–7.45)
PLATELET # BLD AUTO: 235 K/UL (ref 150–450)
PMV BLD AUTO: 9.5 FL (ref 9.4–12.3)
PO2 BLD: 110 MMHG (ref 75–100)
POTASSIUM SERPL-SCNC: 3.1 MMOL/L (ref 3.5–5.1)
PRESSURE SUPPORT SETTING VENT: 16 CMH2O
PROCALCITONIN SERPL-MCNC: <0.05 NG/ML (ref 0–0.49)
PROT SERPL-MCNC: 6.6 G/DL (ref 6.3–8.2)
RBC # BLD AUTO: 4.39 M/UL (ref 4.05–5.2)
SAO2 % BLD: 97.1 % (ref 95–98)
SARS-COV-2 RDRP RESP QL NAA+PROBE: NOT DETECTED
SERVICE CMNT-IMP: ABNORMAL
SODIUM SERPL-SCNC: 140 MMOL/L (ref 136–146)
SOURCE: NORMAL
SPECIMEN TYPE: ABNORMAL
TROPONIN I SERPL HS-MCNC: 589.5 PG/ML (ref 0–14)
TROPONIN I SERPL HS-MCNC: 60.5 PG/ML (ref 0–14)
VENTILATION MODE VENT: ABNORMAL
WBC # BLD AUTO: 17.9 K/UL (ref 4.3–11.1)

## 2024-03-21 PROCEDURE — 6360000002 HC RX W HCPCS: Performed by: STUDENT IN AN ORGANIZED HEALTH CARE EDUCATION/TRAINING PROGRAM

## 2024-03-21 PROCEDURE — 2709999900 HC NON-CHARGEABLE SUPPLY

## 2024-03-21 PROCEDURE — 99291 CRITICAL CARE FIRST HOUR: CPT | Performed by: EMERGENCY MEDICINE

## 2024-03-21 PROCEDURE — 2500000003 HC RX 250 WO HCPCS: Performed by: EMERGENCY MEDICINE

## 2024-03-21 PROCEDURE — 5A09457 ASSISTANCE WITH RESPIRATORY VENTILATION, 24-96 CONSECUTIVE HOURS, CONTINUOUS POSITIVE AIRWAY PRESSURE: ICD-10-PCS | Performed by: STUDENT IN AN ORGANIZED HEALTH CARE EDUCATION/TRAINING PROGRAM

## 2024-03-21 PROCEDURE — 2580000003 HC RX 258: Performed by: EMERGENCY MEDICINE

## 2024-03-21 PROCEDURE — 6370000000 HC RX 637 (ALT 250 FOR IP): Performed by: GENERAL PRACTICE

## 2024-03-21 PROCEDURE — 84145 PROCALCITONIN (PCT): CPT

## 2024-03-21 PROCEDURE — 71045 X-RAY EXAM CHEST 1 VIEW: CPT

## 2024-03-21 PROCEDURE — 6360000002 HC RX W HCPCS: Performed by: EMERGENCY MEDICINE

## 2024-03-21 PROCEDURE — 82962 GLUCOSE BLOOD TEST: CPT

## 2024-03-21 PROCEDURE — 2500000003 HC RX 250 WO HCPCS: Performed by: INTERNAL MEDICINE

## 2024-03-21 PROCEDURE — 6370000000 HC RX 637 (ALT 250 FOR IP): Performed by: STUDENT IN AN ORGANIZED HEALTH CARE EDUCATION/TRAINING PROGRAM

## 2024-03-21 PROCEDURE — 93010 ELECTROCARDIOGRAM REPORT: CPT | Performed by: INTERNAL MEDICINE

## 2024-03-21 PROCEDURE — 99285 EMERGENCY DEPT VISIT HI MDM: CPT

## 2024-03-21 PROCEDURE — 93005 ELECTROCARDIOGRAM TRACING: CPT | Performed by: STUDENT IN AN ORGANIZED HEALTH CARE EDUCATION/TRAINING PROGRAM

## 2024-03-21 PROCEDURE — 83880 ASSAY OF NATRIURETIC PEPTIDE: CPT

## 2024-03-21 PROCEDURE — 96365 THER/PROPH/DIAG IV INF INIT: CPT

## 2024-03-21 PROCEDURE — 36600 WITHDRAWAL OF ARTERIAL BLOOD: CPT

## 2024-03-21 PROCEDURE — 85025 COMPLETE CBC W/AUTO DIFF WBC: CPT

## 2024-03-21 PROCEDURE — 96375 TX/PRO/DX INJ NEW DRUG ADDON: CPT

## 2024-03-21 PROCEDURE — 87635 SARS-COV-2 COVID-19 AMP PRB: CPT

## 2024-03-21 PROCEDURE — 82803 BLOOD GASES ANY COMBINATION: CPT

## 2024-03-21 PROCEDURE — 96367 TX/PROPH/DG ADDL SEQ IV INF: CPT

## 2024-03-21 PROCEDURE — 2100000000 HC CCU R&B

## 2024-03-21 PROCEDURE — 87040 BLOOD CULTURE FOR BACTERIA: CPT

## 2024-03-21 PROCEDURE — 2580000003 HC RX 258: Performed by: STUDENT IN AN ORGANIZED HEALTH CARE EDUCATION/TRAINING PROGRAM

## 2024-03-21 PROCEDURE — 83605 ASSAY OF LACTIC ACID: CPT

## 2024-03-21 PROCEDURE — 94660 CPAP INITIATION&MGMT: CPT

## 2024-03-21 PROCEDURE — A4216 STERILE WATER/SALINE, 10 ML: HCPCS | Performed by: EMERGENCY MEDICINE

## 2024-03-21 PROCEDURE — 83735 ASSAY OF MAGNESIUM: CPT

## 2024-03-21 PROCEDURE — 6360000002 HC RX W HCPCS: Performed by: INTERNAL MEDICINE

## 2024-03-21 PROCEDURE — 51702 INSERT TEMP BLADDER CATH: CPT

## 2024-03-21 PROCEDURE — 6370000000 HC RX 637 (ALT 250 FOR IP): Performed by: EMERGENCY MEDICINE

## 2024-03-21 PROCEDURE — 80053 COMPREHEN METABOLIC PANEL: CPT

## 2024-03-21 PROCEDURE — 2580000003 HC RX 258: Performed by: INTERNAL MEDICINE

## 2024-03-21 PROCEDURE — 96366 THER/PROPH/DIAG IV INF ADDON: CPT

## 2024-03-21 PROCEDURE — 94640 AIRWAY INHALATION TREATMENT: CPT

## 2024-03-21 PROCEDURE — 84484 ASSAY OF TROPONIN QUANT: CPT

## 2024-03-21 PROCEDURE — 36415 COLL VENOUS BLD VENIPUNCTURE: CPT

## 2024-03-21 PROCEDURE — 2700000000 HC OXYGEN THERAPY PER DAY

## 2024-03-21 RX ORDER — FUROSEMIDE 10 MG/ML
40 INJECTION INTRAMUSCULAR; INTRAVENOUS ONCE
Status: COMPLETED | OUTPATIENT
Start: 2024-03-21 | End: 2024-03-21

## 2024-03-21 RX ORDER — DEXMEDETOMIDINE HYDROCHLORIDE 4 UG/ML
.1-1.5 INJECTION, SOLUTION INTRAVENOUS CONTINUOUS
Status: DISCONTINUED | OUTPATIENT
Start: 2024-03-21 | End: 2024-03-23 | Stop reason: HOSPADM

## 2024-03-21 RX ORDER — INSULIN LISPRO 100 [IU]/ML
0-8 INJECTION, SOLUTION INTRAVENOUS; SUBCUTANEOUS
Status: DISCONTINUED | OUTPATIENT
Start: 2024-03-21 | End: 2024-03-26 | Stop reason: HOSPADM

## 2024-03-21 RX ORDER — INSULIN LISPRO 100 [IU]/ML
0-4 INJECTION, SOLUTION INTRAVENOUS; SUBCUTANEOUS NIGHTLY
Status: DISCONTINUED | OUTPATIENT
Start: 2024-03-21 | End: 2024-03-26 | Stop reason: HOSPADM

## 2024-03-21 RX ORDER — ONDANSETRON 2 MG/ML
4 INJECTION INTRAMUSCULAR; INTRAVENOUS ONCE
Status: COMPLETED | OUTPATIENT
Start: 2024-03-21 | End: 2024-03-21

## 2024-03-21 RX ORDER — SODIUM CHLORIDE, SODIUM LACTATE, POTASSIUM CHLORIDE, CALCIUM CHLORIDE 600; 310; 30; 20 MG/100ML; MG/100ML; MG/100ML; MG/100ML
INJECTION, SOLUTION INTRAVENOUS CONTINUOUS
Status: DISCONTINUED | OUTPATIENT
Start: 2024-03-21 | End: 2024-03-22

## 2024-03-21 RX ORDER — 0.9 % SODIUM CHLORIDE 0.9 %
1000 INTRAVENOUS SOLUTION INTRAVENOUS ONCE
Status: COMPLETED | OUTPATIENT
Start: 2024-03-21 | End: 2024-03-21

## 2024-03-21 RX ORDER — IPRATROPIUM BROMIDE AND ALBUTEROL SULFATE 2.5; .5 MG/3ML; MG/3ML
1 SOLUTION RESPIRATORY (INHALATION)
Status: COMPLETED | OUTPATIENT
Start: 2024-03-21 | End: 2024-03-21

## 2024-03-21 RX ORDER — AMIODARONE HYDROCHLORIDE 200 MG/1
200 TABLET ORAL DAILY
Status: DISCONTINUED | OUTPATIENT
Start: 2024-03-21 | End: 2024-03-26 | Stop reason: HOSPADM

## 2024-03-21 RX ORDER — ASPIRIN 81 MG/1
81 TABLET, CHEWABLE ORAL DAILY
Status: DISCONTINUED | OUTPATIENT
Start: 2024-03-21 | End: 2024-03-26 | Stop reason: HOSPADM

## 2024-03-21 RX ORDER — NOREPINEPHRINE BITARTRATE 0.02 MG/ML
1-100 INJECTION, SOLUTION INTRAVENOUS CONTINUOUS
Status: DISCONTINUED | OUTPATIENT
Start: 2024-03-21 | End: 2024-03-22

## 2024-03-21 RX ORDER — SODIUM CHLORIDE 9 MG/ML
INJECTION, SOLUTION INTRAVENOUS PRN
Status: DISCONTINUED | OUTPATIENT
Start: 2024-03-21 | End: 2024-03-26 | Stop reason: HOSPADM

## 2024-03-21 RX ORDER — ENOXAPARIN SODIUM 100 MG/ML
40 INJECTION SUBCUTANEOUS DAILY
Status: DISCONTINUED | OUTPATIENT
Start: 2024-03-21 | End: 2024-03-21 | Stop reason: SDUPTHER

## 2024-03-21 RX ORDER — SODIUM CHLORIDE 0.9 % (FLUSH) 0.9 %
5-40 SYRINGE (ML) INJECTION EVERY 12 HOURS SCHEDULED
Status: DISCONTINUED | OUTPATIENT
Start: 2024-03-21 | End: 2024-03-26 | Stop reason: HOSPADM

## 2024-03-21 RX ORDER — ALBUTEROL SULFATE 2.5 MG/3ML
2.5 SOLUTION RESPIRATORY (INHALATION) EVERY 6 HOURS PRN
Status: DISCONTINUED | OUTPATIENT
Start: 2024-03-21 | End: 2024-03-22

## 2024-03-21 RX ORDER — SODIUM CHLORIDE 0.9 % (FLUSH) 0.9 %
5-40 SYRINGE (ML) INJECTION PRN
Status: DISCONTINUED | OUTPATIENT
Start: 2024-03-21 | End: 2024-03-26 | Stop reason: HOSPADM

## 2024-03-21 RX ORDER — ACETAMINOPHEN 325 MG/1
650 TABLET ORAL EVERY 6 HOURS PRN
Status: DISCONTINUED | OUTPATIENT
Start: 2024-03-21 | End: 2024-03-26 | Stop reason: HOSPADM

## 2024-03-21 RX ORDER — DIMETHICONE, CAMPHOR (SYNTHETIC), MENTHOL, AND PHENOL 1.1; .5; .625; .5 G/100G; G/100G; G/100G; G/100G
OINTMENT TOPICAL PRN
Status: DISCONTINUED | OUTPATIENT
Start: 2024-03-21 | End: 2024-03-26 | Stop reason: HOSPADM

## 2024-03-21 RX ORDER — ACETAMINOPHEN 650 MG/1
650 SUPPOSITORY RECTAL EVERY 6 HOURS PRN
Status: DISCONTINUED | OUTPATIENT
Start: 2024-03-21 | End: 2024-03-26 | Stop reason: HOSPADM

## 2024-03-21 RX ADMIN — Medication: at 12:33

## 2024-03-21 RX ADMIN — SODIUM CHLORIDE, PRESERVATIVE FREE 10 ML: 5 INJECTION INTRAVENOUS at 20:50

## 2024-03-21 RX ADMIN — SODIUM CHLORIDE 1000 ML: 9 INJECTION, SOLUTION INTRAVENOUS at 02:01

## 2024-03-21 RX ADMIN — ASPIRIN 81 MG: 81 TABLET, CHEWABLE ORAL at 09:13

## 2024-03-21 RX ADMIN — INSULIN LISPRO 6 UNITS: 100 INJECTION, SOLUTION INTRAVENOUS; SUBCUTANEOUS at 07:32

## 2024-03-21 RX ADMIN — SODIUM CHLORIDE: 9 INJECTION, SOLUTION INTRAVENOUS at 18:00

## 2024-03-21 RX ADMIN — SODIUM CHLORIDE, PRESERVATIVE FREE 10 ML: 5 INJECTION INTRAVENOUS at 09:13

## 2024-03-21 RX ADMIN — ONDANSETRON 4 MG: 2 INJECTION INTRAMUSCULAR; INTRAVENOUS at 02:31

## 2024-03-21 RX ADMIN — VANCOMYCIN HYDROCHLORIDE 1750 MG: 10 INJECTION, POWDER, LYOPHILIZED, FOR SOLUTION INTRAVENOUS at 02:39

## 2024-03-21 RX ADMIN — SODIUM CHLORIDE, POTASSIUM CHLORIDE, SODIUM LACTATE AND CALCIUM CHLORIDE: 600; 310; 30; 20 INJECTION, SOLUTION INTRAVENOUS at 12:32

## 2024-03-21 RX ADMIN — DOXYCYCLINE 100 MG: 100 INJECTION, POWDER, LYOPHILIZED, FOR SOLUTION INTRAVENOUS at 18:01

## 2024-03-21 RX ADMIN — IPRATROPIUM BROMIDE AND ALBUTEROL SULFATE 1 DOSE: .5; 3 SOLUTION RESPIRATORY (INHALATION) at 01:29

## 2024-03-21 RX ADMIN — IPRATROPIUM BROMIDE AND ALBUTEROL SULFATE 1 DOSE: 2.5; .5 SOLUTION RESPIRATORY (INHALATION) at 01:09

## 2024-03-21 RX ADMIN — FAMOTIDINE 20 MG: 10 INJECTION, SOLUTION INTRAVENOUS at 09:13

## 2024-03-21 RX ADMIN — DEXMEDETOMIDINE 0.4 MCG/KG/HR: 100 INJECTION, SOLUTION, CONCENTRATE INTRAVENOUS at 19:34

## 2024-03-21 RX ADMIN — APIXABAN 5 MG: 5 TABLET, FILM COATED ORAL at 09:13

## 2024-03-21 RX ADMIN — SODIUM CHLORIDE, POTASSIUM CHLORIDE, SODIUM LACTATE AND CALCIUM CHLORIDE: 600; 310; 30; 20 INJECTION, SOLUTION INTRAVENOUS at 21:49

## 2024-03-21 RX ADMIN — INSULIN LISPRO 4 UNITS: 100 INJECTION, SOLUTION INTRAVENOUS; SUBCUTANEOUS at 12:34

## 2024-03-21 RX ADMIN — FUROSEMIDE 40 MG: 10 INJECTION, SOLUTION INTRAMUSCULAR; INTRAVENOUS at 18:30

## 2024-03-21 RX ADMIN — DOXYCYCLINE 100 MG: 100 INJECTION, POWDER, LYOPHILIZED, FOR SOLUTION INTRAVENOUS at 07:22

## 2024-03-21 RX ADMIN — AMIODARONE HYDROCHLORIDE 200 MG: 200 TABLET ORAL at 09:13

## 2024-03-21 RX ADMIN — APIXABAN 5 MG: 5 TABLET, FILM COATED ORAL at 20:50

## 2024-03-21 RX ADMIN — SODIUM CHLORIDE: 9 INJECTION, SOLUTION INTRAVENOUS at 09:28

## 2024-03-21 RX ADMIN — ALBUTEROL SULFATE 2.5 MG: 2.5 SOLUTION RESPIRATORY (INHALATION) at 14:08

## 2024-03-21 RX ADMIN — PIPERACILLIN AND TAZOBACTAM 4500 MG: 4; .5 INJECTION, POWDER, FOR SOLUTION INTRAVENOUS at 02:02

## 2024-03-21 RX ADMIN — CEFTRIAXONE SODIUM 2000 MG: 2 INJECTION, POWDER, FOR SOLUTION INTRAMUSCULAR; INTRAVENOUS at 09:29

## 2024-03-21 ASSESSMENT — PAIN - FUNCTIONAL ASSESSMENT
PAIN_FUNCTIONAL_ASSESSMENT: 0-10
PAIN_FUNCTIONAL_ASSESSMENT: NONE - DENIES PAIN

## 2024-03-21 ASSESSMENT — PAIN SCALES - GENERAL
PAINLEVEL_OUTOF10: 0

## 2024-03-21 ASSESSMENT — LIFESTYLE VARIABLES
HOW OFTEN DO YOU HAVE A DRINK CONTAINING ALCOHOL: NEVER
HOW MANY STANDARD DRINKS CONTAINING ALCOHOL DO YOU HAVE ON A TYPICAL DAY: PATIENT DOES NOT DRINK

## 2024-03-21 NOTE — ED NOTES
TRANSFER - OUT REPORT:    Verbal report given to RN on Vivien Zambrano  being transferred to [facility] for routine progression of patient care       Report consisted of patient's Situation, Background, Assessment and   Recommendations(SBAR).     Information from the following report(s) ED SBAR was reviewed with the receiving nurse.    [place] Assessment:    Presents to emergency department  because of falls (Syncope, seizure, or loss of consciousness): No  Age > 70: Yes  Altered Mental Status, Intoxication with alcohol or substance confusion (Disorientation, impaired judgment, poor safety awaremess, or inability to follow instructions): No  Impaired Mobility: Ambulates or transfers with assistive devices or assistance; Unable to ambulate or transer.: Yes             Lines:   Peripheral IV 03/21/24 Right Antecubital (Active)        Opportunity for questions and clarification was provided.      Patient transported with:  Registered Nurse          Ronit, Renetta, RN  03/21/24 0595

## 2024-03-21 NOTE — ED TRIAGE NOTES
Patient arrives via EMS from home with shortness of breath, states this started about 1.5 hours ago. EMS states fire noted O2 sat of 65% placed on mask when ems arrived sats were 95%. EMS gave 10 mg albuterol, 125 solumedrol, 2g mag. Patient arrives on nebulizer but when that completed and mask removed sats did drop. Placed on 8L with improvement. EMS . Patient seen at [facility] earlier today and was diagnosed with a kidney infection and was given antibiotics for that.

## 2024-03-21 NOTE — ED PROVIDER NOTES
Irregular Heart Beat Brother      No Known Allergies    Physical Exam     Vitals:    03/21/24 0239 03/21/24 0255 03/21/24 0302 03/21/24 0346   BP: 90/75  117/81    Pulse: (!) 101 (!) 102 (!) 101 94   Resp: (!) 33 (!) 31 (!) 34 28   Temp:       TempSrc:       SpO2: 96% 96% 95% 94%   Weight:       Height:         Nursing note and vitals reviewed.    Constitutional: Well developed, in acute distress  HEENT: Atraumatic, conjugate gaze, EOM intact  Neck: Supple  Cardiovascular: No cyanosis, diaphoresis, or JVD appreciated. Normal S1/S2 with no murmurs noted.  Respiratory: Mild tachypnea.  Lungs with diffuse inspiratory and expiratory wheezes  Gastrointestinal: Non-distended. No guarding or rebound. Non-tender.  MSK: No deformities appreciated. No peripheral edema.  Skin: Skin is warm and dry. No rash appreciated.  Neuro: Alert and oriented, moves all four extremities.  Psych: Pleasant and cooperative.    Procedures   Procedures    MDM     Labs Reviewed   CBC WITH AUTO DIFFERENTIAL - Abnormal; Notable for the following components:       Result Value    WBC 17.9 (*)     Eosinophils % 0 (*)     Neutrophils Absolute 11.7 (*)     Monocytes Absolute 1.8 (*)     All other components within normal limits   COMPREHENSIVE METABOLIC PANEL - Abnormal; Notable for the following components:    Potassium 3.1 (*)     Glucose 145 (*)     Albumin 3.0 (*)     All other components within normal limits   MAGNESIUM - Abnormal; Notable for the following components:    Magnesium 3.0 (*)     All other components within normal limits   BRAIN NATRIURETIC PEPTIDE - Abnormal; Notable for the following components:    NT Pro-BNP 2,685 (*)     All other components within normal limits   LACTATE, SEPSIS - Abnormal; Notable for the following components:    Lactic Acid, Sepsis 4.1 (*)     All other components within normal limits   TROPONIN - Abnormal; Notable for the following components:    Troponin, High Sensitivity 60.5 (*)     All other components

## 2024-03-21 NOTE — CARE COORDINATION
Case Management Assessment  Initial Evaluation    Date/Time of Evaluation: 3/21/2024 12:13 PM  Assessment Completed by: Mago Javier RN    If patient is discharged prior to next notation, then this note serves as note for discharge by case management.    Patient Name: Vivien Zambrano                   YOB: 1940  Diagnosis: Septicemia (HCC) [A41.9]  Septic shock (HCC) [A41.9, R65.21]  Pneumonia of both lungs due to infectious organism, unspecified part of lung [J18.9]  Acute hypoxic respiratory failure (HCC) [J96.01]                   Date / Time: 3/21/2024 12:50 AM    Patient Admission Status: Inpatient   Readmission Risk (Low < 19, Mod (19-27), High > 27): Readmission Risk Score: 13.7    Current PCP: Bradford Alanis MD  PCP verified by CM? (P) Yes    Chart Reviewed: Yes      History Provided by: (P) Patient  Patient Orientation: (P) Alert and Oriented    Patient Cognition: (P) Alert    Hospitalization in the last 30 days (Readmission):  No    If yes, Readmission Assessment in  Navigator will be completed.    Advance Directives:      Code Status: Full Code   Patient's Primary Decision Maker is: (P) Legal Next of Kin    Primary Decision Maker: Vivien Zambrano - Patient - [phone]    Secondary Decision Maker: Solange Zambrano - Child - [phone]    Supplemental (Other) Decision Maker: Yadiel Hartman - Child - [phone]    Discharge Planning:    Patient lives with: (P) Children Type of Home: (P) Other (Comment) (pending)  Primary Care Giver: (P) Self  Patient Support Systems include: (P) Children, Family Members   Current Financial resources: (P) Medicare, Other (Comment) (MCR/supp)  Current community resources: (P) None  Current services prior to admission: (P) Durable Medical Equipment            Current DME: (P) Cane, Walker, Glucometer            Type of Home Care services:       ADLS  Prior functional level: (P) Independent in ADLs/IADLs  Current functional level: (P) Assistance with the

## 2024-03-21 NOTE — ACP (ADVANCE CARE PLANNING)
Advance Care Planning     Advance Care Planning Activator (Inpatient)  Conversation Note      Date of ACP Conversation: 3/21/2024       ACP Activator: Mago Javier RN    {When Decision Maker makes decisions on behalf of the incapacitated patient: Decision Maker is asked to consider and make decisions based on patient values, known preferences, or best interests.     Health Care Decision Maker: No LW/HCPOA on file. . Children are legal NOK unless document presented stating otherwise.     Current Designated Health Care Decision Maker:     Primary Decision Maker: Vivien Zambrano - Patient - [phone]    Secondary Decision Maker: Solange Zambrano - Child - [phone]    Supplemental (Other) Decision Maker: Yadiel Hartman - Child - [phone]  Click here to complete Healthcare Decision Makers including section of the Healthcare Decision Maker Relationship (ie \"Primary\")  Today we documented Decision Maker(s) consistent with Legal Next of Kin hierarchy.    Care Preferences    Full code per MD orders.

## 2024-03-21 NOTE — H&P
[facility] Critical Care Note:: 3/21/2024  Vivien Zambrano  Admission Date: 3/21/2024     Length of Stay: 0 days    Background: 83 y.o. female with CAD s/p CABG, HTN, DM2, PAF on Eliquis who presents with SOB from home.  She has been wheezing for couple days, states her breathing got worse tonight.  She went to [facility] yesterday afternoon, was evaluated for flank pain.  She was diagnosed with \"possible pyelonephritis\", was given Rocephin in the ER.  She was prescribed another antibiotic which she did not get filled.  She states she went home and her breathing got bad and she started having some chest pain thus she came here for evaluation.    She had some nonspecific EKG changes, cardiology was consulted by phone without intervention.  She was initially placed on NC 4L, O2 sat slowly dropped with increased WOB. She eventually required BIPAP, ABG 7.21/45.7/97/18 on BIPAP. Chest x-ray showed pneumonia and she was given Zosyn and Vancomycin. White count 17.9, BNP 2685, troponin 60.5, lactate 4.1, COVID negative. Her BP initially was in the 100/60 range, gradually decreased to 90/50s. She was given NS 1L bolus, did not receive a full 30cc/kg bolus due to concerns for volume overload. She was able to be taken off BIPAP onto Airvo 60L/85% maintaining sats of 95%. [facility] was consulted for her respiratory failure and septic shock.    Notable PMH:  has a past medical history of Acute respiratory failure with hypoxia ([facility]), Arrhythmia, Arthritis, CAD (coronary artery disease), Calculus of kidney, Carotid stenosis, Coronary atherosclerosis of native coronary artery, Diabetes (HCC), Essential hypertension, GERD (gastroesophageal reflux disease), HLD (hyperlipidemia), Insomnia, Myocardial infarction due to demand ischemia ([facility]), Respiratory distress, Right ureteral stone, Sepsis (HCC), and Subclavian artery stenosis ([facility]).    24 Hour events:   As above    Review of Systems: Comprehensive ROS

## 2024-03-21 NOTE — INTERDISCIPLINARY ROUNDS
Multi-D Rounds/Checklist (leapfrog):  Lines: can any be removed?: None      DVT Prophylaxis: Ordered  Vent: N/A  Nutrition Ordered/appropriate: Ordered  Can antibiotics or other drugs be stopped? No /End Date set   Inpat Anti-Infectives (From admission, onward)       Start     Ordered Stop    03/21/24 1700  cefTRIAXone (ROCEPHIN) 1,000 mg in sterile water 10 mL IV syringe  1,000 mg,   IntraVENous,   EVERY 24 HOURS        See Hyperspace for full Linked Orders Report.    03/21/24 0511 03/28/24 1659    03/21/24 0600  doxycycline (VIBRAMYCIN) 100 mg in sodium chloride 0.9 % 100 mL IVPB (mini-bag)  100 mg,   IntraVENous,   EVERY 12 HOURS         03/21/24 0533 03/28/24 0559                  Consults needed: None  A: Is pain control adequate? (has PRNs? Stop drip?) Yes  B: Sedation break and SBT? N/A  C: Is sedation choice appropriate? N/A  D: Delirium/CAM-ICU? No  E: Mobility goals/appropriateness? Yes  F: Family update and plan? Son is surrogate decision maker and is being updated daily by primary attending and nursing staff.    Nicky Magaña, APRN - NP

## 2024-03-22 ENCOUNTER — APPOINTMENT (OUTPATIENT)
Dept: GENERAL RADIOLOGY | Age: 84
DRG: 871 | End: 2024-03-22
Payer: MEDICARE

## 2024-03-22 LAB
ALBUMIN SERPL-MCNC: 2.5 G/DL (ref 3.2–4.6)
ALBUMIN/GLOB SERPL: 0.8 (ref 0.4–1.6)
ALP SERPL-CCNC: 45 U/L (ref 50–136)
ALT SERPL-CCNC: 22 U/L (ref 12–65)
ANION GAP SERPL CALC-SCNC: 5 MMOL/L (ref 2–11)
ARTERIAL PATENCY WRIST A: POSITIVE
AST SERPL-CCNC: 93 U/L (ref 15–37)
BASE EXCESS BLD CALC-SCNC: 1.4 MMOL/L
BASOPHILS # BLD: 0 K/UL (ref 0–0.2)
BASOPHILS NFR BLD: 0 % (ref 0–2)
BDY SITE: ABNORMAL
BILIRUB SERPL-MCNC: 0.5 MG/DL (ref 0.2–1.1)
BUN SERPL-MCNC: 12 MG/DL (ref 8–23)
CALCIUM SERPL-MCNC: 8.8 MG/DL (ref 8.3–10.4)
CHLORIDE SERPL-SCNC: 109 MMOL/L (ref 103–113)
CO2 SERPL-SCNC: 26 MMOL/L (ref 21–32)
CREAT SERPL-MCNC: 0.7 MG/DL (ref 0.6–1)
DIFFERENTIAL METHOD BLD: ABNORMAL
EOSINOPHIL # BLD: 0 K/UL (ref 0–0.8)
EOSINOPHIL NFR BLD: 0 % (ref 0.5–7.8)
ERYTHROCYTE [DISTWIDTH] IN BLOOD BY AUTOMATED COUNT: 13.3 % (ref 11.9–14.6)
GAS FLOW.O2 O2 DELIVERY SYS: ABNORMAL
GLOBULIN SER CALC-MCNC: 3.3 G/DL (ref 2.8–4.5)
GLUCOSE BLD STRIP.AUTO-MCNC: 157 MG/DL (ref 65–100)
GLUCOSE BLD STRIP.AUTO-MCNC: 163 MG/DL (ref 65–100)
GLUCOSE BLD STRIP.AUTO-MCNC: 169 MG/DL (ref 65–100)
GLUCOSE BLD STRIP.AUTO-MCNC: 223 MG/DL (ref 65–100)
GLUCOSE SERPL-MCNC: 172 MG/DL (ref 65–100)
HCO3 BLD-SCNC: 24.5 MMOL/L (ref 22–26)
HCT VFR BLD AUTO: 33.7 % (ref 35.8–46.3)
HGB BLD-MCNC: 10.9 G/DL (ref 11.7–15.4)
IMM GRANULOCYTES # BLD AUTO: 0.1 K/UL (ref 0–0.5)
IMM GRANULOCYTES NFR BLD AUTO: 1 % (ref 0–5)
LACTATE SERPL-SCNC: 1.3 MMOL/L (ref 0.4–2)
LYMPHOCYTES # BLD: 1.1 K/UL (ref 0.5–4.6)
LYMPHOCYTES NFR BLD: 8 % (ref 13–44)
MCH RBC QN AUTO: 28.5 PG (ref 26.1–32.9)
MCHC RBC AUTO-ENTMCNC: 32.3 G/DL (ref 31.4–35)
MCV RBC AUTO: 88 FL (ref 82–102)
MONOCYTES # BLD: 1.7 K/UL (ref 0.1–1.3)
MONOCYTES NFR BLD: 12 % (ref 4–12)
NEUTS SEG # BLD: 10.8 K/UL (ref 1.7–8.2)
NEUTS SEG NFR BLD: 79 % (ref 43–78)
NRBC # BLD: 0 K/UL (ref 0–0.2)
O2/TOTAL GAS SETTING VFR VENT: 50 %
PCO2 BLD: 33.2 MMHG (ref 35–45)
PEEP RESPIRATORY: 8 CMH2O
PH BLD: 7.48 (ref 7.35–7.45)
PLATELET # BLD AUTO: 171 K/UL (ref 150–450)
PMV BLD AUTO: 9.8 FL (ref 9.4–12.3)
PO2 BLD: 52 MMHG (ref 75–100)
POTASSIUM SERPL-SCNC: 3.6 MMOL/L (ref 3.5–5.1)
PRESSURE SUPPORT SETTING VENT: 18 CMH2O
PROT SERPL-MCNC: 5.8 G/DL (ref 6.3–8.2)
RBC # BLD AUTO: 3.83 M/UL (ref 4.05–5.2)
RESPIRATORY RATE, POC: 24 (ref 5–40)
SAO2 % BLD: 88.9 % (ref 95–98)
SERVICE CMNT-IMP: ABNORMAL
SODIUM SERPL-SCNC: 140 MMOL/L (ref 136–146)
SPECIMEN TYPE: ABNORMAL
VENTILATION MODE VENT: ABNORMAL
WBC # BLD AUTO: 13.7 K/UL (ref 4.3–11.1)

## 2024-03-22 PROCEDURE — A4216 STERILE WATER/SALINE, 10 ML: HCPCS | Performed by: EMERGENCY MEDICINE

## 2024-03-22 PROCEDURE — 36415 COLL VENOUS BLD VENIPUNCTURE: CPT

## 2024-03-22 PROCEDURE — 2500000003 HC RX 250 WO HCPCS: Performed by: INTERNAL MEDICINE

## 2024-03-22 PROCEDURE — 82803 BLOOD GASES ANY COMBINATION: CPT

## 2024-03-22 PROCEDURE — 80053 COMPREHEN METABOLIC PANEL: CPT

## 2024-03-22 PROCEDURE — 2500000003 HC RX 250 WO HCPCS: Performed by: EMERGENCY MEDICINE

## 2024-03-22 PROCEDURE — 6370000000 HC RX 637 (ALT 250 FOR IP): Performed by: INTERNAL MEDICINE

## 2024-03-22 PROCEDURE — 82962 GLUCOSE BLOOD TEST: CPT

## 2024-03-22 PROCEDURE — 6360000002 HC RX W HCPCS: Performed by: INTERNAL MEDICINE

## 2024-03-22 PROCEDURE — 6370000000 HC RX 637 (ALT 250 FOR IP): Performed by: EMERGENCY MEDICINE

## 2024-03-22 PROCEDURE — 2580000003 HC RX 258: Performed by: EMERGENCY MEDICINE

## 2024-03-22 PROCEDURE — 83605 ASSAY OF LACTIC ACID: CPT

## 2024-03-22 PROCEDURE — 99291 CRITICAL CARE FIRST HOUR: CPT | Performed by: INTERNAL MEDICINE

## 2024-03-22 PROCEDURE — 36600 WITHDRAWAL OF ARTERIAL BLOOD: CPT

## 2024-03-22 PROCEDURE — 6360000002 HC RX W HCPCS: Performed by: EMERGENCY MEDICINE

## 2024-03-22 PROCEDURE — 85025 COMPLETE CBC W/AUTO DIFF WBC: CPT

## 2024-03-22 PROCEDURE — 71045 X-RAY EXAM CHEST 1 VIEW: CPT

## 2024-03-22 PROCEDURE — 94660 CPAP INITIATION&MGMT: CPT

## 2024-03-22 PROCEDURE — 2580000003 HC RX 258: Performed by: INTERNAL MEDICINE

## 2024-03-22 PROCEDURE — 94640 AIRWAY INHALATION TREATMENT: CPT

## 2024-03-22 PROCEDURE — 2100000000 HC CCU R&B

## 2024-03-22 RX ORDER — FUROSEMIDE 10 MG/ML
40 INJECTION INTRAMUSCULAR; INTRAVENOUS EVERY 12 HOURS
Status: DISCONTINUED | OUTPATIENT
Start: 2024-03-22 | End: 2024-03-26 | Stop reason: HOSPADM

## 2024-03-22 RX ORDER — ALBUTEROL SULFATE 2.5 MG/3ML
2.5 SOLUTION RESPIRATORY (INHALATION)
Status: DISCONTINUED | OUTPATIENT
Start: 2024-03-22 | End: 2024-03-26 | Stop reason: HOSPADM

## 2024-03-22 RX ORDER — BUSPIRONE HYDROCHLORIDE 10 MG/1
5 TABLET ORAL 3 TIMES DAILY
Status: DISCONTINUED | OUTPATIENT
Start: 2024-03-22 | End: 2024-03-26 | Stop reason: HOSPADM

## 2024-03-22 RX ADMIN — FUROSEMIDE 40 MG: 10 INJECTION, SOLUTION INTRAMUSCULAR; INTRAVENOUS at 21:59

## 2024-03-22 RX ADMIN — ALBUTEROL SULFATE 2.5 MG: 2.5 SOLUTION RESPIRATORY (INHALATION) at 10:30

## 2024-03-22 RX ADMIN — DOXYCYCLINE 100 MG: 100 INJECTION, POWDER, LYOPHILIZED, FOR SOLUTION INTRAVENOUS at 17:18

## 2024-03-22 RX ADMIN — DEXMEDETOMIDINE 1.2 MCG/KG/HR: 100 INJECTION, SOLUTION, CONCENTRATE INTRAVENOUS at 22:29

## 2024-03-22 RX ADMIN — DOXYCYCLINE 100 MG: 100 INJECTION, POWDER, LYOPHILIZED, FOR SOLUTION INTRAVENOUS at 05:47

## 2024-03-22 RX ADMIN — FAMOTIDINE 20 MG: 10 INJECTION, SOLUTION INTRAVENOUS at 09:03

## 2024-03-22 RX ADMIN — METOPROLOL TARTRATE 25 MG: 25 TABLET, FILM COATED ORAL at 17:14

## 2024-03-22 RX ADMIN — SODIUM CHLORIDE, PRESERVATIVE FREE 5 ML: 5 INJECTION INTRAVENOUS at 21:56

## 2024-03-22 RX ADMIN — BUSPIRONE HYDROCHLORIDE 5 MG: 10 TABLET ORAL at 22:06

## 2024-03-22 RX ADMIN — FUROSEMIDE 40 MG: 10 INJECTION, SOLUTION INTRAMUSCULAR; INTRAVENOUS at 09:03

## 2024-03-22 RX ADMIN — SODIUM CHLORIDE: 9 INJECTION, SOLUTION INTRAVENOUS at 09:10

## 2024-03-22 RX ADMIN — APIXABAN 5 MG: 5 TABLET, FILM COATED ORAL at 11:49

## 2024-03-22 RX ADMIN — ALBUTEROL SULFATE 2.5 MG: 2.5 SOLUTION RESPIRATORY (INHALATION) at 15:30

## 2024-03-22 RX ADMIN — ALBUTEROL SULFATE 2.5 MG: 2.5 SOLUTION RESPIRATORY (INHALATION) at 20:41

## 2024-03-22 RX ADMIN — APIXABAN 5 MG: 5 TABLET, FILM COATED ORAL at 21:58

## 2024-03-22 RX ADMIN — DEXMEDETOMIDINE 0.4 MCG/KG/HR: 100 INJECTION, SOLUTION, CONCENTRATE INTRAVENOUS at 15:51

## 2024-03-22 RX ADMIN — ASPIRIN 81 MG: 81 TABLET, CHEWABLE ORAL at 11:49

## 2024-03-22 RX ADMIN — SODIUM CHLORIDE 100 ML: 9 INJECTION, SOLUTION INTRAVENOUS at 05:46

## 2024-03-22 RX ADMIN — AMIODARONE HYDROCHLORIDE 200 MG: 200 TABLET ORAL at 11:49

## 2024-03-22 RX ADMIN — SODIUM CHLORIDE, PRESERVATIVE FREE 10 ML: 5 INJECTION INTRAVENOUS at 09:06

## 2024-03-22 RX ADMIN — SODIUM CHLORIDE: 9 INJECTION, SOLUTION INTRAVENOUS at 15:51

## 2024-03-22 RX ADMIN — CEFTRIAXONE SODIUM 2000 MG: 2 INJECTION, POWDER, FOR SOLUTION INTRAMUSCULAR; INTRAVENOUS at 09:13

## 2024-03-22 RX ADMIN — BUSPIRONE HYDROCHLORIDE 5 MG: 10 TABLET ORAL at 17:14

## 2024-03-22 ASSESSMENT — PAIN SCALES - GENERAL
PAINLEVEL_OUTOF10: 0

## 2024-03-22 NOTE — PLAN OF CARE
Problem: Discharge Planning  Goal: Discharge to home or other facility with appropriate resources  Outcome: Progressing  Flowsheets (Taken 3/21/2024 5215 by Awilda Vides, RN)  Discharge to home or other facility with appropriate resources:   Identify barriers to discharge with patient and caregiver   Arrange for needed discharge resources and transportation as appropriate   Identify discharge learning needs (meds, wound care, etc)     Problem: Pain  Goal: Verbalizes/displays adequate comfort level or baseline comfort level  Outcome: Progressing     Problem: Safety - Adult  Goal: Free from fall injury  Outcome: Progressing     Problem: Skin/Tissue Integrity  Goal: Absence of new skin breakdown  Description: 1.  Monitor for areas of redness and/or skin breakdown  2.  Assess vascular access sites hourly  3.  Every 4-6 hours minimum:  Change oxygen saturation probe site  4.  Every 4-6 hours:  If on nasal continuous positive airway pressure, respiratory therapy assess nares and determine need for appliance change or resting period.  Outcome: Progressing     Problem: ABCDS Injury Assessment  Goal: Absence of physical injury  Outcome: Progressing

## 2024-03-22 NOTE — CARE COORDINATION
Chart reviewed and pt discussed in am IDR s/p admission to CCU for SOB/BIPAP. Currently precedex and IVF gtts. On/off BIPAP, but currently Airvo at 80% fio2. Diuresing continued and decreasing IVF per MD notes. PT/OT pending per MD. CM following for d/c needs/POC when stable per MD. LOS 1 day.

## 2024-03-22 NOTE — INTERDISCIPLINARY ROUNDS
Multi-D Rounds/Checklist (leapfrog):  Lines: can any be removed?: None    Urinary Catheter 03/21/24 Jarquin (Active)      DVT Prophylaxis: Ordered  Vent: N/A  Nutrition Ordered/appropriate: Ordered  Can antibiotics or other drugs be stopped? No /End Date set     Inpat Anti-Infectives (From admission, onward)       Start     Ordered Stop    03/21/24 1700  cefTRIAXone (ROCEPHIN) 1,000 mg in sterile water 10 mL IV syringe  1,000 mg,   IntraVENous,   EVERY 24 HOURS        See Hyperspace for full Linked Orders Report.    03/21/24 0511 03/28/24 1659    03/21/24 0600  doxycycline (VIBRAMYCIN) 100 mg in sodium chloride 0.9 % 100 mL IVPB (mini-bag)  100 mg,   IntraVENous,   EVERY 12 HOURS         03/21/24 0533 03/28/24 0559                  Consults needed: None  A: Is pain control adequate? (has PRNs? Stop drip?) Yes  B: Sedation break and SBT? N/A  C: Is sedation choice appropriate? Yes  D: Delirium/CAM-ICU? Yes  E: Mobility goals/appropriateness? Yes  F: Family update and plan? Son is surrogate decision maker and is being updated daily by primary attending and nursing staff.    Nicky Magaña APRN - NP

## 2024-03-23 ENCOUNTER — APPOINTMENT (OUTPATIENT)
Dept: GENERAL RADIOLOGY | Age: 84
DRG: 871 | End: 2024-03-23
Payer: MEDICARE

## 2024-03-23 PROBLEM — E87.70 HYPERVOLEMIA: Status: ACTIVE | Noted: 2024-03-23

## 2024-03-23 PROBLEM — A41.9 SEPTICEMIA (HCC): Status: ACTIVE | Noted: 2024-03-23

## 2024-03-23 PROBLEM — R45.1 AGITATION: Status: ACTIVE | Noted: 2024-03-23

## 2024-03-23 PROBLEM — J18.9 PNEUMONIA OF BOTH LUNGS DUE TO INFECTIOUS ORGANISM: Status: ACTIVE | Noted: 2024-03-23

## 2024-03-23 LAB
ALBUMIN SERPL-MCNC: 2.5 G/DL (ref 3.2–4.6)
ALBUMIN/GLOB SERPL: 0.7 (ref 0.4–1.6)
ALP SERPL-CCNC: 52 U/L (ref 50–136)
ALT SERPL-CCNC: 27 U/L (ref 12–65)
ANION GAP SERPL CALC-SCNC: 8 MMOL/L (ref 2–11)
AST SERPL-CCNC: 49 U/L (ref 15–37)
BASOPHILS # BLD: 0 K/UL (ref 0–0.2)
BASOPHILS NFR BLD: 0 % (ref 0–2)
BILIRUB SERPL-MCNC: 0.6 MG/DL (ref 0.2–1.1)
BUN SERPL-MCNC: 15 MG/DL (ref 8–23)
CALCIUM SERPL-MCNC: 9 MG/DL (ref 8.3–10.4)
CHLORIDE SERPL-SCNC: 101 MMOL/L (ref 103–113)
CO2 SERPL-SCNC: 29 MMOL/L (ref 21–32)
CREAT SERPL-MCNC: 0.7 MG/DL (ref 0.6–1)
DIFFERENTIAL METHOD BLD: ABNORMAL
EOSINOPHIL # BLD: 0 K/UL (ref 0–0.8)
EOSINOPHIL NFR BLD: 0 % (ref 0.5–7.8)
ERYTHROCYTE [DISTWIDTH] IN BLOOD BY AUTOMATED COUNT: 13.3 % (ref 11.9–14.6)
GLOBULIN SER CALC-MCNC: 3.8 G/DL (ref 2.8–4.5)
GLUCOSE BLD STRIP.AUTO-MCNC: 219 MG/DL (ref 65–100)
GLUCOSE BLD STRIP.AUTO-MCNC: 227 MG/DL (ref 65–100)
GLUCOSE BLD STRIP.AUTO-MCNC: 236 MG/DL (ref 65–100)
GLUCOSE SERPL-MCNC: 236 MG/DL (ref 65–100)
HCT VFR BLD AUTO: 36.2 % (ref 35.8–46.3)
HGB BLD-MCNC: 11.8 G/DL (ref 11.7–15.4)
IMM GRANULOCYTES # BLD AUTO: 0.1 K/UL (ref 0–0.5)
IMM GRANULOCYTES NFR BLD AUTO: 1 % (ref 0–5)
LYMPHOCYTES # BLD: 1.1 K/UL (ref 0.5–4.6)
LYMPHOCYTES NFR BLD: 11 % (ref 13–44)
MAGNESIUM SERPL-MCNC: 1.7 MG/DL (ref 1.8–2.4)
MCH RBC QN AUTO: 28.2 PG (ref 26.1–32.9)
MCHC RBC AUTO-ENTMCNC: 32.6 G/DL (ref 31.4–35)
MCV RBC AUTO: 86.6 FL (ref 82–102)
MONOCYTES # BLD: 1.1 K/UL (ref 0.1–1.3)
MONOCYTES NFR BLD: 10 % (ref 4–12)
NEUTS SEG # BLD: 8.3 K/UL (ref 1.7–8.2)
NEUTS SEG NFR BLD: 78 % (ref 43–78)
NRBC # BLD: 0 K/UL (ref 0–0.2)
PLATELET # BLD AUTO: 182 K/UL (ref 150–450)
PMV BLD AUTO: 9.8 FL (ref 9.4–12.3)
POTASSIUM SERPL-SCNC: 2.7 MMOL/L (ref 3.5–5.1)
PROT SERPL-MCNC: 6.3 G/DL (ref 6.3–8.2)
RBC # BLD AUTO: 4.18 M/UL (ref 4.05–5.2)
SERVICE CMNT-IMP: ABNORMAL
SODIUM SERPL-SCNC: 138 MMOL/L (ref 136–146)
WBC # BLD AUTO: 10.7 K/UL (ref 4.3–11.1)

## 2024-03-23 PROCEDURE — 83735 ASSAY OF MAGNESIUM: CPT

## 2024-03-23 PROCEDURE — 6360000002 HC RX W HCPCS: Performed by: INTERNAL MEDICINE

## 2024-03-23 PROCEDURE — 6360000002 HC RX W HCPCS

## 2024-03-23 PROCEDURE — 97161 PT EVAL LOW COMPLEX 20 MIN: CPT

## 2024-03-23 PROCEDURE — 2500000003 HC RX 250 WO HCPCS: Performed by: INTERNAL MEDICINE

## 2024-03-23 PROCEDURE — 2580000003 HC RX 258: Performed by: EMERGENCY MEDICINE

## 2024-03-23 PROCEDURE — 2500000003 HC RX 250 WO HCPCS: Performed by: EMERGENCY MEDICINE

## 2024-03-23 PROCEDURE — 6370000000 HC RX 637 (ALT 250 FOR IP): Performed by: EMERGENCY MEDICINE

## 2024-03-23 PROCEDURE — 99291 CRITICAL CARE FIRST HOUR: CPT | Performed by: INTERNAL MEDICINE

## 2024-03-23 PROCEDURE — 6370000000 HC RX 637 (ALT 250 FOR IP): Performed by: INTERNAL MEDICINE

## 2024-03-23 PROCEDURE — 97112 NEUROMUSCULAR REEDUCATION: CPT

## 2024-03-23 PROCEDURE — 2580000003 HC RX 258: Performed by: INTERNAL MEDICINE

## 2024-03-23 PROCEDURE — 94660 CPAP INITIATION&MGMT: CPT

## 2024-03-23 PROCEDURE — 94640 AIRWAY INHALATION TREATMENT: CPT

## 2024-03-23 PROCEDURE — 6360000002 HC RX W HCPCS: Performed by: EMERGENCY MEDICINE

## 2024-03-23 PROCEDURE — 97530 THERAPEUTIC ACTIVITIES: CPT

## 2024-03-23 PROCEDURE — 94761 N-INVAS EAR/PLS OXIMETRY MLT: CPT

## 2024-03-23 PROCEDURE — A4216 STERILE WATER/SALINE, 10 ML: HCPCS | Performed by: EMERGENCY MEDICINE

## 2024-03-23 PROCEDURE — 80053 COMPREHEN METABOLIC PANEL: CPT

## 2024-03-23 PROCEDURE — 71045 X-RAY EXAM CHEST 1 VIEW: CPT

## 2024-03-23 PROCEDURE — 1100000003 HC PRIVATE W/ TELEMETRY

## 2024-03-23 PROCEDURE — 85025 COMPLETE CBC W/AUTO DIFF WBC: CPT

## 2024-03-23 PROCEDURE — 82962 GLUCOSE BLOOD TEST: CPT

## 2024-03-23 PROCEDURE — 36415 COLL VENOUS BLD VENIPUNCTURE: CPT

## 2024-03-23 PROCEDURE — 2700000000 HC OXYGEN THERAPY PER DAY

## 2024-03-23 PROCEDURE — 1100000000 HC RM PRIVATE

## 2024-03-23 PROCEDURE — 97165 OT EVAL LOW COMPLEX 30 MIN: CPT

## 2024-03-23 RX ORDER — MAGNESIUM SULFATE IN WATER 40 MG/ML
2000 INJECTION, SOLUTION INTRAVENOUS PRN
Status: DISCONTINUED | OUTPATIENT
Start: 2024-03-23 | End: 2024-03-26 | Stop reason: HOSPADM

## 2024-03-23 RX ORDER — ONDANSETRON 2 MG/ML
4 INJECTION INTRAMUSCULAR; INTRAVENOUS EVERY 6 HOURS PRN
Status: DISCONTINUED | OUTPATIENT
Start: 2024-03-23 | End: 2024-03-26 | Stop reason: HOSPADM

## 2024-03-23 RX ORDER — SODIUM CHLORIDE AND POTASSIUM CHLORIDE 150; 900 MG/100ML; MG/100ML
INJECTION, SOLUTION INTRAVENOUS CONTINUOUS
Status: DISCONTINUED | OUTPATIENT
Start: 2024-03-23 | End: 2024-03-23

## 2024-03-23 RX ORDER — POTASSIUM CHLORIDE 7.45 MG/ML
10 INJECTION INTRAVENOUS PRN
Status: DISCONTINUED | OUTPATIENT
Start: 2024-03-23 | End: 2024-03-26 | Stop reason: HOSPADM

## 2024-03-23 RX ORDER — POTASSIUM CHLORIDE 29.8 MG/ML
20 INJECTION INTRAVENOUS PRN
Status: DISCONTINUED | OUTPATIENT
Start: 2024-03-23 | End: 2024-03-26 | Stop reason: HOSPADM

## 2024-03-23 RX ORDER — POTASSIUM CHLORIDE 20 MEQ/1
40 TABLET, EXTENDED RELEASE ORAL PRN
Status: DISCONTINUED | OUTPATIENT
Start: 2024-03-23 | End: 2024-03-26 | Stop reason: HOSPADM

## 2024-03-23 RX ORDER — DIPHENHYDRAMINE HYDROCHLORIDE 50 MG/ML
25 INJECTION INTRAMUSCULAR; INTRAVENOUS ONCE
Status: COMPLETED | OUTPATIENT
Start: 2024-03-24 | End: 2024-03-23

## 2024-03-23 RX ADMIN — ALBUTEROL SULFATE 2.5 MG: 2.5 SOLUTION RESPIRATORY (INHALATION) at 15:41

## 2024-03-23 RX ADMIN — CEFTRIAXONE SODIUM 2000 MG: 2 INJECTION, POWDER, FOR SOLUTION INTRAMUSCULAR; INTRAVENOUS at 08:31

## 2024-03-23 RX ADMIN — BUSPIRONE HYDROCHLORIDE 5 MG: 10 TABLET ORAL at 13:23

## 2024-03-23 RX ADMIN — METOPROLOL TARTRATE 25 MG: 25 TABLET, FILM COATED ORAL at 08:41

## 2024-03-23 RX ADMIN — AMIODARONE HYDROCHLORIDE 200 MG: 200 TABLET ORAL at 08:02

## 2024-03-23 RX ADMIN — POTASSIUM CHLORIDE 10 MEQ: 7.46 INJECTION, SOLUTION INTRAVENOUS at 06:06

## 2024-03-23 RX ADMIN — METOPROLOL TARTRATE 25 MG: 25 TABLET, FILM COATED ORAL at 21:15

## 2024-03-23 RX ADMIN — DEXMEDETOMIDINE 1.4 MCG/KG/HR: 100 INJECTION, SOLUTION, CONCENTRATE INTRAVENOUS at 08:00

## 2024-03-23 RX ADMIN — APIXABAN 5 MG: 5 TABLET, FILM COATED ORAL at 08:01

## 2024-03-23 RX ADMIN — POTASSIUM CHLORIDE 10 MEQ: 7.46 INJECTION, SOLUTION INTRAVENOUS at 08:48

## 2024-03-23 RX ADMIN — DOXYCYCLINE 100 MG: 100 INJECTION, POWDER, LYOPHILIZED, FOR SOLUTION INTRAVENOUS at 06:05

## 2024-03-23 RX ADMIN — DEXMEDETOMIDINE 0.9 MCG/KG/HR: 100 INJECTION, SOLUTION, CONCENTRATE INTRAVENOUS at 13:23

## 2024-03-23 RX ADMIN — INSULIN LISPRO 2 UNITS: 100 INJECTION, SOLUTION INTRAVENOUS; SUBCUTANEOUS at 09:07

## 2024-03-23 RX ADMIN — ALBUTEROL SULFATE 2.5 MG: 2.5 SOLUTION RESPIRATORY (INHALATION) at 20:34

## 2024-03-23 RX ADMIN — FUROSEMIDE 40 MG: 10 INJECTION, SOLUTION INTRAMUSCULAR; INTRAVENOUS at 21:15

## 2024-03-23 RX ADMIN — APIXABAN 5 MG: 5 TABLET, FILM COATED ORAL at 21:15

## 2024-03-23 RX ADMIN — BUSPIRONE HYDROCHLORIDE 5 MG: 10 TABLET ORAL at 08:02

## 2024-03-23 RX ADMIN — ALBUTEROL SULFATE 2.5 MG: 2.5 SOLUTION RESPIRATORY (INHALATION) at 07:16

## 2024-03-23 RX ADMIN — FUROSEMIDE 40 MG: 10 INJECTION, SOLUTION INTRAMUSCULAR; INTRAVENOUS at 08:02

## 2024-03-23 RX ADMIN — SODIUM CHLORIDE, PRESERVATIVE FREE 10 ML: 5 INJECTION INTRAVENOUS at 21:15

## 2024-03-23 RX ADMIN — POTASSIUM CHLORIDE 10 MEQ: 7.46 INJECTION, SOLUTION INTRAVENOUS at 11:19

## 2024-03-23 RX ADMIN — INSULIN LISPRO 2 UNITS: 100 INJECTION, SOLUTION INTRAVENOUS; SUBCUTANEOUS at 13:23

## 2024-03-23 RX ADMIN — DEXMEDETOMIDINE 1.4 MCG/KG/HR: 100 INJECTION, SOLUTION, CONCENTRATE INTRAVENOUS at 03:06

## 2024-03-23 RX ADMIN — DIPHENHYDRAMINE HYDROCHLORIDE 25 MG: 50 INJECTION INTRAMUSCULAR; INTRAVENOUS at 23:59

## 2024-03-23 RX ADMIN — SODIUM CHLORIDE, PRESERVATIVE FREE 10 ML: 5 INJECTION INTRAVENOUS at 08:50

## 2024-03-23 RX ADMIN — ALBUTEROL SULFATE 2.5 MG: 2.5 SOLUTION RESPIRATORY (INHALATION) at 11:23

## 2024-03-23 RX ADMIN — FAMOTIDINE 20 MG: 10 INJECTION, SOLUTION INTRAVENOUS at 08:01

## 2024-03-23 RX ADMIN — BUSPIRONE HYDROCHLORIDE 5 MG: 10 TABLET ORAL at 21:15

## 2024-03-23 RX ADMIN — ASPIRIN 81 MG: 81 TABLET, CHEWABLE ORAL at 08:02

## 2024-03-23 RX ADMIN — ONDANSETRON 4 MG: 2 INJECTION INTRAMUSCULAR; INTRAVENOUS at 11:44

## 2024-03-23 ASSESSMENT — PAIN SCALES - GENERAL: PAINLEVEL_OUTOF10: 0

## 2024-03-23 NOTE — THERAPY EVALUATION
ACUTE OCCUPATIONAL THERAPY GOALS:   (Developed with and agreed upon by patient and/or caregiver.)  (1.) Vivien Zambrano  will complete lower body bathing and dressing with INDEPENDENCE and adaptive equipment as needed.    (2.) Vivien Zambrano will complete toileting with INDEPENDENCE.  (3.) Vivien Zambrano will tolerate 25 minutes of OT treatment with 2-3 rest breaks to increase activity tolerance for ADLs.   (4.) Vivien Zambrano will complete functional transfers with INDEPENDENCE and adaptive equipment as needed.   (5.) Vivien Zambrano will complete functional ADL task standing at sink INDEPENDENCE and adaptive equipment as needed.     Timeframe: 7 visits       OCCUPATIONAL THERAPY Initial Assessment and Daily Note       OT Visit Days: 1  Acknowledge Orders  Time  OT Charge Capture  Rehab Caseload Tracker      Vivien Zambrano is a 83 y.o. female   PRIMARY DIAGNOSIS: Septic shock (HCC)  Septicemia (HCC) [A41.9]  Septic shock (HCC) [A41.9, R65.21]  Pneumonia of both lungs due to infectious organism, unspecified part of lung [J18.9]  Acute hypoxic respiratory failure (HCC) [J96.01]       Reason for Referral: Generalized Muscle Weakness (M62.81)  Other lack of cordination (R27.8)  Difficulty in walking, Not elsewhere classified (R26.2)  Inpatient: Payor: MEDICARE / Plan: MEDICARE PART A AND B / Product Type: *No Product type* /     ASSESSMENT:     REHAB RECOMMENDATIONS:   Recommendation to date pending progress:  Setting:  Short-term Rehab    Equipment:    To Be Determined     ASSESSMENT:  Ms. Zambrano is a 82 y/o female who presents with septic shock and acute hypoxic respiratory failure. PMHx of chronic pain in LLE/back due to her sciatica. At baseline pt lives with her daughter and son in law, is independent with ADLs however d/t her sciatica pain getting some assist with ADLs and is typically sponge bathing, and uses a rollator for mobility. Also reports

## 2024-03-24 LAB
ALBUMIN SERPL-MCNC: 2.4 G/DL (ref 3.2–4.6)
ALBUMIN/GLOB SERPL: 0.6 (ref 0.4–1.6)
ALP SERPL-CCNC: 57 U/L (ref 50–136)
ALT SERPL-CCNC: 57 U/L (ref 12–65)
ANION GAP SERPL CALC-SCNC: 8 MMOL/L (ref 2–11)
AST SERPL-CCNC: 69 U/L (ref 15–37)
B PERT DNA SPEC QL NAA+PROBE: NOT DETECTED
BASOPHILS # BLD: 0 K/UL (ref 0–0.2)
BASOPHILS NFR BLD: 0 % (ref 0–2)
BILIRUB SERPL-MCNC: 0.6 MG/DL (ref 0.2–1.1)
BORDETELLA PARAPERTUSSIS BY PCR: NOT DETECTED
BUN SERPL-MCNC: 15 MG/DL (ref 8–23)
C PNEUM DNA SPEC QL NAA+PROBE: NOT DETECTED
CALCIUM SERPL-MCNC: 9.1 MG/DL (ref 8.3–10.4)
CHLORIDE SERPL-SCNC: 99 MMOL/L (ref 103–113)
CO2 SERPL-SCNC: 32 MMOL/L (ref 21–32)
CREAT SERPL-MCNC: 0.7 MG/DL (ref 0.6–1)
DIFFERENTIAL METHOD BLD: NORMAL
EOSINOPHIL # BLD: 0.2 K/UL (ref 0–0.8)
EOSINOPHIL NFR BLD: 2 % (ref 0.5–7.8)
ERYTHROCYTE [DISTWIDTH] IN BLOOD BY AUTOMATED COUNT: 13.1 % (ref 11.9–14.6)
FLUAV SUBTYP SPEC NAA+PROBE: NOT DETECTED
FLUBV RNA SPEC QL NAA+PROBE: NOT DETECTED
GLOBULIN SER CALC-MCNC: 4 G/DL (ref 2.8–4.5)
GLUCOSE BLD STRIP.AUTO-MCNC: 168 MG/DL (ref 65–100)
GLUCOSE BLD STRIP.AUTO-MCNC: 189 MG/DL (ref 65–100)
GLUCOSE BLD STRIP.AUTO-MCNC: 252 MG/DL (ref 65–100)
GLUCOSE BLD STRIP.AUTO-MCNC: 261 MG/DL (ref 65–100)
GLUCOSE SERPL-MCNC: 190 MG/DL (ref 65–100)
HADV DNA SPEC QL NAA+PROBE: NOT DETECTED
HCOV 229E RNA SPEC QL NAA+PROBE: NOT DETECTED
HCOV HKU1 RNA SPEC QL NAA+PROBE: NOT DETECTED
HCOV NL63 RNA SPEC QL NAA+PROBE: NOT DETECTED
HCOV OC43 RNA SPEC QL NAA+PROBE: NOT DETECTED
HCT VFR BLD AUTO: 35.9 % (ref 35.8–46.3)
HGB BLD-MCNC: 11.8 G/DL (ref 11.7–15.4)
HMPV RNA SPEC QL NAA+PROBE: NOT DETECTED
HPIV1 RNA SPEC QL NAA+PROBE: NOT DETECTED
HPIV2 RNA SPEC QL NAA+PROBE: NOT DETECTED
HPIV3 RNA SPEC QL NAA+PROBE: NOT DETECTED
HPIV4 RNA SPEC QL NAA+PROBE: NOT DETECTED
IMM GRANULOCYTES # BLD AUTO: 0.1 K/UL (ref 0–0.5)
IMM GRANULOCYTES NFR BLD AUTO: 1 % (ref 0–5)
LYMPHOCYTES # BLD: 1.5 K/UL (ref 0.5–4.6)
LYMPHOCYTES NFR BLD: 15 % (ref 13–44)
M PNEUMO DNA SPEC QL NAA+PROBE: NOT DETECTED
MAGNESIUM SERPL-MCNC: 1.6 MG/DL (ref 1.8–2.4)
MCH RBC QN AUTO: 28.2 PG (ref 26.1–32.9)
MCHC RBC AUTO-ENTMCNC: 32.9 G/DL (ref 31.4–35)
MCV RBC AUTO: 85.7 FL (ref 82–102)
MONOCYTES # BLD: 1.2 K/UL (ref 0.1–1.3)
MONOCYTES NFR BLD: 12 % (ref 4–12)
NEUTS SEG # BLD: 7.1 K/UL (ref 1.7–8.2)
NEUTS SEG NFR BLD: 70 % (ref 43–78)
NRBC # BLD: 0 K/UL (ref 0–0.2)
NT PRO BNP: 5365 PG/ML
PLATELET # BLD AUTO: 226 K/UL (ref 150–450)
PMV BLD AUTO: 10.2 FL (ref 9.4–12.3)
POTASSIUM SERPL-SCNC: 2.7 MMOL/L (ref 3.5–5.1)
POTASSIUM SERPL-SCNC: 3.7 MMOL/L (ref 3.5–5.1)
PROCALCITONIN SERPL-MCNC: 0.37 NG/ML (ref 0–0.49)
PROT SERPL-MCNC: 6.4 G/DL (ref 6.3–8.2)
RBC # BLD AUTO: 4.19 M/UL (ref 4.05–5.2)
RSV RNA SPEC QL NAA+PROBE: NOT DETECTED
RV+EV RNA SPEC QL NAA+PROBE: NOT DETECTED
SARS-COV-2 RNA RESP QL NAA+PROBE: NOT DETECTED
SERVICE CMNT-IMP: ABNORMAL
SODIUM SERPL-SCNC: 139 MMOL/L (ref 136–146)
WBC # BLD AUTO: 10.1 K/UL (ref 4.3–11.1)

## 2024-03-24 PROCEDURE — 6360000002 HC RX W HCPCS: Performed by: STUDENT IN AN ORGANIZED HEALTH CARE EDUCATION/TRAINING PROGRAM

## 2024-03-24 PROCEDURE — 2580000003 HC RX 258: Performed by: EMERGENCY MEDICINE

## 2024-03-24 PROCEDURE — 83735 ASSAY OF MAGNESIUM: CPT

## 2024-03-24 PROCEDURE — 1100000003 HC PRIVATE W/ TELEMETRY

## 2024-03-24 PROCEDURE — 85025 COMPLETE CBC W/AUTO DIFF WBC: CPT

## 2024-03-24 PROCEDURE — 82962 GLUCOSE BLOOD TEST: CPT

## 2024-03-24 PROCEDURE — 6360000002 HC RX W HCPCS: Performed by: EMERGENCY MEDICINE

## 2024-03-24 PROCEDURE — 94761 N-INVAS EAR/PLS OXIMETRY MLT: CPT

## 2024-03-24 PROCEDURE — 99232 SBSQ HOSP IP/OBS MODERATE 35: CPT | Performed by: INTERNAL MEDICINE

## 2024-03-24 PROCEDURE — 6360000002 HC RX W HCPCS

## 2024-03-24 PROCEDURE — 6360000002 HC RX W HCPCS: Performed by: INTERNAL MEDICINE

## 2024-03-24 PROCEDURE — 80053 COMPREHEN METABOLIC PANEL: CPT

## 2024-03-24 PROCEDURE — 2700000000 HC OXYGEN THERAPY PER DAY

## 2024-03-24 PROCEDURE — A4216 STERILE WATER/SALINE, 10 ML: HCPCS | Performed by: EMERGENCY MEDICINE

## 2024-03-24 PROCEDURE — 0202U NFCT DS 22 TRGT SARS-COV-2: CPT

## 2024-03-24 PROCEDURE — 83880 ASSAY OF NATRIURETIC PEPTIDE: CPT

## 2024-03-24 PROCEDURE — 36415 COLL VENOUS BLD VENIPUNCTURE: CPT

## 2024-03-24 PROCEDURE — 94640 AIRWAY INHALATION TREATMENT: CPT

## 2024-03-24 PROCEDURE — 2500000003 HC RX 250 WO HCPCS: Performed by: EMERGENCY MEDICINE

## 2024-03-24 PROCEDURE — 6370000000 HC RX 637 (ALT 250 FOR IP): Performed by: NURSE PRACTITIONER

## 2024-03-24 PROCEDURE — 6370000000 HC RX 637 (ALT 250 FOR IP): Performed by: EMERGENCY MEDICINE

## 2024-03-24 PROCEDURE — 84145 PROCALCITONIN (PCT): CPT

## 2024-03-24 PROCEDURE — 6370000000 HC RX 637 (ALT 250 FOR IP): Performed by: STUDENT IN AN ORGANIZED HEALTH CARE EDUCATION/TRAINING PROGRAM

## 2024-03-24 PROCEDURE — 6370000000 HC RX 637 (ALT 250 FOR IP): Performed by: INTERNAL MEDICINE

## 2024-03-24 PROCEDURE — 84132 ASSAY OF SERUM POTASSIUM: CPT

## 2024-03-24 PROCEDURE — 94660 CPAP INITIATION&MGMT: CPT

## 2024-03-24 RX ORDER — POTASSIUM CHLORIDE 20 MEQ/1
40 TABLET, EXTENDED RELEASE ORAL
Status: DISCONTINUED | OUTPATIENT
Start: 2024-03-25 | End: 2024-03-26 | Stop reason: HOSPADM

## 2024-03-24 RX ORDER — OLANZAPINE 5 MG/1
5 TABLET ORAL ONCE
Status: COMPLETED | OUTPATIENT
Start: 2024-03-24 | End: 2024-03-24

## 2024-03-24 RX ORDER — SODIUM CHLORIDE AND POTASSIUM CHLORIDE 300; 900 MG/100ML; MG/100ML
INJECTION, SOLUTION INTRAVENOUS CONTINUOUS
Status: DISCONTINUED | OUTPATIENT
Start: 2024-03-24 | End: 2024-03-24

## 2024-03-24 RX ORDER — GABAPENTIN 100 MG/1
100 CAPSULE ORAL 3 TIMES DAILY
Status: DISCONTINUED | OUTPATIENT
Start: 2024-03-24 | End: 2024-03-26 | Stop reason: HOSPADM

## 2024-03-24 RX ORDER — AMLODIPINE BESYLATE 10 MG/1
10 TABLET ORAL DAILY
Status: DISCONTINUED | OUTPATIENT
Start: 2024-03-24 | End: 2024-03-26 | Stop reason: HOSPADM

## 2024-03-24 RX ORDER — LANOLIN ALCOHOL/MO/W.PET/CERES
3 CREAM (GRAM) TOPICAL NIGHTLY PRN
Status: DISCONTINUED | OUTPATIENT
Start: 2024-03-24 | End: 2024-03-26 | Stop reason: HOSPADM

## 2024-03-24 RX ORDER — OLANZAPINE 5 MG/1
5 TABLET ORAL ONCE
Status: CANCELLED | OUTPATIENT
Start: 2024-03-24

## 2024-03-24 RX ORDER — LANOLIN ALCOHOL/MO/W.PET/CERES
400 CREAM (GRAM) TOPICAL DAILY
Status: COMPLETED | OUTPATIENT
Start: 2024-03-24 | End: 2024-03-26

## 2024-03-24 RX ORDER — POTASSIUM CHLORIDE 7.45 MG/ML
10 INJECTION INTRAVENOUS
Status: COMPLETED | OUTPATIENT
Start: 2024-03-24 | End: 2024-03-24

## 2024-03-24 RX ORDER — POTASSIUM CHLORIDE 20 MEQ/1
40 TABLET, EXTENDED RELEASE ORAL 2 TIMES DAILY WITH MEALS
Status: COMPLETED | OUTPATIENT
Start: 2024-03-24 | End: 2024-03-24

## 2024-03-24 RX ADMIN — BUSPIRONE HYDROCHLORIDE 5 MG: 10 TABLET ORAL at 08:25

## 2024-03-24 RX ADMIN — POTASSIUM CHLORIDE 10 MEQ: 7.46 INJECTION, SOLUTION INTRAVENOUS at 10:11

## 2024-03-24 RX ADMIN — FUROSEMIDE 40 MG: 10 INJECTION, SOLUTION INTRAMUSCULAR; INTRAVENOUS at 21:15

## 2024-03-24 RX ADMIN — DOXYCYCLINE 100 MG: 100 INJECTION, POWDER, LYOPHILIZED, FOR SOLUTION INTRAVENOUS at 05:34

## 2024-03-24 RX ADMIN — POTASSIUM CHLORIDE 10 MEQ: 7.46 INJECTION, SOLUTION INTRAVENOUS at 06:21

## 2024-03-24 RX ADMIN — ALBUTEROL SULFATE 2.5 MG: 2.5 SOLUTION RESPIRATORY (INHALATION) at 12:01

## 2024-03-24 RX ADMIN — METOPROLOL TARTRATE 25 MG: 25 TABLET, FILM COATED ORAL at 08:25

## 2024-03-24 RX ADMIN — METOPROLOL TARTRATE 25 MG: 25 TABLET, FILM COATED ORAL at 21:15

## 2024-03-24 RX ADMIN — BUSPIRONE HYDROCHLORIDE 5 MG: 10 TABLET ORAL at 14:45

## 2024-03-24 RX ADMIN — FUROSEMIDE 40 MG: 10 INJECTION, SOLUTION INTRAMUSCULAR; INTRAVENOUS at 08:26

## 2024-03-24 RX ADMIN — POTASSIUM CHLORIDE 10 MEQ: 7.46 INJECTION, SOLUTION INTRAVENOUS at 05:13

## 2024-03-24 RX ADMIN — POTASSIUM CHLORIDE 40 MEQ: 1500 TABLET, EXTENDED RELEASE ORAL at 18:07

## 2024-03-24 RX ADMIN — POTASSIUM CHLORIDE 40 MEQ: 1500 TABLET, EXTENDED RELEASE ORAL at 08:24

## 2024-03-24 RX ADMIN — OLANZAPINE 5 MG: 5 TABLET, FILM COATED ORAL at 02:30

## 2024-03-24 RX ADMIN — BUSPIRONE HYDROCHLORIDE 5 MG: 10 TABLET ORAL at 21:15

## 2024-03-24 RX ADMIN — POTASSIUM CHLORIDE 10 MEQ: 7.46 INJECTION, SOLUTION INTRAVENOUS at 13:05

## 2024-03-24 RX ADMIN — ASPIRIN 81 MG: 81 TABLET, CHEWABLE ORAL at 08:25

## 2024-03-24 RX ADMIN — CEFTRIAXONE SODIUM 2000 MG: 2 INJECTION, POWDER, FOR SOLUTION INTRAMUSCULAR; INTRAVENOUS at 08:23

## 2024-03-24 RX ADMIN — ALBUTEROL SULFATE 2.5 MG: 2.5 SOLUTION RESPIRATORY (INHALATION) at 07:58

## 2024-03-24 RX ADMIN — POTASSIUM CHLORIDE 10 MEQ: 7.46 INJECTION, SOLUTION INTRAVENOUS at 11:27

## 2024-03-24 RX ADMIN — ALBUTEROL SULFATE 2.5 MG: 2.5 SOLUTION RESPIRATORY (INHALATION) at 20:16

## 2024-03-24 RX ADMIN — SODIUM CHLORIDE, PRESERVATIVE FREE 5 ML: 5 INJECTION INTRAVENOUS at 08:27

## 2024-03-24 RX ADMIN — APIXABAN 5 MG: 5 TABLET, FILM COATED ORAL at 21:15

## 2024-03-24 RX ADMIN — MAGNESIUM GLUCONATE 500 MG ORAL TABLET 400 MG: 500 TABLET ORAL at 08:25

## 2024-03-24 RX ADMIN — INSULIN LISPRO 4 UNITS: 100 INJECTION, SOLUTION INTRAVENOUS; SUBCUTANEOUS at 18:07

## 2024-03-24 RX ADMIN — ALBUTEROL SULFATE 2.5 MG: 2.5 SOLUTION RESPIRATORY (INHALATION) at 15:41

## 2024-03-24 RX ADMIN — AMIODARONE HYDROCHLORIDE 200 MG: 200 TABLET ORAL at 08:25

## 2024-03-24 RX ADMIN — SODIUM CHLORIDE, PRESERVATIVE FREE 10 ML: 5 INJECTION INTRAVENOUS at 21:16

## 2024-03-24 RX ADMIN — DOXYCYCLINE 100 MG: 100 INJECTION, POWDER, LYOPHILIZED, FOR SOLUTION INTRAVENOUS at 18:38

## 2024-03-24 RX ADMIN — ONDANSETRON 4 MG: 2 INJECTION INTRAMUSCULAR; INTRAVENOUS at 11:15

## 2024-03-24 RX ADMIN — AMLODIPINE BESYLATE 10 MG: 10 TABLET ORAL at 11:15

## 2024-03-24 RX ADMIN — ACETAMINOPHEN 650 MG: 325 TABLET ORAL at 01:30

## 2024-03-24 RX ADMIN — APIXABAN 5 MG: 5 TABLET, FILM COATED ORAL at 08:25

## 2024-03-24 RX ADMIN — Medication 3 MG: at 21:21

## 2024-03-24 RX ADMIN — FAMOTIDINE 20 MG: 10 INJECTION, SOLUTION INTRAVENOUS at 08:26

## 2024-03-24 ASSESSMENT — PAIN DESCRIPTION - LOCATION
LOCATION: GENERALIZED
LOCATION: LEG
LOCATION: ARM

## 2024-03-24 ASSESSMENT — PAIN DESCRIPTION - DESCRIPTORS
DESCRIPTORS: PINS AND NEEDLES
DESCRIPTORS: PINS AND NEEDLES
DESCRIPTORS: SORE

## 2024-03-24 ASSESSMENT — PAIN SCALES - GENERAL
PAINLEVEL_OUTOF10: 3
PAINLEVEL_OUTOF10: 0
PAINLEVEL_OUTOF10: 8
PAINLEVEL_OUTOF10: 2

## 2024-03-24 ASSESSMENT — PAIN DESCRIPTION - ORIENTATION
ORIENTATION: LEFT
ORIENTATION: RIGHT;LEFT

## 2024-03-24 NOTE — ICUWATCH
RRT Clinical Rounding Nurse Update    Vitals:    03/24/24 0014 03/24/24 0309 03/24/24 0402 03/24/24 0531   BP:   (!) 157/50    Pulse: 71 74 73 71   Resp: (!) 33 26 20 20   Temp:   99.1 °F (37.3 °C)    TempSrc:       SpO2: 98% 97% 99% 100%   Weight:       Height:            DETERIORATION INDEX SCORE: 37    ASSESSMENT:  Previous outreach assessment was reviewed. There have been no significant changes since previous assessment. Off Bipap, on Airvo 60L/45%. VSS.    PLAN:  Will follow per RRT Clinical Rounding Program protocol.    Teofilo Barker RN  [facility]: [phone]  [facility]: [phone]

## 2024-03-24 NOTE — ICUWATCH
RRT Clinical Rounding Nurse Progress Report      SUBJECTIVE: Patient assessed secondary to transfer from critical care.      Vitals:    03/23/24 1700 03/23/24 1730 03/23/24 1800 03/23/24 2014   BP: 133/64  (!) 145/65 (!) 132/45   Pulse: 62 63 62 74   Resp: 16 12 21 20   Temp:    98.1 °F (36.7 °C)   TempSrc:       SpO2: 95% 96% 95% 97%   Weight:       Height:            DETERIORATION INDEX SCORE: 34    ASSESSMENT:  Pt is resting in bed, alert and oriented to self, place and time. Currently on Bipap at 50%. Unlabored breathing with clear lung sounds, diminished in the bases. Pt denies CP, SOB or dizziness. Cap refill < 3 sec. VSS. Pt has no additional concerns or complaints at this time.    Pertinent lab work, vital signs and progress notes from today have been reviewed.    PLAN:  Will follow per RRT Clinical Rounding Program protocol.    Teofilo Barker RN  [facility]: [phone]  [facility]: [phone]

## 2024-03-24 NOTE — ICUWATCH
RRT Clinical Rounding Nurse Update    Vitals:    03/24/24 1202 03/24/24 1234 03/24/24 1541 03/24/24 1547   BP:  132/67  (!) 136/108   Pulse: 79 81 91 91   Resp: 18  18 18   Temp:  98.1 °F (36.7 °C)  98.4 °F (36.9 °C)   TempSrc:  Oral  Oral   SpO2: 98% 98% 97% 98%   Weight:       Height:            DETERIORATION INDEX SCORE: 30    ASSESSMENT:  Previous outreach assessment was reviewed.  Patient now on 4L NC, resting comfortably.Family at bedside.    PLAN:  Will discharge from RRT Clinical Rounding Program per protocol. Please call if needed.    Lyndsey Oates RN  [facility]: [phone]  [facility]: [phone]

## 2024-03-24 NOTE — CONSULTS
[facility] Hospitalist Consult   Admit Date:  3/21/2024 12:50 AM   Name:  Vivien Zambrano   Age:  83 y.o.  Sex:  female  :  1940   MRN:  [id number]   Room:  [facility]/    Presenting/Chief Complaint: Shortness of Breath    Reason(s) for Admission: Septicemia (HCC) [A41.9]  Septic shock (HCC) [A41.9, R65.21]  Pneumonia of both lungs due to infectious organism, unspecified part of lung [J18.9]  Acute hypoxic respiratory failure (HCC) [J96.01]     Hospitalists consulted by Andrews Lisa MD for: assuming care.     History of Presenting Illness:    83 y.o. female with CAD s/p CABG, HTN, DM2, PAF on Eliquis who presents with SOB from home.  She has been wheezing for couple days, states her breathing got worse tonight.  She went to [facility] yesterday afternoon, was evaluated for flank pain.  She was diagnosed with \"possible pyelonephritis\", was given Rocephin in the ER.  She was prescribed another antibiotic which she did not get filled.  She states she went home and her breathing got bad and she started having some chest pain thus she came here for evaluation.     She had some nonspecific EKG changes, cardiology was consulted by phone without intervention.  She was initially placed on NC 4L, O2 sat slowly dropped with increased WOB. She eventually required BIPAP, ABG 7.21/45.7/97/18 on BIPAP. Chest x-ray showed pneumonia and she was given Zosyn and Vancomycin. White count 17.9, BNP 2685, troponin 60.5, lactate 4.1, COVID negative. Her BP initially was in the 100/60 range, gradually decreased to 90/50s. She was given NS 1L bolus, did not receive a full 30cc/kg bolus due to concerns for volume overload. She was able to be taken off BIPAP onto Airvo 60L/85% maintaining sats of 95%. [facility] was consulted for her respiratory failure and septic shock.  Hospitalist was consulted for assuming care.      Assessment & Plan:   Acute hypoxic respiratory failure  Currently patient is on Airvo  Not on any home

## 2024-03-24 NOTE — ICUWATCH
RRT Clinical Rounding Nurse Update    Vitals:    03/24/24 0756 03/24/24 0759 03/24/24 1202 03/24/24 1234   BP: (!) 155/89   132/67   Pulse: 86  79 81   Resp: 18  18    Temp: 98.1 °F (36.7 °C)   98.1 °F (36.7 °C)   TempSrc: Oral   Oral   SpO2: 99% 96% 98% 98%   Weight:       Height:            DETERIORATION INDEX SCORE: 31    ASSESSMENT:  Previous outreach assessment was reviewed.  Patient now on airvo, being weaned- current setting 25L/45%. Bilat lung sounds diminished. Alert and oriented, eating lunch. States she feels much better. Vital signs, recent results, and progress notes reviewed.     PLAN:  Will follow per RRT Clinical Rounding Program protocol.    Lyndsey Oates RN  [facility]: [phone]  [facility]: [phone]

## 2024-03-25 LAB
ALBUMIN SERPL-MCNC: 2.4 G/DL (ref 3.2–4.6)
ALBUMIN/GLOB SERPL: 0.6 (ref 0.4–1.6)
ALP SERPL-CCNC: 61 U/L (ref 50–136)
ALT SERPL-CCNC: 45 U/L (ref 12–65)
ANION GAP SERPL CALC-SCNC: 5 MMOL/L (ref 2–11)
AST SERPL-CCNC: 28 U/L (ref 15–37)
BASOPHILS # BLD: 0.1 K/UL (ref 0–0.2)
BASOPHILS NFR BLD: 1 % (ref 0–2)
BILIRUB SERPL-MCNC: 0.4 MG/DL (ref 0.2–1.1)
BUN SERPL-MCNC: 16 MG/DL (ref 8–23)
CALCIUM SERPL-MCNC: 9.4 MG/DL (ref 8.3–10.4)
CHLORIDE SERPL-SCNC: 103 MMOL/L (ref 103–113)
CO2 SERPL-SCNC: 32 MMOL/L (ref 21–32)
CREAT SERPL-MCNC: 0.8 MG/DL (ref 0.6–1)
DIFFERENTIAL METHOD BLD: ABNORMAL
EOSINOPHIL # BLD: 0.2 K/UL (ref 0–0.8)
EOSINOPHIL NFR BLD: 3 % (ref 0.5–7.8)
ERYTHROCYTE [DISTWIDTH] IN BLOOD BY AUTOMATED COUNT: 13.5 % (ref 11.9–14.6)
EST. AVERAGE GLUCOSE BLD GHB EST-MCNC: 160 MG/DL
GLOBULIN SER CALC-MCNC: 3.8 G/DL (ref 2.8–4.5)
GLUCOSE BLD STRIP.AUTO-MCNC: 223 MG/DL (ref 65–100)
GLUCOSE BLD STRIP.AUTO-MCNC: 233 MG/DL (ref 65–100)
GLUCOSE BLD STRIP.AUTO-MCNC: 256 MG/DL (ref 65–100)
GLUCOSE BLD STRIP.AUTO-MCNC: 335 MG/DL (ref 65–100)
GLUCOSE BLD STRIP.AUTO-MCNC: 352 MG/DL (ref 65–100)
GLUCOSE SERPL-MCNC: 264 MG/DL (ref 65–100)
HBA1C MFR BLD: 7.2 % (ref 4.8–5.6)
HCT VFR BLD AUTO: 35.4 % (ref 35.8–46.3)
HGB BLD-MCNC: 11.4 G/DL (ref 11.7–15.4)
IMM GRANULOCYTES # BLD AUTO: 0.2 K/UL (ref 0–0.5)
IMM GRANULOCYTES NFR BLD AUTO: 2 % (ref 0–5)
LYMPHOCYTES # BLD: 1.4 K/UL (ref 0.5–4.6)
LYMPHOCYTES NFR BLD: 15 % (ref 13–44)
MCH RBC QN AUTO: 28.6 PG (ref 26.1–32.9)
MCHC RBC AUTO-ENTMCNC: 32.2 G/DL (ref 31.4–35)
MCV RBC AUTO: 88.7 FL (ref 82–102)
MONOCYTES # BLD: 1.3 K/UL (ref 0.1–1.3)
MONOCYTES NFR BLD: 14 % (ref 4–12)
NEUTS SEG # BLD: 5.9 K/UL (ref 1.7–8.2)
NEUTS SEG NFR BLD: 65 % (ref 43–78)
NRBC # BLD: 0 K/UL (ref 0–0.2)
PLATELET # BLD AUTO: 262 K/UL (ref 150–450)
PMV BLD AUTO: 10.3 FL (ref 9.4–12.3)
POTASSIUM SERPL-SCNC: 4.1 MMOL/L (ref 3.5–5.1)
PROT SERPL-MCNC: 6.2 G/DL (ref 6.3–8.2)
RBC # BLD AUTO: 3.99 M/UL (ref 4.05–5.2)
SERVICE CMNT-IMP: ABNORMAL
SODIUM SERPL-SCNC: 140 MMOL/L (ref 136–146)
WBC # BLD AUTO: 9 K/UL (ref 4.3–11.1)

## 2024-03-25 PROCEDURE — 82962 GLUCOSE BLOOD TEST: CPT

## 2024-03-25 PROCEDURE — 2700000000 HC OXYGEN THERAPY PER DAY

## 2024-03-25 PROCEDURE — 2500000003 HC RX 250 WO HCPCS: Performed by: EMERGENCY MEDICINE

## 2024-03-25 PROCEDURE — 1100000003 HC PRIVATE W/ TELEMETRY

## 2024-03-25 PROCEDURE — 80053 COMPREHEN METABOLIC PANEL: CPT

## 2024-03-25 PROCEDURE — 6370000000 HC RX 637 (ALT 250 FOR IP): Performed by: STUDENT IN AN ORGANIZED HEALTH CARE EDUCATION/TRAINING PROGRAM

## 2024-03-25 PROCEDURE — 2580000003 HC RX 258: Performed by: EMERGENCY MEDICINE

## 2024-03-25 PROCEDURE — 6370000000 HC RX 637 (ALT 250 FOR IP): Performed by: EMERGENCY MEDICINE

## 2024-03-25 PROCEDURE — 6370000000 HC RX 637 (ALT 250 FOR IP): Performed by: INTERNAL MEDICINE

## 2024-03-25 PROCEDURE — 97530 THERAPEUTIC ACTIVITIES: CPT

## 2024-03-25 PROCEDURE — 36415 COLL VENOUS BLD VENIPUNCTURE: CPT

## 2024-03-25 PROCEDURE — 83036 HEMOGLOBIN GLYCOSYLATED A1C: CPT

## 2024-03-25 PROCEDURE — 94760 N-INVAS EAR/PLS OXIMETRY 1: CPT

## 2024-03-25 PROCEDURE — 6360000002 HC RX W HCPCS: Performed by: EMERGENCY MEDICINE

## 2024-03-25 PROCEDURE — 6360000002 HC RX W HCPCS: Performed by: INTERNAL MEDICINE

## 2024-03-25 PROCEDURE — 99232 SBSQ HOSP IP/OBS MODERATE 35: CPT | Performed by: INTERNAL MEDICINE

## 2024-03-25 PROCEDURE — 6370000000 HC RX 637 (ALT 250 FOR IP): Performed by: NURSE PRACTITIONER

## 2024-03-25 PROCEDURE — 94640 AIRWAY INHALATION TREATMENT: CPT

## 2024-03-25 PROCEDURE — 85025 COMPLETE CBC W/AUTO DIFF WBC: CPT

## 2024-03-25 RX ORDER — INSULIN LISPRO 100 [IU]/ML
4 INJECTION, SOLUTION INTRAVENOUS; SUBCUTANEOUS
Status: DISCONTINUED | OUTPATIENT
Start: 2024-03-25 | End: 2024-03-26 | Stop reason: HOSPADM

## 2024-03-25 RX ORDER — INSULIN LISPRO 100 [IU]/ML
2 INJECTION, SOLUTION INTRAVENOUS; SUBCUTANEOUS
Status: DISCONTINUED | OUTPATIENT
Start: 2024-03-25 | End: 2024-03-25

## 2024-03-25 RX ADMIN — APIXABAN 5 MG: 5 TABLET, FILM COATED ORAL at 21:44

## 2024-03-25 RX ADMIN — BUSPIRONE HYDROCHLORIDE 5 MG: 10 TABLET ORAL at 16:28

## 2024-03-25 RX ADMIN — FUROSEMIDE 40 MG: 10 INJECTION, SOLUTION INTRAMUSCULAR; INTRAVENOUS at 08:39

## 2024-03-25 RX ADMIN — AMLODIPINE BESYLATE 10 MG: 10 TABLET ORAL at 08:38

## 2024-03-25 RX ADMIN — INSULIN LISPRO 2 UNITS: 100 INJECTION, SOLUTION INTRAVENOUS; SUBCUTANEOUS at 08:35

## 2024-03-25 RX ADMIN — BUSPIRONE HYDROCHLORIDE 5 MG: 10 TABLET ORAL at 08:38

## 2024-03-25 RX ADMIN — SODIUM CHLORIDE, PRESERVATIVE FREE 10 ML: 5 INJECTION INTRAVENOUS at 08:00

## 2024-03-25 RX ADMIN — ACETAMINOPHEN 650 MG: 325 TABLET ORAL at 06:09

## 2024-03-25 RX ADMIN — APIXABAN 5 MG: 5 TABLET, FILM COATED ORAL at 08:38

## 2024-03-25 RX ADMIN — DOXYCYCLINE 100 MG: 100 INJECTION, POWDER, LYOPHILIZED, FOR SOLUTION INTRAVENOUS at 06:08

## 2024-03-25 RX ADMIN — BUSPIRONE HYDROCHLORIDE 5 MG: 10 TABLET ORAL at 21:44

## 2024-03-25 RX ADMIN — POTASSIUM CHLORIDE 40 MEQ: 1500 TABLET, EXTENDED RELEASE ORAL at 08:39

## 2024-03-25 RX ADMIN — CEFTRIAXONE SODIUM 2000 MG: 2 INJECTION, POWDER, FOR SOLUTION INTRAMUSCULAR; INTRAVENOUS at 08:50

## 2024-03-25 RX ADMIN — SODIUM CHLORIDE, PRESERVATIVE FREE 10 ML: 5 INJECTION INTRAVENOUS at 21:52

## 2024-03-25 RX ADMIN — INSULIN LISPRO 2 UNITS: 100 INJECTION, SOLUTION INTRAVENOUS; SUBCUTANEOUS at 11:48

## 2024-03-25 RX ADMIN — AMIODARONE HYDROCHLORIDE 200 MG: 200 TABLET ORAL at 08:38

## 2024-03-25 RX ADMIN — ASPIRIN 81 MG: 81 TABLET, CHEWABLE ORAL at 08:38

## 2024-03-25 RX ADMIN — METOPROLOL TARTRATE 25 MG: 25 TABLET, FILM COATED ORAL at 21:44

## 2024-03-25 RX ADMIN — FUROSEMIDE 40 MG: 10 INJECTION, SOLUTION INTRAMUSCULAR; INTRAVENOUS at 21:39

## 2024-03-25 RX ADMIN — ALBUTEROL SULFATE 2.5 MG: 2.5 SOLUTION RESPIRATORY (INHALATION) at 22:04

## 2024-03-25 RX ADMIN — INSULIN LISPRO 6 UNITS: 100 INJECTION, SOLUTION INTRAVENOUS; SUBCUTANEOUS at 11:48

## 2024-03-25 RX ADMIN — MAGNESIUM GLUCONATE 500 MG ORAL TABLET 400 MG: 500 TABLET ORAL at 08:38

## 2024-03-25 RX ADMIN — ALBUTEROL SULFATE 2.5 MG: 2.5 SOLUTION RESPIRATORY (INHALATION) at 07:59

## 2024-03-25 RX ADMIN — DOXYCYCLINE 100 MG: 100 INJECTION, POWDER, LYOPHILIZED, FOR SOLUTION INTRAVENOUS at 16:55

## 2024-03-25 RX ADMIN — ALBUTEROL SULFATE 2.5 MG: 2.5 SOLUTION RESPIRATORY (INHALATION) at 11:24

## 2024-03-25 RX ADMIN — INSULIN LISPRO 2 UNITS: 100 INJECTION, SOLUTION INTRAVENOUS; SUBCUTANEOUS at 17:07

## 2024-03-25 RX ADMIN — INSULIN LISPRO 4 UNITS: 100 INJECTION, SOLUTION INTRAVENOUS; SUBCUTANEOUS at 17:06

## 2024-03-25 RX ADMIN — INSULIN LISPRO 2 UNITS: 100 INJECTION, SOLUTION INTRAVENOUS; SUBCUTANEOUS at 08:36

## 2024-03-25 RX ADMIN — METOPROLOL TARTRATE 25 MG: 25 TABLET, FILM COATED ORAL at 08:38

## 2024-03-25 ASSESSMENT — PAIN SCALES - GENERAL
PAINLEVEL_OUTOF10: 3
PAINLEVEL_OUTOF10: 2
PAINLEVEL_OUTOF10: 8

## 2024-03-25 ASSESSMENT — PAIN DESCRIPTION - DESCRIPTORS
DESCRIPTORS: PINS AND NEEDLES
DESCRIPTORS: ACHING

## 2024-03-25 ASSESSMENT — PAIN DESCRIPTION - ORIENTATION: ORIENTATION: INNER

## 2024-03-25 ASSESSMENT — PAIN DESCRIPTION - LOCATION: LOCATION: HEAD

## 2024-03-26 ENCOUNTER — APPOINTMENT (OUTPATIENT)
Dept: GENERAL RADIOLOGY | Age: 84
DRG: 871 | End: 2024-03-26
Payer: MEDICARE

## 2024-03-26 ENCOUNTER — HOME HEALTH ADMISSION (OUTPATIENT)
Dept: HOME HEALTH SERVICES | Facility: HOME HEALTH | Age: 84
End: 2024-03-26
Payer: MEDICARE

## 2024-03-26 VITALS
HEIGHT: 63 IN | TEMPERATURE: 98.4 F | WEIGHT: 169.8 LBS | HEART RATE: 83 BPM | SYSTOLIC BLOOD PRESSURE: 131 MMHG | BODY MASS INDEX: 30.09 KG/M2 | RESPIRATION RATE: 19 BRPM | OXYGEN SATURATION: 93 % | DIASTOLIC BLOOD PRESSURE: 62 MMHG

## 2024-03-26 LAB
ALBUMIN SERPL-MCNC: 2.6 G/DL (ref 3.2–4.6)
ALBUMIN/GLOB SERPL: 0.7 (ref 0.4–1.6)
ALP SERPL-CCNC: 66 U/L (ref 50–136)
ALT SERPL-CCNC: 34 U/L (ref 12–65)
ANION GAP SERPL CALC-SCNC: 6 MMOL/L (ref 2–11)
AST SERPL-CCNC: 17 U/L (ref 15–37)
BACTERIA SPEC CULT: NORMAL
BACTERIA SPEC CULT: NORMAL
BASOPHILS # BLD: 0.1 K/UL (ref 0–0.2)
BASOPHILS NFR BLD: 1 % (ref 0–2)
BILIRUB SERPL-MCNC: 0.4 MG/DL (ref 0.2–1.1)
BUN SERPL-MCNC: 18 MG/DL (ref 8–23)
CALCIUM SERPL-MCNC: 9.5 MG/DL (ref 8.3–10.4)
CHLORIDE SERPL-SCNC: 100 MMOL/L (ref 103–113)
CO2 SERPL-SCNC: 33 MMOL/L (ref 21–32)
CREAT SERPL-MCNC: 0.9 MG/DL (ref 0.6–1)
DIFFERENTIAL METHOD BLD: ABNORMAL
EOSINOPHIL # BLD: 0.4 K/UL (ref 0–0.8)
EOSINOPHIL NFR BLD: 4 % (ref 0.5–7.8)
ERYTHROCYTE [DISTWIDTH] IN BLOOD BY AUTOMATED COUNT: 13.6 % (ref 11.9–14.6)
GLOBULIN SER CALC-MCNC: 3.8 G/DL (ref 2.8–4.5)
GLUCOSE BLD STRIP.AUTO-MCNC: 251 MG/DL (ref 65–100)
GLUCOSE BLD STRIP.AUTO-MCNC: 341 MG/DL (ref 65–100)
GLUCOSE SERPL-MCNC: 267 MG/DL (ref 65–100)
HCT VFR BLD AUTO: 34.7 % (ref 35.8–46.3)
HGB BLD-MCNC: 11 G/DL (ref 11.7–15.4)
IMM GRANULOCYTES # BLD AUTO: 0.3 K/UL (ref 0–0.5)
IMM GRANULOCYTES NFR BLD AUTO: 3 % (ref 0–5)
LYMPHOCYTES # BLD: 1.7 K/UL (ref 0.5–4.6)
LYMPHOCYTES NFR BLD: 19 % (ref 13–44)
MCH RBC QN AUTO: 28.1 PG (ref 26.1–32.9)
MCHC RBC AUTO-ENTMCNC: 31.7 G/DL (ref 31.4–35)
MCV RBC AUTO: 88.7 FL (ref 82–102)
MONOCYTES # BLD: 1.3 K/UL (ref 0.1–1.3)
MONOCYTES NFR BLD: 14 % (ref 4–12)
NEUTS SEG # BLD: 5.4 K/UL (ref 1.7–8.2)
NEUTS SEG NFR BLD: 59 % (ref 43–78)
NRBC # BLD: 0 K/UL (ref 0–0.2)
PLATELET # BLD AUTO: 262 K/UL (ref 150–450)
PMV BLD AUTO: 10.2 FL (ref 9.4–12.3)
POTASSIUM SERPL-SCNC: 3.9 MMOL/L (ref 3.5–5.1)
PROT SERPL-MCNC: 6.4 G/DL (ref 6.3–8.2)
RBC # BLD AUTO: 3.91 M/UL (ref 4.05–5.2)
SERVICE CMNT-IMP: ABNORMAL
SERVICE CMNT-IMP: ABNORMAL
SERVICE CMNT-IMP: NORMAL
SERVICE CMNT-IMP: NORMAL
SODIUM SERPL-SCNC: 139 MMOL/L (ref 136–146)
WBC # BLD AUTO: 9 K/UL (ref 4.3–11.1)

## 2024-03-26 PROCEDURE — 82962 GLUCOSE BLOOD TEST: CPT

## 2024-03-26 PROCEDURE — 85025 COMPLETE CBC W/AUTO DIFF WBC: CPT

## 2024-03-26 PROCEDURE — 36415 COLL VENOUS BLD VENIPUNCTURE: CPT

## 2024-03-26 PROCEDURE — 71045 X-RAY EXAM CHEST 1 VIEW: CPT

## 2024-03-26 PROCEDURE — 6370000000 HC RX 637 (ALT 250 FOR IP): Performed by: STUDENT IN AN ORGANIZED HEALTH CARE EDUCATION/TRAINING PROGRAM

## 2024-03-26 PROCEDURE — 94640 AIRWAY INHALATION TREATMENT: CPT

## 2024-03-26 PROCEDURE — 6370000000 HC RX 637 (ALT 250 FOR IP): Performed by: EMERGENCY MEDICINE

## 2024-03-26 PROCEDURE — 2580000003 HC RX 258: Performed by: EMERGENCY MEDICINE

## 2024-03-26 PROCEDURE — 6370000000 HC RX 637 (ALT 250 FOR IP): Performed by: NURSE PRACTITIONER

## 2024-03-26 PROCEDURE — 2500000003 HC RX 250 WO HCPCS: Performed by: EMERGENCY MEDICINE

## 2024-03-26 PROCEDURE — 80053 COMPREHEN METABOLIC PANEL: CPT

## 2024-03-26 PROCEDURE — 6360000002 HC RX W HCPCS: Performed by: INTERNAL MEDICINE

## 2024-03-26 PROCEDURE — 97535 SELF CARE MNGMENT TRAINING: CPT

## 2024-03-26 PROCEDURE — 97530 THERAPEUTIC ACTIVITIES: CPT

## 2024-03-26 PROCEDURE — 99232 SBSQ HOSP IP/OBS MODERATE 35: CPT | Performed by: INTERNAL MEDICINE

## 2024-03-26 PROCEDURE — 6370000000 HC RX 637 (ALT 250 FOR IP): Performed by: INTERNAL MEDICINE

## 2024-03-26 PROCEDURE — 6360000002 HC RX W HCPCS: Performed by: EMERGENCY MEDICINE

## 2024-03-26 RX ORDER — CEFUROXIME AXETIL 250 MG/1
250 TABLET ORAL 2 TIMES DAILY
Qty: 14 TABLET | Refills: 0 | Status: SHIPPED | OUTPATIENT
Start: 2024-03-26 | End: 2024-04-02

## 2024-03-26 RX ORDER — GLUCAGON 1 MG/ML
1 KIT INJECTION PRN
Status: DISCONTINUED | OUTPATIENT
Start: 2024-03-26 | End: 2024-03-26 | Stop reason: HOSPADM

## 2024-03-26 RX ORDER — DOXYCYCLINE HYCLATE 100 MG
100 TABLET ORAL 2 TIMES DAILY
Qty: 10 TABLET | Refills: 0 | Status: SHIPPED | OUTPATIENT
Start: 2024-03-26 | End: 2024-03-31

## 2024-03-26 RX ORDER — INSULIN GLARGINE 100 [IU]/ML
10 INJECTION, SOLUTION SUBCUTANEOUS DAILY
Status: DISCONTINUED | OUTPATIENT
Start: 2024-03-26 | End: 2024-03-26 | Stop reason: HOSPADM

## 2024-03-26 RX ORDER — FUROSEMIDE 20 MG/1
20 TABLET ORAL DAILY
Qty: 30 TABLET | Refills: 0 | Status: SHIPPED | OUTPATIENT
Start: 2024-03-26 | End: 2024-04-25

## 2024-03-26 RX ORDER — DEXTROSE MONOHYDRATE 100 MG/ML
INJECTION, SOLUTION INTRAVENOUS CONTINUOUS PRN
Status: DISCONTINUED | OUTPATIENT
Start: 2024-03-26 | End: 2024-03-26 | Stop reason: HOSPADM

## 2024-03-26 RX ADMIN — INSULIN LISPRO 4 UNITS: 100 INJECTION, SOLUTION INTRAVENOUS; SUBCUTANEOUS at 08:04

## 2024-03-26 RX ADMIN — MAGNESIUM GLUCONATE 500 MG ORAL TABLET 400 MG: 500 TABLET ORAL at 07:59

## 2024-03-26 RX ADMIN — AMLODIPINE BESYLATE 10 MG: 10 TABLET ORAL at 07:58

## 2024-03-26 RX ADMIN — APIXABAN 5 MG: 5 TABLET, FILM COATED ORAL at 07:59

## 2024-03-26 RX ADMIN — ALBUTEROL SULFATE 2.5 MG: 2.5 SOLUTION RESPIRATORY (INHALATION) at 07:53

## 2024-03-26 RX ADMIN — ASPIRIN 81 MG: 81 TABLET, CHEWABLE ORAL at 08:00

## 2024-03-26 RX ADMIN — INSULIN GLARGINE 10 UNITS: 100 INJECTION, SOLUTION SUBCUTANEOUS at 08:05

## 2024-03-26 RX ADMIN — DOXYCYCLINE 100 MG: 100 INJECTION, POWDER, LYOPHILIZED, FOR SOLUTION INTRAVENOUS at 06:05

## 2024-03-26 RX ADMIN — BUSPIRONE HYDROCHLORIDE 5 MG: 10 TABLET ORAL at 07:59

## 2024-03-26 RX ADMIN — CEFTRIAXONE SODIUM 2000 MG: 2 INJECTION, POWDER, FOR SOLUTION INTRAMUSCULAR; INTRAVENOUS at 08:17

## 2024-03-26 RX ADMIN — INSULIN LISPRO 4 UNITS: 100 INJECTION, SOLUTION INTRAVENOUS; SUBCUTANEOUS at 08:05

## 2024-03-26 RX ADMIN — SODIUM CHLORIDE, PRESERVATIVE FREE 10 ML: 5 INJECTION INTRAVENOUS at 08:09

## 2024-03-26 RX ADMIN — INSULIN LISPRO 4 UNITS: 100 INJECTION, SOLUTION INTRAVENOUS; SUBCUTANEOUS at 12:10

## 2024-03-26 RX ADMIN — FUROSEMIDE 40 MG: 10 INJECTION, SOLUTION INTRAMUSCULAR; INTRAVENOUS at 08:06

## 2024-03-26 RX ADMIN — POTASSIUM CHLORIDE 40 MEQ: 1500 TABLET, EXTENDED RELEASE ORAL at 07:58

## 2024-03-26 RX ADMIN — INSULIN LISPRO 6 UNITS: 100 INJECTION, SOLUTION INTRAVENOUS; SUBCUTANEOUS at 12:10

## 2024-03-26 RX ADMIN — AMIODARONE HYDROCHLORIDE 200 MG: 200 TABLET ORAL at 07:59

## 2024-03-26 RX ADMIN — METOPROLOL TARTRATE 25 MG: 25 TABLET, FILM COATED ORAL at 07:59

## 2024-03-26 ASSESSMENT — PAIN SCALES - GENERAL: PAINLEVEL_OUTOF10: 0

## 2024-03-26 NOTE — DIABETES MGMT
Patient admitted with septic shock. Admit blood glucose 145.  HgbA1C 7.2 (eAG 160).  Blood glucose ranged 223-352 yesterday with patient receiving Humalog 18 units. Blood glucose this morning was 251. Creatinine 0.90. GFR 64. Reviewed patient current regimen: Humalog correctional insulin and Humalog 2 units with meals.  Patient would likely benefit from initiation of low dose basal insulin as fasting glucose is not at goal.  Provider updated via [employer] regarding recommendations and patient glycemic control.  New order received for Lantus 10 units daily.    Update 1215:  Patient seen for assessment regarding diabetes management. Patient in wheelchair ready for discharge.  Patient has a past medical history of DM, HTN, PAF, HLD, s/p CABG, PAD, CAD, carotid stenosis, back pain. Patient states they do have a working glucometer with supplies at home. Per patient they typically check blood glucose levels one to two times a day. Per patient their blood glucose levels have been running 100s at home. Patient states they are currently taking Metformin 500 mg BID at home for management of diabetes. Patient states usually have higher blood glucose levels after having steroid injections in back for pain, but blood sugar usually come down after injections.  Discussed current A1C and significance.  Discussed ADA blood glucose and A1C target goals.  Patient states has never used insulin at home. Encouraged patient to continue to work on lifestyle modifications and to follow up with primary care provider (Bradford Alanis). Patient verbalized understanding and voices no further questions regarding diabetes management.

## 2024-03-27 ENCOUNTER — HOME CARE VISIT (OUTPATIENT)
Dept: SCHEDULING | Facility: HOME HEALTH | Age: 84
End: 2024-03-27

## 2024-03-27 ENCOUNTER — TELEPHONE (OUTPATIENT)
Dept: FAMILY MEDICINE CLINIC | Facility: CLINIC | Age: 84
End: 2024-03-27

## 2024-03-27 ENCOUNTER — CARE COORDINATION (OUTPATIENT)
Dept: CARE COORDINATION | Facility: CLINIC | Age: 84
End: 2024-03-27

## 2024-03-27 VITALS
HEART RATE: 80 BPM | OXYGEN SATURATION: 92 % | SYSTOLIC BLOOD PRESSURE: 142 MMHG | RESPIRATION RATE: 18 BRPM | DIASTOLIC BLOOD PRESSURE: 84 MMHG | TEMPERATURE: 98.2 F

## 2024-03-27 PROCEDURE — G0299 HHS/HOSPICE OF RN EA 15 MIN: HCPCS

## 2024-03-27 PROCEDURE — 0221000100 HH NO PAY CLAIM PROCEDURE

## 2024-03-27 ASSESSMENT — ENCOUNTER SYMPTOMS
STOOL DESCRIPTION: SOFT FORMED
CONTUSION: 1
DYSPNEA ACTIVITY LEVEL: AFTER AMBULATING 10 - 20 FT

## 2024-03-27 NOTE — TELEPHONE ENCOUNTER
Jj with [facility] said they will be seeing pt 3 times a week for the next couple weeks and then 2 x a week.    Also wanted to make Dr. Alanis aware of a medication interaction with metoprolol and amiodarone.    [phone]

## 2024-03-27 NOTE — CARE COORDINATION
administration of home medications. Medications reviewed, 1111F entered: yes    Was patient discharged with a pulse oximeter? no    Non-face-to-face services provided:  Scheduled appointment with PCP-on 4/2/24 at 3 pm  Scheduled appointment with Specialist-Pulmonary -office to call with appointment for follow up  Obtained and reviewed discharge summary and/or continuity of care documents  Education of patient/family/caregiver/guardian to support self-management-symptoms management, medication management, self monitoring and when to seek care, and follow up appointments. Patient has good support from her daughter and JOANN. Family is able to monitor BP, HR, FSBS and oxygen saturation at home. Family has contacts for providers if needs arise. Family plans to call Cardiology for follow up. Patient was discharge with [facility] and soc is scheduled for today per family.    Offered patient enrollment in the Remote Patient Monitoring (RPM) program for in-home monitoring: family/Patient declined.    Care Transitions 24 Hour Call    Schedule Follow Up Appointment with PCP: Completed  Do you have a copy of your discharge instructions?: Yes  Do you have all of your prescriptions and are they filled?: Yes  Have you been contacted by a [facility] Pharmacist?: No  Have you scheduled your follow up appointment?: Yes  How are you going to get to your appointment?: Car - family or friend to transport  Do you have support at home?: Other Caregiver, Child  Do you feel like you have everything you need to keep you well at home?: Yes  Are you an active caregiver in your home?: No  Care Transitions Interventions     Discussed follow-up appointments. If no appointment was previously scheduled, appointment scheduling offered: Yes.   Is follow up appointment scheduled within 7 days of discharge? Yes.    Follow Up  Future Appointments   Date Time Provider Department Center   4/2/2024  3:00 PM Bradford Alanis MD [facility]   5/9/2024  3:20 PM Bradford Alanis

## 2024-03-28 NOTE — TELEPHONE ENCOUNTER
LVM on Jj's number approving orders to see patient 3 x's a week for the next couple of weeks then 2 x's a week thereafter. Advised to contact Cardiologist regarding medication interaction with Metoprolol and Amiodarone. Advised to return call if he had any questions or concerns.

## 2024-03-28 NOTE — PROGRESS NOTES
Vivien Zambrano  Admission Date: 3/21/2024         Daily Progress Note: 3/25/2024    The patient's chart is reviewed and the patient is discussed with the staff.    Background:  83 y.o. female with CAD s/p CABG, HTN, DM2, PAF on Eliquis who presents with SOB from home.  She went to [facility] and diagnosed with \"possible pyelonephritis\", was given Rocephin in the ER.  She was prescribed another antibiotic which she did not get filled.  She states she went home +  chest pain thus she came here for evaluation.  She had some nonspecific EKG changes, cardiology was consulted by phone without intervention.  She was initially placed on NC 4L, O2 sat slowly dropped with increased WOB. She eventually required BIPAP, ABG 7.21/45.7/97/18 on BIPAP. Chest x-ray showed pneumonia and she was given Zosyn and Vancomycin. White count 17.9, BNP 2685, troponin 60.5, lactate 4.1, COVID negative. + shock. She was given NS 1L bolus, did not receive a full 30cc/kg bolus due to concerns for volume overload. She was able to be taken off BIPAP onto Airvo 60L/85% maintaining sats of 95%. [facility] was consulted for her respiratory failure and septic shock. Improved, transferred to the floor. Has not been out of bed, uses wheelchair at home. RVP negative. No sputum checked.  Subjective:     Now on 2-3 lpm with sat 97%.  Planning to go home soon, does not want rehab.       Current Facility-Administered Medications   Medication Dose Route Frequency    insulin lispro (HUMALOG) injection vial 2 Units  2 Units SubCUTAneous TID     potassium chloride (KLOR-CON M) extended release tablet 40 mEq  40 mEq Oral Daily with breakfast    magnesium oxide (MAG-OX) tablet 400 mg  400 mg Oral Daily    melatonin tablet 3 mg  3 mg Oral Nightly PRN    amLODIPine (NORVASC) tablet 10 mg  10 mg Oral Daily    gabapentin (NEURONTIN) capsule 100 mg  100 mg Oral TID    potassium chloride (KLOR-CON M) extended release tablet 40 mEq  40 mEq 
          A follow up visit was made to the patient. Emotional support, spiritual presence and   prayer were provided for the patient. She was alert, receptive and engaging in conversation. She had two family members present.      Sai Tobias MDIV, [facility]    
   03/21/24 1846   NIV Type   NIV Started/Stopped On   Equipment Type V60   Mode Bilevel   Mask Type Full face mask   Mask Size Small   Assessment   Level of Consciousness 0   Comfort Level Fair   Using Accessory Muscles Yes   Mask Compliance Good   Skin Assessment Clean, dry, & intact   Breath Sounds   Breath Sounds Bilateral Diminished;Fine crackles   Settings/Measurements   PIP Observed 23 cm H20   IPAP 18 cmH20   CPAP/EPAP 8 cmH2O   Vt (Measured) 606 mL   Rate Ordered 18   Insp Rise Time (%) 3 %   FiO2  90 %   I Time/ I Time % 0.85 s   Minute Volume (L/min) 22.4 Liters   Mask Leak (lpm) 0 lpm   Patient's Home Machine No   Alarm Settings   Alarms On Y   Low Pressure (cmH2O) 5 cmH2O   High Pressure (cmH2O) 35 cmH2O   Apnea (secs) 20 secs   RR Low (bpm) 8   RR High (bpm) 50 br/min   Patient Observation   Observations TOTAL rr 37       
   03/22/24 2040   NIV Type   Equipment Type v60   Mode Bilevel   Mask Type Full face mask   Assessment   Respirations 27   Comfort Level Good   Using Accessory Muscles No   Mask Compliance Good   Skin Assessment Clean, dry, & intact   Settings/Measurements   PIP Observed 19 cm H20   IPAP 16 cmH20   CPAP/EPAP 8 cmH2O   Vt (Measured) 553 mL   Rate Ordered 18   Insp Rise Time (%) 3 %   FiO2  60 %   I Time/ I Time % 0.85 s   Minute Volume (L/min) 14.9 Liters   Mask Leak (lpm) 0 lpm   Patient's Home Machine No   Alarm Settings   Alarms On Y   Low Pressure (cmH2O) 5 cmH2O   High Pressure (cmH2O) 35 cmH2O   Apnea (secs) 20 secs   RR Low (bpm) 8   RR High (bpm) 50 br/min       
   03/23/24 2210   NIV Type   NIV Started/Stopped On   Equipment Type V60   Mode Bilevel   Mask Type Full face mask   Mask Size Small   Assessment   Pulse 72   Respirations 29   SpO2 98 %   Comfort Level Good   Using Accessory Muscles No   Mask Compliance Good   Skin Assessment Clean, dry, & intact   Settings/Measurements   PIP Observed 18 cm H20   IPAP 16 cmH20   CPAP/EPAP 8 cmH2O   Vt (Measured) 418 mL   Rate Ordered 18   Insp Rise Time (%) 3 %   FiO2  50 %   I Time/ I Time % 0.85 s   Minute Volume (L/min) 12.3 Liters   Mask Leak (lpm) 0 lpm   Patient's Home Machine No   Alarm Settings   Alarms On Y   Low Pressure (cmH2O) 5 cmH2O   High Pressure (cmH2O) 35 cmH2O   Apnea (secs) 20 secs   RR Low (bpm) 8   RR High (bpm) 50 br/min       
   03/26/24 1022   Resting (Room Air)   SpO2 95      During Walk (Room Air)   SpO2 95      After Walk   SpO2 96      Does the Patient Qualify for Home O2 No   Does the Patient Need Portable Oxygen Tanks No       
  Physician Progress Note      PATIENT:               FRANDY DANIEL  CSN #:                  [id number]  :                       1940  ADMIT DATE:       3/21/2024 12:50 AM  DISCH DATE:        3/26/2024 12:34 PM  RESPONDING  PROVIDER #:        Sri Licona MD          QUERY TEXT:    Pt admitted with sepsis. Pt noted to have hypervolemia. If possible, please   document in the progress notes and discharge summary if you are evaluating   and/or treating any of the following:    The medical record reflects the following:  Risk Factors:  83 y.o. female with with CAD s/p CABG, HTN, DM2, PAF on Eliquis   admitted to the ICU for pneumonia with septic shock and hypoxic respiratory   failure.  Clinical Indicators: CXR: Extensive bilateral pulmonary opacities again noted   may represent edema and/or pneumonia  3/24 Pulmonology note- Hypervolemia \"Normal EF per echo \"  3/25 Pulmonology note \"Bnp high and volume overload suspected\"  Pro-BNP 2,685, 5,365  Treatment: IV lasix    Thank you,  Roxanna Tripp RN, BSN, CDI  [email or web address]  .  Options provided:  -- Noncardiogenic acute pulmonary edema due to, Please specify.  -- Other - I will add my own diagnosis  -- Disagree - Not applicable / Not valid  -- Disagree - Clinically unable to determine / Unknown  -- Refer to Clinical Documentation Reviewer    PROVIDER RESPONSE TEXT:    This patient has noncardiogenic acute pulmonary edema due to chf    Query created by: Roxanna Tripp on 3/26/2024 12:59 PM      Electronically signed by:  Sri Licona MD 3/28/2024 4:22 PM          
  Physician Progress Note      PATIENT:               FRANDY DANIEL  [facility] #:                  [id number]  :                       1940  ADMIT DATE:       3/21/2024 12:50 AM  DISCH DATE:  RESPONDING  PROVIDER #:        Sri Licona MD          QUERY TEXT:    Patient admitted with sepsis, noted to have paroxysmal atrial fibrillation and   is maintained on eliquis. If possible, please document in progress notes and   discharge summary if you are evaluating and/or treating any of the following:    The medical record reflects the following:  Risk Factors: 83 y.o. female, HTN, DM  Clinical Indicators: Paroxysmal atrial fibrillation  Treatment: Eliquis  Options provided:  -- Secondary hypercoagulable state in a patient with atrial fibrillation  -- Other - I will add my own diagnosis  -- Disagree - Not applicable / Not valid  -- Disagree - Clinically unable to determine / Unknown  -- Refer to Clinical Documentation Reviewer    PROVIDER RESPONSE TEXT:    This patient has secondary hypercoagulable state in a patient with atrial   fibrillation.    Query created by: Roxanna Tripp on 3/24/2024 6:55 PM      Electronically signed by:  Sri Licona MD 3/25/2024 7:12 AM          
  Terry Low/[facility] Critical Care Note:: 3/22/2024  Vivien Zambrano  Admission Date: 3/21/2024     Length of Stay: 1 days    Background: 83 y.o. female with CAD s/p CABG, HTN, DM2, PAF on Eliquis who presents with SOB from home.  She has been wheezing for couple days, states her breathing got worse tonight.  She went to [facility] yesterday afternoon, was evaluated for flank pain.  She was diagnosed with \"possible pyelonephritis\", was given Rocephin in the ER.  She was prescribed another antibiotic which she did not get filled.  She states she went home and her breathing got bad and she started having some chest pain thus she came here for evaluation.     She had some nonspecific EKG changes, cardiology was consulted by phone without intervention.  She was initially placed on NC 4L, O2 sat slowly dropped with increased WOB. She eventually required BIPAP, ABG 7.21/45.7/97/18 on BIPAP. Chest x-ray showed pneumonia and she was given Zosyn and Vancomycin. White count 17.9, BNP 2685, troponin 60.5, lactate 4.1, COVID negative. Her BP initially was in the 100/60 range, gradually decreased to 90/50s. She was given NS 1L bolus, did not receive a full 30cc/kg bolus due to concerns for volume overload. She was able to be taken off BIPAP onto Airvo 60L/85% maintaining sats of 95%. [facility] was consulted for her respiratory failure and septic shock.    Notable PMH:  has a past medical history of Acute respiratory failure with hypoxia ([facility]), Arrhythmia, Arthritis, CAD (coronary artery disease), Calculus of kidney, Carotid stenosis, Coronary atherosclerosis of native coronary artery, Diabetes ([facility]), Essential hypertension, GERD (gastroesophageal reflux disease), HLD (hyperlipidemia), Insomnia, Myocardial infarction due to demand ischemia ([facility]), Respiratory distress, Right ureteral stone, Sepsis ([facility]), and Subclavian artery stenosis ([facility]).    24 Hour events:     Currently on BiPAP at 18/8 cm with FiO2 80%.  
0600: report received from Betsy REARDON.   0618: Pt arrived up from ER, Bathed with CHG, new electrodes and pulse ox applied new gown and purewick placed.   Admission assessment preformed, pt passes swallow eval.  0700: Report given to Awilda REARDON    
ACUTE OCCUPATIONAL THERAPY GOALS:   (Developed with and agreed upon by patient and/or caregiver.)  (1.) Vivien Zambrano  will complete lower body bathing and dressing with INDEPENDENCE and adaptive equipment as needed.    (2.) Vivien Zambrano will complete toileting with INDEPENDENCE.  (3.) Vivien Zambrano will tolerate 25 minutes of OT treatment with 2-3 rest breaks to increase activity tolerance for ADLs.   (4.) Vivien Zambrano will complete functional transfers with INDEPENDENCE and adaptive equipment as needed.   (5.) Vivien Zambrano will complete functional ADL task standing at sink INDEPENDENCE and adaptive equipment as needed.    OCCUPATIONAL THERAPY: Daily Note AM   OT Visit Days: 2   Time In/Out  OT Charge Capture  Rehab Caseload Tracker  OT Orders    Vivien Zambrano is a 83 y.o. female   PRIMARY DIAGNOSIS: Septic shock (HCC)  Septicemia (HCC) [A41.9]  Septic shock (HCC) [A41.9, R65.21]  Pneumonia of both lungs due to infectious organism, unspecified part of lung [J18.9]  Acute hypoxic respiratory failure (HCC) [J96.01]       Inpatient: Payor: MEDICARE / Plan: MEDICARE PART A AND B / Product Type: *No Product type* /     ASSESSMENT:     REHAB RECOMMENDATIONS:   Recommendation to date pending progress:  Setting:  Home Health Therapy    Equipment:    To Be Determined     ASSESSMENT:  Ms. Zambrano presents with decreased activity tolerance and decreased independence with mobility from her baseline. She was in supine upon arrival and agreeable to treatment but wanted to take it slow today. Pt participated in all mobility with overall min a. She declined self care this session d/t wanting to take it slow today because she has not been up in a while. Pt was able to walk to the door and back to prepare for HH distances. Good effort. Continue POC.       SUBJECTIVE:     Ms. Zambrano states, \"Let's just take it slow today.\"     Social/Functional Lives With: 
ACUTE PHYSICAL THERAPY GOALS:   (Developed with and agreed upon by patient and/or caregiver.)  LTG:  (1.)Ms. Zambrano will move from supine to sit and sit to supine , scoot up and down, and roll side to side in bed with MODIFIED INDEPENDENCE within 7 treatment day(s).    (2.)Ms. Zambrano will transfer from bed to chair and chair to bed with SUPERVISION using the least restrictive device within 7 treatment day(s).    (3.)Ms. Zambrano will ambulate with SUPERVISION for 250 feet with the least restrictive device within 7 treatment day(s).  (4.)Ms. Zambrano will participate in therapeutic activity/exercises x 25 minutes for increased activity tolerance with SpO2 above 90% within 7 treatment days.    PHYSICAL THERAPY: Daily Note AM   (Link to Caseload Tracking: PT Visit Days : 2  Time In/Out PT Charge Capture  Rehab Caseload Tracker  Orders    Vivien Zambrano is a 83 y.o. female   PRIMARY DIAGNOSIS: Septic shock (HCC)  Septicemia (HCC) [A41.9]  Septic shock (HCC) [A41.9, R65.21]  Pneumonia of both lungs due to infectious organism, unspecified part of lung [J18.9]  Acute hypoxic respiratory failure (HCC) [J96.01]       Inpatient: Payor: MEDICARE / Plan: MEDICARE PART A AND B / Product Type: *No Product type* /     ASSESSMENT:     REHAB RECOMMENDATIONS:   Recommendation to date pending progress:  Setting:  Home Health Therapy    Equipment:    To Be Determined     ASSESSMENT:  Ms. Zambrano was sitting up in recliner chair upon contact and agreeable to PT. Patient transferred to standing with CGA and cues for hand placement/technique. Patient then ambulated 75' x 2 with rolling walker, CGA and cues for sequencing with rolling walker/pursed lipped breathing. Patient took a seated rest break in between ambulation bouts. Patient returned to her room and participated in prolonged static/dynamic standing balance activities at the sink during functional tasks demonstrating fair+ static standing balance and fair dynamic standing balance. 
Initial visit made to patient and a prayer was provided. The patient appeared to be resting.    Sai Tobias MDIV, [facility]   
Patient has orders to remove grayson however patient has refused several times through out the day  
Patient is awake and alert.  Patient remains on BiPAP, and states she continues to feel SOB.  Chest x-ray ordered, and patient to remain on BiPAP pending results.     03/22/24 0815   NIV Type   Equipment Type v60   Mode Bilevel   Mask Type Full face mask   Mask Size Medium   Breath Sounds   Breath Sounds Bilateral Diminished;Rhonchi   Settings/Measurements   PIP Observed 19 cm H20   IPAP 18 cmH20   CPAP/EPAP 8 cmH2O   Vt (Measured) 684 mL   Rate Ordered 33   FiO2  80 %   Minute Volume (L/min) 21.9 Liters   Mask Leak (lpm) 0 lpm   Patient's Home Machine No   Alarm Settings   Alarms On Y   Low Pressure (cmH2O) 5 cmH2O   High Pressure (cmH2O) 35 cmH2O   Delay Alarm 20 sec(s)   Apnea (secs) 20 secs   RR Low (bpm) 8   RR High (bpm) 50 br/min       
Patient placed on V60.  Alarms are activated and nurse has been notified. Documentation completed.        03/21/24 0255   NIV Type   $NIV $Daily Charge   NIV Started/Stopped On   Equipment Type V60   Mode Bilevel   Mask Type Full face mask   Mask Size Small   Bonnet size Small   Assessment   Pulse (!) 102   Respirations (!) 31   SpO2 96 %   Using Accessory Muscles Yes   Mask Compliance Good   Skin Assessment Clean, dry, & intact   Skin Protection for O2 Device N/A   Settings/Measurements   PIP Observed 19 cm H20   IPAP 16 cmH20   CPAP/EPAP 8 cmH2O   Vt (Measured) 491 mL   Rate Ordered 16   Insp Rise Time (%) 3 %   FiO2  100 %   I Time/ I Time % 0.9 s   Minute Volume (L/min) 17.1 Liters   Mask Leak (lpm) 0 lpm   Patient's Home Machine No   Alarm Settings   Alarms On Y   Low Pressure (cmH2O) 5 cmH2O   High Pressure (cmH2O) 35 cmH2O   Apnea (secs) 20 secs   RR Low (bpm) 8   RR High (bpm) 50 br/min       
Patient unable to ambulate to do six minute walk.  
Pt desatting on Airvo 60L/60% to 84%.  Patient states she is feeling SOB  WI=[id number].  Increased Airvo to 60L/85%; SpO2 increased to 88%.  Patient continues to complain of SOB.  Patient returned to BiPAP.  RN at bedside.  SpO2 increased to 93%.    03/22/24 1540   NIV Type   NIV Started/Stopped On   Equipment Type V60   Mode Bilevel   Mask Type Full face mask   Mask Size Small   Assessment   Respirations (!) 34   SpO2 92 %   Comfort Level Good   Using Accessory Muscles Yes   Mask Compliance Good   Settings/Measurements   PIP Observed 20 cm H20   IPAP 18 cmH20   CPAP/EPAP 10 cmH2O   Vt (Measured) 658 mL   Rate Ordered 18   FiO2  60 %   Minute Volume (L/min) 16.9 Liters   Mask Leak (lpm) 0 lpm   Patient's Home Machine No   Alarm Settings   Alarms On Y   Low Pressure (cmH2O) 5 cmH2O   High Pressure (cmH2O) 35 cmH2O   Delay Alarm 20 sec(s)   Apnea (secs) 20 secs   RR Low (bpm) 8   RR High (bpm) 50 br/min       
Pt education about DM and discharge instructions completed. Instructed pt to complete total duration of antibiotic. IV removed. Answered all questions, no further complaints or questions at this time. Pt wheeled out via wheelchair to personal car to home.   
Pt requesting grayson to be removed. New order to remove grayson from Charli Evans MD.     Grayson removed @ [id number], see Avatar.  
Shift Note  22 March 2024 (PM)    Precedex gtt was helpful overnight - pt tolerated BiPAP but remains very anxious at baseline, and SpO2 decreases rapidly with any movement / when Pt removes BiPAP.     Lab Results   Component Value Date/Time     03/23/2024 04:01 AM    K 2.7 03/23/2024 04:01 AM     03/23/2024 04:01 AM    CO2 29 03/23/2024 04:01 AM    BUN 15 03/23/2024 04:01 AM    CREATININE 0.70 03/23/2024 04:01 AM    GLUCOSE 236 03/23/2024 04:01 AM    CALCIUM 9.0 03/23/2024 04:01 AM    LABGLOM >60 03/23/2024 04:01 AM      Lab Results   Component Value Date/Time    MG 1.7 03/23/2024 04:01 AM     Lab Results   Component Value Date    WBC 10.7 03/23/2024    HGB 11.8 03/23/2024    HCT 36.2 03/23/2024    MCV 86.6 03/23/2024     03/23/2024     AM labs reviewed (above). Electrolytes replaced according to ordered protocol - first 10 mEq of Potassium infusing now, will need 5 additional bags of potassium for a total replacement of 60 mEq.    I/O this shift:  In: 328 [I.V.:328]  Out: 1625 [Urine:1625]   
TRANSFER - IN REPORT:    Verbal report received from CARON Jackson  nurse on Vivien Zambrano  being received from ED for routine progression of patient care      Report consisted of patient's Situation, Background, Assessment and   Recommendations(SBAR).     Information from the following report(s) Nurse Handoff Report was reviewed with the receiving nurse.    Opportunity for questions and clarification was provided.      Assessment completed upon patient's arrival to unit and care assumed.     
Therapy Note:  Therapist participated in ICU/CCU IDT rounds and believe patient is currently functioning below baseline functional mobility/ADL performance.  Patient could benefit from skilled therapy services when MD deems medically appropriate.  Thank you,  Lyndsey Light, PT     
Generalized weakness observed grossly   Balance []  Good-fair sitting and fair standing   Posture [] N/A   Sensation []     Coordination []      Tone []     Edema []    Activity Tolerance []  Fatigued quickly with transfers    []      COGNITION/  PERCEPTION: Intact Impaired (Comments):   Orientation [x]     Vision []     Hearing [x]     Cognition  [x]       MOBILITY: I Mod I S SBA CGA Min Mod Max Total  NT x2 Comments:   Bed Mobility    Rolling [] [] [] [] [] [] [] [] [] [] []    Supine to Sit [] [] [] [] [] [x] [] [] [] [] [x]    Scooting [] [] [] [] [] [] [x] [] [] [] []    Sit to Supine [] [] [] [] [] [] [] [] [] [] []    Transfers    Sit to Stand [] [] [] [] [] [x] [] [] [] [] [x]    Bed to Chair [] [] [] [] [x] [] [] [] [] [] [x]    Stand to Sit [] [] [] [] [x] [] [] [] [] [] [x]     [] [] [] [] [] [] [] [] [] [] []    I=Independent, Mod I=Modified Independent, S=Supervision, SBA=Standby Assistance, CGA=Contact Guard Assistance,   Min=Minimal Assistance, Mod=Moderate Assistance, Max=Maximal Assistance, Total=Total Assistance, NT=Not Tested    GAIT: I Mod I S SBA CGA Min Mod Max Total  NT x2 Comments:   Level of Assistance [] [] [] [] [x] [] [] [] [] [] [x]    Distance   4 feet    DME Rolling Walker    Gait Quality Decreased mignon  and Decreased step length    Weightbearing Status Restrictions/Precautions  Restrictions/Precautions: Fall Risk    Stairs      I=Independent, Mod I=Modified Independent, S=Supervision, SBA=Standby Assistance, CGA=Contact Guard Assistance,   Min=Minimal Assistance, Mod=Moderate Assistance, Max=Maximal Assistance, Total=Total Assistance, NT=Not Tested    PLAN:   FREQUENCY AND DURATION: 3 times/week for duration of hospital stay or until stated goals are met, whichever comes first.    THERAPY PROGNOSIS: Good    PROBLEM LIST:   (Skilled intervention is medically necessary to address:)  Decreased Activity Tolerance  Decreased Balance  Decreased Gait Ability  Decreased Safety 
Mod=Moderate Assistance, Max=Maximal Assistance, Total=Total Assistance, NT=Not Tested    BALANCE: Good Fair+ Fair Fair- Poor NT Comments   Sitting Static [x] [] [] [] [] []    Sitting Dynamic [x] [] [] [] [] []              Standing Static [] [x] [] [] [] []    Standing Dynamic [] [] [x] [] [] []        PLAN:     FREQUENCY/DURATION   OT Plan of Care: 3 times/week for duration of hospital stay or until stated goals are met, whichever comes first.    TREATMENT:     TREATMENT:   Co-Treatment PT/OT necessary due to patient's decreased overall endurance/tolerance levels, as well as need for high level skilled assistance to complete functional transfers/mobility and functional tasks  Self Care (23 minutes): Patient participated in upper body dressing, personal device care, and grooming ADLs in supported sitting and standing with minimal visual, verbal, manual, and tactile cueing to increase independence, decrease assistance required, increase activity tolerance, and increase safety awareness. Patient also participated in functional mobility, functional transfer, energy conservation, and adaptive equipment training to increase independence, decrease assistance required, increase activity tolerance, and increase safety awareness.     TREATMENT GRID:  N/A    AFTER TREATMENT PRECAUTIONS: Alarm Activated, Bed/Chair Locked, Call light within reach, Chair, Needs within reach, and RN notified    INTERDISCIPLINARY COLLABORATION:  RN/ PCT, PT/ PTA, and OT/ [place]    EDUCATION:       TOTAL TREATMENT DURATION AND TIME:  Time In: 1106  Time Out: 1129  Minutes: 23    JESSIE Buenrostro             
in sodium chloride 0.9 % 50 mL IVPB (mini-bag)  2,000 mg IntraVENous Q24H     Review of Systems: Comprehensive ROS negative except in HPI  Objective:   Blood pressure (!) 155/89, pulse 86, temperature 98.1 °F (36.7 °C), temperature source Oral, resp. rate 18, height 1.6 m (5' 3\"), weight 78.3 kg (172 lb 9.9 oz), SpO2 96 %.   Intake/Output Summary (Last 24 hours) at 3/24/2024 0825  Last data filed at 3/24/2024 0630  Gross per 24 hour   Intake 1523 ml   Output 1900 ml   Net -377 ml     Physical Exam:   Constitutional:  the patient is well developed and in no acute distress  EENMT:  Sclera clear, pupils equal, oral mucosa moist  Respiratory: symmetric chest rise. Some wheezing from posterior. On airvo - FIO2 45%  Cardiovascular:  RRR without M,G,R. There is no lower extremity edema.  Gastrointestinal: soft and non-tender; with positive bowel sounds.  Musculoskeletal: warm without cyanosis. Normal muscle tone.   Skin:  no jaundice or rashes, no wounds   Neurologic: symmetric strength, fluent speech  Psychiatric:  calm, appropriate, oriented x 4    Imaging: I performed an independent interpretation of the patient's images.  CXR:    3/23                                                                                    3/22      LAB:  Recent Labs     03/22/24 0137 03/23/24 0401 03/24/24  0408   WBC 13.7* 10.7 10.1   HGB 10.9* 11.8 11.8   HCT 33.7* 36.2 35.9    182 226   PROCAL  --   --  0.37     Recent Labs     03/22/24 0137 03/23/24 0401 03/24/24  0408    138 139   K 3.6 2.7* 2.7*    101* 99*   CO2 26 29 32   BUN 12 15 15   CREATININE 0.70 0.70 0.70   MG  --  1.7* 1.6*   BILITOT 0.5 0.6 0.6   AST 93* 49* 69*   ALT 22 27 57   ALKPHOS 45* 52 57     Recent Labs     03/24/24  0408   NTPROBNP 5,365*     Recent Labs     03/22/24 0137 03/23/24 0401 03/24/24  0408   GLUCOSE 172* 236* 190*      Microbiology:   No results for input(s): \"CULTURE\" in the last 72 hours.  No results for input(s): \"COVID19\" in 
       Microbiology:   No results for input(s): \"CULTURE\" in the last 72 hours.  Recent Labs     03/24/24  1153   COVID19 NOT DETECTED       ECHO: 09/23/23    ECHO (TTE) COMPLETE (CONTRAST/BUBBLE/3D PRN) 09/24/2023  8:44 PM (Final)    Interpretation Summary    Left Ventricle: Low normal left ventricular systolic function with a visually estimated EF of 50 - 55%. Left ventricle is mildly dilated. Mildly increased wall thickness. Moderate basal inferior wall hypokinesis noted. Indeterminate diastolic function due to arrhythmia. Average E/e' ratio is 11.88.    Mitral Valve: Mild regurgitation with a centrally directed jet.    Left Atrium: Left atrium is mildly dilated.    Contrast used: Definity.    Atrial fibrillation noted during the study    Signed by: Teofilo Hester MD on 9/24/2023  8:44 PM    Assessment and Plan:  (Medical Decision Making)   Impression:  83 y.o. female with CAD s/p CABG, HTN, DM2, PAF on Eliquis admitted to the ICU for pneumonia with septic shock and hypoxic respiratory failure. Tx with bipap, abx with improvement . Transferred to floor on 3/23.     Principal Problem:    Type 2 diabetes mellitus with hyperglycemia, without long-term current use of insulin (HCC)  Plan: BS upper 100s to 200s. Hospitalist managing      Essential hypertension  Plan: on home Norvasc.       PAF (paroxysmal atrial fibrillation) ([facility])  Plan: On Eliquis      Acute hypoxic respiratory failure (HCC)  Plan: resolved, now on RA      Pneumonia of both lungs due to infectious organism  Plan: blood cultures negative. No sputum for culture. WBC down to normal. On 7 day course abx      Hypervolemia  Plan: no edema on exam but BNP was elevated. Normal EF per echo 2023. Has been on IV lasix with neg balance.         --7 day course abx for pna. CXR markedly improved.  --remains on IV lasix, convert to oral soon  --PT/OT - increase activity  --on RA and does not need oxygen  --POC per hospitalist, will sign off.       More than 
Completed Specimen: Blood Updated: 03/23/24 0809     Special Requests NO SPECIAL REQUESTS        Culture NO GROWTH 2 DAYS             Recent Labs     03/21/24  0159   COVID19 Not detected     Ventilator Settings Ideal body weight: 52.4 kg (115 lb 8.3 oz)  Adjusted ideal body weight: 62.8 kg (138 lb 5.8 oz)  Mode FIO2 Rate Tidal Volume Pressure        75 %                  Peak airway pressure:     Minute ventilation:    ABG:  Recent Labs     03/22/24  0351   BE 1.4     Assessment and Plan:  (Medical Decision Making)   Impression:  83 y.o. female with with CAD s/p CABG, HTN, DM2, PAF on Eliquis admitted to the ICU for pneumonia with septic shock and hypoxic respiratory failure. Currently on BiPAP /80%, on Rocephin and Doxy.    CV:   Volume Status: Echo from 9/24/23 showed EF 50-55%.  I's and O's is - 2938 since admit   Bnp 2685  Septic shock: On Rocephin and Doxy for CAP. Azithromycin replaced with Doxy due to current Amiodarone use and QT concerns.  NSTEMI: Elevated troponin with no EKG changes likely secondary to her septic shock. Will trend troponin  PULM:   Acute hypoxemic/hypercapneic respiratory failure:  On BIPAP  last pm - now on airvo  75% Will continue diuresis and decrease IV fluid to KVO to help with oxygenation  CXR with bilateral infiltrates R>L- ? Volume overload vs pneumonia-repeat bnp and procal  RENAL:  ANCA: Normal Cr, today is 0.7.  Urine output is excellent-getting lasix 40 bid  Lactic acidosis: Initial lactate 4.1.  Trended down to normal at 1.3  Electrolytes: replete as needed  GI:   Nutrition: will advance as tolerated   HEME:   Anticoagulation: Currently taking Eliquis for PAF, ASA 81mg  ID:   Septic Shock: Now off levo, continue ceftriaxone and doxycycline  ENDO:   DM: Will cover with sliding scale insulin  Skin: no decub, turns, preventive care  Prophy: GI per protocol, no Lovenox due to Eliquis    Full Code    The patient is critically ill with respiratory failure, circulatory failure and 
Glucose 352 (H) 65 - 100 mg/dL    Performed by: Danny    POCT Glucose    Collection Time: 03/25/24 11:40 AM   Result Value Ref Range    POC Glucose 335 (H) 65 - 100 mg/dL    Performed by: Adry        Recent Labs     03/24/24  1153   COVID19 NOT DETECTED       Current Meds:  Current Facility-Administered Medications   Medication Dose Route Frequency    insulin lispro (HUMALOG) injection vial 2 Units  2 Units SubCUTAneous TID WC    potassium chloride (KLOR-CON M) extended release tablet 40 mEq  40 mEq Oral Daily with breakfast    magnesium oxide (MAG-OX) tablet 400 mg  400 mg Oral Daily    melatonin tablet 3 mg  3 mg Oral Nightly PRN    amLODIPine (NORVASC) tablet 10 mg  10 mg Oral Daily    gabapentin (NEURONTIN) capsule 100 mg  100 mg Oral TID    potassium chloride (KLOR-CON M) extended release tablet 40 mEq  40 mEq Oral PRN    Or    potassium bicarb-citric acid (EFFER-K) effervescent tablet 40 mEq  40 mEq Oral PRN    Or    potassium chloride 10 mEq/100 mL IVPB (Peripheral Line)  10 mEq IntraVENous PRN    magnesium sulfate 2000 mg in 50 mL IVPB premix  2,000 mg IntraVENous PRN    ondansetron (ZOFRAN) injection 4 mg  4 mg IntraVENous Q6H PRN    potassium chloride 20 mEq/50 mL IVPB (Central Line)  20 mEq IntraVENous PRN    Or    potassium chloride 10 mEq/100 mL IVPB (Peripheral Line)  10 mEq IntraVENous PRN    albuterol (PROVENTIL) (2.5 MG/3ML) 0.083% nebulizer solution 2.5 mg  2.5 mg Nebulization 4x Daily RT    furosemide (LASIX) injection 40 mg  40 mg IntraVENous Q12H    metoprolol tartrate (LOPRESSOR) tablet 25 mg  25 mg Oral BID    busPIRone (BUSPAR) tablet 5 mg  5 mg Oral TID    apixaban (ELIQUIS) tablet 5 mg  5 mg Oral BID    amiodarone (CORDARONE) tablet 200 mg  200 mg Oral Daily    aspirin chewable tablet 81 mg  81 mg Oral Daily    [Held by provider] metFORMIN (GLUCOPHAGE) tablet 500 mg  500 mg Oral BID WC    sodium chloride flush 0.9 % injection 5-40 mL  5-40 mL IntraVENous 2

## 2024-03-29 ENCOUNTER — HOME CARE VISIT (OUTPATIENT)
Dept: SCHEDULING | Facility: HOME HEALTH | Age: 84
End: 2024-03-29

## 2024-03-29 VITALS
DIASTOLIC BLOOD PRESSURE: 60 MMHG | RESPIRATION RATE: 18 BRPM | SYSTOLIC BLOOD PRESSURE: 120 MMHG | TEMPERATURE: 98 F | OXYGEN SATURATION: 92 % | HEART RATE: 87 BPM

## 2024-03-29 PROCEDURE — G0299 HHS/HOSPICE OF RN EA 15 MIN: HCPCS

## 2024-03-29 ASSESSMENT — ENCOUNTER SYMPTOMS: DIARRHEA: 1

## 2024-03-29 NOTE — TELEPHONE ENCOUNTER
Lemuel called back, informed of pt assistance program, as well as the [facility]. Lemuel voiced understanding and will bring information to pt's appt on 4/9/24.

## 2024-04-01 ENCOUNTER — HOME CARE VISIT (OUTPATIENT)
Dept: SCHEDULING | Facility: HOME HEALTH | Age: 84
End: 2024-04-01

## 2024-04-01 VITALS
RESPIRATION RATE: 16 BRPM | SYSTOLIC BLOOD PRESSURE: 122 MMHG | TEMPERATURE: 96.8 F | HEART RATE: 52 BPM | OXYGEN SATURATION: 92 % | DIASTOLIC BLOOD PRESSURE: 40 MMHG

## 2024-04-01 VITALS
OXYGEN SATURATION: 96 % | DIASTOLIC BLOOD PRESSURE: 40 MMHG | HEART RATE: 52 BPM | RESPIRATION RATE: 18 BRPM | SYSTOLIC BLOOD PRESSURE: 122 MMHG | TEMPERATURE: 98.8 F

## 2024-04-01 VITALS
OXYGEN SATURATION: 93 % | HEART RATE: 62 BPM | SYSTOLIC BLOOD PRESSURE: 122 MMHG | RESPIRATION RATE: 14 BRPM | DIASTOLIC BLOOD PRESSURE: 60 MMHG | TEMPERATURE: 98.2 F

## 2024-04-01 PROCEDURE — G0299 HHS/HOSPICE OF RN EA 15 MIN: HCPCS

## 2024-04-01 PROCEDURE — G0151 HHCP-SERV OF PT,EA 15 MIN: HCPCS

## 2024-04-01 PROCEDURE — G0152 HHCP-SERV OF OT,EA 15 MIN: HCPCS

## 2024-04-01 ASSESSMENT — ENCOUNTER SYMPTOMS
DIARRHEA: 1
CRAMPS: 1
DIARRHEA: 1
DYSPNEA ACTIVITY LEVEL: AFTER AMBULATING 10 - 20 FT
PAIN LOCATION - PAIN QUALITY: SHARP, SHOOTING
PAIN LOCATION - PAIN QUALITY: BURNING
NAUSEA: 1
DIARRHEA: 1
STOOL DESCRIPTION: SEMI-FLUID

## 2024-04-01 NOTE — HOME HEALTH
Situation/Background: 83 year old female patient with PMH of CAD, CABG, HTN, DM, PAF. Lives with daughter and son in law in single level home with no steps to enter. Patient referred to HHPT following hospitalization from 3/21/24 to 3/26/24 with pneumonia. Patient has been using rollator for approximately 3 weeks for household mobility.   Assessment: Presents with BLE weakness, decreased endurance, decreased transfer ability, impaired balance. Ambulates with rollator; unable to transfer into and out of bed.   Recommendation: 2w3, 1w1; BLE seated and standing HEP and therex, gait balance and transfer training

## 2024-04-02 ENCOUNTER — TELEPHONE (OUTPATIENT)
Dept: FAMILY MEDICINE CLINIC | Facility: CLINIC | Age: 84
End: 2024-04-02

## 2024-04-02 NOTE — TELEPHONE ENCOUNTER
Patient and son contacted with message with provider. Voiced understanding. Reports not being able to complete XR before her appointment on Thursday. Reports she has lost all her energy and has PT coming out to the house. Also with the diarrhea, nausea and vomiting she does not feel as though she could go anywhere at this time.

## 2024-04-02 NOTE — TELEPHONE ENCOUNTER
Patients appointment for today was rescheduled for Thursday due to provider being out of the office. Son reports patients stopping both of her antibiotics yesterday: doxycycline 100 mg and cefuroxime 250 mg; due to it causing diarrhea, nausea, throwing up, loss of appetite. Reports having a hard time being able to tolerate most antibiotics. Wanting to know if this is fine that she stopped taking medication until she is able to see provider on Thursday. Advised I would send message to provider to advise.

## 2024-04-03 ENCOUNTER — HOME CARE VISIT (OUTPATIENT)
Dept: SCHEDULING | Facility: HOME HEALTH | Age: 84
End: 2024-04-03

## 2024-04-03 VITALS
SYSTOLIC BLOOD PRESSURE: 118 MMHG | TEMPERATURE: 98 F | HEART RATE: 55 BPM | RESPIRATION RATE: 18 BRPM | DIASTOLIC BLOOD PRESSURE: 60 MMHG | OXYGEN SATURATION: 95 %

## 2024-04-03 PROCEDURE — G0299 HHS/HOSPICE OF RN EA 15 MIN: HCPCS

## 2024-04-03 ASSESSMENT — ENCOUNTER SYMPTOMS
PAIN LOCATION - PAIN QUALITY: ACHE, THROBBING
DIARRHEA: 1
DYSPNEA ACTIVITY LEVEL: AFTER AMBULATING 10 - 20 FT

## 2024-04-04 ENCOUNTER — OFFICE VISIT (OUTPATIENT)
Dept: FAMILY MEDICINE CLINIC | Facility: CLINIC | Age: 84
End: 2024-04-04

## 2024-04-04 VITALS
DIASTOLIC BLOOD PRESSURE: 70 MMHG | HEIGHT: 63 IN | OXYGEN SATURATION: 94 % | BODY MASS INDEX: 29.95 KG/M2 | WEIGHT: 169 LBS | HEART RATE: 94 BPM | SYSTOLIC BLOOD PRESSURE: 110 MMHG

## 2024-04-04 DIAGNOSIS — F33.0 MAJOR DEPRESSIVE DISORDER, RECURRENT EPISODE, MILD DEGREE (HCC): ICD-10-CM

## 2024-04-04 DIAGNOSIS — D64.9 ANEMIA, UNSPECIFIED TYPE: ICD-10-CM

## 2024-04-04 DIAGNOSIS — R53.1 WEAKNESS GENERALIZED: ICD-10-CM

## 2024-04-04 DIAGNOSIS — R13.19 ESOPHAGEAL DYSPHAGIA: Primary | ICD-10-CM

## 2024-04-04 DIAGNOSIS — I50.30 DIASTOLIC HEART FAILURE, UNSPECIFIED HF CHRONICITY (HCC): ICD-10-CM

## 2024-04-04 DIAGNOSIS — E11.51 TYPE 2 DIABETES MELLITUS WITH PERIPHERAL VASCULAR DISEASE (HCC): ICD-10-CM

## 2024-04-04 DIAGNOSIS — Z74.1: ICD-10-CM

## 2024-04-04 LAB
BASOPHILS # BLD: 0.1 K/UL (ref 0–0.2)
BASOPHILS NFR BLD: 1 % (ref 0–2)
DIFFERENTIAL METHOD BLD: ABNORMAL
EOSINOPHIL # BLD: 0.2 K/UL (ref 0–0.8)
EOSINOPHIL NFR BLD: 2 % (ref 0.5–7.8)
ERYTHROCYTE [DISTWIDTH] IN BLOOD BY AUTOMATED COUNT: 14.3 % (ref 11.9–14.6)
HCT VFR BLD AUTO: 38.6 % (ref 35.8–46.3)
HGB BLD-MCNC: 11.6 G/DL (ref 11.7–15.4)
IMM GRANULOCYTES # BLD AUTO: 0.1 K/UL (ref 0–0.5)
IMM GRANULOCYTES NFR BLD AUTO: 1 % (ref 0–5)
LYMPHOCYTES # BLD: 2 K/UL (ref 0.5–4.6)
LYMPHOCYTES NFR BLD: 20 % (ref 13–44)
MCH RBC QN AUTO: 27.6 PG (ref 26.1–32.9)
MCHC RBC AUTO-ENTMCNC: 30.1 G/DL (ref 31.4–35)
MCV RBC AUTO: 91.7 FL (ref 82–102)
MONOCYTES # BLD: 1 K/UL (ref 0.1–1.3)
MONOCYTES NFR BLD: 10 % (ref 4–12)
NEUTS SEG # BLD: 6.6 K/UL (ref 1.7–8.2)
NEUTS SEG NFR BLD: 66 % (ref 43–78)
NRBC # BLD: 0 K/UL (ref 0–0.2)
PLATELET # BLD AUTO: 469 K/UL (ref 150–450)
PMV BLD AUTO: 9.4 FL (ref 9.4–12.3)
RBC # BLD AUTO: 4.21 M/UL (ref 4.05–5.2)
WBC # BLD AUTO: 9.9 K/UL (ref 4.3–11.1)

## 2024-04-04 RX ORDER — SUCRALFATE 1 G/1
TABLET ORAL
Qty: 120 TABLET | Refills: 1 | Status: SHIPPED | OUTPATIENT
Start: 2024-04-04

## 2024-04-04 RX ORDER — ONDANSETRON 4 MG/1
4 TABLET, ORALLY DISINTEGRATING ORAL EVERY 8 HOURS PRN
Qty: 30 TABLET | Refills: 2 | Status: SHIPPED | OUTPATIENT
Start: 2024-04-04 | End: 2024-05-04

## 2024-04-04 RX ORDER — FLUCONAZOLE 100 MG/1
200 TABLET ORAL DAILY
Qty: 14 TABLET | Refills: 0 | Status: SHIPPED | OUTPATIENT
Start: 2024-04-04 | End: 2024-04-11

## 2024-04-04 RX ORDER — SUCRALFATE ORAL 1 G/10ML
1 SUSPENSION ORAL 4 TIMES DAILY
Qty: 1200 ML | Refills: 3 | Status: SHIPPED | OUTPATIENT
Start: 2024-04-04 | End: 2024-04-04

## 2024-04-04 RX ORDER — PANTOPRAZOLE SODIUM 40 MG/1
40 TABLET, DELAYED RELEASE ORAL
Qty: 30 TABLET | Refills: 0 | Status: SHIPPED | OUTPATIENT
Start: 2024-04-04 | End: 2024-05-04

## 2024-04-04 ASSESSMENT — PATIENT HEALTH QUESTIONNAIRE - PHQ9
SUM OF ALL RESPONSES TO PHQ QUESTIONS 1-9: 0
SUM OF ALL RESPONSES TO PHQ QUESTIONS 1-9: 0
8. MOVING OR SPEAKING SO SLOWLY THAT OTHER PEOPLE COULD HAVE NOTICED. OR THE OPPOSITE, BEING SO FIGETY OR RESTLESS THAT YOU HAVE BEEN MOVING AROUND A LOT MORE THAN USUAL: NOT AT ALL
SUM OF ALL RESPONSES TO PHQ9 QUESTIONS 1 & 2: 0
SUM OF ALL RESPONSES TO PHQ QUESTIONS 1-9: 0
5. POOR APPETITE OR OVEREATING: NOT AT ALL
9. THOUGHTS THAT YOU WOULD BE BETTER OFF DEAD, OR OF HURTING YOURSELF: NOT AT ALL
6. FEELING BAD ABOUT YOURSELF - OR THAT YOU ARE A FAILURE OR HAVE LET YOURSELF OR YOUR FAMILY DOWN: NOT AT ALL
2. FEELING DOWN, DEPRESSED OR HOPELESS: NOT AT ALL
4. FEELING TIRED OR HAVING LITTLE ENERGY: NOT AT ALL
7. TROUBLE CONCENTRATING ON THINGS, SUCH AS READING THE NEWSPAPER OR WATCHING TELEVISION: NOT AT ALL
SUM OF ALL RESPONSES TO PHQ QUESTIONS 1-9: 0
1. LITTLE INTEREST OR PLEASURE IN DOING THINGS: NOT AT ALL
3. TROUBLE FALLING OR STAYING ASLEEP: NOT AT ALL
10. IF YOU CHECKED OFF ANY PROBLEMS, HOW DIFFICULT HAVE THESE PROBLEMS MADE IT FOR YOU TO DO YOUR WORK, TAKE CARE OF THINGS AT HOME, OR GET ALONG WITH OTHER PEOPLE: NOT DIFFICULT AT ALL

## 2024-04-04 ASSESSMENT — ENCOUNTER SYMPTOMS: DIARRHEA: 0

## 2024-04-04 NOTE — TELEPHONE ENCOUNTER
Pharmacy called and stated insurance will not cover the carafate suspension needs to be tablets to be covered. Please send over new prescription to [facility]

## 2024-04-04 NOTE — PROGRESS NOTES
MCV 85.7 82 - 102 FL    MCH 28.2 26.1 - 32.9 PG    MCHC 32.9 31.4 - 35.0 g/dL    RDW 13.1 11.9 - 14.6 %    Platelets 226 150 - 450 K/uL    MPV 10.2 9.4 - 12.3 FL    nRBC 0.00 0.0 - 0.2 K/uL    Differential Type AUTOMATED      Neutrophils % 70 43 - 78 %    Lymphocytes % 15 13 - 44 %    Monocytes % 12 4.0 - 12.0 %    Eosinophils % 2 0.5 - 7.8 %    Basophils % 0 0.0 - 2.0 %    Immature Granulocytes 1 0.0 - 5.0 %    Neutrophils Absolute 7.1 1.7 - 8.2 K/UL    Lymphocytes Absolute 1.5 0.5 - 4.6 K/UL    Monocytes Absolute 1.2 0.1 - 1.3 K/UL    Eosinophils Absolute 0.2 0.0 - 0.8 K/UL    Basophils Absolute 0.0 0.0 - 0.2 K/UL    Absolute Immature Granulocyte 0.1 0.0 - 0.5 K/UL   Procalcitonin    Collection Time: 03/24/24  4:08 AM   Result Value Ref Range    Procalcitonin 0.37 0.00 - 0.49 ng/mL   Brain Natriuretic Peptide    Collection Time: 03/24/24  4:08 AM   Result Value Ref Range    NT Pro-BNP 5,365 (H) <450 PG/ML   Magnesium    Collection Time: 03/24/24  4:08 AM   Result Value Ref Range    Magnesium 1.6 (L) 1.8 - 2.4 mg/dL   POCT Glucose    Collection Time: 03/24/24  8:20 AM   Result Value Ref Range    POC Glucose 168 (H) 65 - 100 mg/dL    Performed by: [facility]    Respiratory Panel, Molecular, with COVID-19 (Restricted: peds pts or suitable admitted adults)    Collection Time: 03/24/24 11:53 AM    Specimen: Nasopharyngeal   Result Value Ref Range    Adenovirus by PCR NOT DETECTED NOTDET      Coronavirus 229E by PCR NOT DETECTED NOTDET      Coronavirus HKU1 by PCR NOT DETECTED NOTDET      Coronavirus NL63 by PCR NOT DETECTED NOTDET      Coronavirus OC43 by PCR NOT DETECTED NOTDET      SARS-CoV-2, PCR NOT DETECTED NOTDET      Human Metapneumovirus by PCR NOT DETECTED NOTDET      Rhinovirus Enterovirus PCR NOT DETECTED NOTDET      Influenza A by PCR NOT DETECTED NOTDET      Influenza B PCR NOT DETECTED NOTDET      Parainfluenza 1 PCR NOT DETECTED NOTDET      Parainfluenza 2 PCR NOT DETECTED NOTDET      Parainfluenza

## 2024-04-05 ENCOUNTER — HOME CARE VISIT (OUTPATIENT)
Dept: SCHEDULING | Facility: HOME HEALTH | Age: 84
End: 2024-04-05

## 2024-04-05 ENCOUNTER — CARE COORDINATION (OUTPATIENT)
Dept: CARE COORDINATION | Facility: CLINIC | Age: 84
End: 2024-04-05

## 2024-04-05 ENCOUNTER — TELEPHONE (OUTPATIENT)
Dept: FAMILY MEDICINE CLINIC | Facility: CLINIC | Age: 84
End: 2024-04-05

## 2024-04-05 VITALS
HEART RATE: 62 BPM | DIASTOLIC BLOOD PRESSURE: 60 MMHG | TEMPERATURE: 97.5 F | SYSTOLIC BLOOD PRESSURE: 116 MMHG | RESPIRATION RATE: 17 BRPM | OXYGEN SATURATION: 95 %

## 2024-04-05 VITALS
HEART RATE: 52 BPM | OXYGEN SATURATION: 90 % | SYSTOLIC BLOOD PRESSURE: 112 MMHG | RESPIRATION RATE: 16 BRPM | DIASTOLIC BLOOD PRESSURE: 60 MMHG | TEMPERATURE: 98.4 F

## 2024-04-05 DIAGNOSIS — R53.1 WEAKNESS: Primary | ICD-10-CM

## 2024-04-05 DIAGNOSIS — Z74.1: ICD-10-CM

## 2024-04-05 LAB
ALBUMIN SERPL-MCNC: 3.4 G/DL (ref 3.2–4.6)
ALBUMIN/GLOB SERPL: 1 (ref 0.4–1.6)
ALP SERPL-CCNC: 66 U/L (ref 50–136)
ALT SERPL-CCNC: 26 U/L (ref 12–65)
ANION GAP SERPL CALC-SCNC: 10 MMOL/L (ref 2–11)
AST SERPL-CCNC: 27 U/L (ref 15–37)
BILIRUB SERPL-MCNC: 0.3 MG/DL (ref 0.2–1.1)
BUN SERPL-MCNC: 7 MG/DL (ref 8–23)
CALCIUM SERPL-MCNC: 10 MG/DL (ref 8.3–10.4)
CHLORIDE SERPL-SCNC: 103 MMOL/L (ref 103–113)
CO2 SERPL-SCNC: 26 MMOL/L (ref 21–32)
CREAT SERPL-MCNC: 1.1 MG/DL (ref 0.6–1)
GLOBULIN SER CALC-MCNC: 3.4 G/DL (ref 2.8–4.5)
GLUCOSE SERPL-MCNC: 84 MG/DL (ref 65–100)
POTASSIUM SERPL-SCNC: 4 MMOL/L (ref 3.5–5.1)
PROT SERPL-MCNC: 6.8 G/DL (ref 6.3–8.2)
SODIUM SERPL-SCNC: 139 MMOL/L (ref 136–146)

## 2024-04-05 PROCEDURE — G0299 HHS/HOSPICE OF RN EA 15 MIN: HCPCS

## 2024-04-05 PROCEDURE — G0157 HHC PT ASSISTANT EA 15: HCPCS

## 2024-04-05 ASSESSMENT — ENCOUNTER SYMPTOMS
PAIN LOCATION - PAIN QUALITY: SHARP
SORE THROAT: 1
DIARRHEA: 1
NAUSEA: 1
INDIGESTION: 1
PAIN LOCATION - PAIN QUALITY: THROBBING ACHE

## 2024-04-05 NOTE — TELEPHONE ENCOUNTER
----- Message from Bradford Alanis MD sent at 4/4/2024  3:41 PM EDT -----  Patient needs a bedside commode and a manual wheelchair.  Link to weakness and dependence for walking

## 2024-04-05 NOTE — TELEPHONE ENCOUNTER
LVM asking patient where she would like for commode and wheelchair to be sent. Advised for a return call.

## 2024-04-05 NOTE — CARE COORDINATION
Care Transitions Follow Up Call    Patient Current Location:  Home: [address] [place] [address]  [place] [address]    Care Transition Nurse contacted the patient by telephone to follow up after admission on 3/21/2024.  Verified name and  with patient as identifiers.    Patient: Vivien Zambrano  Patient : 1940   MRN: [id number]  Reason for Admission: Pneumonia of both lungs due to infectious organism, unspecified part of lung   Discharge Date: 3/26/24 RARS: Readmission Risk Score: 15.3    Needs to be reviewed by the provider   Additional needs identified to be addressed with provider: No  none             Method of communication with provider: none.      Addressed changes since last contact:   Patient reports she has a poor appetitive and is still very weak. Patient states her family is assisting with her care. Patient completed follow up with her PCP and Carafate, Protonix, and diflucan were started. Patient reports she will start today. Patient reports she had to stop her antibiotics due to vomiting and difficulty swallowing them, but patient endorses compliance with her other medications. CTN encouraged oral nutritional supplements. Patient is current with [facility]. Per chart review, PCP is ordering wheelchair and BSC and overnight oximetry due to low sats at night. Patient reports she is monitoring her BP, FSBS, and oxygen saturation closely at home. Patient reports good support from her family.   Discussed follow-up appointments. If no appointment was previously scheduled, appointment scheduling offered: Yes.   Is follow up appointment scheduled within 7 days of discharge? Yes.    Follow Up  Future Appointments   Date Time Provider Department Center   2024 11:00 AM Fran Tran OTA [facility] HOME HEAL   2024  1:00 PM Annie Degroot PTA [facility] HOME HEAL   2024  3:30 PM Brenna Aceves RN [facility] HOME HEAL   2024 To Be Determined Annie Degroot PTA [facility] HOME HEAL

## 2024-04-06 ASSESSMENT — ENCOUNTER SYMPTOMS: PAIN LOCATION - PAIN QUALITY: ACHY, BURNING

## 2024-04-08 ENCOUNTER — HOME CARE VISIT (OUTPATIENT)
Dept: HOME HEALTH SERVICES | Facility: HOME HEALTH | Age: 84
End: 2024-04-08
Payer: MEDICARE

## 2024-04-10 ENCOUNTER — HOME CARE VISIT (OUTPATIENT)
Dept: SCHEDULING | Facility: HOME HEALTH | Age: 84
End: 2024-04-10
Payer: MEDICARE

## 2024-04-10 ENCOUNTER — TELEPHONE (OUTPATIENT)
Dept: FAMILY MEDICINE CLINIC | Facility: CLINIC | Age: 84
End: 2024-04-10

## 2024-04-10 VITALS
TEMPERATURE: 97.3 F | RESPIRATION RATE: 16 BRPM | OXYGEN SATURATION: 93 % | DIASTOLIC BLOOD PRESSURE: 62 MMHG | HEART RATE: 60 BPM | SYSTOLIC BLOOD PRESSURE: 118 MMHG

## 2024-04-10 VITALS
RESPIRATION RATE: 17 BRPM | HEART RATE: 60 BPM | OXYGEN SATURATION: 93 % | DIASTOLIC BLOOD PRESSURE: 62 MMHG | SYSTOLIC BLOOD PRESSURE: 118 MMHG | TEMPERATURE: 97.3 F

## 2024-04-10 PROCEDURE — G0157 HHC PT ASSISTANT EA 15: HCPCS

## 2024-04-10 PROCEDURE — G0158 HHC OT ASSISTANT EA 15: HCPCS

## 2024-04-10 PROCEDURE — G0299 HHS/HOSPICE OF RN EA 15 MIN: HCPCS

## 2024-04-10 NOTE — TELEPHONE ENCOUNTER
Penny from [facility] health called seeing 2x a week for home health. Family is requesting for 3 x next week. Need verbal order [phone]

## 2024-04-11 VITALS
SYSTOLIC BLOOD PRESSURE: 130 MMHG | OXYGEN SATURATION: 94 % | DIASTOLIC BLOOD PRESSURE: 68 MMHG | RESPIRATION RATE: 18 BRPM | HEART RATE: 51 BPM | TEMPERATURE: 98.4 F

## 2024-04-11 ASSESSMENT — ENCOUNTER SYMPTOMS
PAIN LOCATION - PAIN QUALITY: ACHE, THROBBING
DYSPNEA ACTIVITY LEVEL: AFTER AMBULATING 10 - 20 FT

## 2024-04-12 ENCOUNTER — HOSPITAL ENCOUNTER (INPATIENT)
Age: 84
LOS: 6 days | Discharge: HOME | End: 2024-04-19
Attending: EMERGENCY MEDICINE
Payer: MEDICARE

## 2024-04-12 ENCOUNTER — HOME CARE VISIT (OUTPATIENT)
Dept: SCHEDULING | Facility: HOME HEALTH | Age: 84
End: 2024-04-12
Payer: MEDICARE

## 2024-04-12 VITALS
HEART RATE: 46 BPM | DIASTOLIC BLOOD PRESSURE: 56 MMHG | RESPIRATION RATE: 17 BRPM | TEMPERATURE: 97.5 F | SYSTOLIC BLOOD PRESSURE: 106 MMHG | OXYGEN SATURATION: 95 %

## 2024-04-12 DIAGNOSIS — R00.1 BRADYCARDIA ON ECG: ICD-10-CM

## 2024-04-12 DIAGNOSIS — I50.30 DIASTOLIC HEART FAILURE, UNSPECIFIED HF CHRONICITY (HCC): ICD-10-CM

## 2024-04-12 DIAGNOSIS — A41.9 SEPSIS WITH ACUTE RENAL FAILURE AND SEPTIC SHOCK, DUE TO UNSPECIFIED ORGANISM, UNSPECIFIED ACUTE RENAL FAILURE TYPE (HCC): Primary | ICD-10-CM

## 2024-04-12 DIAGNOSIS — R00.1 BRADYCARDIA: ICD-10-CM

## 2024-04-12 DIAGNOSIS — J96.01 ACUTE HYPOXIC RESPIRATORY FAILURE (HCC): ICD-10-CM

## 2024-04-12 DIAGNOSIS — I25.10 ATHEROSCLEROSIS OF NATIVE CORONARY ARTERY OF NATIVE HEART WITHOUT ANGINA PECTORIS: Chronic | ICD-10-CM

## 2024-04-12 DIAGNOSIS — R65.21 SEPSIS WITH ACUTE RENAL FAILURE AND SEPTIC SHOCK, DUE TO UNSPECIFIED ORGANISM, UNSPECIFIED ACUTE RENAL FAILURE TYPE (HCC): Primary | ICD-10-CM

## 2024-04-12 DIAGNOSIS — N17.9 SEPSIS WITH ACUTE RENAL FAILURE AND SEPTIC SHOCK, DUE TO UNSPECIFIED ORGANISM, UNSPECIFIED ACUTE RENAL FAILURE TYPE (HCC): Primary | ICD-10-CM

## 2024-04-12 PROCEDURE — 96365 THER/PROPH/DIAG IV INF INIT: CPT

## 2024-04-12 PROCEDURE — 96366 THER/PROPH/DIAG IV INF ADDON: CPT

## 2024-04-12 PROCEDURE — G0299 HHS/HOSPICE OF RN EA 15 MIN: HCPCS

## 2024-04-12 PROCEDURE — 96374 THER/PROPH/DIAG INJ IV PUSH: CPT

## 2024-04-12 PROCEDURE — 99285 EMERGENCY DEPT VISIT HI MDM: CPT

## 2024-04-12 PROCEDURE — G0157 HHC PT ASSISTANT EA 15: HCPCS

## 2024-04-12 PROCEDURE — 96368 THER/DIAG CONCURRENT INF: CPT

## 2024-04-12 NOTE — TELEPHONE ENCOUNTER
Pt's Son Yadiel was concerned about pt taking Zofran and if this medication could cause her heart rate to be 49-55 range. He also mentioned her urinating on 3 x a day.

## 2024-04-13 ENCOUNTER — APPOINTMENT (OUTPATIENT)
Dept: GENERAL RADIOLOGY | Age: 84
End: 2024-04-13
Payer: MEDICARE

## 2024-04-13 ENCOUNTER — APPOINTMENT (OUTPATIENT)
Dept: NON INVASIVE DIAGNOSTICS | Age: 84
End: 2024-04-13
Payer: MEDICARE

## 2024-04-13 ENCOUNTER — HOME CARE VISIT (OUTPATIENT)
Dept: HOME HEALTH SERVICES | Facility: HOME HEALTH | Age: 84
End: 2024-04-13
Payer: MEDICARE

## 2024-04-13 VITALS
RESPIRATION RATE: 16 BRPM | HEART RATE: 44 BPM | DIASTOLIC BLOOD PRESSURE: 40 MMHG | OXYGEN SATURATION: 92 % | TEMPERATURE: 98.8 F | SYSTOLIC BLOOD PRESSURE: 102 MMHG

## 2024-04-13 PROBLEM — A41.9 SEPSIS (HCC): Status: ACTIVE | Noted: 2024-04-13

## 2024-04-13 PROBLEM — N17.9 ACUTE KIDNEY INJURY (HCC): Status: ACTIVE | Noted: 2024-04-13

## 2024-04-13 PROBLEM — R00.1 BRADYCARDIA ON ECG: Status: ACTIVE | Noted: 2024-04-13

## 2024-04-13 PROBLEM — I95.9 HYPOTENSION: Status: ACTIVE | Noted: 2024-04-13

## 2024-04-13 PROBLEM — I25.810 ATHEROSCLEROSIS OF CORONARY ARTERY BYPASS GRAFT OF NATIVE HEART WITHOUT ANGINA PECTORIS: Status: ACTIVE | Noted: 2024-04-13

## 2024-04-13 PROBLEM — E11.51 TYPE 2 DIABETES MELLITUS WITH PERIPHERAL VASCULAR DISEASE (HCC): Chronic | Status: ACTIVE | Noted: 2020-01-27

## 2024-04-13 PROBLEM — I48.0 PAF (PAROXYSMAL ATRIAL FIBRILLATION) (HCC): Chronic | Status: ACTIVE | Noted: 2017-07-14

## 2024-04-13 LAB
ALBUMIN SERPL-MCNC: 1.7 G/DL (ref 3.2–4.6)
ALBUMIN SERPL-MCNC: 2.8 G/DL (ref 3.2–4.6)
ALBUMIN/GLOB SERPL: 0.7 (ref 0.4–1.6)
ALP SERPL-CCNC: 66 U/L (ref 50–136)
ALT SERPL-CCNC: 19 U/L (ref 12–65)
ANION GAP SERPL CALC-SCNC: 6 MMOL/L (ref 2–11)
ANION GAP SERPL CALC-SCNC: 9 MMOL/L (ref 2–11)
ANION GAP SERPL CALC-SCNC: 9 MMOL/L (ref 2–11)
APPEARANCE UR: ABNORMAL
ARTERIAL PATENCY WRIST A: ABNORMAL
ARTERIAL PATENCY WRIST A: POSITIVE
ARTERIAL PATENCY WRIST A: POSITIVE
AST SERPL-CCNC: 21 U/L (ref 15–37)
B PERT DNA SPEC QL NAA+PROBE: NOT DETECTED
BACTERIA URNS QL MICRO: ABNORMAL /HPF
BASE DEFICIT BLD-SCNC: 1.8 MMOL/L
BASE DEFICIT BLD-SCNC: 12.6 MMOL/L
BASE DEFICIT BLD-SCNC: 5.7 MMOL/L
BASOPHILS # BLD: 0.1 K/UL (ref 0–0.2)
BASOPHILS NFR BLD: 1 % (ref 0–2)
BDY SITE: ABNORMAL
BILIRUB SERPL-MCNC: 0.3 MG/DL (ref 0.2–1.1)
BILIRUB UR QL: NEGATIVE
BORDETELLA PARAPERTUSSIS BY PCR: NOT DETECTED
BUN SERPL-MCNC: 20 MG/DL (ref 8–23)
BUN SERPL-MCNC: 21 MG/DL (ref 8–23)
BUN SERPL-MCNC: 23 MG/DL (ref 8–23)
C PNEUM DNA SPEC QL NAA+PROBE: NOT DETECTED
CALCIUM SERPL-MCNC: 5.8 MG/DL (ref 8.3–10.4)
CALCIUM SERPL-MCNC: 8.5 MG/DL (ref 8.3–10.4)
CALCIUM SERPL-MCNC: 8.8 MG/DL (ref 8.3–10.4)
CASTS URNS QL MICRO: 0 /LPF
CHLORIDE SERPL-SCNC: 107 MMOL/L (ref 103–113)
CHLORIDE SERPL-SCNC: 108 MMOL/L (ref 103–113)
CHLORIDE SERPL-SCNC: 117 MMOL/L (ref 103–113)
CO2 SERPL-SCNC: 17 MMOL/L (ref 21–32)
CO2 SERPL-SCNC: 23 MMOL/L (ref 21–32)
CO2 SERPL-SCNC: 26 MMOL/L (ref 21–32)
COLOR UR: ABNORMAL
CREAT SERPL-MCNC: 1.2 MG/DL (ref 0.6–1)
CREAT SERPL-MCNC: 1.2 MG/DL (ref 0.6–1)
CREAT SERPL-MCNC: 1.6 MG/DL (ref 0.6–1)
CRYSTALS URNS QL MICRO: 0 /LPF
DIFFERENTIAL METHOD BLD: ABNORMAL
ECHO AO ASC DIAM: 2.8 CM
ECHO AO ASCENDING AORTA INDEX: 1.57 CM/M2
ECHO AO ROOT DIAM: 2.8 CM
ECHO AO ROOT INDEX: 1.57 CM/M2
ECHO AV AREA PEAK VELOCITY: 1.8 CM2
ECHO AV AREA VTI: 1.7 CM2
ECHO AV AREA/BSA PEAK VELOCITY: 1 CM2/M2
ECHO AV AREA/BSA VTI: 1 CM2/M2
ECHO AV MEAN GRADIENT: 4 MMHG
ECHO AV MEAN VELOCITY: 1 M/S
ECHO AV PEAK GRADIENT: 9 MMHG
ECHO AV PEAK VELOCITY: 1.5 M/S
ECHO AV VELOCITY RATIO: 0.73
ECHO AV VTI: 26.6 CM
ECHO BSA: 1.84 M2
ECHO IVC PROX: 1.6 CM
ECHO LA AREA 2C: 25.2 CM2
ECHO LA AREA 4C: 23.6 CM2
ECHO LA DIAMETER INDEX: 2.53 CM/M2
ECHO LA DIAMETER: 4.5 CM
ECHO LA MAJOR AXIS: 5.9 CM
ECHO LA MINOR AXIS: 5.1 CM
ECHO LA TO AORTIC ROOT RATIO: 1.61
ECHO LA VOL BP: 92 ML (ref 22–52)
ECHO LA VOL MOD A2C: 97 ML (ref 22–52)
ECHO LA VOL MOD A4C: 76 ML (ref 22–52)
ECHO LA VOL/BSA BIPLANE: 52 ML/M2 (ref 16–34)
ECHO LA VOLUME INDEX MOD A2C: 54 ML/M2 (ref 16–34)
ECHO LA VOLUME INDEX MOD A4C: 43 ML/M2 (ref 16–34)
ECHO LV E' LATERAL VELOCITY: 10 CM/S
ECHO LV E' SEPTAL VELOCITY: 5 CM/S
ECHO LV EDV A2C: 93 ML
ECHO LV EDV A4C: 127 ML
ECHO LV EDV INDEX A4C: 71 ML/M2
ECHO LV EDV NDEX A2C: 52 ML/M2
ECHO LV EJECTION FRACTION A2C: 37 %
ECHO LV EJECTION FRACTION A4C: 37 %
ECHO LV EJECTION FRACTION BIPLANE: 37 % (ref 55–100)
ECHO LV ESV A2C: 59 ML
ECHO LV ESV A4C: 80 ML
ECHO LV ESV INDEX A2C: 33 ML/M2
ECHO LV ESV INDEX A4C: 45 ML/M2
ECHO LV FRACTIONAL SHORTENING: 25 % (ref 28–44)
ECHO LV INTERNAL DIMENSION DIASTOLE INDEX: 3.2 CM/M2
ECHO LV INTERNAL DIMENSION DIASTOLIC: 5.7 CM (ref 3.9–5.3)
ECHO LV INTERNAL DIMENSION SYSTOLIC INDEX: 2.42 CM/M2
ECHO LV INTERNAL DIMENSION SYSTOLIC: 4.3 CM
ECHO LV IVSD: 0.6 CM (ref 0.6–0.9)
ECHO LV MASS 2D: 183.7 G (ref 67–162)
ECHO LV MASS INDEX 2D: 103.2 G/M2 (ref 43–95)
ECHO LV POSTERIOR WALL DIASTOLIC: 1.1 CM (ref 0.6–0.9)
ECHO LV RELATIVE WALL THICKNESS RATIO: 0.39
ECHO LVOT AREA: 2.5 CM2
ECHO LVOT AV VTI INDEX: 0.68
ECHO LVOT DIAM: 1.8 CM
ECHO LVOT MEAN GRADIENT: 2 MMHG
ECHO LVOT PEAK GRADIENT: 4 MMHG
ECHO LVOT PEAK VELOCITY: 1.1 M/S
ECHO LVOT STROKE VOLUME INDEX: 26 ML/M2
ECHO LVOT SV: 46.3 ML
ECHO LVOT VTI: 18.2 CM
ECHO MV A VELOCITY: 0.77 M/S
ECHO MV AREA VTI: 1.4 CM2
ECHO MV E DECELERATION TIME (DT): 151 MS
ECHO MV E VELOCITY: 1.2 M/S
ECHO MV E/A RATIO: 1.56
ECHO MV E/E' LATERAL: 12
ECHO MV E/E' RATIO (AVERAGED): 18
ECHO MV LVOT VTI INDEX: 1.78
ECHO MV MAX VELOCITY: 1.4 M/S
ECHO MV MEAN GRADIENT: 3 MMHG
ECHO MV MEAN VELOCITY: 0.8 M/S
ECHO MV PEAK GRADIENT: 7 MMHG
ECHO MV REGURGITANT PEAK GRADIENT: 74 MMHG
ECHO MV REGURGITANT PEAK VELOCITY: 4.3 M/S
ECHO MV VTI: 32.4 CM
ECHO PV MAX VELOCITY: 1 M/S
ECHO PV PEAK GRADIENT: 4 MMHG
ECHO RV BASAL DIMENSION: 3.5 CM
ECHO RV TAPSE: 1.4 CM (ref 1.7–?)
ECHO TV REGURGITANT MAX VELOCITY: 3.13 M/S
ECHO TV REGURGITANT PEAK GRADIENT: 39 MMHG
EKG ATRIAL RATE: 80 BPM
EKG DIAGNOSIS: NORMAL
EKG P AXIS: 74 DEGREES
EKG P-R INTERVAL: 228 MS
EKG Q-T INTERVAL: 468 MS
EKG QRS DURATION: 147 MS
EKG QTC CALCULATION (BAZETT): 540 MS
EKG R AXIS: 142 DEGREES
EKG T AXIS: 36 DEGREES
EKG VENTRICULAR RATE: 80 BPM
EOSINOPHIL # BLD: 0.4 K/UL (ref 0–0.8)
EOSINOPHIL NFR BLD: 2 % (ref 0.5–7.8)
EPI CELLS #/AREA URNS HPF: ABNORMAL /HPF
ERYTHROCYTE [DISTWIDTH] IN BLOOD BY AUTOMATED COUNT: 14.6 % (ref 11.9–14.6)
FLUAV SUBTYP SPEC NAA+PROBE: NOT DETECTED
FLUBV RNA SPEC QL NAA+PROBE: NOT DETECTED
GAS FLOW.O2 O2 DELIVERY SYS: ABNORMAL
GAS FLOW.O2 O2 DELIVERY SYS: ABNORMAL
GLOBULIN SER CALC-MCNC: 3.9 G/DL (ref 2.8–4.5)
GLUCOSE BLD STRIP.AUTO-MCNC: 146 MG/DL (ref 65–100)
GLUCOSE BLD STRIP.AUTO-MCNC: 178 MG/DL (ref 65–100)
GLUCOSE BLD STRIP.AUTO-MCNC: 215 MG/DL (ref 65–100)
GLUCOSE BLD STRIP.AUTO-MCNC: 228 MG/DL (ref 65–100)
GLUCOSE BLD STRIP.AUTO-MCNC: 247 MG/DL (ref 65–100)
GLUCOSE BLD STRIP.AUTO-MCNC: 298 MG/DL (ref 65–100)
GLUCOSE BLD STRIP.AUTO-MCNC: 332 MG/DL (ref 65–100)
GLUCOSE BLD STRIP.AUTO-MCNC: 351 MG/DL (ref 65–100)
GLUCOSE BLD STRIP.AUTO-MCNC: 353 MG/DL (ref 65–100)
GLUCOSE BLD STRIP.AUTO-MCNC: 402 MG/DL (ref 65–100)
GLUCOSE BLD STRIP.AUTO-MCNC: 406 MG/DL (ref 65–100)
GLUCOSE BLD STRIP.AUTO-MCNC: 409 MG/DL (ref 65–100)
GLUCOSE BLD STRIP.AUTO-MCNC: 487 MG/DL (ref 65–100)
GLUCOSE SERPL-MCNC: 149 MG/DL (ref 65–100)
GLUCOSE SERPL-MCNC: 317 MG/DL (ref 65–100)
GLUCOSE SERPL-MCNC: 400 MG/DL (ref 65–100)
GLUCOSE UR STRIP.AUTO-MCNC: 250 MG/DL
HADV DNA SPEC QL NAA+PROBE: NOT DETECTED
HCO3 BLD-SCNC: 15.1 MMOL/L (ref 22–26)
HCO3 BLD-SCNC: 20.4 MMOL/L (ref 22–26)
HCO3 BLD-SCNC: 22 MMOL/L (ref 22–26)
HCOV 229E RNA SPEC QL NAA+PROBE: NOT DETECTED
HCOV HKU1 RNA SPEC QL NAA+PROBE: NOT DETECTED
HCOV NL63 RNA SPEC QL NAA+PROBE: NOT DETECTED
HCOV OC43 RNA SPEC QL NAA+PROBE: NOT DETECTED
HCT VFR BLD AUTO: 32.6 % (ref 35.8–46.3)
HGB BLD-MCNC: 10 G/DL (ref 11.7–15.4)
HGB UR QL STRIP: ABNORMAL
HMPV RNA SPEC QL NAA+PROBE: NOT DETECTED
HPIV1 RNA SPEC QL NAA+PROBE: NOT DETECTED
HPIV2 RNA SPEC QL NAA+PROBE: NOT DETECTED
HPIV3 RNA SPEC QL NAA+PROBE: NOT DETECTED
HPIV4 RNA SPEC QL NAA+PROBE: NOT DETECTED
IMM GRANULOCYTES # BLD AUTO: 0.3 K/UL (ref 0–0.5)
IMM GRANULOCYTES NFR BLD AUTO: 1 % (ref 0–5)
INSPIRATION.DURATION SETTING TIME VENT: 0.85 SEC
INSPIRATION.DURATION SETTING TIME VENT: 0.85 SEC
IPAP/PIP/HIGH PEEP: 26
IPAP/PIP/HIGH PEEP: 35
KETONES UR QL STRIP.AUTO: ABNORMAL MG/DL
LACTATE SERPL-SCNC: 2 MMOL/L (ref 0.4–2)
LACTATE SERPL-SCNC: 2.8 MMOL/L (ref 0.4–2)
LACTATE SERPL-SCNC: 3.1 MMOL/L (ref 0.4–2)
LEUKOCYTE ESTERASE UR QL STRIP.AUTO: ABNORMAL
LIPASE SERPL-CCNC: 24 U/L (ref 73–393)
LYMPHOCYTES # BLD: 2.4 K/UL (ref 0.5–4.6)
LYMPHOCYTES NFR BLD: 11 % (ref 13–44)
M PNEUMO DNA SPEC QL NAA+PROBE: NOT DETECTED
MAGNESIUM SERPL-MCNC: 1.6 MG/DL (ref 1.8–2.4)
MCH RBC QN AUTO: 26.6 PG (ref 26.1–32.9)
MCHC RBC AUTO-ENTMCNC: 30.7 G/DL (ref 31.4–35)
MCV RBC AUTO: 86.7 FL (ref 82–102)
MONOCYTES # BLD: 2 K/UL (ref 0.1–1.3)
MONOCYTES NFR BLD: 9 % (ref 4–12)
MUCOUS THREADS URNS QL MICRO: 0 /LPF
NEUTS SEG # BLD: 16.7 K/UL (ref 1.7–8.2)
NEUTS SEG NFR BLD: 76 % (ref 43–78)
NITRITE UR QL STRIP.AUTO: NEGATIVE
NRBC # BLD: 0 K/UL (ref 0–0.2)
NT PRO BNP: 4991 PG/ML
O2/TOTAL GAS SETTING VFR VENT: 45 %
O2/TOTAL GAS SETTING VFR VENT: 45 %
PAW @ MEAN EXP FLOW ON VENT: 14 CMH2O
PAW @ MEAN EXP FLOW ON VENT: 18 CMH2O
PCO2 BLD: 33.4 MMHG (ref 35–45)
PCO2 BLD: 40.7 MMHG (ref 35–45)
PCO2 BLD: 41 MMHG (ref 35–45)
PEEP RESPIRATORY: 8 CMH2O
PEEP RESPIRATORY: 8 CMH2O
PH BLD: 7.18 (ref 7.35–7.45)
PH BLD: 7.3 (ref 7.35–7.45)
PH BLD: 7.43 (ref 7.35–7.45)
PH UR STRIP: 5 (ref 5–9)
PLATELET # BLD AUTO: 419 K/UL (ref 150–450)
PMV BLD AUTO: 9.5 FL (ref 9.4–12.3)
PO2 BLD: 61 MMHG (ref 75–100)
PO2 BLD: 72 MMHG (ref 75–100)
PO2 BLD: 90 MMHG (ref 75–100)
POC FIO2: 2
POTASSIUM SERPL-SCNC: 3 MMOL/L (ref 3.5–5.1)
POTASSIUM SERPL-SCNC: 3.2 MMOL/L (ref 3.5–5.1)
POTASSIUM SERPL-SCNC: 4.4 MMOL/L (ref 3.5–5.1)
PROCALCITONIN SERPL-MCNC: <0.05 NG/ML (ref 0–0.49)
PROT SERPL-MCNC: 6.7 G/DL (ref 6.3–8.2)
PROT UR STRIP-MCNC: NEGATIVE MG/DL
RBC # BLD AUTO: 3.76 M/UL (ref 4.05–5.2)
RBC #/AREA URNS HPF: ABNORMAL /HPF
RESPIRATORY RATE, POC: 26 (ref 5–40)
RSV RNA SPEC QL NAA+PROBE: NOT DETECTED
RV+EV RNA SPEC QL NAA+PROBE: NOT DETECTED
SAO2 % BLD: 91.7 % (ref 95–98)
SAO2 % BLD: 92.5 % (ref 95–98)
SAO2 % BLD: 94.4 % (ref 95–98)
SARS-COV-2 RNA RESP QL NAA+PROBE: NOT DETECTED
SERVICE CMNT-IMP: ABNORMAL
SODIUM SERPL-SCNC: 137 MMOL/L (ref 136–146)
SODIUM SERPL-SCNC: 142 MMOL/L (ref 136–146)
SODIUM SERPL-SCNC: 143 MMOL/L (ref 136–146)
SP GR UR REFRACTOMETRY: 1.02 (ref 1–1.02)
SPECIMEN TYPE: ABNORMAL
TROPONIN I SERPL HS-MCNC: 14.6 PG/ML (ref 0–14)
TROPONIN I SERPL HS-MCNC: 16.8 PG/ML (ref 0–14)
TSH W FREE THYROID IF ABNORMAL: 0.61 UIU/ML (ref 0.36–3.74)
URINE CULTURE IF INDICATED: ABNORMAL
UROBILINOGEN UR QL STRIP.AUTO: 0.2 EU/DL (ref 0.2–1)
VENTILATION MODE VENT: ABNORMAL
VENTILATION MODE VENT: ABNORMAL
VT SETTING VENT: 400 ML
VT SETTING VENT: 400 ML
WBC # BLD AUTO: 21.9 K/UL (ref 4.3–11.1)
WBC URNS QL MICRO: ABNORMAL /HPF
YEAST URNS QL MICRO: ABNORMAL

## 2024-04-13 PROCEDURE — 6360000002 HC RX W HCPCS

## 2024-04-13 PROCEDURE — 99292 CRITICAL CARE ADDL 30 MIN: CPT | Performed by: INTERNAL MEDICINE

## 2024-04-13 PROCEDURE — C1751 CATH, INF, PER/CENT/MIDLINE: HCPCS

## 2024-04-13 PROCEDURE — 94761 N-INVAS EAR/PLS OXIMETRY MLT: CPT

## 2024-04-13 PROCEDURE — 6360000002 HC RX W HCPCS: Performed by: EMERGENCY MEDICINE

## 2024-04-13 PROCEDURE — 93010 ELECTROCARDIOGRAM REPORT: CPT | Performed by: INTERNAL MEDICINE

## 2024-04-13 PROCEDURE — 03HY32Z INSERTION OF MONITORING DEVICE INTO UPPER ARTERY, PERCUTANEOUS APPROACH: ICD-10-PCS

## 2024-04-13 PROCEDURE — 93306 TTE W/DOPPLER COMPLETE: CPT | Performed by: INTERNAL MEDICINE

## 2024-04-13 PROCEDURE — 82962 GLUCOSE BLOOD TEST: CPT

## 2024-04-13 PROCEDURE — 6370000000 HC RX 637 (ALT 250 FOR IP): Performed by: STUDENT IN AN ORGANIZED HEALTH CARE EDUCATION/TRAINING PROGRAM

## 2024-04-13 PROCEDURE — 80053 COMPREHEN METABOLIC PANEL: CPT

## 2024-04-13 PROCEDURE — 36620 INSERTION CATHETER ARTERY: CPT

## 2024-04-13 PROCEDURE — 82040 ASSAY OF SERUM ALBUMIN: CPT

## 2024-04-13 PROCEDURE — 83690 ASSAY OF LIPASE: CPT

## 2024-04-13 PROCEDURE — 94640 AIRWAY INHALATION TREATMENT: CPT

## 2024-04-13 PROCEDURE — 85025 COMPLETE CBC W/AUTO DIFF WBC: CPT

## 2024-04-13 PROCEDURE — 71045 X-RAY EXAM CHEST 1 VIEW: CPT

## 2024-04-13 PROCEDURE — 2580000003 HC RX 258: Performed by: INTERNAL MEDICINE

## 2024-04-13 PROCEDURE — 2580000003 HC RX 258

## 2024-04-13 PROCEDURE — 99291 CRITICAL CARE FIRST HOUR: CPT

## 2024-04-13 PROCEDURE — 2500000003 HC RX 250 WO HCPCS: Performed by: INTERNAL MEDICINE

## 2024-04-13 PROCEDURE — 6360000004 HC RX CONTRAST MEDICATION

## 2024-04-13 PROCEDURE — 80048 BASIC METABOLIC PNL TOTAL CA: CPT

## 2024-04-13 PROCEDURE — 02HV33Z INSERTION OF INFUSION DEVICE INTO SUPERIOR VENA CAVA, PERCUTANEOUS APPROACH: ICD-10-PCS

## 2024-04-13 PROCEDURE — 2700000000 HC OXYGEN THERAPY PER DAY

## 2024-04-13 PROCEDURE — 83735 ASSAY OF MAGNESIUM: CPT

## 2024-04-13 PROCEDURE — C8929 TTE W OR WO FOL WCON,DOPPLER: HCPCS

## 2024-04-13 PROCEDURE — C1894 INTRO/SHEATH, NON-LASER: HCPCS

## 2024-04-13 PROCEDURE — 0BH17EZ INSERTION OF ENDOTRACHEAL AIRWAY INTO TRACHEA, VIA NATURAL OR ARTIFICIAL OPENING: ICD-10-PCS

## 2024-04-13 PROCEDURE — 6370000000 HC RX 637 (ALT 250 FOR IP): Performed by: INTERNAL MEDICINE

## 2024-04-13 PROCEDURE — 94002 VENT MGMT INPAT INIT DAY: CPT

## 2024-04-13 PROCEDURE — 83880 ASSAY OF NATRIURETIC PEPTIDE: CPT

## 2024-04-13 PROCEDURE — 74018 RADEX ABDOMEN 1 VIEW: CPT

## 2024-04-13 PROCEDURE — 3E033XZ INTRODUCTION OF VASOPRESSOR INTO PERIPHERAL VEIN, PERCUTANEOUS APPROACH: ICD-10-PCS

## 2024-04-13 PROCEDURE — 31500 INSERT EMERGENCY AIRWAY: CPT

## 2024-04-13 PROCEDURE — 2580000003 HC RX 258: Performed by: STUDENT IN AN ORGANIZED HEALTH CARE EDUCATION/TRAINING PROGRAM

## 2024-04-13 PROCEDURE — 84145 PROCALCITONIN (PCT): CPT

## 2024-04-13 PROCEDURE — 81001 URINALYSIS AUTO W/SCOPE: CPT

## 2024-04-13 PROCEDURE — 82803 BLOOD GASES ANY COMBINATION: CPT

## 2024-04-13 PROCEDURE — 36556 INSERT NON-TUNNEL CV CATH: CPT

## 2024-04-13 PROCEDURE — 84484 ASSAY OF TROPONIN QUANT: CPT

## 2024-04-13 PROCEDURE — 5A1945Z RESPIRATORY VENTILATION, 24-96 CONSECUTIVE HOURS: ICD-10-PCS

## 2024-04-13 PROCEDURE — 0202U NFCT DS 22 TRGT SARS-COV-2: CPT

## 2024-04-13 PROCEDURE — 2709999900 HC NON-CHARGEABLE SUPPLY

## 2024-04-13 PROCEDURE — A4216 STERILE WATER/SALINE, 10 ML: HCPCS

## 2024-04-13 PROCEDURE — 2500000003 HC RX 250 WO HCPCS

## 2024-04-13 PROCEDURE — 83605 ASSAY OF LACTIC ACID: CPT

## 2024-04-13 PROCEDURE — 6360000002 HC RX W HCPCS: Performed by: INTERNAL MEDICINE

## 2024-04-13 PROCEDURE — 51702 INSERT TEMP BLADDER CATH: CPT

## 2024-04-13 PROCEDURE — 2000000000 HC ICU R&B

## 2024-04-13 PROCEDURE — 99222 1ST HOSP IP/OBS MODERATE 55: CPT | Performed by: INTERNAL MEDICINE

## 2024-04-13 PROCEDURE — 2580000003 HC RX 258: Performed by: EMERGENCY MEDICINE

## 2024-04-13 PROCEDURE — 84443 ASSAY THYROID STIM HORMONE: CPT

## 2024-04-13 PROCEDURE — 87040 BLOOD CULTURE FOR BACTERIA: CPT

## 2024-04-13 PROCEDURE — 36600 WITHDRAWAL OF ARTERIAL BLOOD: CPT

## 2024-04-13 PROCEDURE — 93005 ELECTROCARDIOGRAM TRACING: CPT | Performed by: EMERGENCY MEDICINE

## 2024-04-13 PROCEDURE — 6360000002 HC RX W HCPCS: Performed by: NURSE PRACTITIONER

## 2024-04-13 RX ORDER — CALCIUM GLUCONATE 20 MG/ML
2000 INJECTION, SOLUTION INTRAVENOUS ONCE
Status: COMPLETED | OUTPATIENT
Start: 2024-04-13 | End: 2024-04-13

## 2024-04-13 RX ORDER — FENTANYL CITRATE-0.9 % NACL/PF 20 MCG/2ML
50 SYRINGE (ML) INTRAVENOUS EVERY 30 MIN PRN
Status: DISCONTINUED | OUTPATIENT
Start: 2024-04-13 | End: 2024-04-13 | Stop reason: SDUPTHER

## 2024-04-13 RX ORDER — POTASSIUM CHLORIDE 7.45 MG/ML
10 INJECTION INTRAVENOUS PRN
Status: DISCONTINUED | OUTPATIENT
Start: 2024-04-13 | End: 2024-04-19 | Stop reason: HOSPADM

## 2024-04-13 RX ORDER — FENTANYL CITRATE 50 UG/ML
INJECTION, SOLUTION INTRAMUSCULAR; INTRAVENOUS
Status: COMPLETED
Start: 2024-04-13 | End: 2024-04-13

## 2024-04-13 RX ORDER — SODIUM CHLORIDE 0.9 % (FLUSH) 0.9 %
5-40 SYRINGE (ML) INJECTION PRN
Status: DISCONTINUED | OUTPATIENT
Start: 2024-04-13 | End: 2024-04-19 | Stop reason: HOSPADM

## 2024-04-13 RX ORDER — ONDANSETRON 2 MG/ML
4 INJECTION INTRAMUSCULAR; INTRAVENOUS EVERY 6 HOURS PRN
Status: DISCONTINUED | OUTPATIENT
Start: 2024-04-13 | End: 2024-04-19 | Stop reason: HOSPADM

## 2024-04-13 RX ORDER — SODIUM CHLORIDE 0.9 % (FLUSH) 0.9 %
5-40 SYRINGE (ML) INJECTION EVERY 12 HOURS SCHEDULED
Status: DISCONTINUED | OUTPATIENT
Start: 2024-04-13 | End: 2024-04-19 | Stop reason: HOSPADM

## 2024-04-13 RX ORDER — SODIUM CHLORIDE 9 MG/ML
INJECTION, SOLUTION INTRAVENOUS PRN
Status: DISCONTINUED | OUTPATIENT
Start: 2024-04-13 | End: 2024-04-19 | Stop reason: HOSPADM

## 2024-04-13 RX ORDER — NOREPINEPHRINE BITARTRATE 0.02 MG/ML
2-30 INJECTION, SOLUTION INTRAVENOUS CONTINUOUS
Status: DISCONTINUED | OUTPATIENT
Start: 2024-04-13 | End: 2024-04-14

## 2024-04-13 RX ORDER — 0.9 % SODIUM CHLORIDE 0.9 %
30 INTRAVENOUS SOLUTION INTRAVENOUS
Status: COMPLETED | OUTPATIENT
Start: 2024-04-13 | End: 2024-04-13

## 2024-04-13 RX ORDER — SODIUM CHLORIDE, SODIUM LACTATE, POTASSIUM CHLORIDE, AND CALCIUM CHLORIDE .6; .31; .03; .02 G/100ML; G/100ML; G/100ML; G/100ML
1000 INJECTION, SOLUTION INTRAVENOUS ONCE
Status: COMPLETED | OUTPATIENT
Start: 2024-04-13 | End: 2024-04-13

## 2024-04-13 RX ORDER — FENTANYL CITRATE-0.9 % NACL/PF 10 MCG/ML
25-200 PLASTIC BAG, INJECTION (ML) INTRAVENOUS CONTINUOUS
Status: DISCONTINUED | OUTPATIENT
Start: 2024-04-13 | End: 2024-04-13

## 2024-04-13 RX ORDER — MIDAZOLAM HYDROCHLORIDE 1 MG/ML
1-10 INJECTION, SOLUTION INTRAVENOUS CONTINUOUS
Status: DISCONTINUED | OUTPATIENT
Start: 2024-04-13 | End: 2024-04-13

## 2024-04-13 RX ORDER — CALCIUM GLUCONATE 20 MG/ML
1000 INJECTION, SOLUTION INTRAVENOUS ONCE
Status: COMPLETED | OUTPATIENT
Start: 2024-04-13 | End: 2024-04-13

## 2024-04-13 RX ORDER — NOREPINEPHRINE BITARTRATE 0.02 MG/ML
INJECTION, SOLUTION INTRAVENOUS
Status: COMPLETED
Start: 2024-04-13 | End: 2024-04-13

## 2024-04-13 RX ORDER — POLYETHYLENE GLYCOL 3350 17 G/17G
17 POWDER, FOR SOLUTION ORAL DAILY PRN
Status: DISCONTINUED | OUTPATIENT
Start: 2024-04-13 | End: 2024-04-19 | Stop reason: HOSPADM

## 2024-04-13 RX ORDER — ROCURONIUM BROMIDE 10 MG/ML
INJECTION, SOLUTION INTRAVENOUS
Status: COMPLETED
Start: 2024-04-13 | End: 2024-04-13

## 2024-04-13 RX ORDER — ETOMIDATE 2 MG/ML
30 INJECTION INTRAVENOUS ONCE
Status: COMPLETED | OUTPATIENT
Start: 2024-04-13 | End: 2024-04-13

## 2024-04-13 RX ORDER — ALBUTEROL SULFATE 2.5 MG/3ML
2.5 SOLUTION RESPIRATORY (INHALATION)
Status: DISCONTINUED | OUTPATIENT
Start: 2024-04-13 | End: 2024-04-19 | Stop reason: HOSPADM

## 2024-04-13 RX ORDER — ETOMIDATE 2 MG/ML
INJECTION INTRAVENOUS
Status: COMPLETED
Start: 2024-04-13 | End: 2024-04-13

## 2024-04-13 RX ORDER — MAGNESIUM SULFATE IN WATER 40 MG/ML
4000 INJECTION, SOLUTION INTRAVENOUS ONCE
Status: COMPLETED | OUTPATIENT
Start: 2024-04-13 | End: 2024-04-13

## 2024-04-13 RX ORDER — HEPARIN SODIUM 5000 [USP'U]/ML
5000 INJECTION, SOLUTION INTRAVENOUS; SUBCUTANEOUS EVERY 8 HOURS SCHEDULED
Status: DISCONTINUED | OUTPATIENT
Start: 2024-04-13 | End: 2024-04-18

## 2024-04-13 RX ORDER — POTASSIUM CHLORIDE 29.8 MG/ML
20 INJECTION INTRAVENOUS PRN
Status: DISCONTINUED | OUTPATIENT
Start: 2024-04-13 | End: 2024-04-19 | Stop reason: HOSPADM

## 2024-04-13 RX ORDER — ROCURONIUM BROMIDE 10 MG/ML
50 INJECTION, SOLUTION INTRAVENOUS ONCE
Status: COMPLETED | OUTPATIENT
Start: 2024-04-13 | End: 2024-04-13

## 2024-04-13 RX ORDER — ACETAMINOPHEN 325 MG/1
650 TABLET ORAL EVERY 6 HOURS PRN
Status: DISCONTINUED | OUTPATIENT
Start: 2024-04-13 | End: 2024-04-19 | Stop reason: HOSPADM

## 2024-04-13 RX ORDER — ONDANSETRON 4 MG/1
4 TABLET, ORALLY DISINTEGRATING ORAL EVERY 8 HOURS PRN
Status: DISCONTINUED | OUTPATIENT
Start: 2024-04-13 | End: 2024-04-19 | Stop reason: HOSPADM

## 2024-04-13 RX ORDER — DOPAMINE HYDROCHLORIDE 320 MG/100ML
1-20 INJECTION, SOLUTION INTRAVENOUS CONTINUOUS
Status: DISCONTINUED | OUTPATIENT
Start: 2024-04-13 | End: 2024-04-14

## 2024-04-13 RX ORDER — IBUPROFEN 600 MG/1
5 TABLET ORAL ONCE
Status: COMPLETED | OUTPATIENT
Start: 2024-04-13 | End: 2024-04-13

## 2024-04-13 RX ORDER — FENTANYL CITRATE 50 UG/ML
50 INJECTION, SOLUTION INTRAMUSCULAR; INTRAVENOUS
Status: DISCONTINUED | OUTPATIENT
Start: 2024-04-13 | End: 2024-04-15

## 2024-04-13 RX ORDER — DEXTROSE MONOHYDRATE 100 MG/ML
INJECTION, SOLUTION INTRAVENOUS CONTINUOUS PRN
Status: DISCONTINUED | OUTPATIENT
Start: 2024-04-13 | End: 2024-04-19 | Stop reason: HOSPADM

## 2024-04-13 RX ORDER — INSULIN LISPRO 100 [IU]/ML
0-8 INJECTION, SOLUTION INTRAVENOUS; SUBCUTANEOUS EVERY 4 HOURS
Status: DISCONTINUED | OUTPATIENT
Start: 2024-04-13 | End: 2024-04-13

## 2024-04-13 RX ORDER — ENOXAPARIN SODIUM 100 MG/ML
30 INJECTION SUBCUTANEOUS DAILY
Status: DISCONTINUED | OUTPATIENT
Start: 2024-04-13 | End: 2024-04-13 | Stop reason: ALTCHOICE

## 2024-04-13 RX ORDER — IBUPROFEN 600 MG/1
1 TABLET ORAL PRN
Status: DISCONTINUED | OUTPATIENT
Start: 2024-04-13 | End: 2024-04-19 | Stop reason: HOSPADM

## 2024-04-13 RX ORDER — METOCLOPRAMIDE HYDROCHLORIDE 5 MG/ML
5 INJECTION INTRAMUSCULAR; INTRAVENOUS
Status: COMPLETED | OUTPATIENT
Start: 2024-04-13 | End: 2024-04-13

## 2024-04-13 RX ORDER — FENTANYL CITRATE 50 UG/ML
100 INJECTION, SOLUTION INTRAMUSCULAR; INTRAVENOUS ONCE
Status: COMPLETED | OUTPATIENT
Start: 2024-04-13 | End: 2024-04-13

## 2024-04-13 RX ORDER — MIDAZOLAM HYDROCHLORIDE 2 MG/2ML
2 INJECTION, SOLUTION INTRAMUSCULAR; INTRAVENOUS
Status: DISCONTINUED | OUTPATIENT
Start: 2024-04-13 | End: 2024-04-15

## 2024-04-13 RX ORDER — 0.9 % SODIUM CHLORIDE 0.9 %
30 INTRAVENOUS SOLUTION INTRAVENOUS ONCE
Status: COMPLETED | OUTPATIENT
Start: 2024-04-13 | End: 2024-04-13

## 2024-04-13 RX ORDER — PHENTOLAMINE MESYLATE 5 MG/1
5 INJECTION INTRAMUSCULAR; INTRAVENOUS ONCE
Status: DISCONTINUED | OUTPATIENT
Start: 2024-04-13 | End: 2024-04-13 | Stop reason: SDUPTHER

## 2024-04-13 RX ADMIN — SODIUM CHLORIDE 2313 ML: 9 INJECTION, SOLUTION INTRAVENOUS at 04:11

## 2024-04-13 RX ADMIN — SODIUM CHLORIDE 5 MG: 9 INJECTION, SOLUTION INTRAMUSCULAR; INTRAVENOUS; SUBCUTANEOUS at 03:53

## 2024-04-13 RX ADMIN — PERFLUTREN 0.6 ML: 6.52 INJECTION, SUSPENSION INTRAVENOUS at 09:10

## 2024-04-13 RX ADMIN — CALCIUM GLUCONATE 2000 MG: 20 INJECTION, SOLUTION INTRAVENOUS at 16:32

## 2024-04-13 RX ADMIN — POTASSIUM CHLORIDE 20 MEQ: 29.8 INJECTION, SOLUTION INTRAVENOUS at 21:29

## 2024-04-13 RX ADMIN — CALCIUM GLUCONATE 1000 MG: 20 INJECTION, SOLUTION INTRAVENOUS at 15:32

## 2024-04-13 RX ADMIN — ETOMIDATE 30 MG: 2 INJECTION INTRAVENOUS at 03:11

## 2024-04-13 RX ADMIN — SODIUM CHLORIDE, POTASSIUM CHLORIDE, SODIUM LACTATE AND CALCIUM CHLORIDE 1000 ML: 600; 310; 30; 20 INJECTION, SOLUTION INTRAVENOUS at 11:12

## 2024-04-13 RX ADMIN — MIDAZOLAM HYDROCHLORIDE 2 MG: 1 INJECTION, SOLUTION INTRAMUSCULAR; INTRAVENOUS at 16:41

## 2024-04-13 RX ADMIN — DOPAMINE HYDROCHLORIDE 2.5 MCG/KG/MIN: 320 INJECTION, SOLUTION INTRAVENOUS at 00:29

## 2024-04-13 RX ADMIN — FENTANYL CITRATE 50 MCG: 50 INJECTION INTRAMUSCULAR; INTRAVENOUS at 10:13

## 2024-04-13 RX ADMIN — ROCURONIUM BROMIDE 50 MG: 10 INJECTION, SOLUTION INTRAVENOUS at 03:12

## 2024-04-13 RX ADMIN — SODIUM CHLORIDE 13.44 UNITS/HR: 9 INJECTION, SOLUTION INTRAVENOUS at 20:51

## 2024-04-13 RX ADMIN — VASOPRESSIN 0.03 UNITS/MIN: 0.2 INJECTION INTRAVENOUS at 05:30

## 2024-04-13 RX ADMIN — SODIUM CHLORIDE 5 MCG/MIN: 9 INJECTION, SOLUTION INTRAVENOUS at 03:16

## 2024-04-13 RX ADMIN — VANCOMYCIN HYDROCHLORIDE 1750 MG: 10 INJECTION, POWDER, LYOPHILIZED, FOR SOLUTION INTRAVENOUS at 01:12

## 2024-04-13 RX ADMIN — MIDAZOLAM HYDROCHLORIDE 2 MG: 1 INJECTION, SOLUTION INTRAMUSCULAR; INTRAVENOUS at 11:25

## 2024-04-13 RX ADMIN — FENTANYL CITRATE 50 MCG/HR: 0.05 INJECTION, SOLUTION INTRAMUSCULAR; INTRAVENOUS at 03:29

## 2024-04-13 RX ADMIN — POTASSIUM CHLORIDE 20 MEQ: 29.8 INJECTION, SOLUTION INTRAVENOUS at 22:34

## 2024-04-13 RX ADMIN — MIDAZOLAM HYDROCHLORIDE 2 MG: 1 INJECTION, SOLUTION INTRAMUSCULAR; INTRAVENOUS at 13:23

## 2024-04-13 RX ADMIN — SODIUM CHLORIDE 20 MCG/MIN: 9 INJECTION, SOLUTION INTRAVENOUS at 11:33

## 2024-04-13 RX ADMIN — SODIUM CHLORIDE, PRESERVATIVE FREE 10 ML: 5 INJECTION INTRAVENOUS at 19:15

## 2024-04-13 RX ADMIN — SODIUM CHLORIDE, PRESERVATIVE FREE 10 ML: 5 INJECTION INTRAVENOUS at 10:14

## 2024-04-13 RX ADMIN — SODIUM CHLORIDE 6.98 UNITS/HR: 9 INJECTION, SOLUTION INTRAVENOUS at 11:47

## 2024-04-13 RX ADMIN — SODIUM BICARBONATE: 84 INJECTION, SOLUTION INTRAVENOUS at 06:18

## 2024-04-13 RX ADMIN — ALBUTEROL SULFATE 2.5 MG: 2.5 SOLUTION RESPIRATORY (INHALATION) at 20:05

## 2024-04-13 RX ADMIN — HEPARIN SODIUM 5000 UNITS: 5000 INJECTION INTRAVENOUS; SUBCUTANEOUS at 13:23

## 2024-04-13 RX ADMIN — PIPERACILLIN AND TAZOBACTAM 4500 MG: 4; .5 INJECTION, POWDER, FOR SOLUTION INTRAVENOUS at 06:29

## 2024-04-13 RX ADMIN — HEPARIN SODIUM 5000 UNITS: 5000 INJECTION INTRAVENOUS; SUBCUTANEOUS at 22:30

## 2024-04-13 RX ADMIN — METOCLOPRAMIDE 5 MG: 5 INJECTION, SOLUTION INTRAMUSCULAR; INTRAVENOUS at 00:29

## 2024-04-13 RX ADMIN — MAGNESIUM SULFATE HEPTAHYDRATE 4000 MG: 40 INJECTION, SOLUTION INTRAVENOUS at 04:52

## 2024-04-13 RX ADMIN — MIDAZOLAM 2 MG/HR: 5 INJECTION INTRAMUSCULAR; INTRAVENOUS at 03:30

## 2024-04-13 RX ADMIN — Medication 5 MG/HR: at 15:37

## 2024-04-13 RX ADMIN — GLUCAGON 5 MG: KIT at 10:58

## 2024-04-13 RX ADMIN — PIPERACILLIN AND TAZOBACTAM 4500 MG: 4; .5 INJECTION, POWDER, FOR SOLUTION INTRAVENOUS at 00:57

## 2024-04-13 RX ADMIN — INSULIN LISPRO 8 UNITS: 100 INJECTION, SOLUTION INTRAVENOUS; SUBCUTANEOUS at 10:57

## 2024-04-13 RX ADMIN — ALBUTEROL SULFATE 2.5 MG: 2.5 SOLUTION RESPIRATORY (INHALATION) at 15:10

## 2024-04-13 RX ADMIN — SODIUM CHLORIDE 29 MCG/MIN: 9 INJECTION, SOLUTION INTRAVENOUS at 08:41

## 2024-04-13 RX ADMIN — ONDANSETRON 4 MG: 2 INJECTION INTRAMUSCULAR; INTRAVENOUS at 02:18

## 2024-04-13 RX ADMIN — WATER 2000 MG: 1 INJECTION INTRAMUSCULAR; INTRAVENOUS; SUBCUTANEOUS at 10:57

## 2024-04-13 RX ADMIN — SODIUM BICARBONATE: 84 INJECTION, SOLUTION INTRAVENOUS at 21:25

## 2024-04-13 RX ADMIN — CEFEPIME 1000 MG: 1 INJECTION, POWDER, FOR SOLUTION INTRAMUSCULAR; INTRAVENOUS at 22:41

## 2024-04-13 RX ADMIN — MIDAZOLAM HYDROCHLORIDE 2 MG: 1 INJECTION, SOLUTION INTRAMUSCULAR; INTRAVENOUS at 10:13

## 2024-04-13 RX ADMIN — FENTANYL CITRATE 100 MCG: 50 INJECTION INTRAMUSCULAR; INTRAVENOUS at 03:11

## 2024-04-13 RX ADMIN — SODIUM CHLORIDE 7 MCG/MIN: 9 INJECTION, SOLUTION INTRAVENOUS at 16:40

## 2024-04-13 RX ADMIN — HEPARIN SODIUM 5000 UNITS: 5000 INJECTION INTRAVENOUS; SUBCUTANEOUS at 06:19

## 2024-04-13 RX ADMIN — ALBUTEROL SULFATE 2.5 MG: 2.5 SOLUTION RESPIRATORY (INHALATION) at 07:26

## 2024-04-13 RX ADMIN — DOPAMINE HYDROCHLORIDE 15 MCG/KG/MIN: 320 INJECTION, SOLUTION INTRAVENOUS at 10:57

## 2024-04-13 RX ADMIN — SODIUM BICARBONATE 50 MEQ: 84 INJECTION, SOLUTION INTRAVENOUS at 06:24

## 2024-04-13 RX ADMIN — FENTANYL CITRATE 100 MCG: 50 INJECTION, SOLUTION INTRAMUSCULAR; INTRAVENOUS at 03:11

## 2024-04-13 RX ADMIN — ALBUTEROL SULFATE 2.5 MG: 2.5 SOLUTION RESPIRATORY (INHALATION) at 11:56

## 2024-04-13 RX ADMIN — SODIUM BICARBONATE: 84 INJECTION, SOLUTION INTRAVENOUS at 13:27

## 2024-04-13 RX ADMIN — SODIUM CHLORIDE 2313 ML: 9 INJECTION, SOLUTION INTRAVENOUS at 00:58

## 2024-04-13 ASSESSMENT — PULMONARY FUNCTION TESTS
PIF_VALUE: 26
PIF_VALUE: 18
PIF_VALUE: 22
PIF_VALUE: 30
PIF_VALUE: 33
PIF_VALUE: 26

## 2024-04-13 ASSESSMENT — ENCOUNTER SYMPTOMS
STOOL DESCRIPTION: SOFT FORMED
PAIN LOCATION - PAIN QUALITY: THROBBING ACHE
HEMOPTYSIS: 0

## 2024-04-13 ASSESSMENT — PAIN SCALES - GENERAL
PAINLEVEL_OUTOF10: 0

## 2024-04-13 NOTE — ED NOTES
Zosyn infusion switched to a new channel due to the previous IV pump showing an error that was preventing infusion of the medication .      Quinton Ferro RN  04/13/24 0124

## 2024-04-13 NOTE — PROGRESS NOTES
[facility] Critical Care Note:: 4/13/2024  Vivien Zambrano  Admission Date: 4/12/2024     Length of Stay: 0 days    Background: 83 y.o. female with CAD s/p CABG, primary hypertension, hyperlipidemia, paroxysmal atrial fibrillation, non-insulin-dependent diabetes, GERD, subclavian artery stenosis who presented to the emergency room on 4/13 with bradycardia and minimal responsiveness.  She had a recent hospitalization 321 until 3/26/2024 with pneumonia and sepsis.  Since discharge she has had poor appetite, antibiotics, progressive weakness, low urine output.  EMS found her to have sinus bradycardia in the 40s and she was treated with atropine without improvement followed by normal saline and 2 Amps of bicarb.  She was placed on dopamine with improvement in heart rate.  In the emergency department she was reported to be responsive, but this has not been the case during her ICU stay    Notable PMH:  has a past medical history of Acute respiratory failure with hypoxia ([facility]), Arrhythmia, Arthritis, CAD (coronary artery disease), Calculus of kidney, Carotid stenosis, Coronary atherosclerosis of native coronary artery, Diabetes ([facility]), Essential hypertension, GERD (gastroesophageal reflux disease), HLD (hyperlipidemia), Insomnia, Myocardial infarction due to demand ischemia ([facility]), Respiratory distress, Right ureteral stone, Sepsis ([facility]), and Subclavian artery stenosis ([facility]).    24 Hour events:   She has an elevated white count of 21.9 thousand with neutrophil predominance, h hypomagnesemia, ANCA, non anion gap metabolic acidosis.  She is currently on dopamine/levophed infusions as well as fentanyl and Versed (unclear reasons!)  She was intubated this morning. Her arterial line waveform is not accurate.    Review of Systems: Unable to obtain due to patient factors.     Lines: (insertion date)   ETT  (Active)     NG/OG/NJ/NE Tube Orogastric 16 fr Center mouth (Active)       Urinary Catheter 04/13/24 2  fibrillation noted during the study    Antibiotics:  Inpat Anti-Infectives (From admission, onward)       Start     Ordered Stop    04/13/24 1430  piperacillin-tazobactam (ZOSYN) 3,375 mg in sodium chloride 0.9 % 50 mL IVPB (mini-bag)  3,375 mg,   IntraVENous,   EVERY 8 HOURS         04/13/24 0815 04/20/24 0629    04/13/24 0500  vancomycin (VANCOCIN) intermittent dosing (placeholder)  Other,   Rx Placeholder         04/13/24 0500 04/20/24 0459                  Microbiology:   Results       Procedure Component Value Units Date/Time    Respiratory Panel, Molecular, with COVID-19 (Restricted: peds pts or suitable admitted adults) [[id number]] Collected: 04/13/24 0144    Order Status: Completed Specimen: Nasopharyngeal Updated: 04/13/24 0256     Adenovirus by PCR NOT DETECTED        Coronavirus 229E by PCR NOT DETECTED        Coronavirus HKU1 by PCR NOT DETECTED        Coronavirus NL63 by PCR NOT DETECTED        Coronavirus OC43 by PCR NOT DETECTED        SARS-CoV-2, PCR NOT DETECTED        Human Metapneumovirus by PCR NOT DETECTED        Rhinovirus Enterovirus PCR NOT DETECTED        Influenza A by PCR NOT DETECTED        Influenza B PCR NOT DETECTED        Parainfluenza 1 PCR NOT DETECTED        Parainfluenza 2 PCR NOT DETECTED        Parainfluenza 3 PCR NOT DETECTED        Parainfluenza 4 PCR NOT DETECTED        Respiratory Syncytial Virus by PCR NOT DETECTED        Bordetella parapertussis by PCR NOT DETECTED        Bordetella pertussis by PCR NOT DETECTED        Chlamydophila Pneumonia PCR NOT DETECTED        Mycoplasma pneumo by PCR NOT DETECTED       Culture, Blood 1 [[id number]] Collected: 04/13/24 0038    Order Status: Completed Specimen: Blood Updated: 04/13/24 0057     Special Requests --        LEFT  Antecubital       Culture PENDING    Culture, Blood 1 [[id number]] Collected: 04/13/24 0021    Order Status: Completed Specimen: Blood Updated: 04/13/24 0057     Special Requests --        RIGHT  FOREARM

## 2024-04-13 NOTE — PROCEDURES
PROCEDURE NOTE  ARTERIAL LINE PLACEMENT  04/13/24  5:01 AM    Indication: shock in need of hemodynamic monitoring    A time-out was completed verifying correct patient, procedure, site, positioning, and special equipment if applicable. Kristian’s test was performed to ensure adequate perfusion. The patient’s left wrist was prepped and draped in sterile fashion.  A 20G Arrow arterial line was introduced into the left radial artery. The catheter was threaded over the guide wire and the needle was removed with appropriate pulsatile blood return. The catheter was then sutured in place to the skin and a sterile dressing applied. Perfusion to the extremity distal to the point of catheter insertion was checked and found to be adequate.     The patient tolerated the procedure well and there were no complications.    Fili Beebe MD

## 2024-04-13 NOTE — H&P
[facility] Critical Care Note:: 4/13/2024  Vivien Zambrano  Admission Date: 4/12/2024     Length of Stay: 0 days    Background: 83 y.o. female with CAD, PAF, DM2 (without insulin), HTN, HLD, carotid stenosis, GERD, who was recently in the hospital for pneumonia and sepsis since being sent home she has not felt well and has not been eating or drinking well.  Patient did have a bout of diarrhea last week.  Patient finished her outpatient antibiotics last week.  Family states heart rates been running low and they were told to come to the ED if it drop below 40.  Tonight it dropped below 40 and her blood pressure dropped.  Family also states there have been some confusion which is new.  She had some nighttime hallucinations last night..    Notable PMH:  has a past medical history of Acute respiratory failure with hypoxia ([facility]), Arrhythmia, Arthritis, CAD (coronary artery disease), Calculus of kidney, Carotid stenosis, Coronary atherosclerosis of native coronary artery, Diabetes ([facility]), Essential hypertension, GERD (gastroesophageal reflux disease), HLD (hyperlipidemia), Insomnia, Myocardial infarction due to demand ischemia ([facility]), Respiratory distress, Right ureteral stone, Sepsis ([facility]), and Subclavian artery stenosis ([facility]).    24 Hour events:   Worsening of her bradycardia and altered mental status with hypotension brought to the ED by EMS who gave her 3 mg of atropine without change and started her on dopamine.  She also received ketamine as she was getting externally paced.    Review of Systems: Comprehensive ROS negative except in HPI    Lines: (insertion date)      Drips: current dose (range)  Dose (mcg/kg/min) Dopamine: 11.5 mcg/kg/min     Pertinent Exam:         Blood pressure (!) 84/72, pulse 84, temperature 98.1 °F (36.7 °C), resp. rate 23, height 1.575 m (5' 2\"), weight 77.1 kg (170 lb), SpO2 94 %.   Intake/Output Summary (Last 24 hours) at 4/13/2024 0231  Last data filed at  acid 3.1 WBCs 21 K electrolytes: Electrolytes within normal limits  GI:   Nutrition: Morbidly obese  HEME:   Anemia: H&H 10/32  Thrombocytopenia: 419 K  Anticoagulation: Eliquis at home  ID:   Septic Shock: Possible with source being unknown at this time possible pneumonia with possible urosepsis  ENDO:   DM: Glucose 149  Skin: no decub, turns, preventive care  Prophy: Mechanical and chemical prevention of DVTs    Family can be updated by phone after rounds.  Yadiel Hartman (Child)    can be reached at [phone] (Mobile).    Full Code    The patient is critically ill with respiratory failure, circulatory failure and requires high complexity decision making for assessment and support including frequent ventilator adjustment, frequent evaluation and titration of therapies , application of advanced monitoring technologies and extensive interpretation of multiple databases    Cumulative time devoted to patient care services by me for day of service is 45 mins.    Fili Beebe MD

## 2024-04-13 NOTE — PROGRESS NOTES
Patient transferred from the ED to CCU bed to upon arrival patient's blood pressure very labile and low patient on higher dose dopamine.  Heart rate 70 to 80 bpm.  Respiratory condition markedly changed.  Patient was immediately intubated to control her airway and better maintain adequate ventilation.  Patient continued to have hypotension.  IV access was lost.  Infiltration of vasopressors was noted and Regitine/phentolamine was administered per protocol.    The patient is critically ill with respiratory failure, circulatory failure and requires high complexity decision making for assessment and support including frequent ventilator adjustment , frequent evaluation and titration of therapies , application of advanced monitoring technologies and extensive interpretation of multiple databases    Cumulative time devoted to patient care services by me for day of service -70 min     Fili Beebe MD

## 2024-04-13 NOTE — ED NOTES
Report given to Quinton RN to assume care at this time.      Sravanthi Kang, CARON  04/13/24 0052

## 2024-04-13 NOTE — ED PROVIDER NOTES
Emergency Department Provider Note       PCP: Bradford Alanis MD   Age: 83 y.o.   Sex: female     DISPOSITION Decision To Admit 04/13/2024 01:06:22 AM       ICD-10-CM    1. Sepsis with acute renal failure and septic shock, due to unspecified organism, unspecified acute renal failure type (HCC)  A41.9     R65.21     N17.9       2. Bradycardia  R00.1           Medical Decision Making     DDX:     CHF, COPD, pneumonia, PE,    MI, coronary artery disease, unstable angina, coronary artery disease,    Atrial fibrillation, cardiac arrhythmia, PVC, medication induced palpitations, heart block,  electrolyte induced palpitations,    Aortic dissection, aortic aneurysm,    GERD, musculoskeletal pain, costochondritis, rib fracture, pleurisy,    ED Course as of 04/13/24 0108   Sat Apr 13, 2024   0105 I spoke to Dr. Beebe the intensivist on-call this evening.  He is going to come and see the patient planning on admission to the hospital. [KH]   0105 The patient's dopamine has had to be increased to 7.5 secondary to current hypotension.  Heart rate has remained 70 to 80 bpm.  The patient has been started on broad-spectrum antibiotics secondary to concern for sepsis with elevated white blood cell count of 22. [KH]      ED Course User Index  [KH] Moises Fitzgerald, DO     1 or more acute illnesses that pose a threat to life or bodily function.   Drug therapy given requiring intensive monitoring for toxicity.    I independently ordered and reviewed each unique test.  I reviewed external records: ED visit note from an outside group.  I reviewed external records: provider visit note from PCP.  I reviewed external records: provider visit note from outside specialist.  I reviewed external records: previous EKG including cardiologist interpretation.    I reviewed external records: previous lab results from outside ED.  I reviewed external records: previous imaging study including radiologist interpretation.   The patients assessment  required an independent historian: EMS.  The reason they were needed is important historical information not provided by the patient.  I interpreted the X-rays no pneumothorax.  My Independent EKG Interpretation: sinus rhythm, no evidence of arrhythmia      ST Segments:Normal ST segments - NO STEMI   Rate: 80  The patient was admitted and I have discussed patient management with the admitting provider.  The management of this patient was discussed with an external consultant.      SEP-1 CORE MEASURE    Fluid Resuscitation Rational: less than 30mL/kg because of a history of CHF NYHA III or IV with symptoms with minimal exertion/at rest.  Instead, 1000 was ordered.  More fluid initially would be potentially detrimental to the patient    Repeat Lactate Level: ordered and pending at this time    Reassessment Exam: I completed the sepsis fluid reassessment on 4/13/24 1:07 AM EDT.     Critical Care Procedure Note: 96 minutes of critical care time was performed in the emergency department.  This time was separate from any other procedures listed during the patients emergency department course. The failure to initiate these interventions on an urgent basis would likely have resulted in sudden, clinically significant or life-threatening deterioration in the patients condition.  The patient met the criteria for sepsis core measures implementation and this included parenteral fluid resuscitation, parental antibiotics, monitoring, and reassessments.         History     83-year-old female brought from home after EMS was called out for bradycardia.  Patient was in the hospital secondary to sepsis from March 21 to 26, 2024.  She had bilateral pneumonia and was found to be hyperglycemic.  The patient does take calcium channel blockers and beta-blockers at home but no high suspicion of possible overdose as family has been helping with her medicines.  When EMS arrived they found the patient to be bradycardic and hypotensive

## 2024-04-13 NOTE — ED NOTES
Pacing removed at this time per provider's instructions. Pt's HR maintaining between 70s-80s at this time. EKG obtained and shown to provider.      Sravanthi Kang, CARON  04/13/24 0016

## 2024-04-13 NOTE — PROCEDURES
Procedure: Endotracheal intubation    Indication: Acute respiratory failure 518.81    Medications:  Fentanyl 100 mcg   Etomidate 30 mg   Rocuronium 50 mg    After assessing the airway, the patient underwent preoxygenation with 100% FiO2 for 5 min. Fentanyl was then given and after 3 min, etomidate and rocuronium were given sequentially in rapid IV push.  The Sellick maneuver was performed throughout the entire sequence.  After adequate sedation and paralysis intubation was performed.    A [id number] laryngoscope was used to visualize the epiglottis and vocal cords.    After positive identification of epiglottis and the vocal cords, a size 7.5 ET tube was placed into the trachea with direct visualization.      Confirmation of endotracheal positionin.  The ET tube was directly visualized passing through the vocal cords.    2.  CO2 colorimetry was employed immediately to verify tube in airway with appropriate color change indicating detection/lack of CO2.   3.  Water vapor was seen within the ET tube, and auscultation of the abdomen revealed no bubbling sounds.  4.  Auscultation  And inspection of the chest after intubation showed symmetric chest excursion and symmetric air entry bilaterally.  5.  Chest X-ray has been ordered and is pending.    The tube was secured at 22 cm at the teeth with a tube segovia.  The patient has been placed on a mechanical ventilator.    There were no complications.    Fili Beebe MD

## 2024-04-13 NOTE — ED NOTES
TRANSFER - OUT REPORT:    Verbal report given to CARON Red on Vivien Zambrano  being transferred to [facility] for routine progression of patient care       Report consisted of patient's Situation, Background, Assessment and   Recommendations(SBAR).     Information from the following report(s) ED SBAR was reviewed with the receiving nurse.    Avenel Fall Assessment:    Presents to emergency department  because of falls (Syncope, seizure, or loss of consciousness): No  Age > 70: Yes  Altered Mental Status, Intoxication with alcohol or substance confusion (Disorientation, impaired judgment, poor safety awaremess, or inability to follow instructions): No  Impaired Mobility: Ambulates or transfers with assistive devices or assistance; Unable to ambulate or transer.: No  Nursing Judgement: Yes          Lines:   Peripheral IV 04/13/24 Right Antecubital (Active)   Site Assessment Clean, dry & intact 04/13/24 0159   Line Status Infusing 04/13/24 0159   Phlebitis Assessment No symptoms 04/13/24 0159   Infiltration Assessment 0 04/13/24 0159   Dressing Status Clean, dry & intact;New dressing applied 04/13/24 0159   Dressing Type Transparent 04/13/24 0159       Peripheral IV 04/13/24 Right Forearm (Active)   Site Assessment Clean, dry & intact 04/13/24 0042   Line Status Brisk blood return;Normal saline locked;Flushed 04/13/24 0042   Phlebitis Assessment No symptoms 04/13/24 0042   Infiltration Assessment 0 04/13/24 0042   Dressing Status Clean, dry & intact 04/13/24 0042   Dressing Type Transparent 04/13/24 0042       Peripheral IV 04/13/24 Left Antecubital (Active)   Site Assessment Clean, dry & intact 04/13/24 0042   Line Status Normal saline locked;Flushed 04/13/24 0042   Phlebitis Assessment No symptoms 04/13/24 0042   Infiltration Assessment 0 04/13/24 0042   Dressing Status Clean, dry & intact 04/13/24 0042   Dressing Type Transparent 04/13/24 0042        Opportunity for questions and clarification was provided.       Patient transported with:  Monitor and Registered Nurse, respiratory therapist, heated high flow nasal canula.          Quinton Ferro RN  04/13/24 6600

## 2024-04-13 NOTE — ED NOTES
Blood Culture drawn and 2nd staff member assisted (audited) CARON Fishman.  Clinical collect stickers were printed prior to scanning and administering antibiotics.      Quinton Ferro RN  04/13/24 0038

## 2024-04-13 NOTE — ED NOTES
Respiratory therapist contacted at this time due to patient's breathing becoming more labored and is in route to the department.      Quinton Ferro RN  04/13/24 0152

## 2024-04-13 NOTE — PROGRESS NOTES
VANCO NOTE RENAL INSUFFICIENCY PATIENT   [facility]   Pharmacy Pharmacokinetic Monitoring Service - Vancomycin    Consulting Provider: Concepción   Indication: sepsis  Target Concentration: Random level ? 20 mg/L  Day of Therapy: 1  Additional Antimicrobials: Zosyn    Pertinent Laboratory Values:   Wt Readings from Last 1 Encounters:   04/13/24 77.5 kg (170 lb 13.7 oz)     Temp Readings from Last 1 Encounters:   04/13/24 97.5 °F (36.4 °C) (Axillary)     Recent Labs     04/13/24  0021 04/13/24  0213   BUN 23  --    CREATININE 1.60*  --    WBC 21.9*  --    PROCAL <0.05  --    LACACIDPL 3.1* 2.8*       No results found for: \"VANCOTROUGH\", \"VANCORANDOM\"    MRSA Nasal Swab: N/A. Non-respiratory infection.    Assessment:  Date:  Dose/Freq Admin Times Level/Time:   4/13 1750mg x1 0112                              Plan:  Concentration-guided dosing due to renal impairment  Start vancomycin intermittent dosing.  Vancomycin concentrations will be ordered as clinically appropriate   Pharmacy will continue to monitor patient and adjust therapy as indicated    Thank you for the consult,  Inga [facility]

## 2024-04-13 NOTE — PROGRESS NOTES
TRANSFER - IN REPORT:    Verbal report received from CARON Alcantara on Vivien Zambrano  being received from ED for routine progression of patient care      Report consisted of patient's Situation, Background, Assessment and   Recommendations(SBAR).     Information from the following report(s) Nurse Handoff Report, ED Encounter Summary, ED SBAR, Surgery Report, Cardiac Rhythm NSR, and Neuro Assessment was reviewed with the receiving nurse.    Opportunity for questions and clarification was provided.      Assessment completed upon patient's arrival to unit and care assumed.      4 Eyes Skin Assessment     NAME:  Vivien Zambrano  YOB: 1940  MEDICAL RECORD NUMBER:  [id number]    The patient is being assessed for  {Reason for Assessment:07089}    I agree that at least one RN has performed a thorough Head to Toe Skin Assessment on the patient. ALL assessment sites listed below have been assessed.      Areas assessed by both nurses:    {Pressure Areas Assessed:02350}        Does the Patient have a Wound? {Action Wound:99492}       Raman Prevention initiated by RN: {YES/NO:19726}  Wound Care Orders initiated by RN: {YES/NO:19726}    Pressure Injury (Stage 3,4, Unstageable, DTI, NWPT, and Complex wounds) if present, place Wound referral order by RN under : {YES/NO:19726}    New Ostomies, if present place, Ostomy referral order under : {YES/NO:19726}     Nurse 1 eSignature: {Esignature:[id number]}    **SHARE this note so that the co-signing nurse can place an eSignature**    Nurse 2 eSignature: {Esignature:[id number]}

## 2024-04-13 NOTE — CONSULTS
[facility] CARDIOLOGY   Evaluation                  Primary Cardiologist: Dr. Moore    Primary Care Physician: Dr. Alanis    Admitting Physician: Dr. Beebe    Evaluating Physician: Dr. Stallworth    Subjective:     Patient is a 83 y.o. female with PMHx of CAD (s/p CABG), HTN, HLD, PAF, JEWEL, DM II, GERD and Subclavian artery stenosis who presented to the ED for evaluation of bradycardia. Pt is minimally responsive and information is obtained from ER RN and Intensivist at bedside. Basically, Pt admitted for PNA/sepsis 3/21->3/26. Since d/c has had FTT with poor PO intake, diarrhea from the ABX, poor UOP and progressive weakness. She was noted by family to have HR in the 40s and was increasingly less responsive with AMS. EMS was called. HR was SB 40s. She was given 3mg Atropine w/o improvement. She was given NS IVF bolus and Ca+ x2 amps. She was externally paced without good capture. She was then placed on IV Dopa with improved HR. She was brought to the ED.     While in the ED, Pt was apparently more alert and interactive. However she became increasingly less responsive. She was admitted to the CCU by the Intensivist. Cardiology asked to come evaluate her bradycardia.     Past Medical History:   Diagnosis Date    Acute respiratory failure with hypoxia (HCC) 9/24/2023    Arrhythmia age 32    stays controlled with med per pt--- will request cardio records    Arthritis     hand and spine    CAD (coronary artery disease) 2012    cabg     Calculus of kidney     Carotid stenosis 10/21/2015    Coronary atherosclerosis of native coronary artery 10/21/2015    Diabetes (HCC)     type2--- sqbs avg am--120--- hypo at 70's hgba1c 6.1 on 06/1/2019    Essential hypertension 10/21/2015    managed with medication    GERD (gastroesophageal reflux disease)     controlled with meds    HLD (hyperlipidemia) 10/21/2015    Insomnia     Myocardial infarction due to demand ischemia (HCC) 9/24/2023    Respiratory distress 9/24/2023    Right ureteral  above assessment. I agree and confirm with findings with additional details/exceptions as listed below:    History of coronary disease with prior CABG [coronary angiogram from 9/2023 with stable anatomy with severe native three-vessel disease with patent LIMA to LAD but critical proximal/mid subclavian artery stenosis, occluded SVG to OM with left left-sided collaterals, occluded free radial to RCA with collateralized flow].  Other history of hypertension, hyperlipidemia, diabetes mellitus, paroxysmal atrial fibrillation.  Last noted echo from 9/24/2023 with ejection fraction low normal with basal inferior wall hypokinesis.  Patient with recent admission from 3/21/2024 to 3/26/2024 for stated pneumonia/sepsis.  Appears postdischarge, has had progressive failure to thrive and progressive weakness/poor p.o. intake/decreased urine output per history.  Stated heart rates in the 40s at home and EMS was called patient also being less responsive.  Appears on initial presentation, noted to be hypotensive and received IV fluid boluses; per records externally paced with no good capture and subsequently was started on IV dopamine with improved heart rates.  Close heart rates per family in the low 40s.  Was intubated overnight due to decreased responsiveness.  Currently on pressors with Levophed/vasopressin/dopamine.  Heart rates noted in the 80s.  On 45% FiO2.    Physical Exam  Constitutional:       Comments: Intubated/sedated   HENT:      Head: Normocephalic and atraumatic.      Mouth/Throat:      Mouth: Mucous membranes are moist.   Eyes:      Pupils: Pupils are equal, round, and reactive to light.   Neck:      Vascular: No carotid bruit.   Cardiovascular:      Rate and Rhythm: Normal rate and regular rhythm.      Pulses: Normal pulses.      Heart sounds: No murmur heard.  Pulmonary:      Effort: Pulmonary effort is normal.      Breath sounds: Normal breath sounds.   Abdominal:      General: There is no distension.

## 2024-04-13 NOTE — ED TRIAGE NOTES
Pt brought in by EMS from home c/o bradycardia (HR in the 40s) and lightheaded. Upon arrival to the scene EMS reports pt was A&O 4/4, c/o lightheadedness, hypotensive (80/60),  and hypoxic w/ SpO2 of 70% on RA. Pt placed on 6 L w/ rpt SpO2 of 94%. Pt given 3 mg of atropine over 15 mins and 400 ml bolus w/ EMS, w/ no improvement in symptoms. Pt given 2 Mg glucagon IV. Pt began decompensating w/ EMS w/ BP 60/40 and HR of 40. Pt given 30 mg ketamine and placed on external pacer, set to 70 bpm at 85 milliamps. Dopamine also started at 5 mcg/min w/ rpt BP of 100/62, HR of 70. Hx of previous MI. Provider at bedside at this time.

## 2024-04-13 NOTE — PROGRESS NOTES
Patient was intubated with a number 7.5 ET Tube. Tube placement verified by auscultation, by CXR, and ETCO2 monitor. ET Tube is secured at the 22 cm brinda at the gum and in the center. Patient was intubated by  on the 1 attempt. Breath sounds are diminished. Patient is Negative for subcutaneous air and chest excursion is symmetric. Trachea is midline.  Patient is also Negative for cyanosis and is Negative for pitting edema.  Patient placed on ventilator on documented settings. All alarms are set and audible. Resuscitation bag is  at the head of the bed.      Ventilator Settings  Mode FIO2 Rate Tidal Volume Pressure PEEP I:E Ratio   AC/PRVC  45 %   20 bpm   400 ml      8 cmH2O 1:2.5      Peak airway pressure:   26 cmH2O  Minute ventilation:   7.75 L/min        Tayla Scott, RRT

## 2024-04-13 NOTE — PROCEDURES
Procedure: Central Line Insertion  (CPT - 19837)    Indication: Hypotension/septic shock    Chlorohexidine Skin Antiseptic: Yes  Hand Hygiene: Yes  Maximal Barrier Precautions: Yes  Optimal Catheter Site Selection: Yes  Sterile Ultrasound Technique: Yes    Location:  right internal jugular    Time out done and correct patient/location for procedure.    Area prepped and sterilized with chlorhexedine. Sterile drapes applied.  Patient given local lidocane for anesthesia. Using Seldinger technique triple lumen lumen catheter inserted. Guidewire removed. All ports with blood return and lines flushed. Line sutured in place. Patient tolerated procedure well, no complications.   Estimated Blood loss: 5 cc    Fili Beebe MD

## 2024-04-13 NOTE — PROGRESS NOTES
Ventilator check complete; patient has a #7.5 ET tube secured at the 22 at the gum.  Patient is  sedated.  Patient is not able to follow commands.  Breath sounds are clear and diminished.  Trachea is midline, Negative for subcutaneous air, and chest excursion is symmetric. Patient is also Negative for cyanosis and is Negative for pitting edema.  All alarms are set and audible.  Resuscitation bag is  at the head of the bed.      Ventilator Settings  Mode FIO2 Rate Tidal Volume Pressure PEEP I:E Ratio   AC/PRVC  45 % 20    400      8 1:2.5      Peak airway pressure:     Minute ventilation:       ABG: No results for input(s): \"PH\", \"PCO2\", \"PO2\", \"HCO3\" in the last 72 hours.      MITCH MACIAS

## 2024-04-14 ENCOUNTER — APPOINTMENT (OUTPATIENT)
Dept: GENERAL RADIOLOGY | Age: 84
End: 2024-04-14
Payer: MEDICARE

## 2024-04-14 LAB
ANION GAP SERPL CALC-SCNC: 7 MMOL/L (ref 2–11)
BASOPHILS # BLD: 0 K/UL (ref 0–0.2)
BASOPHILS NFR BLD: 0 % (ref 0–2)
BUN SERPL-MCNC: 16 MG/DL (ref 8–23)
CALCIUM SERPL-MCNC: 8 MG/DL (ref 8.3–10.4)
CHLORIDE SERPL-SCNC: 104 MMOL/L (ref 103–113)
CO2 SERPL-SCNC: 30 MMOL/L (ref 21–32)
CREAT SERPL-MCNC: 0.9 MG/DL (ref 0.6–1)
DIFFERENTIAL METHOD BLD: ABNORMAL
EOSINOPHIL # BLD: 0.1 K/UL (ref 0–0.8)
EOSINOPHIL NFR BLD: 0 % (ref 0.5–7.8)
ERYTHROCYTE [DISTWIDTH] IN BLOOD BY AUTOMATED COUNT: 14.5 % (ref 11.9–14.6)
GLUCOSE BLD STRIP.AUTO-MCNC: 80 MG/DL (ref 65–100)
GLUCOSE BLD STRIP.AUTO-MCNC: 83 MG/DL (ref 65–100)
GLUCOSE SERPL-MCNC: 137 MG/DL (ref 65–100)
HCT VFR BLD AUTO: 27.8 % (ref 35.8–46.3)
HGB BLD-MCNC: 8.8 G/DL (ref 11.7–15.4)
IMM GRANULOCYTES # BLD AUTO: 0.2 K/UL (ref 0–0.5)
IMM GRANULOCYTES NFR BLD AUTO: 1 % (ref 0–5)
LYMPHOCYTES # BLD: 1.6 K/UL (ref 0.5–4.6)
LYMPHOCYTES NFR BLD: 9 % (ref 13–44)
MCH RBC QN AUTO: 26.8 PG (ref 26.1–32.9)
MCHC RBC AUTO-ENTMCNC: 31.7 G/DL (ref 31.4–35)
MCV RBC AUTO: 84.8 FL (ref 82–102)
MONOCYTES # BLD: 2.1 K/UL (ref 0.1–1.3)
MONOCYTES NFR BLD: 12 % (ref 4–12)
MRSA DNA SPEC QL NAA+PROBE: NOT DETECTED
NEUTS SEG # BLD: 14 K/UL (ref 1.7–8.2)
NEUTS SEG NFR BLD: 78 % (ref 43–78)
NRBC # BLD: 0 K/UL (ref 0–0.2)
PLATELET # BLD AUTO: 300 K/UL (ref 150–450)
PMV BLD AUTO: 9.2 FL (ref 9.4–12.3)
POTASSIUM SERPL-SCNC: 3.7 MMOL/L (ref 3.5–5.1)
RBC # BLD AUTO: 3.28 M/UL (ref 4.05–5.2)
S AUREUS CPE NOSE QL NAA+PROBE: NOT DETECTED
SERVICE CMNT-IMP: NORMAL
SERVICE CMNT-IMP: NORMAL
SODIUM SERPL-SCNC: 141 MMOL/L (ref 136–146)
VANCOMYCIN SERPL-MCNC: 11 UG/ML
WBC # BLD AUTO: 17.9 K/UL (ref 4.3–11.1)

## 2024-04-14 PROCEDURE — 76937 US GUIDE VASCULAR ACCESS: CPT

## 2024-04-14 PROCEDURE — 94003 VENT MGMT INPAT SUBQ DAY: CPT

## 2024-04-14 PROCEDURE — 80202 ASSAY OF VANCOMYCIN: CPT

## 2024-04-14 PROCEDURE — 99291 CRITICAL CARE FIRST HOUR: CPT | Performed by: INTERNAL MEDICINE

## 2024-04-14 PROCEDURE — 6360000002 HC RX W HCPCS: Performed by: INTERNAL MEDICINE

## 2024-04-14 PROCEDURE — 2000000000 HC ICU R&B

## 2024-04-14 PROCEDURE — 99232 SBSQ HOSP IP/OBS MODERATE 35: CPT | Performed by: INTERNAL MEDICINE

## 2024-04-14 PROCEDURE — 71045 X-RAY EXAM CHEST 1 VIEW: CPT

## 2024-04-14 PROCEDURE — 36600 WITHDRAWAL OF ARTERIAL BLOOD: CPT

## 2024-04-14 PROCEDURE — 6360000002 HC RX W HCPCS

## 2024-04-14 PROCEDURE — 94640 AIRWAY INHALATION TREATMENT: CPT

## 2024-04-14 PROCEDURE — 6370000000 HC RX 637 (ALT 250 FOR IP): Performed by: INTERNAL MEDICINE

## 2024-04-14 PROCEDURE — 94660 CPAP INITIATION&MGMT: CPT

## 2024-04-14 PROCEDURE — 85025 COMPLETE CBC W/AUTO DIFF WBC: CPT

## 2024-04-14 PROCEDURE — A4216 STERILE WATER/SALINE, 10 ML: HCPCS | Performed by: INTERNAL MEDICINE

## 2024-04-14 PROCEDURE — 87641 MR-STAPH DNA AMP PROBE: CPT

## 2024-04-14 PROCEDURE — 80048 BASIC METABOLIC PNL TOTAL CA: CPT

## 2024-04-14 PROCEDURE — 2580000003 HC RX 258

## 2024-04-14 PROCEDURE — 2580000003 HC RX 258: Performed by: INTERNAL MEDICINE

## 2024-04-14 PROCEDURE — 82803 BLOOD GASES ANY COMBINATION: CPT

## 2024-04-14 RX ORDER — ROSUVASTATIN CALCIUM 20 MG/1
20 TABLET, COATED ORAL NIGHTLY
Status: DISCONTINUED | OUTPATIENT
Start: 2024-04-14 | End: 2024-04-19 | Stop reason: HOSPADM

## 2024-04-14 RX ORDER — AMIODARONE HYDROCHLORIDE 200 MG/1
200 TABLET ORAL DAILY
Status: DISCONTINUED | OUTPATIENT
Start: 2024-04-15 | End: 2024-04-16

## 2024-04-14 RX ORDER — NOREPINEPHRINE BITARTRATE 0.02 MG/ML
2-100 INJECTION, SOLUTION INTRAVENOUS CONTINUOUS
Status: DISCONTINUED | OUTPATIENT
Start: 2024-04-14 | End: 2024-04-15

## 2024-04-14 RX ORDER — BUMETANIDE 0.25 MG/ML
1 INJECTION INTRAMUSCULAR; INTRAVENOUS ONCE
Status: COMPLETED | OUTPATIENT
Start: 2024-04-14 | End: 2024-04-14

## 2024-04-14 RX ORDER — DIMETHICONE, CAMPHOR (SYNTHETIC), MENTHOL, AND PHENOL 1.1; .5; .625; .5 G/100G; G/100G; G/100G; G/100G
OINTMENT TOPICAL PRN
Status: DISCONTINUED | OUTPATIENT
Start: 2024-04-14 | End: 2024-04-19 | Stop reason: HOSPADM

## 2024-04-14 RX ORDER — ASPIRIN 81 MG/1
81 TABLET ORAL DAILY
Status: DISCONTINUED | OUTPATIENT
Start: 2024-04-14 | End: 2024-04-19 | Stop reason: HOSPADM

## 2024-04-14 RX ORDER — BUMETANIDE 0.25 MG/ML
0.5 INJECTION INTRAMUSCULAR; INTRAVENOUS 2 TIMES DAILY
Status: DISCONTINUED | OUTPATIENT
Start: 2024-04-14 | End: 2024-04-15

## 2024-04-14 RX ORDER — MORPHINE SULFATE 2 MG/ML
2 INJECTION, SOLUTION INTRAMUSCULAR; INTRAVENOUS ONCE
Status: COMPLETED | OUTPATIENT
Start: 2024-04-14 | End: 2024-04-14

## 2024-04-14 RX ADMIN — HEPARIN SODIUM 5000 UNITS: 5000 INJECTION INTRAVENOUS; SUBCUTANEOUS at 15:51

## 2024-04-14 RX ADMIN — ALBUTEROL SULFATE 2.5 MG: 2.5 SOLUTION RESPIRATORY (INHALATION) at 15:53

## 2024-04-14 RX ADMIN — BUMETANIDE 0.5 MG: 0.25 INJECTION INTRAMUSCULAR; INTRAVENOUS at 17:12

## 2024-04-14 RX ADMIN — NITROGLYCERIN 0.5 INCH: 20 OINTMENT TOPICAL at 12:33

## 2024-04-14 RX ADMIN — ALBUTEROL SULFATE 2.5 MG: 2.5 SOLUTION RESPIRATORY (INHALATION) at 21:32

## 2024-04-14 RX ADMIN — LORAZEPAM 1 MG: 2 INJECTION INTRAMUSCULAR; INTRAVENOUS at 16:22

## 2024-04-14 RX ADMIN — CEFEPIME 2000 MG: 2 INJECTION, POWDER, FOR SOLUTION INTRAVENOUS at 11:10

## 2024-04-14 RX ADMIN — ONDANSETRON 4 MG: 2 INJECTION INTRAMUSCULAR; INTRAVENOUS at 17:12

## 2024-04-14 RX ADMIN — CEFEPIME 2000 MG: 2 INJECTION, POWDER, FOR SOLUTION INTRAVENOUS at 22:31

## 2024-04-14 RX ADMIN — ALBUTEROL SULFATE 2.5 MG: 2.5 SOLUTION RESPIRATORY (INHALATION) at 08:06

## 2024-04-14 RX ADMIN — HEPARIN SODIUM 5000 UNITS: 5000 INJECTION INTRAVENOUS; SUBCUTANEOUS at 06:05

## 2024-04-14 RX ADMIN — ALBUTEROL SULFATE 2.5 MG: 2.5 SOLUTION RESPIRATORY (INHALATION) at 11:19

## 2024-04-14 RX ADMIN — HEPARIN SODIUM 5000 UNITS: 5000 INJECTION INTRAVENOUS; SUBCUTANEOUS at 22:28

## 2024-04-14 RX ADMIN — MORPHINE SULFATE 2 MG: 2 INJECTION, SOLUTION INTRAMUSCULAR; INTRAVENOUS at 12:32

## 2024-04-14 RX ADMIN — MORPHINE SULFATE 2 MG: 2 INJECTION, SOLUTION INTRAMUSCULAR; INTRAVENOUS at 11:32

## 2024-04-14 RX ADMIN — SODIUM CHLORIDE, PRESERVATIVE FREE 10 ML: 5 INJECTION INTRAVENOUS at 22:31

## 2024-04-14 RX ADMIN — SODIUM CHLORIDE, PRESERVATIVE FREE 10 ML: 5 INJECTION INTRAVENOUS at 07:54

## 2024-04-14 RX ADMIN — VANCOMYCIN HYDROCHLORIDE 1000 MG: 1 INJECTION, POWDER, LYOPHILIZED, FOR SOLUTION INTRAVENOUS at 06:09

## 2024-04-14 RX ADMIN — BUMETANIDE 1 MG: 0.25 INJECTION INTRAMUSCULAR; INTRAVENOUS at 12:01

## 2024-04-14 ASSESSMENT — PULMONARY FUNCTION TESTS
PIF_VALUE: 23
PIF_VALUE: 25

## 2024-04-14 ASSESSMENT — PAIN SCALES - GENERAL
PAINLEVEL_OUTOF10: 0

## 2024-04-14 NOTE — PLAN OF CARE
Problem: Discharge Planning  Goal: Discharge to home or other facility with appropriate resources  4/14/2024 0921 by Inga Otto RN  Outcome: Progressing  4/14/2024 0921 by Inga Otto RN  Outcome: Progressing  4/14/2024 0033 by Celsa Zendejas RN  Outcome: Progressing  Flowsheets (Taken 4/13/2024 1915)  Discharge to home or other facility with appropriate resources:   Arrange for needed discharge resources and transportation as appropriate   Identify barriers to discharge with patient and caregiver     Problem: Safety - Medical Restraint  Goal: Remains free of injury from restraints (Restraint for Interference with Medical Device)  Description: INTERVENTIONS:  1. Determine that other, less restrictive measures have been tried or would not be effective before applying the restraint  2. Evaluate the patient's condition at the time of restraint application  3. Inform patient/family regarding the reason for restraint  4. Q2H: Monitor safety, psychosocial status, comfort, nutrition and hydration  4/14/2024 0921 by Inga Otto RN  Outcome: Progressing  4/14/2024 0921 by Inga Otto RN  Outcome: Progressing  4/14/2024 0033 by Celsa Zendejas RN  Outcome: Progressing     Problem: Safety - Adult  Goal: Free from fall injury  4/14/2024 0921 by Inga Otto RN  Outcome: Progressing  4/14/2024 0921 by Inga Otto RN  Outcome: Progressing  4/14/2024 0033 by Celsa Zendejas RN  Outcome: Progressing     Problem: Chronic Conditions and Co-morbidities  Goal: Patient's chronic conditions and co-morbidity symptoms are monitored and maintained or improved  4/14/2024 0921 by Inga Otto RN  Outcome: Progressing  4/14/2024 0921 by Inga Otto RN  Outcome: Progressing  4/14/2024 0033 by Celsa Zendejas RN  Outcome: Progressing  Flowsheets (Taken 4/13/2024 1915)  Care Plan - Patient's Chronic Conditions and Co-Morbidity Symptoms are Monitored and Maintained or Improved:   Monitor  and assess patient's chronic conditions and comorbid symptoms for stability, deterioration, or improvement   Collaborate with multidisciplinary team to address chronic and comorbid conditions and prevent exacerbation or deterioration     Problem: Pain  Goal: Verbalizes/displays adequate comfort level or baseline comfort level  4/14/2024 0921 by Inga Otto RN  Outcome: Progressing  4/14/2024 0921 by Inga Otto RN  Outcome: Progressing  Flowsheets  Taken 4/14/2024 0705 by Inga Otto RN  Verbalizes/displays adequate comfort level or baseline comfort level:   Assess pain using appropriate pain scale   Encourage patient to monitor pain and request assistance  Taken 4/14/2024 0315 by Celsa Zendejas RN  Verbalizes/displays adequate comfort level or baseline comfort level:   Assess pain using appropriate pain scale   Administer analgesics based on type and severity of pain and evaluate response  4/14/2024 0033 by Celsa Zendejas RN  Outcome: Progressing  Flowsheets  Taken 4/13/2024 2315  Verbalizes/displays adequate comfort level or baseline comfort level:   Administer analgesics based on type and severity of pain and evaluate response   Assess pain using appropriate pain scale  Taken 4/13/2024 1915  Verbalizes/displays adequate comfort level or baseline comfort level:   Administer analgesics based on type and severity of pain and evaluate response   Assess pain using appropriate pain scale     Problem: Skin/Tissue Integrity  Goal: Absence of new skin breakdown  Description: 1.  Monitor for areas of redness and/or skin breakdown  2.  Assess vascular access sites hourly  3.  Every 4-6 hours minimum:  Change oxygen saturation probe site  4.  Every 4-6 hours:  If on nasal continuous positive airway pressure, respiratory therapy assess nares and determine need for appliance change or resting period.  4/14/2024 0921 by Inga Otto RN  Outcome: Progressing  4/14/2024 0921 by Inga Otto

## 2024-04-14 NOTE — PROGRESS NOTES
US Guided PIV access-    Ultrasound was used to find the vein which was compressible and without any ultrasound features of an artery or nerve bundle. Skin was cleaned and disinfected prior to IV puncture.  Under real-time ultrasound guidance peripheral access was obtained in the right forearm using 22 G 1.75\" Peripheral IV catheter after 1 attempt(s). Blood return was present and IV flushed without difficulty with no clinical signs of infiltration. IV dressing applied and no immediate complications noted. Patient tolerated the procedure well.

## 2024-04-14 NOTE — PROGRESS NOTES
RT was called assess the patient. The patient has increased crackles, an increase in her work of breathing, and an increase in oxygen demand. Based on patient condition she was placed BiPAP 14/8 100%. Patient is now resting comfortably.

## 2024-04-14 NOTE — PROGRESS NOTES
Respiratory Mechanics completed and are as follows:     Patient extubated to a 50L 50% HFNC. Patient is  able to communicate and is  negative for stridor. Breath sounds are diminished. No complications with extubation.     Qamar Iqbal RCP

## 2024-04-14 NOTE — PROGRESS NOTES
[facility] Critical Care Note:: 4/14/2024  Vivien Zambrano  Admission Date: 4/12/2024     Length of Stay: 1 days    Background: 83 y.o. female with CAD s/p CABG, primary hypertension, hyperlipidemia, paroxysmal atrial fibrillation, non-insulin-dependent diabetes, GERD, subclavian artery stenosis who presented to the emergency room on 4/13 with bradycardia and minimal responsiveness.  She had a recent hospitalization 321 until 3/26/2024 with pneumonia and sepsis.  Since discharge she has had poor appetite, antibiotics, progressive weakness, low urine output.  EMS found her to have sinus bradycardia in the 40s and she was treated with atropine without improvement followed by normal saline and 2 Amps of bicarb.  She was placed on dopamine with improvement in heart rate.  In the emergency department she was reported to be responsive, but this has not been the case during her ICU stay    Notable PMH:  has a past medical history of Acute respiratory failure with hypoxia ([facility]), Arrhythmia, Arthritis, CAD (coronary artery disease), Calculus of kidney, Carotid stenosis, Coronary atherosclerosis of native coronary artery, Diabetes ([facility]), Essential hypertension, GERD (gastroesophageal reflux disease), HLD (hyperlipidemia), Insomnia, Myocardial infarction due to demand ischemia ([facility]), Respiratory distress, Right ureteral stone, Sepsis ([facility]), and Subclavian artery stenosis ([facility]).    24 Hour events:   Leukocytosis down to 17.9 K.  Pulse in the 80s off all drips.  Fluid balance was +4.3 L yesterday.  BUN and creatinine are improved.  She briefly required an insulin drip yesterday and was treated with glucagon for beta-blocker toxicity.      Review of Systems: Unable to obtain due to patient factors.     Lines: (insertion date)   ETT  (Active)     NG/OG/NJ/NE Tube Orogastric 16 fr Center mouth (Active)       Urinary Catheter 04/13/24 2 Way (Active)     CVC Triple Lumen 04/13/24 Right Internal jugular

## 2024-04-14 NOTE — PROGRESS NOTES
VANCO DAILY FOLLOW UP NOTE  [facility]   Pharmacy Pharmacokinetic Monitoring Service - Vancomycin    Consulting Provider: MD Concepción   Indication: Sepsis  Target Concentration: Goal AUC/GARRICK 400-600 mg*hr/L  Day of Therapy: 2  Additional Antimicrobials: cefepime    Pertinent Laboratory Values:   Wt Readings from Last 1 Encounters:   04/13/24 77.1 kg (170 lb)     Temp Readings from Last 1 Encounters:   04/14/24 99.5 °F (37.5 °C) (Axillary)     Recent Labs     04/13/24  0021 04/13/24  0213 04/13/24  0618 04/13/24  1255 04/13/24  1839 04/14/24  0330   BUN 23  --   --  20 21 16   CREATININE 1.60*  --   --  1.20* 1.20* 0.90   WBC 21.9*  --   --   --   --  17.9*   PROCAL <0.05  --   --   --   --   --    LACACIDPL 3.1* 2.8* 2.0  --   --   --      Estimated Creatinine Clearance: 47 mL/min (based on SCr of 0.9 mg/dL).    Lab Results   Component Value Date/Time    VANCORANDOM 11.0 04/14/2024 03:30 AM       MRSA Nasal Swab: was ordered by provider, awaiting results    Assessment:  Date/Time Dose Concentration AUC         Note: Serum concentrations collected for AUC dosing may appear elevated if collected in close proximity to the dose administered, this is not necessarily an indication of toxicity    Plan:  Dosing recommendations based on Bayesian software  Vancomycin 1250 mg q24h  Anticipated AUC of 501 and trough concentration of 15.2 at steady state  Renal labs as indicated   Vancomycin concentrations will be ordered as clinically appropriate   Pharmacy will continue to monitor patient and adjust therapy as indicated    Thank you for the consult,  John Urbano, PharmD, BCOP  Clinical Pharmacist  Contact Via [employer]

## 2024-04-14 NOTE — PROGRESS NOTES
Ventilator check complete; patient has a #7.5 ET tube secured at the 23 at the lip.  Patient is  not sedated.  Patient is  able to follow commands.  Breath sounds are coarse.  Trachea is midline, Negative for subcutaneous air, and chest excursion is symmetric. Patient is also Negative for cyanosis and is Negative for pitting edema.  All alarms are set and audible.  Resuscitation bag is  at the head of the bed.      Ventilator Settings  Mode FIO2 Rate Tidal Volume Pressure PEEP I:E Ratio   AC/PRVC  45 %   20 400    8 1:2.5      Peak airway pressure:     Minute ventilation:       ABG: No results for input(s): \"PH\", \"PCO2\", \"PO2\", \"HCO3\" in the last 72 hours.      Qamar Iqbal RCP

## 2024-04-14 NOTE — PROGRESS NOTES
[facility] CARDIOLOGY PROGRESS NOTE           4/14/2024 10:43 AM    Admit Date: 4/12/2024      Subjective:     Extubated this a.m.  Currently Airvo with increased O2 requirements. ~4.6L net +  Currently off pressors.  In sinus rhythm.    ROS:  Cardiovascular:  As noted above    Objective:      Vitals:    04/14/24 1100 04/14/24 1115 04/14/24 1130 04/14/24 1202   BP: (!) 109/55 139/65 (!) 146/69    Pulse: 90 93 (!) 101    Resp:    30   Temp:  98.5 °F (36.9 °C)     TempSrc:  Axillary     SpO2: 94% 91% (!) 86%    Weight:       Height:           Physical Exam:  General-No Acute Distress  Neck- supple, + JVD  CV- regular rate and rhythm no MRG  Lung-coarse breath sounds bilaterally  Abd- soft, nontender, nondistended  Ext- tr to 1+ edema bilaterally.  Skin- warm and dry    Data Review:   Recent Labs     04/13/24  0021 04/13/24  1255 04/13/24  1839 04/14/24  0330      < > 142 141   K 4.4   < > 3.2* 3.7   MG 1.6*  --   --   --    BUN 23   < > 21 16   WBC 21.9*  --   --  17.9*   HGB 10.0*  --   --  8.8*   HCT 32.6*  --   --  27.8*     --   --  300    < > = values in this interval not displayed.       Assessment/Plan:     Principal Problem:    Bradycardia  -Probable metabolic component/contributed by medications; no evidence to suggest bradycardia contributing to presentation especially with low heart rates in the 40s.  -Currently resolved.  Hold off beta-blocker therapy and resume home amiodarone dose.  -No device indication     Hypotension  -Uncertain etiology with consideration for septic versus hypovolemic; improved.  Off pressors.  -Also noted acute renal failure with creatinine around 1.6 which improved to baseline at 0.9 with fluid resuscitation.    Dyspnea  -Currently with increased O2 requirements and appears volume overload with initially needed fluid resuscitation.  Plan dose of IV diuresis and assess response and redose as needed.     Paroxysmal atrial fibrillation  -In sinus rhythm; see

## 2024-04-14 NOTE — PROGRESS NOTES
Ventilator check complete; patient has a #7.5 ET tube secured at the 22 at the gum.  Patient is not sedated.  Patient is not able to follow commands.  Breath sounds are diminished.  Trachea is midline, Negative for subcutaneous air, and chest excursion is symmetric. Patient is also Negative for cyanosis and is Negative for pitting edema.  All alarms are set and audible.  Resuscitation bag is  at the head of the bed.      Ventilator Settings  Mode FIO2 Rate Tidal Volume Pressure PEEP I:E Ratio   (P) AC/PRVC  45 % 20bpm     400  mL    8   1:1.5      Peak airway pressure:   17 cmH2O  Minute ventilation:   10.9 L/MIN    ABG:     Kathleen Coombs RCP

## 2024-04-15 ENCOUNTER — HOME CARE VISIT (OUTPATIENT)
Dept: HOME HEALTH SERVICES | Facility: HOME HEALTH | Age: 84
End: 2024-04-15
Payer: MEDICARE

## 2024-04-15 PROBLEM — J81.0 ACUTE PULMONARY EDEMA (HCC): Status: ACTIVE | Noted: 2024-04-15

## 2024-04-15 PROBLEM — J90 PLEURAL EFFUSION, RIGHT: Status: ACTIVE | Noted: 2024-04-15

## 2024-04-15 LAB
ANION GAP SERPL CALC-SCNC: 15 MMOL/L (ref 2–11)
ANION GAP SERPL CALC-SCNC: 17 MMOL/L (ref 2–11)
ANION GAP SERPL CALC-SCNC: 9 MMOL/L (ref 2–11)
APPEARANCE FLD: NORMAL
ARTERIAL PATENCY WRIST A: POSITIVE
BASE EXCESS BLD CALC-SCNC: 5.8 MMOL/L
BDY SITE: ABNORMAL
BUN SERPL-MCNC: 26 MG/DL (ref 8–23)
BUN SERPL-MCNC: 30 MG/DL (ref 8–23)
BUN SERPL-MCNC: 30 MG/DL (ref 8–23)
CALCIUM SERPL-MCNC: 8.1 MG/DL (ref 8.3–10.4)
CALCIUM SERPL-MCNC: 8.3 MG/DL (ref 8.3–10.4)
CALCIUM SERPL-MCNC: 8.5 MG/DL (ref 8.3–10.4)
CHLORIDE SERPL-SCNC: 103 MMOL/L (ref 103–113)
CHLORIDE SERPL-SCNC: 104 MMOL/L (ref 103–113)
CHLORIDE SERPL-SCNC: 106 MMOL/L (ref 103–113)
CO2 SERPL-SCNC: 18 MMOL/L (ref 21–32)
CO2 SERPL-SCNC: 20 MMOL/L (ref 21–32)
CO2 SERPL-SCNC: 25 MMOL/L (ref 21–32)
COLOR FLD: YELLOW
CREAT SERPL-MCNC: 1.9 MG/DL (ref 0.6–1)
CREAT SERPL-MCNC: 2 MG/DL (ref 0.6–1)
CREAT SERPL-MCNC: 2.2 MG/DL (ref 0.6–1)
EKG ATRIAL RATE: 80 BPM
EKG DIAGNOSIS: NORMAL
EKG DIAGNOSIS: NORMAL
EKG P AXIS: 75 DEGREES
EKG P-R INTERVAL: 206 MS
EKG Q-T INTERVAL: 394 MS
EKG Q-T INTERVAL: 438 MS
EKG QRS DURATION: 110 MS
EKG QRS DURATION: 96 MS
EKG QTC CALCULATION (BAZETT): 454 MS
EKG QTC CALCULATION (BAZETT): 508 MS
EKG R AXIS: 135 DEGREES
EKG R AXIS: 151 DEGREES
EKG T AXIS: 211 DEGREES
EKG T AXIS: 223 DEGREES
EKG VENTRICULAR RATE: 80 BPM
EKG VENTRICULAR RATE: 81 BPM
ERYTHROCYTE [DISTWIDTH] IN BLOOD BY AUTOMATED COUNT: 15.3 % (ref 11.9–14.6)
GAS FLOW.O2 O2 DELIVERY SYS: ABNORMAL
GLUCOSE FLD-MCNC: 154 MG/DL
GLUCOSE SERPL-MCNC: 131 MG/DL (ref 65–100)
GLUCOSE SERPL-MCNC: 141 MG/DL (ref 65–100)
GLUCOSE SERPL-MCNC: 165 MG/DL (ref 65–100)
HCO3 BLD-SCNC: 29.1 MMOL/L (ref 22–26)
HCT VFR BLD AUTO: 28.8 % (ref 35.8–46.3)
HGB BLD-MCNC: 8.6 G/DL (ref 11.7–15.4)
INSPIRATION.DURATION SETTING TIME VENT: 0.85 SEC
IPAP/PIP/HIGH PEEP: 23
LDH FLD L TO P-CCNC: 103 U/L
LYMPHOCYTES NFR BRONCH MANUAL: 65 %
MACROPHAGES NFR BRONCH MANUAL: 13 %
MCH RBC QN AUTO: 26.5 PG (ref 26.1–32.9)
MCHC RBC AUTO-ENTMCNC: 29.9 G/DL (ref 31.4–35)
MCV RBC AUTO: 88.6 FL (ref 82–102)
NEUTROPHILS NFR BRONCH MANUAL: 22 %
NRBC # BLD: 0 K/UL (ref 0–0.2)
NUC CELL # FLD: 313 /CU MM
O2/TOTAL GAS SETTING VFR VENT: 45 %
OTHER CELLS NFR BLD MANUAL: 17
PAW @ MEAN EXP FLOW ON VENT: 13 CMH2O
PCO2 BLD: 36.4 MMHG (ref 35–45)
PEEP RESPIRATORY: 8 CMH2O
PH BLD: 7.51 (ref 7.35–7.45)
PLATELET # BLD AUTO: 234 K/UL (ref 150–450)
PMV BLD AUTO: 9.9 FL (ref 9.4–12.3)
PO2 BLD: 30 MMHG (ref 75–100)
POTASSIUM SERPL-SCNC: 4.3 MMOL/L (ref 3.5–5.1)
POTASSIUM SERPL-SCNC: 4.8 MMOL/L (ref 3.5–5.1)
POTASSIUM SERPL-SCNC: 4.9 MMOL/L (ref 3.5–5.1)
PROT FLD-MCNC: 1.3 G/DL
RBC # BLD AUTO: 3.25 M/UL (ref 4.05–5.2)
RBC # FLD: 3000 /CU MM
SAO2 % BLD: 63.7 % (ref 95–98)
SERVICE CMNT-IMP: ABNORMAL
SODIUM SERPL-SCNC: 137 MMOL/L (ref 136–146)
SODIUM SERPL-SCNC: 139 MMOL/L (ref 136–146)
SODIUM SERPL-SCNC: 141 MMOL/L (ref 136–146)
SPECIMEN SOURCE FLD: NORMAL
TROPONIN I SERPL HS-MCNC: 1636.5 PG/ML (ref 0–14)
TROPONIN I SERPL HS-MCNC: 1643 PG/ML (ref 0–14)
TROPONIN I SERPL HS-MCNC: 1940.3 PG/ML (ref 0–14)
VENTILATION MODE VENT: ABNORMAL
VT SETTING VENT: 400 ML
WBC # BLD AUTO: 21.1 K/UL (ref 4.3–11.1)

## 2024-04-15 PROCEDURE — 2580000003 HC RX 258

## 2024-04-15 PROCEDURE — 93005 ELECTROCARDIOGRAM TRACING: CPT | Performed by: NURSE PRACTITIONER

## 2024-04-15 PROCEDURE — 6370000000 HC RX 637 (ALT 250 FOR IP): Performed by: INTERNAL MEDICINE

## 2024-04-15 PROCEDURE — 0W9930Z DRAINAGE OF RIGHT PLEURAL CAVITY WITH DRAINAGE DEVICE, PERCUTANEOUS APPROACH: ICD-10-PCS | Performed by: INTERNAL MEDICINE

## 2024-04-15 PROCEDURE — 87102 FUNGUS ISOLATION CULTURE: CPT

## 2024-04-15 PROCEDURE — 6360000002 HC RX W HCPCS

## 2024-04-15 PROCEDURE — C1729 CATH, DRAINAGE: HCPCS | Performed by: INTERNAL MEDICINE

## 2024-04-15 PROCEDURE — 2700000000 HC OXYGEN THERAPY PER DAY

## 2024-04-15 PROCEDURE — 87205 SMEAR GRAM STAIN: CPT

## 2024-04-15 PROCEDURE — 2000000000 HC ICU R&B

## 2024-04-15 PROCEDURE — 94640 AIRWAY INHALATION TREATMENT: CPT

## 2024-04-15 PROCEDURE — 99291 CRITICAL CARE FIRST HOUR: CPT | Performed by: INTERNAL MEDICINE

## 2024-04-15 PROCEDURE — 84484 ASSAY OF TROPONIN QUANT: CPT

## 2024-04-15 PROCEDURE — A4216 STERILE WATER/SALINE, 10 ML: HCPCS | Performed by: INTERNAL MEDICINE

## 2024-04-15 PROCEDURE — 94660 CPAP INITIATION&MGMT: CPT

## 2024-04-15 PROCEDURE — 87116 MYCOBACTERIA CULTURE: CPT

## 2024-04-15 PROCEDURE — 5A09357 ASSISTANCE WITH RESPIRATORY VENTILATION, LESS THAN 24 CONSECUTIVE HOURS, CONTINUOUS POSITIVE AIRWAY PRESSURE: ICD-10-PCS | Performed by: INTERNAL MEDICINE

## 2024-04-15 PROCEDURE — 89050 BODY FLUID CELL COUNT: CPT

## 2024-04-15 PROCEDURE — 76604 US EXAM CHEST: CPT | Performed by: INTERNAL MEDICINE

## 2024-04-15 PROCEDURE — 82945 GLUCOSE OTHER FLUID: CPT

## 2024-04-15 PROCEDURE — 81001 URINALYSIS AUTO W/SCOPE: CPT

## 2024-04-15 PROCEDURE — 88305 TISSUE EXAM BY PATHOLOGIST: CPT

## 2024-04-15 PROCEDURE — 2709999900 HC NON-CHARGEABLE SUPPLY: Performed by: INTERNAL MEDICINE

## 2024-04-15 PROCEDURE — 99233 SBSQ HOSP IP/OBS HIGH 50: CPT | Performed by: INTERNAL MEDICINE

## 2024-04-15 PROCEDURE — 84157 ASSAY OF PROTEIN OTHER: CPT

## 2024-04-15 PROCEDURE — 93005 ELECTROCARDIOGRAM TRACING: CPT | Performed by: INTERNAL MEDICINE

## 2024-04-15 PROCEDURE — 87070 CULTURE OTHR SPECIMN AEROBIC: CPT

## 2024-04-15 PROCEDURE — 2580000003 HC RX 258: Performed by: INTERNAL MEDICINE

## 2024-04-15 PROCEDURE — 36592 COLLECT BLOOD FROM PICC: CPT

## 2024-04-15 PROCEDURE — 85027 COMPLETE CBC AUTOMATED: CPT

## 2024-04-15 PROCEDURE — 83615 LACTATE (LD) (LDH) ENZYME: CPT

## 2024-04-15 PROCEDURE — 36415 COLL VENOUS BLD VENIPUNCTURE: CPT

## 2024-04-15 PROCEDURE — 3609027000 HC THORACENTESIS W/ULTRASOUND: Performed by: INTERNAL MEDICINE

## 2024-04-15 PROCEDURE — 80048 BASIC METABOLIC PNL TOTAL CA: CPT

## 2024-04-15 PROCEDURE — 93010 ELECTROCARDIOGRAM REPORT: CPT | Performed by: INTERNAL MEDICINE

## 2024-04-15 PROCEDURE — 6360000002 HC RX W HCPCS: Performed by: INTERNAL MEDICINE

## 2024-04-15 PROCEDURE — 84300 ASSAY OF URINE SODIUM: CPT

## 2024-04-15 PROCEDURE — 88112 CYTOPATH CELL ENHANCE TECH: CPT

## 2024-04-15 PROCEDURE — 87206 SMEAR FLUORESCENT/ACID STAI: CPT

## 2024-04-15 PROCEDURE — 32555 ASPIRATE PLEURA W/ IMAGING: CPT | Performed by: INTERNAL MEDICINE

## 2024-04-15 RX ORDER — ALPRAZOLAM 0.5 MG/1
0.5 TABLET ORAL 2 TIMES DAILY PRN
Status: DISCONTINUED | OUTPATIENT
Start: 2024-04-15 | End: 2024-04-19 | Stop reason: HOSPADM

## 2024-04-15 RX ORDER — BUMETANIDE 0.25 MG/ML
0.5 INJECTION INTRAMUSCULAR; INTRAVENOUS DAILY
Status: COMPLETED | OUTPATIENT
Start: 2024-04-15 | End: 2024-04-16

## 2024-04-15 RX ORDER — BUMETANIDE 0.25 MG/ML
0.5 INJECTION INTRAMUSCULAR; INTRAVENOUS DAILY
Status: DISCONTINUED | OUTPATIENT
Start: 2024-04-16 | End: 2024-04-15

## 2024-04-15 RX ADMIN — ALBUTEROL SULFATE 2.5 MG: 2.5 SOLUTION RESPIRATORY (INHALATION) at 07:40

## 2024-04-15 RX ADMIN — ALBUTEROL SULFATE 2.5 MG: 2.5 SOLUTION RESPIRATORY (INHALATION) at 11:26

## 2024-04-15 RX ADMIN — HEPARIN SODIUM 5000 UNITS: 5000 INJECTION INTRAVENOUS; SUBCUTANEOUS at 21:30

## 2024-04-15 RX ADMIN — SODIUM CHLORIDE, PRESERVATIVE FREE 10 ML: 5 INJECTION INTRAVENOUS at 08:43

## 2024-04-15 RX ADMIN — ALBUTEROL SULFATE 2.5 MG: 2.5 SOLUTION RESPIRATORY (INHALATION) at 15:15

## 2024-04-15 RX ADMIN — HEPARIN SODIUM 5000 UNITS: 5000 INJECTION INTRAVENOUS; SUBCUTANEOUS at 08:39

## 2024-04-15 RX ADMIN — ALBUTEROL SULFATE 2.5 MG: 2.5 SOLUTION RESPIRATORY (INHALATION) at 20:14

## 2024-04-15 RX ADMIN — SODIUM CHLORIDE, PRESERVATIVE FREE 10 ML: 5 INJECTION INTRAVENOUS at 21:21

## 2024-04-15 RX ADMIN — ASPIRIN 81 MG: 81 TABLET, COATED ORAL at 08:39

## 2024-04-15 RX ADMIN — ALPRAZOLAM 0.5 MG: 0.5 TABLET ORAL at 21:17

## 2024-04-15 RX ADMIN — BUMETANIDE 0.5 MG: 0.25 INJECTION INTRAMUSCULAR; INTRAVENOUS at 12:24

## 2024-04-15 RX ADMIN — ONDANSETRON 4 MG: 2 INJECTION INTRAMUSCULAR; INTRAVENOUS at 07:49

## 2024-04-15 RX ADMIN — LORAZEPAM 0.5 MG: 2 INJECTION INTRAMUSCULAR; INTRAVENOUS at 09:07

## 2024-04-15 RX ADMIN — AMIODARONE HYDROCHLORIDE 200 MG: 200 TABLET ORAL at 08:39

## 2024-04-15 RX ADMIN — ROSUVASTATIN CALCIUM 20 MG: 20 TABLET, COATED ORAL at 21:17

## 2024-04-15 RX ADMIN — HEPARIN SODIUM 5000 UNITS: 5000 INJECTION INTRAVENOUS; SUBCUTANEOUS at 16:00

## 2024-04-15 ASSESSMENT — PAIN SCALES - GENERAL
PAINLEVEL_OUTOF10: 0

## 2024-04-15 NOTE — PROGRESS NOTES
[facility] CARDIOLOGY PROGRESS NOTE           4/15/2024 12:59 PM    Admit Date: 4/12/2024      Subjective:   Patient complains of chest pain.  This is reproducible on exam today across the precordium with palpation.  She does have elevated high-sensitivity troponins with recent cardiac catheterization 09/2023 with severe diffuse small vessel CAD not amenable to intervention.  Patient has severe peripheral vascular disease jeopardizing patency of LIMA to LAD.  Patient is not a candidate for any additional cardiac interventions.  LVEF remains stable at 45-50% with dense inferior akinesis.  This is not a new finding.  This is a chronic finding for the patient.    ROS:  Cardiovascular:  As noted above    Objective:      Vitals:    04/15/24 1100 04/15/24 1122 04/15/24 1126 04/15/24 1224   BP:  (!) 94/53  (!) 94/48   Pulse: 80 80 80    Resp: 22 25 21    Temp:  98 °F (36.7 °C)     TempSrc:  Oral     SpO2:  100% 100%    Weight:       Height:           Physical Exam:  General-No Acute Distress  Neck- supple, no JVD  CV- regular rate and rhythm no MRG  Lung-diminished bilaterally  Abd- soft, nontender, nondistended  Ext- no edema bilaterally.  Skin- warm and dry    Data Review:   Recent Labs     04/13/24  0021 04/13/24  1255 04/14/24  0330 04/15/24  0358 04/15/24  0901      < > 141 141 139   K 4.4   < > 3.7 4.9 4.8   MG 1.6*  --   --   --   --    BUN 23   < > 16 26* 30*   WBC 21.9*  --  17.9* 21.1*  --    HGB 10.0*  --  8.8* 8.6*  --    HCT 32.6*  --  27.8* 28.8*  --      --  300 234  --     < > = values in this interval not displayed.     Echo Findings    Left Ventricle Mildly reduced left ventricular systolic function with a visually estimated EF of 45 - 50%. Left ventricle is mildly dilated. Mildly increased wall thickness. There is moderate to severe hypokinesis of the basal to mid inferior/inferolateral wall. Grade II diastolic dysfunction with increased LAP.   Left Atrium Left atrium

## 2024-04-15 NOTE — PROGRESS NOTES
04/15/24 0918   NIV Type   $NIV $Daily Charge   Ventilator ID [id number]   Suction Setup and Functional Yes   NIV Started/Stopped On   Equipment Type V60   Mode Bilevel   Mask Size Medium   Assessment   SpO2 91 %   Level of Consciousness 0   Comfort Level Good   Using Accessory Muscles Yes   Mask Compliance Good   Settings/Measurements   PIP Observed 12 cm H20   IPAP 10 cmH20   CPAP/EPAP 6 cmH2O   Vt (Measured) 475 mL   Rate Ordered 12   Insp Rise Time (%) 3 %   FiO2  60 %   I Time/ I Time % 0.9 s   Minute Volume (L/min) 15.3 Liters   Patient's Home Machine No   Alarm Settings   Alarms On Y   Low Pressure (cmH2O) 5 cmH2O   High Pressure (cmH2O) 35 cmH2O   Apnea (secs) 20 secs   RR Low (bpm) 8   RR High (bpm) 50 br/min     Patient placed back on BIPAP due to work of breathing. Nurse informed. Patient alert and oriented. Alarms on.

## 2024-04-15 NOTE — PROCEDURES
PROCEDURE:  DIAGNOSTIC THORACENTESIS, THERAPEUTIC THORACENTESIS     PRE-OP DIAGNOSIS:  right PLEURAL EFFUSION    POST-OP DIAGNOSIS: right PLEURAL EFFUSION    VOLUME REMOVED:  800 cc    ANESTHESIA:  LOCAL ANESTHESIA WITH 1% LIDOCAINE 10 CC TOTAL.    CHEST ULTRASOUND FINDINGS:    A Turbo-M, Sonosite ultrasound with a 5-16 mHz probe was used to image the chest and localize the pleural effusion on the right chest.    A moderate anechoic space was seen on the right consistent with an uncomplicated pleural effusion.       DESCRIPTION OF PROCEDURE:    After obtaining informed consent and localizing the safest location for thoracentesis, the  7th intercostal space was marked with a blunt, plastic needle cap in the mid scapular line.    An [employer] AK-0100 Pleral-Seal thoracentesis kit was used to perform the procedure.    The skin was cleansed with the supplied chlorhexidine swab and then draped in the usual fashion.    Using the previously marked location as a guide, a 22 G 1.5 inch needle was used to inject 1% lidocaine into the skin and subcutaneous tissue, as well as onto the underlying rib and inter-costal muscles.  Pleural fluid was aspirated to assure proper location and additional lidocaine was injected into the pleural space prior to removing the anesthesia needle.    A 3mm incision was then made with the supplied scalpel in the usual fashion to facilitate the insertion of the thoracentesis needle.    The needle with an 8 [place] thoracentesis catheter was then introduced into the chest through the previously made incision in the usual fashion, the rib localized with the needle, and the catheter then marched over the rib into the pleural space.    After aspirating fluid, the thoracentesis catheter was then placed into the chest using the needle itself as a trocar.  The needle was then removed and the catheter was attached to the supplied tubing without complication.    800 cc of light sterling fluid was aspirated  and sent for analysis.    Fluid was sent for the following tests:    LDH  Total Protein  Glucose  Cell count with differential  Routine culture and Gram stain  Cytology  AFB  Fungus       Post procedure US confirmed complete drainage of the effusion and presence of lung sliding, ruling out pneumothorax. ([id number])    EBL:  1 drop    COMPLICATIONS:  none    Jj Hunter MD

## 2024-04-15 NOTE — ACP (ADVANCE CARE PLANNING)
Advance Care Planning     Advance Care Planning Activator (Inpatient)  Conversation Note      Date of ACP Conversation: 4/15/2024      ACP Activator: Mago Javier RN     {When Decision Maker makes decisions on behalf of the incapacitated patient: Decision Maker is asked to consider and make decisions based on patient values, known preferences, or best interests.      Health Care Decision Maker: No LW/HCPOA on file. . Children are legal NOK unless document presented stating otherwise.      Current Designated Health Care Decision Maker:     Primary Decision Maker: Vivien Zambrano - Patient - [phone]    Secondary Decision Maker: Solange Zambrano - Child - [phone]    Supplemental (Other) Decision Maker: Yadiel Hartman - Child - [phone]  Click here to complete Healthcare Decision Makers including section of the Healthcare Decision Maker Relationship (ie \"Primary\")  Today we documented Decision Maker(s) consistent with Legal Next of Kin hierarchy.     Care Preferences     Full code per MD orders.

## 2024-04-15 NOTE — CONSULTS
Hospitalist was consulted 4/14 to assume care on 4/15.  Chart reviewed and patient seen at bedside.  Patient is not stable at this time, with increased work of breathing and on Airvo 85%.  Discussed with intensivist and agreed patient is not ready to transition care.  Hospitalist will sign off at this time.  We will assume care when patient is more stable and ready to transfer out of ICU. Please call us if have any questions.

## 2024-04-15 NOTE — INTERDISCIPLINARY ROUNDS
Multi-D Rounds/Checklist (leapfrog):  Lines: can any be removed?: Central line    Urinary Catheter 04/13/24 2 Way (Active)     CVC Triple Lumen 04/13/24 Right Internal jugular (Active)     DVT Prophylaxis: Ordered- heparin  Vent: N/A  Nutrition Ordered/appropriate: Contraindicated-    Can antibiotics or other drugs be stopped? No/End Date set   Inpat Anti-Infectives (From admission, onward)       Start     Ordered Stop    04/15/24 0800  vancomycin (VANCOCIN) 1250 mg in sodium chloride 0.9% 250 mL IVPB  1,250 mg,   IntraVENous,   EVERY 24 HOURS         04/14/24 0942 --    04/14/24 1100  ceFEPIme (MAXIPIME) 2,000 mg in sodium chloride 0.9 % 100 mL IVPB (mini-bag)  2,000 mg,   IntraVENous,   EVERY 12 HOURS        See Hyperspace for full Linked Orders Report.    04/14/24 0942 04/20/24 2209                  Consults needed: None  A: Is pain control adequate? (has PRNs? Stop drip?) Yes  B: Sedation break and SBT? N/A  C: Is sedation choice appropriate? N/A  D: Delirium/CAM-ICU? No  E: Mobility goals/appropriateness? Yes  F: Family update and plan? Daughter is surrogate decision maker and is being updated daily by primary attending and nursing staff.    NATO Bertrand - NP

## 2024-04-15 NOTE — PROGRESS NOTES
EKG noted with lateral ST depression. No ST elevation. TNI back and noted 1940. Will get cardiology to see patient. BP low at 80's so no NTG SL for now. Echo recent with abnormality ?need for catherization in the future. Cr repeat is also elevated at 2.2. Will get Renal to see patient - I called Dr. Reeves and gave him update and he will see the patient.     Additional time is 15 minutes    Jj Hunter MD

## 2024-04-15 NOTE — PLAN OF CARE
Problem: Discharge Planning  Goal: Discharge to home or other facility with appropriate resources  4/14/2024 2025 by Monica David RN  Outcome: Progressing  Flowsheets (Taken 4/14/2024 1600 by Keena Perez RN)  Discharge to home or other facility with appropriate resources:   Identify barriers to discharge with patient and caregiver   Arrange for needed discharge resources and transportation as appropriate   Identify discharge learning needs (meds, wound care, etc)   Refer to discharge planning if patient needs post-hospital services based on physician order or complex needs related to functional status, cognitive ability or social support system   Arrange for interpreters to assist at discharge as needed  4/14/2024 0921 by Inga Otto RN  Outcome: Progressing  4/14/2024 0921 by Inga Otto RN  Outcome: Progressing  Flowsheets (Taken 4/14/2024 0705)  Discharge to home or other facility with appropriate resources: Identify barriers to discharge with patient and caregiver     Problem: Safety - Medical Restraint  Goal: Remains free of injury from restraints (Restraint for Interference with Medical Device)  Description: INTERVENTIONS:  1. Determine that other, less restrictive measures have been tried or would not be effective before applying the restraint  2. Evaluate the patient's condition at the time of restraint application  3. Inform patient/family regarding the reason for restraint  4. Q2H: Monitor safety, psychosocial status, comfort, nutrition and hydration  4/14/2024 2025 by Monica David RN  Outcome: Completed  4/14/2024 0921 by Inga Otto RN  Outcome: Progressing  4/14/2024 0921 by Inga Otto RN  Outcome: Progressing     Problem: Safety - Adult  Goal: Free from fall injury  4/14/2024 2025 by Monica David RN  Outcome: Progressing  4/14/2024 0921 by Inga Otto RN  Outcome: Progressing  4/14/2024 0921 by Inga Otto RN  Outcome: Progressing     Problem:  analgesics based on type and severity of pain and evaluate response  Taken 4/14/2024 1600 by Keena Perez RN  Verbalizes/displays adequate comfort level or baseline comfort level: Encourage patient to monitor pain and request assistance  Taken 4/14/2024 1115 by Inga Otto RN  Verbalizes/displays adequate comfort level or baseline comfort level:   Assess pain using appropriate pain scale   Encourage patient to monitor pain and request assistance  4/14/2024 0921 by Inga Otto RN  Outcome: Progressing  4/14/2024 0921 by Inga Otto RN  Outcome: Progressing  Flowsheets  Taken 4/14/2024 0705 by Inga Otto RN  Verbalizes/displays adequate comfort level or baseline comfort level:   Assess pain using appropriate pain scale   Encourage patient to monitor pain and request assistance  Taken 4/14/2024 0315 by Celsa Zendejas RN  Verbalizes/displays adequate comfort level or baseline comfort level:   Assess pain using appropriate pain scale   Administer analgesics based on type and severity of pain and evaluate response

## 2024-04-15 NOTE — INTERVAL H&P NOTE
Update History & Physical    The patient's History and Physical of April 15, chart was reviewed with the patient and I examined the patient. There was no change. The surgical site was confirmed by the patient and me.     Plan: The risks, benefits, expected outcome, and alternative to the recommended procedure have been discussed with the patient. Patient understands and wants to proceed with the procedure.     Electronically signed by Jj Hunter MD on 4/15/2024 at 2:18 PM

## 2024-04-15 NOTE — PROGRESS NOTES
[facility] Critical Care Note:: 4/15/2024  Vivien Zambrano  Admission Date: 4/12/2024     Length of Stay: 2 days    Background: 83 y.o. female with CAD s/p CABG, primary hypertension, hyperlipidemia, paroxysmal atrial fibrillation, non-insulin-dependent diabetes, GERD, subclavian artery stenosis who presented to the emergency room on 4/13 with bradycardia and minimal responsiveness.  She had a recent hospitalization 321 until 3/26/2024 with pneumonia and sepsis.  Since discharge she has had poor appetite, antibiotics, progressive weakness, low urine output.  EMS found her to have sinus bradycardia in the 40s and she was treated with atropine without improvement followed by normal saline and 2 Amps of bicarb.  She was placed on dopamine with improvement in heart rate.  In the emergency department she was reported to be responsive, but this has not been the case during her ICU stay    Notable PMH:  has a past medical history of Acute respiratory failure with hypoxia ([facility]), Arrhythmia, Arthritis, CAD (coronary artery disease), Calculus of kidney, Carotid stenosis, Coronary atherosclerosis of native coronary artery, Diabetes ([facility]), Essential hypertension, GERD (gastroesophageal reflux disease), HLD (hyperlipidemia), Insomnia, Myocardial infarction due to demand ischemia ([facility]), Respiratory distress, Right ureteral stone, Sepsis ([facility]), and Subclavian artery stenosis ([facility]).    24 Hour events:   Off vent since yesterday but breathing is not that good this AM. Feels more short of breath and now feels like has if someone is sitting on her chest. On Airvo at 73% and 50 LPM. Has been vomiting and also with diarrhea about 4 x today. Last vomiting was 30 minutes ago.       Review of Systems:   -Fever  -Headaches  +Chest pressure  +Dyspnea, -wheezing, -cough  -Abdominal pain,- constipation  -Leg swelling  All other organ systems grossly normal.      Lines: (insertion date)        Urinary Catheter 04/13/24

## 2024-04-15 NOTE — CARE COORDINATION
Chart reviewed and attempted to call daughter x2 numbers with no answer. Pt currently resting in bed on Airvo. No gtts. Will attempt to see pt again tomorrow and speak with daughter. CM following for d/c needs/POC.        04/15/24 7402   Service Assessment   Patient Orientation Alert and Oriented   Cognition Alert   History Provided By Medical Record   Primary Caregiver Family   Accompanied By/Relationship none   Support Systems Children;Family Members   Patient's Healthcare Decision Maker is: Legal Next of Kin   PCP Verified by CM Yes   Prior Functional Level Assistance with the following:   Current Functional Level Assistance with the following:   Can patient return to prior living arrangement Unknown at present   Ability to make needs known: Fair   Family able to assist with home care needs: Yes   Would you like for me to discuss the discharge plan with any other family members/significant others, and if so, who? Yes   Financial Resources Medicare;Other (Comment)  (MCR/supplemental)   Community Resources Other (Comment)  (unknown)   CM/SW Referral Other (see comment)  (pending)   Social/Functional History   Lives With Daughter   Home Equipment Cane;Walker, rolling   Receives Help From Family   ADL Assistance Independent   Homemaking Assistance Needs assistance   Homemaking Responsibilities No   Ambulation Assistance Needs assistance   Transfer Assistance Needs assistance   Active  No   Patient's  Info family   Discharge Planning   Type of Residence Other (Comment)  (pending)   Living Arrangements Children   Current Services Prior To Admission Durable Medical Equipment   Current DME Prior to Arrival Cane;Walker   Potential Assistance Needed Skilled Nursing Facility;Home Care   Potential Assistance Purchasing Medications No   Condition of Participation: Discharge Planning   Freedom of Choice list was provided with basic dialogue that supports the patient's individualized plan of care/goals, treatment

## 2024-04-15 NOTE — CONSULTS
Nephrology consult    Admission Date:  4/12/2024    Admission Diagnosis  Bradycardia [R00.1]  Bradycardia on ECG [R00.1]  Sepsis with acute renal failure and septic shock, due to unspecified organism, unspecified acute renal failure type (HCC) [A41.9, R65.21, N17.9]    Consult request by Dr. Hunter    History of Present Illness:    Vivien Zambrano is an 83 y.o. female who is being evaluated for acute kidney injury.  She has underlying history of coronary artery disease, atrial fibs, diabetes, hypertension, carotid stenosis.  Recent hospitalization with pneumonia and sepsis.  Prior to admission was having some diarrhea poor appetite.  She also had some heart rate drops with bradycardia.  There was some mental status changes and confusion.  Initially admitted with volume depletion IV fluid and some pressors.  Possible sepsis    Bradycardia treated with atropine then given calcium and subsequently externally paced.  Placed on dopamine and eventual improvement.    Volume was administered and then became more's suggestive of fluid overload with chest x-ray findings with pulmonary edema and pleural effusions.  Echocardiogram with new lower ejection fraction of 45 to 50% with moderate severe hypokinesis of the basal to mid inferior wall.  Initial troponin 14 which has subsequently increased to 1900.  Urine volume is remain low despite IV diuretics.  Still relatively hypotensive, BiPAP for    No fever, chills,  No nausea/vomitting, diarrhea or constipation.  No dysuria, hematuria.  No paresthesia, seizure history, no arthritis/ arthralgia or skin rashes.       No Known Allergies    No current facility-administered medications on file prior to encounter.     Current Outpatient Medications on File Prior to Encounter   Medication Sig Dispense Refill    ondansetron (ZOFRAN-ODT) 4 MG disintegrating tablet Place 1 tablet under the tongue every 8 hours as needed for Nausea Patient to use for nauesa and vominting 30

## 2024-04-15 NOTE — PROCEDURES
Summary:    After informed consent was obtained, the patient was positioned upright in the usual fashion.  Ultrasound was used to identify the optimal spot for pleural drainage.    scant effusion noted in left chest. No attempt made to drain.     EBL --none    Patient stable post procedure    No samples sent.     Signed By: Jj Hunter MD

## 2024-04-16 ENCOUNTER — TELEPHONE (OUTPATIENT)
Dept: FAMILY MEDICINE CLINIC | Facility: CLINIC | Age: 84
End: 2024-04-16

## 2024-04-16 ENCOUNTER — APPOINTMENT (OUTPATIENT)
Dept: GENERAL RADIOLOGY | Age: 84
End: 2024-04-16
Payer: MEDICARE

## 2024-04-16 LAB
ANION GAP SERPL CALC-SCNC: 7 MMOL/L (ref 2–11)
APPEARANCE UR: CLEAR
BACTERIA URNS QL MICRO: ABNORMAL /HPF
BASOPHILS # BLD: 0 K/UL (ref 0–0.2)
BASOPHILS NFR BLD: 0 % (ref 0–2)
BILIRUB UR QL: NEGATIVE
BUN SERPL-MCNC: 29 MG/DL (ref 8–23)
CALCIUM SERPL-MCNC: 8.1 MG/DL (ref 8.3–10.4)
CHLORIDE SERPL-SCNC: 104 MMOL/L (ref 103–113)
CO2 SERPL-SCNC: 29 MMOL/L (ref 21–32)
COLOR UR: ABNORMAL
CREAT SERPL-MCNC: 1.9 MG/DL (ref 0.6–1)
DIFFERENTIAL METHOD BLD: ABNORMAL
EKG ATRIAL RATE: 76 BPM
EKG DIAGNOSIS: NORMAL
EKG P AXIS: 83 DEGREES
EKG P-R INTERVAL: 222 MS
EKG Q-T INTERVAL: 406 MS
EKG QRS DURATION: 110 MS
EKG QTC CALCULATION (BAZETT): 456 MS
EKG R AXIS: 106 DEGREES
EKG T AXIS: 183 DEGREES
EKG VENTRICULAR RATE: 76 BPM
EOSINOPHIL # BLD: 0.1 K/UL (ref 0–0.8)
EOSINOPHIL NFR BLD: 1 % (ref 0.5–7.8)
EPI CELLS #/AREA URNS HPF: ABNORMAL /HPF
ERYTHROCYTE [DISTWIDTH] IN BLOOD BY AUTOMATED COUNT: 15.6 % (ref 11.9–14.6)
GLUCOSE SERPL-MCNC: 81 MG/DL (ref 65–100)
GLUCOSE UR STRIP.AUTO-MCNC: NEGATIVE MG/DL
HCT VFR BLD AUTO: 29.3 % (ref 35.8–46.3)
HGB BLD-MCNC: 8.9 G/DL (ref 11.7–15.4)
HGB UR QL STRIP: ABNORMAL
IMM GRANULOCYTES # BLD AUTO: 0.2 K/UL (ref 0–0.5)
IMM GRANULOCYTES NFR BLD AUTO: 2 % (ref 0–5)
KETONES UR QL STRIP.AUTO: NEGATIVE MG/DL
LEUKOCYTE ESTERASE UR QL STRIP.AUTO: ABNORMAL
LYMPHOCYTES # BLD: 0.8 K/UL (ref 0.5–4.6)
LYMPHOCYTES NFR BLD: 8 % (ref 13–44)
MCH RBC QN AUTO: 26.5 PG (ref 26.1–32.9)
MCHC RBC AUTO-ENTMCNC: 30.4 G/DL (ref 31.4–35)
MCV RBC AUTO: 87.2 FL (ref 82–102)
MONOCYTES # BLD: 0.3 K/UL (ref 0.1–1.3)
MONOCYTES NFR BLD: 3 % (ref 4–12)
MUCOUS THREADS URNS QL MICRO: ABNORMAL /LPF
NEUTS SEG # BLD: 8.6 K/UL (ref 1.7–8.2)
NEUTS SEG NFR BLD: 86 % (ref 43–78)
NITRITE UR QL STRIP.AUTO: NEGATIVE
NRBC # BLD: 0 K/UL (ref 0–0.2)
OTHER OBSERVATIONS: ABNORMAL
PH UR STRIP: 5.5 (ref 5–9)
PLATELET # BLD AUTO: 176 K/UL (ref 150–450)
PMV BLD AUTO: 10.1 FL (ref 9.4–12.3)
POTASSIUM SERPL-SCNC: 3.4 MMOL/L (ref 3.5–5.1)
PROT UR STRIP-MCNC: 30 MG/DL
RBC # BLD AUTO: 3.36 M/UL (ref 4.05–5.2)
RBC #/AREA URNS HPF: ABNORMAL /HPF
SODIUM SERPL-SCNC: 140 MMOL/L (ref 136–146)
SODIUM UR-SCNC: 72 MMOL/L
SP GR UR REFRACTOMETRY: 1.01 (ref 1–1.02)
TROPONIN I SERPL HS-MCNC: 1734.4 PG/ML (ref 0–14)
UROBILINOGEN UR QL STRIP.AUTO: 0.2 EU/DL (ref 0.2–1)
WBC # BLD AUTO: 10.1 K/UL (ref 4.3–11.1)
WBC URNS QL MICRO: ABNORMAL /HPF

## 2024-04-16 PROCEDURE — 80048 BASIC METABOLIC PNL TOTAL CA: CPT

## 2024-04-16 PROCEDURE — 2000000000 HC ICU R&B

## 2024-04-16 PROCEDURE — 94660 CPAP INITIATION&MGMT: CPT

## 2024-04-16 PROCEDURE — 6360000002 HC RX W HCPCS

## 2024-04-16 PROCEDURE — 2580000003 HC RX 258

## 2024-04-16 PROCEDURE — 2580000003 HC RX 258: Performed by: INTERNAL MEDICINE

## 2024-04-16 PROCEDURE — 94640 AIRWAY INHALATION TREATMENT: CPT

## 2024-04-16 PROCEDURE — 6370000000 HC RX 637 (ALT 250 FOR IP): Performed by: INTERNAL MEDICINE

## 2024-04-16 PROCEDURE — 6360000002 HC RX W HCPCS: Performed by: INTERNAL MEDICINE

## 2024-04-16 PROCEDURE — 99232 SBSQ HOSP IP/OBS MODERATE 35: CPT | Performed by: INTERNAL MEDICINE

## 2024-04-16 PROCEDURE — 93005 ELECTROCARDIOGRAM TRACING: CPT | Performed by: EMERGENCY MEDICINE

## 2024-04-16 PROCEDURE — 2700000000 HC OXYGEN THERAPY PER DAY

## 2024-04-16 PROCEDURE — 6360000002 HC RX W HCPCS: Performed by: PHYSICIAN ASSISTANT

## 2024-04-16 PROCEDURE — 84484 ASSAY OF TROPONIN QUANT: CPT

## 2024-04-16 PROCEDURE — 99233 SBSQ HOSP IP/OBS HIGH 50: CPT | Performed by: INTERNAL MEDICINE

## 2024-04-16 PROCEDURE — 85025 COMPLETE CBC W/AUTO DIFF WBC: CPT

## 2024-04-16 PROCEDURE — 93010 ELECTROCARDIOGRAM REPORT: CPT | Performed by: INTERNAL MEDICINE

## 2024-04-16 PROCEDURE — 76937 US GUIDE VASCULAR ACCESS: CPT

## 2024-04-16 PROCEDURE — 71045 X-RAY EXAM CHEST 1 VIEW: CPT

## 2024-04-16 PROCEDURE — 36415 COLL VENOUS BLD VENIPUNCTURE: CPT

## 2024-04-16 RX ORDER — CARVEDILOL 3.12 MG/1
3.12 TABLET ORAL 2 TIMES DAILY WITH MEALS
Status: DISCONTINUED | OUTPATIENT
Start: 2024-04-16 | End: 2024-04-19 | Stop reason: HOSPADM

## 2024-04-16 RX ORDER — BUMETANIDE 0.25 MG/ML
0.5 INJECTION INTRAMUSCULAR; INTRAVENOUS 2 TIMES DAILY
Status: COMPLETED | OUTPATIENT
Start: 2024-04-16 | End: 2024-04-17

## 2024-04-16 RX ORDER — BUMETANIDE 0.25 MG/ML
0.5 INJECTION INTRAMUSCULAR; INTRAVENOUS DAILY
Status: DISCONTINUED | OUTPATIENT
Start: 2024-04-16 | End: 2024-04-16

## 2024-04-16 RX ORDER — AMIODARONE HYDROCHLORIDE 200 MG/1
200 TABLET ORAL 2 TIMES DAILY
Status: DISCONTINUED | OUTPATIENT
Start: 2024-04-16 | End: 2024-04-19 | Stop reason: HOSPADM

## 2024-04-16 RX ADMIN — BUMETANIDE 0.5 MG: 0.25 INJECTION INTRAMUSCULAR; INTRAVENOUS at 17:05

## 2024-04-16 RX ADMIN — HEPARIN SODIUM 5000 UNITS: 5000 INJECTION INTRAVENOUS; SUBCUTANEOUS at 21:30

## 2024-04-16 RX ADMIN — CARVEDILOL 3.12 MG: 3.12 TABLET, FILM COATED ORAL at 08:35

## 2024-04-16 RX ADMIN — ALBUTEROL SULFATE 2.5 MG: 2.5 SOLUTION RESPIRATORY (INHALATION) at 11:34

## 2024-04-16 RX ADMIN — AMIODARONE HYDROCHLORIDE 1 MG/MIN: 50 INJECTION, SOLUTION INTRAVENOUS at 10:48

## 2024-04-16 RX ADMIN — SODIUM CHLORIDE, PRESERVATIVE FREE 10 ML: 5 INJECTION INTRAVENOUS at 08:35

## 2024-04-16 RX ADMIN — AMIODARONE HYDROCHLORIDE 200 MG: 200 TABLET ORAL at 08:35

## 2024-04-16 RX ADMIN — HEPARIN SODIUM 5000 UNITS: 5000 INJECTION INTRAVENOUS; SUBCUTANEOUS at 14:42

## 2024-04-16 RX ADMIN — AMIODARONE HYDROCHLORIDE 1 MG/MIN: 50 INJECTION, SOLUTION INTRAVENOUS at 17:51

## 2024-04-16 RX ADMIN — HEPARIN SODIUM 5000 UNITS: 5000 INJECTION INTRAVENOUS; SUBCUTANEOUS at 05:52

## 2024-04-16 RX ADMIN — ALPRAZOLAM 0.5 MG: 0.5 TABLET ORAL at 22:05

## 2024-04-16 RX ADMIN — SODIUM CHLORIDE, PRESERVATIVE FREE 10 ML: 5 INJECTION INTRAVENOUS at 20:58

## 2024-04-16 RX ADMIN — ROSUVASTATIN CALCIUM 20 MG: 20 TABLET, COATED ORAL at 20:56

## 2024-04-16 RX ADMIN — CARVEDILOL 3.12 MG: 3.12 TABLET, FILM COATED ORAL at 17:05

## 2024-04-16 RX ADMIN — ASPIRIN 81 MG: 81 TABLET, COATED ORAL at 08:35

## 2024-04-16 RX ADMIN — ALBUTEROL SULFATE 2.5 MG: 2.5 SOLUTION RESPIRATORY (INHALATION) at 19:20

## 2024-04-16 RX ADMIN — ALBUTEROL SULFATE 2.5 MG: 2.5 SOLUTION RESPIRATORY (INHALATION) at 08:32

## 2024-04-16 RX ADMIN — ALBUTEROL SULFATE 2.5 MG: 2.5 SOLUTION RESPIRATORY (INHALATION) at 15:26

## 2024-04-16 RX ADMIN — BUMETANIDE 0.5 MG: 0.25 INJECTION INTRAMUSCULAR; INTRAVENOUS at 08:34

## 2024-04-16 RX ADMIN — AMIODARONE HYDROCHLORIDE 150 MG: 50 INJECTION, SOLUTION INTRAVENOUS at 10:34

## 2024-04-16 ASSESSMENT — PAIN SCALES - GENERAL
PAINLEVEL_OUTOF10: 0

## 2024-04-16 NOTE — PROGRESS NOTES
Bedside and verbal shift change report received from  Keri RN, Gayle RN (offgoing nurse). Report included the following information SBAR, Kardex, Intake/Output, MAR, Recent Results, Med Rec Status, Cardiac Rhythm SR, and Alarm Parameters .

## 2024-04-16 NOTE — TELEPHONE ENCOUNTER
Patients son called and stated patient is in ccu right now but things are looking better so they are still interested in getting wheelchair and bedside commode

## 2024-04-16 NOTE — PROGRESS NOTES
[facility] Progress Note    Subjective:    Sitting up in bed.  Off respiratory support.  Pretty comfortable  -Family present    ROS:  Denies CP, SOB, nausea/ vomitting     Objective:    Current Facility-Administered Medications   Medication Dose Route Frequency    carvedilol (COREG) tablet 3.125 mg  3.125 mg Oral BID WC    ALPRAZolam (XANAX) tablet 0.5 mg  0.5 mg Oral BID PRN    medicated lip ointment (BLISTEX)   Topical PRN    amiodarone (CORDARONE) tablet 200 mg  200 mg Oral Daily    aspirin EC tablet 81 mg  81 mg Oral Daily    rosuvastatin (CRESTOR) tablet 20 mg  20 mg Oral Nightly    sodium chloride flush 0.9 % injection 5-40 mL  5-40 mL IntraVENous 2 times per day    sodium chloride flush 0.9 % injection 5-40 mL  5-40 mL IntraVENous PRN    0.9 % sodium chloride infusion   IntraVENous PRN    ondansetron (ZOFRAN-ODT) disintegrating tablet 4 mg  4 mg Oral Q8H PRN    Or    ondansetron (ZOFRAN) injection 4 mg  4 mg IntraVENous Q6H PRN    polyethylene glycol (GLYCOLAX) packet 17 g  17 g Oral Daily PRN    acetaminophen (TYLENOL) tablet 650 mg  650 mg Oral Q6H PRN    Or    acetaminophen (TYLENOL) suppository 650 mg  650 mg Rectal Q6H PRN    albuterol (PROVENTIL) (2.5 MG/3ML) 0.083% nebulizer solution 2.5 mg  2.5 mg Nebulization Q4H WA RT    heparin (porcine) injection 5,000 Units  5,000 Units SubCUTAneous 3 times per day    sodium chloride flush 0.9 % injection 5-40 mL  5-40 mL IntraVENous PRN    0.9 % sodium chloride infusion   IntraVENous PRN    glucose chewable tablet 16 g  4 tablet Oral PRN    dextrose bolus 10% 125 mL  125 mL IntraVENous PRN    Or    dextrose bolus 10% 250 mL  250 mL IntraVENous PRN    Glucagon Emergency KIT 1 mg  1 mg SubCUTAneous PRN    dextrose 10 % infusion   IntraVENous Continuous PRN    potassium chloride 20 mEq/50 mL IVPB (Central Line)  20 mEq IntraVENous PRN    Or    potassium chloride 10 mEq/100 mL IVPB (Peripheral Line)  10 mEq IntraVENous PRN       Exam:  Vitals:    04/16/24

## 2024-04-16 NOTE — PROGRESS NOTES
Patient weaned off Airvo to high flow nasal cannula. Patient is tolerating the change well at this time.       04/16/24 0846   Oxygen Therapy/Pulse Ox   O2 Therapy Oxygen humidified   O2 Device High flow nasal cannula   O2 Flow Rate (L/min) 8 L/min

## 2024-04-16 NOTE — PROGRESS NOTES
[facility] CARDIOLOGY PROGRESS NOTE           4/16/2024 10:03 AM    Admit Date: 4/12/2024      Subjective:   Patient complains of chest pain.  This is reproducible on exam today across the precordium with palpation.  She does have elevated high-sensitivity troponins with recent cardiac catheterization 09/2023 with severe diffuse small vessel CAD not amenable to intervention.  Patient has severe peripheral vascular disease jeopardizing patency of LIMA to LAD.  Patient is not a candidate for any additional cardiac interventions.  LVEF remains stable at 45-50% with dense inferior akinesis.  This is not a new finding.  This is a chronic finding for the patient.    ROS:  Cardiovascular:  As noted above    Objective:      Vitals:    04/16/24 0730 04/16/24 0800 04/16/24 0832 04/16/24 0900   BP: (!) 97/52 95/71  (!) 108/57   Pulse: 74 (!) 113 (!) 113 (!) 117   Resp: 18 26 24 21   Temp: 97.9 °F (36.6 °C)      TempSrc: Oral      SpO2: 98% 99% 98%    Weight:       Height:           Physical Exam:  General-No Acute Distress  Neck- supple, no JVD  CV- regular rate and rhythm no MRG  Lung-diminished bilaterally  Abd- soft, nontender, nondistended  Ext- no edema bilaterally.  Skin- warm and dry    Data Review:   Recent Labs     04/15/24  0358 04/15/24  0901 04/15/24  2051 04/16/24  0611      < > 137 140   K 4.9   < > 4.3 3.4*   BUN 26*   < > 30* 29*   WBC 21.1*  --   --  10.1   HGB 8.6*  --   --  8.9*   HCT 28.8*  --   --  29.3*     --   --  176    < > = values in this interval not displayed.       Echo Findings    Left Ventricle Mildly reduced left ventricular systolic function with a visually estimated EF of 45 - 50%. Left ventricle is mildly dilated. Mildly increased wall thickness. There is moderate to severe hypokinesis of the basal to mid inferior/inferolateral wall. Grade II diastolic dysfunction with increased LAP.   Left Atrium Left atrium is moderately dilated.   Right Ventricle Right  three-vessel coronary artery disease not amenable to any intervention.  Patient also has severe peripheral vascular disease involving subclavian jeopardizing patency of LIMA to LAD.  Patient has known severe peripheral vascular disease involving bilateral lower extremities.  This makes situation very difficult.  Suspect elevated high-sensitivity troponin is demand in nature.  Patient has been hypoxic and hypotensive.  There is no indication for repeat cardiac catheterization at this time.  EKG findings are chronic and consistent with old EKGs.    Subclavian arterial stenosis ([facility])  Plan: Severe known left subclavian stenosis.    Type 2 diabetes mellitus with peripheral vascular disease ([facility])  Plan: Managed per primary team    PAF (paroxysmal atrial fibrillation) ([facility])  Plan: Patient in and out of atrial fibrillation with intermittent rapid response.  Start IV amiodarone 150 mg bolus and 1 mg/min.  No anticoagulation due to severe anemia.    Acute hypoxic respiratory failure ([facility])  Plan: Multifactorial    Atherosclerosis of coronary artery bypass graft of native heart without angina pectoris  Plan: Please see above    Give additional diuretics today.  Ordered Bumex 0.5 mg every 12 hours x 2 doses.    SANDOR HERNANDEZ MD  4/16/2024 10:03 AM

## 2024-04-16 NOTE — PROGRESS NOTES
04/15/24 1297   NIV Type   NIV Started/Stopped On   Equipment Type V60   Mode Bilevel   Mask Type Under the nose   Mask Size Medium   Assessment   Respirations 28   Comfort Level Good   Using Accessory Muscles No   Mask Compliance Good   Skin Assessment Clean, dry, & intact   Settings/Measurements   PIP Observed 11 cm H20   IPAP 12 cmH20   CPAP/EPAP 6 cmH2O   Vt (Measured) 451 mL   Rate Ordered 12   Insp Rise Time (%) 5 %   FiO2  75 %  (NIV)   I Time/ I Time % 0.9 s   Minute Volume (L/min) 12.7 Liters   Mask Leak (lpm) 14 lpm   Patient's Home Machine No   Alarm Settings   Alarms On Y   Low Pressure (cmH2O) 5 cmH2O   High Pressure (cmH2O) 35 cmH2O   Apnea (secs) 20 secs   RR Low (bpm) 8   RR High (bpm) 50 br/min

## 2024-04-16 NOTE — PROGRESS NOTES
Bedside and verbal shift change report received from  Keri REARDON (offgoing nurse). Report included the following information SBAR, Kardex, Intake/Output, MAR, Recent Results, Med Rec Status, Cardiac Rhythm SR, and Alarm Parameters .

## 2024-04-16 NOTE — CONSULTS
[facility] Hospitalist Consult   Admit Date:  2024 11:58 PM   Name:  Vivien Zambrano   Age:  83 y.o.  Sex:  female  :  1940   MRN:  [id number]   Room:  [facility]/    Presenting/Chief Complaint: Bradycardia and Hypotension    Reason(s) for Admission: Bradycardia [R00.1]  Bradycardia on ECG [R00.1]  Sepsis with acute renal failure and septic shock, due to unspecified organism, unspecified acute renal failure type (HCC) [A41.9, R65.21, N17.9]     Hospitalists consulted by Fili Beebe MD for: assume care    History of Presenting Illness:   Vivien Zambrano is a 83 y.o. female with history of CAD s/p CABG, primary hypertension, hyperlipidemia, paroxysmal atrial fibrillation, non-insulin-dependent diabetes, GERD, subclavian artery stenosis who presented to the emergency room on  with bradycardia and minimal responsiveness. She had a recent hospitalization 3/21 until 3/26/2024 with pneumonia and sepsis. Since discharge she has had poor appetite, antibiotics, progressive weakness, low urine output. EMS found her to have sinus bradycardia in the 40s and she was treated with atropine without improvement followed by normal saline and 2 Amps of bicarb. She was placed on dopamine with improvement in heart rate.  She became increasingly less responsive and intubated.  Concern for beta-blocker overdose, likely iatrogenically outpatient beta-blocker therapy with patient's family escalating doses despite worsening bradycardia.  She was treated with glucagon for beta-blocker toxicity.  Extubated  to Airvo.     Initially there was concern for sepsis but sepsis workup negative and antibiotic discontinued 4/15.  Status post right thoracentesis 4/15 with 800 mL removed, transudative.  Oxygen requirement gradually improving and has been weaned to high flow nasal cannula on .    Patient noted with atypical atrial flutter with rapid ventricular response on  and restarted on Coreg 3.125 mg  ischemia:  Cardiology following and recommend patient has severe CAD, no intervention at this time  Echocardiogram on 4/13 with EF 45 to 50%.    HFrEF:  Not on goal directed therapy due to low blood pressure  Continue carvedilol  Continue Bumex  Strict I&O's  Cardiology following, appreciate recommendation  Echocardiogram with EF 45 to 50% and grade 2 diastolic dysfunction    Hyperlipidemia:  Continue statin    Type 2 diabetes mellitus:   Continue sliding scale    Anxiety:  As needed xanax      Anticipated Discharge Arrangements:   Skilled Nursing Facility    PT/OT evals and PPD needed/ordered?  Therapy and PPD  Diet:  ADULT DIET; Easy to Chew; 4 carb choices (60 gm/meal)  VTE prophylaxis: Heparin  Code status: Full Code    Non-peripheral Lines and Tubes (if present):      Urinary Catheter 04/13/24 2 Way (Active)        Telemetry (if present):  Cardiac/Telemetry Monitor On: Bedside monitor in use        Hospital Problems:  Principal Problem:    Bradycardia on ECG  Active Problems:    Sepsis (HCC)    Acute kidney injury (HCC)    Hypotension    Coronary atherosclerosis of native coronary artery    Subclavian arterial stenosis (HCC)    Type 2 diabetes mellitus with peripheral vascular disease (HCC)    PAF (paroxysmal atrial fibrillation) (HCC)    Acute hypoxic respiratory failure (HCC)    Atherosclerosis of coronary artery bypass graft of native heart without angina pectoris    Acute pulmonary edema (HCC)    Pleural effusion, left    Pleural effusion, right  Resolved Problems:    * No resolved hospital problems. *        Objective:   Patient Vitals for the past 24 hrs:   Temp Pulse Resp BP SpO2   04/16/24 1000 -- (!) 116 19 (!) 102/57 97 %   04/16/24 0900 -- (!) 117 21 (!) 108/57 --   04/16/24 0832 -- (!) 113 24 -- 98 %   04/16/24 0800 -- (!) 113 26 95/71 99 %   04/16/24 0730 97.9 °F (36.6 °C) 74 18 (!) 97/52 98 %   04/16/24 0645 -- 75 20 (!) 93/48 100 %   04/16/24 0630 -- 73 12 (!) 108/52 97 %   04/16/24 0615 -- 75 19

## 2024-04-16 NOTE — INTERDISCIPLINARY ROUNDS
Multi-D Rounds/Checklist (leapfrog):  Lines: can any be removed?: None    Urinary Catheter 04/13/24 2 Way (Active)          DVT Prophylaxis: Ordered- heparin  Vent: N/A  Nutrition Ordered/appropriate: Ordered  Can antibiotics or other drugs be stopped? No  Inpat Anti-Infectives (From admission, onward)      None              Consults needed: None  A: Is pain control adequate? (has PRNs? Stop drip?) Yes  B: Sedation break and SBT? N/A  C: Is sedation choice appropriate? N/A  D: Delirium/CAM-ICU? No  E: Mobility goals/appropriateness? Yes  F: Family update and plan? Daughter is surrogate decision maker and is being updated daily by primary attending and nursing staff.    Amanda Pires, APRN - CNP

## 2024-04-16 NOTE — PROGRESS NOTES
[facility] Critical Care Note:: 4/16/2024  Vivien Zambrano  Admission Date: 4/12/2024     Length of Stay: 3 days    Background: 83 y.o. female with CAD s/p CABG, primary hypertension, hyperlipidemia, paroxysmal atrial fibrillation, non-insulin-dependent diabetes, GERD, subclavian artery stenosis who presented to the emergency room on 4/13 with bradycardia and minimal responsiveness.  She had a recent hospitalization 321 until 3/26/2024 with pneumonia and sepsis.  Since discharge she has had poor appetite, antibiotics, progressive weakness, low urine output.  EMS found her to have sinus bradycardia in the 40s and she was treated with atropine without improvement followed by normal saline and 2 Amps of bicarb.  She was placed on dopamine with improvement in heart rate.  In the emergency department she was reported to be responsive, but this has not been the case during her ICU stay    Notable PMH:  has a past medical history of Acute respiratory failure with hypoxia ([facility]), Arrhythmia, Arthritis, CAD (coronary artery disease), Calculus of kidney, Carotid stenosis, Coronary atherosclerosis of native coronary artery, Diabetes ([facility]), Essential hypertension, GERD (gastroesophageal reflux disease), HLD (hyperlipidemia), Insomnia, Myocardial infarction due to demand ischemia ([facility]), Respiratory distress, Right ureteral stone, Sepsis ([facility]), and Subclavian artery stenosis ([facility]).    24 Hour events:   S/p tap yesterdy with 800 removed from Right chest. Dyspnea less today on Airvo and down to 50% and 45 LPM. Diuresed well. No diarrhea and vomiting overnight. Eating. No chest pain today. ?flutter overnight.     Review of Systems:   -Fever  -Headaches  -Chest pressure  +Dyspnea (less), -wheezing, -cough  -Abdominal pain,- constipation -N/V today.  -Leg swelling  All other organ systems grossly normal.      Lines: (insertion date)        Urinary Catheter 04/13/24 2 Way (Active)          Drips: current dose  EF of 45 - 50%. Left ventricle is mildly dilated. Mildly increased wall thickness. There is moderate to severe hypokinesis of the basal to mid inferior/inferolateral wall. Grade II diastolic dysfunction with increased LAP.    Left Atrium: Left atrium is moderately dilated.    Image quality is technically difficult. Contrast used: Definity. Procedure performed with the patient in a supine position.    Antibiotics:  VANCO 4/13-  CEFEPIME 4/13-    Microbiology:   Results       Procedure Component Value Units Date/Time    Culture, Body Fluid [[id number]] Collected: 04/15/24 1410    Order Status: No result Updated: 04/15/24 1715    AFB Culture + Smear W/Rflx ID From Culture [[id number]] Collected: 04/15/24 1410    Order Status: No result Updated: 04/15/24 1558    Culture, Fungus [[id number]] Collected: 04/15/24 1410    Order Status: No result Updated: 04/15/24 1601    MRSA/Staph Aureus DNA [[id number]] Collected: 04/14/24 0935    Order Status: Completed Specimen: Nasal Swab Updated: 04/14/24 1332     MRSA by PCR Not detected        Comment: A positive test result does not necessarily indicate the presence of a viable organism. A positive result is indicative of the presence of SA or MRSA DNA.  The BD Max StaphSR assay is intended to aid in the prevention and control of MRSA and SA infections in healthcare settings.  It is not intended to diagnose MRSA or SA infections nor guide or monitor treatment for MRSA/SA infections. A negative result does not preclude nasal colonization.          SA by PCR Not detected       Respiratory Panel, Molecular, with COVID-19 (Restricted: peds pts or suitable admitted adults) [[id number]] Collected: 04/13/24 0144    Order Status: Completed Specimen: Nasopharyngeal Updated: 04/13/24 0256     Adenovirus by PCR NOT DETECTED        Coronavirus 229E by PCR NOT DETECTED        Coronavirus HKU1 by PCR NOT DETECTED        Coronavirus NL63 by PCR NOT DETECTED        Coronavirus OC43 by PCR NOT

## 2024-04-17 ENCOUNTER — APPOINTMENT (OUTPATIENT)
Dept: GENERAL RADIOLOGY | Age: 84
End: 2024-04-17
Payer: MEDICARE

## 2024-04-17 PROBLEM — R57.0 CARDIOGENIC SHOCK (HCC): Status: ACTIVE | Noted: 2024-04-17

## 2024-04-17 LAB
ANION GAP SERPL CALC-SCNC: 5 MMOL/L (ref 2–11)
BASOPHILS # BLD: 0 K/UL (ref 0–0.2)
BASOPHILS NFR BLD: 1 % (ref 0–2)
BUN SERPL-MCNC: 20 MG/DL (ref 8–23)
CALCIUM SERPL-MCNC: 7.8 MG/DL (ref 8.3–10.4)
CHLORIDE SERPL-SCNC: 103 MMOL/L (ref 103–113)
CO2 SERPL-SCNC: 30 MMOL/L (ref 21–32)
CREAT SERPL-MCNC: 1.3 MG/DL (ref 0.6–1)
DIFFERENTIAL METHOD BLD: ABNORMAL
EOSINOPHIL # BLD: 0.2 K/UL (ref 0–0.8)
EOSINOPHIL NFR BLD: 3 % (ref 0.5–7.8)
ERYTHROCYTE [DISTWIDTH] IN BLOOD BY AUTOMATED COUNT: 15.3 % (ref 11.9–14.6)
GLUCOSE SERPL-MCNC: 97 MG/DL (ref 65–100)
HCT VFR BLD AUTO: 29 % (ref 35.8–46.3)
HGB BLD-MCNC: 8.9 G/DL (ref 11.7–15.4)
IMM GRANULOCYTES # BLD AUTO: 0.4 K/UL (ref 0–0.5)
IMM GRANULOCYTES NFR BLD AUTO: 5 % (ref 0–5)
LYMPHOCYTES # BLD: 0.8 K/UL (ref 0.5–4.6)
LYMPHOCYTES NFR BLD: 11 % (ref 13–44)
MCH RBC QN AUTO: 26.1 PG (ref 26.1–32.9)
MCHC RBC AUTO-ENTMCNC: 30.7 G/DL (ref 31.4–35)
MCV RBC AUTO: 85 FL (ref 82–102)
MONOCYTES # BLD: 0.6 K/UL (ref 0.1–1.3)
MONOCYTES NFR BLD: 7 % (ref 4–12)
NEUTS SEG # BLD: 5.8 K/UL (ref 1.7–8.2)
NEUTS SEG NFR BLD: 73 % (ref 43–78)
NRBC # BLD: 0 K/UL (ref 0–0.2)
PLATELET # BLD AUTO: 146 K/UL (ref 150–450)
PMV BLD AUTO: 10.1 FL (ref 9.4–12.3)
POTASSIUM SERPL-SCNC: 2.8 MMOL/L (ref 3.5–5.1)
RBC # BLD AUTO: 3.41 M/UL (ref 4.05–5.2)
SODIUM SERPL-SCNC: 138 MMOL/L (ref 136–146)
WBC # BLD AUTO: 7.7 K/UL (ref 4.3–11.1)

## 2024-04-17 PROCEDURE — 99232 SBSQ HOSP IP/OBS MODERATE 35: CPT | Performed by: INTERNAL MEDICINE

## 2024-04-17 PROCEDURE — 6370000000 HC RX 637 (ALT 250 FOR IP): Performed by: INTERNAL MEDICINE

## 2024-04-17 PROCEDURE — 97530 THERAPEUTIC ACTIVITIES: CPT

## 2024-04-17 PROCEDURE — 2580000003 HC RX 258

## 2024-04-17 PROCEDURE — 94761 N-INVAS EAR/PLS OXIMETRY MLT: CPT

## 2024-04-17 PROCEDURE — 85025 COMPLETE CBC W/AUTO DIFF WBC: CPT

## 2024-04-17 PROCEDURE — 6360000002 HC RX W HCPCS

## 2024-04-17 PROCEDURE — 2140000000 HC CCU INTERMEDIATE R&B

## 2024-04-17 PROCEDURE — 80048 BASIC METABOLIC PNL TOTAL CA: CPT

## 2024-04-17 PROCEDURE — 97535 SELF CARE MNGMENT TRAINING: CPT

## 2024-04-17 PROCEDURE — 6360000002 HC RX W HCPCS: Performed by: NURSE PRACTITIONER

## 2024-04-17 PROCEDURE — 6360000002 HC RX W HCPCS: Performed by: INTERNAL MEDICINE

## 2024-04-17 PROCEDURE — 2700000000 HC OXYGEN THERAPY PER DAY

## 2024-04-17 PROCEDURE — 94660 CPAP INITIATION&MGMT: CPT

## 2024-04-17 PROCEDURE — 99233 SBSQ HOSP IP/OBS HIGH 50: CPT | Performed by: INTERNAL MEDICINE

## 2024-04-17 PROCEDURE — 97165 OT EVAL LOW COMPLEX 30 MIN: CPT

## 2024-04-17 PROCEDURE — 97161 PT EVAL LOW COMPLEX 20 MIN: CPT

## 2024-04-17 PROCEDURE — 94640 AIRWAY INHALATION TREATMENT: CPT

## 2024-04-17 PROCEDURE — 6360000002 HC RX W HCPCS: Performed by: PHYSICIAN ASSISTANT

## 2024-04-17 PROCEDURE — 71045 X-RAY EXAM CHEST 1 VIEW: CPT

## 2024-04-17 PROCEDURE — 2580000003 HC RX 258: Performed by: NURSE PRACTITIONER

## 2024-04-17 PROCEDURE — 36415 COLL VENOUS BLD VENIPUNCTURE: CPT

## 2024-04-17 RX ADMIN — SODIUM CHLORIDE, PRESERVATIVE FREE 10 ML: 5 INJECTION INTRAVENOUS at 21:07

## 2024-04-17 RX ADMIN — POTASSIUM CHLORIDE 10 MEQ: 7.46 INJECTION, SOLUTION INTRAVENOUS at 20:24

## 2024-04-17 RX ADMIN — AMIODARONE HYDROCHLORIDE 200 MG: 200 TABLET ORAL at 21:07

## 2024-04-17 RX ADMIN — ALBUTEROL SULFATE 2.5 MG: 2.5 SOLUTION RESPIRATORY (INHALATION) at 19:35

## 2024-04-17 RX ADMIN — SODIUM CHLORIDE, PRESERVATIVE FREE 5 ML: 5 INJECTION INTRAVENOUS at 22:27

## 2024-04-17 RX ADMIN — CARVEDILOL 3.12 MG: 3.12 TABLET, FILM COATED ORAL at 09:59

## 2024-04-17 RX ADMIN — BUMETANIDE 0.5 MG: 0.25 INJECTION INTRAMUSCULAR; INTRAVENOUS at 09:56

## 2024-04-17 RX ADMIN — HEPARIN SODIUM 5000 UNITS: 5000 INJECTION INTRAVENOUS; SUBCUTANEOUS at 14:49

## 2024-04-17 RX ADMIN — ALBUTEROL SULFATE 2.5 MG: 2.5 SOLUTION RESPIRATORY (INHALATION) at 08:38

## 2024-04-17 RX ADMIN — CARVEDILOL 3.12 MG: 3.12 TABLET, FILM COATED ORAL at 18:18

## 2024-04-17 RX ADMIN — ACETAMINOPHEN 650 MG: 325 TABLET ORAL at 22:45

## 2024-04-17 RX ADMIN — ALBUTEROL SULFATE 2.5 MG: 2.5 SOLUTION RESPIRATORY (INHALATION) at 12:40

## 2024-04-17 RX ADMIN — AMIODARONE HYDROCHLORIDE 0.5 MG/MIN: 50 INJECTION, SOLUTION INTRAVENOUS at 04:03

## 2024-04-17 RX ADMIN — SODIUM CHLORIDE: 9 INJECTION, SOLUTION INTRAVENOUS at 07:39

## 2024-04-17 RX ADMIN — ALBUTEROL SULFATE 2.5 MG: 2.5 SOLUTION RESPIRATORY (INHALATION) at 16:15

## 2024-04-17 RX ADMIN — HEPARIN SODIUM 5000 UNITS: 5000 INJECTION INTRAVENOUS; SUBCUTANEOUS at 21:25

## 2024-04-17 RX ADMIN — ASPIRIN 81 MG: 81 TABLET, COATED ORAL at 09:56

## 2024-04-17 RX ADMIN — POTASSIUM CHLORIDE 10 MEQ: 7.46 INJECTION, SOLUTION INTRAVENOUS at 06:10

## 2024-04-17 RX ADMIN — Medication: at 11:16

## 2024-04-17 RX ADMIN — AMIODARONE HYDROCHLORIDE 200 MG: 200 TABLET ORAL at 09:56

## 2024-04-17 RX ADMIN — ROSUVASTATIN CALCIUM 20 MG: 20 TABLET, COATED ORAL at 21:07

## 2024-04-17 RX ADMIN — SODIUM CHLORIDE, PRESERVATIVE FREE 10 ML: 5 INJECTION INTRAVENOUS at 09:57

## 2024-04-17 RX ADMIN — ALPRAZOLAM 0.5 MG: 0.5 TABLET ORAL at 22:45

## 2024-04-17 RX ADMIN — Medication: at 22:29

## 2024-04-17 RX ADMIN — POTASSIUM CHLORIDE 10 MEQ: 7.46 INJECTION, SOLUTION INTRAVENOUS at 10:00

## 2024-04-17 RX ADMIN — HEPARIN SODIUM 5000 UNITS: 5000 INJECTION INTRAVENOUS; SUBCUTANEOUS at 05:35

## 2024-04-17 RX ADMIN — POTASSIUM CHLORIDE 10 MEQ: 7.46 INJECTION, SOLUTION INTRAVENOUS at 16:19

## 2024-04-17 ASSESSMENT — PAIN DESCRIPTION - ONSET: ONSET: GRADUAL

## 2024-04-17 ASSESSMENT — PAIN DESCRIPTION - ORIENTATION
ORIENTATION: RIGHT
ORIENTATION: RIGHT
ORIENTATION: LEFT;RIGHT

## 2024-04-17 ASSESSMENT — PAIN SCALES - GENERAL
PAINLEVEL_OUTOF10: 0
PAINLEVEL_OUTOF10: 3
PAINLEVEL_OUTOF10: 3
PAINLEVEL_OUTOF10: 0
PAINLEVEL_OUTOF10: 3
PAINLEVEL_OUTOF10: 0
PAINLEVEL_OUTOF10: 0

## 2024-04-17 ASSESSMENT — PAIN DESCRIPTION - DESCRIPTORS
DESCRIPTORS: SORE
DESCRIPTORS: ACHING;SORE
DESCRIPTORS: SORE

## 2024-04-17 ASSESSMENT — PAIN DESCRIPTION - FREQUENCY: FREQUENCY: INTERMITTENT

## 2024-04-17 ASSESSMENT — PAIN DESCRIPTION - PAIN TYPE: TYPE: ACUTE PAIN

## 2024-04-17 ASSESSMENT — PAIN DESCRIPTION - LOCATION
LOCATION: ARM

## 2024-04-17 NOTE — PROGRESS NOTES
ACUTE PHYSICAL THERAPY GOALS:   (Developed with and agreed upon by patient and/or caregiver.)  LTG:  (1.)Ms. Zambrano will move from supine to sit and sit to supine , scoot up and down, and roll side to side in bed with STAND BY ASSIST within 7 treatment day(s).    (2.)Ms. Zambrano will transfer from bed to chair and chair to bed with CONTACT GUARD ASSIST using the least restrictive device within 7 treatment day(s).    (3.)Ms. Zambrano will ambulate with CONTACT GUARD ASSIST for 250 feet with the least restrictive device within 7 treatment day(s).  (4.)Ms. Zambrano will participate in therapeutic activity/exercises x 25 minutes for increased activity tolerance within 7 treatment days.  (5.)Ms. Zambrano will perform standing static and dynamic balance activities x 15 minutes with SUPERVISION to improve safety within 7 day(s).    ________________________________________________________________________________________________        PHYSICAL THERAPY Initial Assessment and AM  (Link to Caseload Tracking: PT Visit Days : 1  Acknowledge Orders  Time In/Out  PT Charge Capture  Rehab Caseload Tracker    Vivien Zambrano is a 83 y.o. female   PRIMARY DIAGNOSIS: Bradycardia on ECG  Bradycardia [R00.1]  Bradycardia on ECG [R00.1]  Sepsis with acute renal failure and septic shock, due to unspecified organism, unspecified acute renal failure type (HCC) [A41.9, R65.21, N17.9]  Procedure(s) (LRB):  THORACENTESIS ULTRASOUND (Bilateral)  2 Days Post-Op  Reason for Referral: Generalized Muscle Weakness (M62.81)  Difficulty in walking, Not elsewhere classified (R26.2)  Inpatient: Payor: MEDICARE / Plan: MEDICARE PART A AND B / Product Type: *No Product type* /     ASSESSMENT:     REHAB RECOMMENDATIONS:   Recommendation to date pending progress:  Setting:  Short-term Rehab    Equipment:    To Be Determined     ASSESSMENT:  Ms. Zambrano was admitted to the hospital with c/o confusion and bradycardia.  Pt has a history of a-fib, type 2 diabetes, and

## 2024-04-17 NOTE — PROGRESS NOTES
[facility] Critical Care Note:: 4/17/2024  Vivien Zambrano  Admission Date: 4/12/2024     Length of Stay: 4 days    Background: 83 y.o. female with CAD s/p CABG, primary hypertension, hyperlipidemia, paroxysmal atrial fibrillation, non-insulin-dependent diabetes, GERD, subclavian artery stenosis who presented to the emergency room on 4/13 with bradycardia and minimal responsiveness.  She had a recent hospitalization 321 until 3/26/2024 with pneumonia and sepsis.  Since discharge she has had poor appetite, antibiotics, progressive weakness, low urine output.  EMS found her to have sinus bradycardia in the 40s and she was treated with atropine without improvement followed by normal saline and 2 Amps of bicarb.  She was placed on dopamine with improvement in heart rate.  In the emergency department she was reported to be responsive, but this has not been the case during her ICU stay    Notable PMH:  has a past medical history of Acute respiratory failure with hypoxia ([facility]), Arrhythmia, Arthritis, CAD (coronary artery disease), Calculus of kidney, Carotid stenosis, Coronary atherosclerosis of native coronary artery, Diabetes ([facility]), Essential hypertension, GERD (gastroesophageal reflux disease), HLD (hyperlipidemia), Insomnia, Myocardial infarction due to demand ischemia ([facility]), Respiratory distress, Right ureteral stone, Sepsis ([facility]), and Subclavian artery stenosis ([facility]).    24 Hour events:     Overnight diuresed with negative 777 ml.  Dyspnea is less off Airvo and on a few liters nasal cannula.  Did use BiPAP last night.  Feels better.  Is on Amio again per cardiology    Review of Systems:   -Fever  -Headaches  -Chest pressure  +Dyspnea (less), -wheezing, -cough  -Abdominal pain,- constipation -N/V today.  -Leg swelling  All other organ systems grossly normal.      Lines: (insertion date)        External Urinary Catheter (Active)          Drips: current dose (range)  Dose (mg/hr) Midazolam: 2  NOT DETECTED        Coronavirus 229E by PCR NOT DETECTED        Coronavirus HKU1 by PCR NOT DETECTED        Coronavirus NL63 by PCR NOT DETECTED        Coronavirus OC43 by PCR NOT DETECTED        SARS-CoV-2, PCR NOT DETECTED        Human Metapneumovirus by PCR NOT DETECTED        Rhinovirus Enterovirus PCR NOT DETECTED        Influenza A by PCR NOT DETECTED        Influenza B PCR NOT DETECTED        Parainfluenza 1 PCR NOT DETECTED        Parainfluenza 2 PCR NOT DETECTED        Parainfluenza 3 PCR NOT DETECTED        Parainfluenza 4 PCR NOT DETECTED        Respiratory Syncytial Virus by PCR NOT DETECTED        Bordetella parapertussis by PCR NOT DETECTED        Bordetella pertussis by PCR NOT DETECTED        Chlamydophila Pneumonia PCR NOT DETECTED        Mycoplasma pneumo by PCR NOT DETECTED       Culture, Blood 1 [[id number]] Collected: 04/13/24 0038    Order Status: Completed Specimen: Blood Updated: 04/17/24 0829     Special Requests --        LEFT  Antecubital       Culture NO GROWTH 4 DAYS       Culture, Blood 1 [[id number]] Collected: 04/13/24 0021    Order Status: Completed Specimen: Blood Updated: 04/17/24 0829     Special Requests --        RIGHT  FOREARM       Culture NO GROWTH 4 DAYS             No results for input(s): \"COVID19\" in the last 72 hours.    Ventilator Settings Ideal body weight: 50.1 kg (110 lb 7.2 oz)  Adjusted ideal body weight: 61.8 kg (136 lb 3.2 oz)  Mode FIO2 Rate Tidal Volume Pressure   CPAP/PS    40 %  20 bpm         8     Peak airway pressure:     Minute ventilation:    ABG:No results for input(s): \"PHAPOC\", \"GVJ0LBEI\", \"CR7BOIK\", \"ZWQ0ZPU\", \"BE\" in the last 72 hours.    Assessment and Plan:  (Medical Decision Making)   Impression: 83 y.o. female with CAD s/p CABG, primary hypertension, hyperlipidemia, paroxysmal atrial fibrillation, non-insulin-dependent diabetes, GERD, subclavian artery stenosis with likely B-blocker OD. ?social situration. Off vent on 4/14. RR elevated high FIO2

## 2024-04-17 NOTE — CARE COORDINATION
Chart reviewed and pt discussed in am IDR. Remains CCU. NC 2L O2. Followed by Cardiology, Nephrology, Hospitalist. D/c POC for home with resumption of [facility] currently per family. CM following. LOS 4 days.

## 2024-04-17 NOTE — PROGRESS NOTES
04/16/24 2200   NIV Type   $NIV $Daily Charge   NIV Started/Stopped On   Equipment Type v60   Mode Bilevel   Mask Type Under the nose   Mask Size Medium   Assessment   Pulse 66   Respirations 29   BP (!) 95/48   SpO2 100 %   Comfort Level Good   Using Accessory Muscles No   Mask Compliance Good   Skin Assessment Clean, dry, & intact   Settings/Measurements   PIP Observed 12 cm H20   IPAP 12 cmH20   CPAP/EPAP 6 cmH2O   Vt (Measured) 393 mL   Rate Ordered 12   Insp Rise Time (%) 5 %   FiO2  40 %   I Time/ I Time % 0.9 s   Minute Volume (L/min) 11.2 Liters   Mask Leak (lpm) 0 lpm   Patient's Home Machine No   Alarm Settings   Alarms On Y   Low Pressure (cmH2O) 5 cmH2O   High Pressure (cmH2O) 35 cmH2O   Apnea (secs) 20 secs   RR Low (bpm) 8   RR High (bpm) 50 br/min

## 2024-04-17 NOTE — PROGRESS NOTES
[facility] CARDIOLOGY PROGRESS NOTE           4/17/2024 7:27 AM    Admit Date: 4/12/2024      Subjective:   No cp or inc sob  Transient a fib yesterday and iv amio added    Objective:      Vitals:    04/17/24 0545 04/17/24 0600 04/17/24 0615 04/17/24 0630   BP:  (!) 107/53  (!) 97/49   Pulse: 64 63 (!) 109 63   Resp: 26 19 21 24   Temp:       TempSrc:       SpO2: 96% 97% 94% 96%   Weight:       Height:           Physical Exam:  General- extubated, anxious  Neck- supple, no JVD  CV- soft hs's  Lung- clear bilaterally  Abd- soft, nontender, nondistended  Ext- no edema bilaterally.  Skin- warm and dry    Data Review:   Recent Labs     04/16/24  0611 04/17/24  0528    138   K 3.4* 2.8*   BUN 29* 20   WBC 10.1 7.7   HGB 8.9* 8.9*   HCT 29.3* 29.0*    146*       Assessment/Plan:     Recurrent sepsis/ 9/2023, 3/2024 and now  HFpEF  Ascvd  Pad  Pleural effusion  Paf  New Diabetic?, A1c 7.2  Anemic  Dexter  ///  Correct low K+  Continue Coreg 3.125, asa 81, Crestor 20  Stop iv amio  Resume po amio  No acei due to low BP           DARREN HARO MD  4/17/2024 7:27 AM

## 2024-04-17 NOTE — PROGRESS NOTES
ACUTE OCCUPATIONAL THERAPY GOALS:   (Developed with and agreed upon by patient and/or caregiver.)  1. Patient will complete lower body bathing and dressing with SBA and adaptive equipment as needed.   2. Patient will complete toileting with SBA.   3. Patient will tolerate 30 minutes of OT treatment with 1-2 rest breaks to increase activity tolerance for ADLs.   4. Patient will complete functional transfers with SBA and adaptive equipment as needed.   5. Patient will complete functional mobility for household distances with SBA and adaptive equipment as needed.  6. Patient will complete self-grooming while standing edge of sink with SBA and adaptive equipment as needed.    Timeframe: 7 visits       OCCUPATIONAL THERAPY Initial Assessment, Daily Note, and AM       OT Visit Days: 1   Acknowledge Orders  Time  OT Charge Capture  Rehab Caseload Tracker      Vivien Zambrano is a 83 y.o. female   PRIMARY DIAGNOSIS: Bradycardia on ECG  Bradycardia [R00.1]  Bradycardia on ECG [R00.1]  Sepsis with acute renal failure and septic shock, due to unspecified organism, unspecified acute renal failure type (HCC) [A41.9, R65.21, N17.9]  Procedure(s) (LRB):  THORACENTESIS ULTRASOUND (Bilateral)  2 Days Post-Op  Reason for Referral: Generalized Muscle Weakness (M62.81)  Inpatient: Payor: MEDICARE / Plan: MEDICARE PART A AND B / Product Type: *No Product type* /     ASSESSMENT:     REHAB RECOMMENDATIONS:   Recommendation to date pending progress:  Setting:  Short-term Rehab    Equipment:    To Be Determined     ASSESSMENT:  Ms. Zambrano presents with deficits in overall strength, activity tolerance, activities of daily living performance, and functional mobility. Presents in ICU for bradycardia, sepsis, and hypotension; resting on 2L 02. Alert however mild confusion during conversation. Of note, pt lives with daughter and JOANN; uses rolling walker for mobility; PRN assists w/ ADLs. Recently hospitalized on 3/21 for sepsis and  S=Supervision/Setup, SBA=Standby Assistance, CGA=Contact Guard Assistance, Min=Minimal Assistance, Mod=Moderate Assistance, Max=Maximal Assistance, Total=Total Assistance, NT=Not Tested    ACTIVITIES OF DAILY LIVING: I Mod I S SBA CGA Min Mod Max Total NT Comments   BASIC ADLs:              Upper Body Bathing  [] [] [] [] [] [x] [] [] [] []  Washing hair   Lower Body Bathing [] [] [] [] [] [] [] [] [] []     Toileting [] [] [] [] [] [] [] [] [] []    Upper Body Dressing [] [] [] [] [] [] [] [] [] []    Lower Body Dressing [] [] [] [] [] [] [] [] [] []    Feeding [] [] [] [] [] [] [] [] [] []    Grooming [] [] [] [x] [] [] [] [] [] [] Brushing teeth, washing face, combing hair    Personal Device Care [] [] [] [] [] [] [] [] [] []    Functional Mobility [] [] [] [] [] [x] [x] [] [] [] X 2 x RW   I=Independent, Mod I=Modified Independent, S=Supervision/Setup, SBA=Standby Assistance, CGA=Contact Guard Assistance, Min=Minimal Assistance, Mod=Moderate Assistance, Max=Maximal Assistance, Total=Total Assistance, NT=Not Tested    PLAN:   FREQUENCY/DURATION   OT Plan of Care: 3 times/week for duration of hospital stay or until stated goals are met, whichever comes first.    PROBLEM LIST:   (Skilled intervention is medically necessary to address:)  Decreased ADL/Functional Activities  Decreased Activity Tolerance  Decreased AROM/PROM  Decreased Balance  Decreased Cognition  Decreased Coordination  Decreased Gait Ability  Decreased Safety Awareness  Decreased Strength  Decreased Transfer Abilities   INTERVENTIONS PLANNED:  (Benefits and precautions of occupational therapy have been discussed with the patient.)  Self Care Training  Therapeutic Activity  Therapeutic Exercise/HEP  Neuromuscular Re-education  Manual Therapy  Education         TREATMENT:     EVALUATION: LOW COMPLEXITY: (Untimed Charge)  The initial evaluation charge encompasses clinical chart review, objective assessment, interpretation of assessment, and skilled

## 2024-04-17 NOTE — PROGRESS NOTES
Initial visit was made, prayer,  emotional support and a spiritual presence were provided. She was sitting in a recliner and alert. She expressed her appreciation for the visit.      Sai Tobias MDIV, [facility]

## 2024-04-17 NOTE — PROGRESS NOTES
Physician Progress Note      PATIENT:               FRANDY DANIEL  CSN #:                  [id number]  :                       1940  ADMIT DATE:       2024 11:58 PM  DISCH DATE:  RESPONDING  PROVIDER #:        Marino Alexander MD          QUERY TEXT:    Pt admitted with hypotension and has shock documented. If possible, please   document in progress notes and discharge summary further specificity regarding   the type of shock:    The medical record reflects the following:  Risk Factors: Bradycardia, resp failure, hypotension    Clinical Indicators:   Pulm note reports, \"Septic Shock, suspected vs hypovolemic shock with   Dextre/beta blocker.\"  4/15 Pulm note reports, \"Shock, was likely from BB overdose. Now improved.   Cultures negative. Stop abx.\"   IM note reports, \"Sepsis, Ruled out. Shock likely secondary to   bradycardia due to beta-blocker overdose.\"    Treatment: Dopamine, Levophed, intubation, ICU admisison    Thank you,  Thomas Vazquez RN CDI  [email or web address]  Options provided:  -- Cardiogenic Shock  -- Hypovolemic Shock  -- Other - I will add my own diagnosis  -- Disagree - Not applicable / Not valid  -- Disagree - Clinically unable to determine / Unknown  -- Refer to Clinical Documentation Reviewer    PROVIDER RESPONSE TEXT:    This patient is in cardiogenic shock.    Query created by: Thomas Vazquez on 4/15/2024 11:57 AM      Electronically signed by:  Marino Alexander MD 2024 7:10 AM

## 2024-04-17 NOTE — PROGRESS NOTES
Hospitalist Progress Note   Admit Date:  2024 11:58 PM   Name:  Vivien Zambrano   Age:  83 y.o.  Sex:  female  :  1940   MRN:  [id number]   Room:  [facility]/    Presenting/Chief Complaint: Bradycardia and Hypotension     Reason(s) for Admission: Bradycardia [R00.1]  Bradycardia on ECG [R00.1]  Sepsis with acute renal failure and septic shock, due to unspecified organism, unspecified acute renal failure type (HCC) [A41.9, R65.21, N17.9]     Hospital Course:   Vivien Zambrano is a 83 y.o. female with history of CAD s/p CABG, primary hypertension, hyperlipidemia, paroxysmal atrial fibrillation, non-insulin-dependent diabetes, GERD, subclavian artery stenosis who presented to the emergency room on  with bradycardia and minimal responsiveness. She had a recent hospitalization 3/21 until 3/26/2024 with pneumonia and sepsis. Since discharge she has had poor appetite, progressive weakness, low urine output. EMS found her to have sinus bradycardia in the 40s and she was treated with atropine without improvement followed by normal saline and 2 Amps of bicarb. She was placed on dopamine with improvement in heart rate.  She became increasingly less responsive and intubated.  Concern for beta-blocker overdose, likely iatrogenically outpatient beta-blocker therapy with patient's family escalating doses despite worsening bradycardia.  She was treated with glucagon for beta-blocker toxicity.  Extubated  to Airvo.      Initially there was concern for sepsis but sepsis workup negative and antibiotic discontinued 4/15.  Status post right thoracentesis 4/15 with 800 mL removed, transudative.  Oxygen requirement gradually improving and has been weaned to high flow nasal cannula on .     Patient noted with atypical atrial flutter with rapid ventricular response on  and restarted on Coreg 3.125 mg twice daily.  Received amiodarone bolus and started on amiodarone drip.  Cardiology

## 2024-04-17 NOTE — PROGRESS NOTES
[facility] Progress Note    Subjective:    Sitting up in bed.  Off respiratory support.  Nasal cannula oxygen      ROS:  Denies CP, SOB, nausea/ vomitting     Objective:    Current Facility-Administered Medications   Medication Dose Route Frequency    carvedilol (COREG) tablet 3.125 mg  3.125 mg Oral BID WC    amiodarone (CORDARONE) tablet 200 mg  200 mg Oral BID    bumetanide (BUMEX) injection 0.5 mg  0.5 mg IntraVENous BID    ALPRAZolam (XANAX) tablet 0.5 mg  0.5 mg Oral BID PRN    medicated lip ointment (BLISTEX)   Topical PRN    aspirin EC tablet 81 mg  81 mg Oral Daily    rosuvastatin (CRESTOR) tablet 20 mg  20 mg Oral Nightly    sodium chloride flush 0.9 % injection 5-40 mL  5-40 mL IntraVENous 2 times per day    sodium chloride flush 0.9 % injection 5-40 mL  5-40 mL IntraVENous PRN    0.9 % sodium chloride infusion   IntraVENous PRN    ondansetron (ZOFRAN-ODT) disintegrating tablet 4 mg  4 mg Oral Q8H PRN    Or    ondansetron (ZOFRAN) injection 4 mg  4 mg IntraVENous Q6H PRN    polyethylene glycol (GLYCOLAX) packet 17 g  17 g Oral Daily PRN    acetaminophen (TYLENOL) tablet 650 mg  650 mg Oral Q6H PRN    Or    acetaminophen (TYLENOL) suppository 650 mg  650 mg Rectal Q6H PRN    albuterol (PROVENTIL) (2.5 MG/3ML) 0.083% nebulizer solution 2.5 mg  2.5 mg Nebulization Q4H WA RT    heparin (porcine) injection 5,000 Units  5,000 Units SubCUTAneous 3 times per day    sodium chloride flush 0.9 % injection 5-40 mL  5-40 mL IntraVENous PRN    0.9 % sodium chloride infusion   IntraVENous PRN    glucose chewable tablet 16 g  4 tablet Oral PRN    dextrose bolus 10% 125 mL  125 mL IntraVENous PRN    Or    dextrose bolus 10% 250 mL  250 mL IntraVENous PRN    Glucagon Emergency KIT 1 mg  1 mg SubCUTAneous PRN    dextrose 10 % infusion   IntraVENous Continuous PRN    potassium chloride 20 mEq/50 mL IVPB (Central Line)  20 mEq IntraVENous PRN    Or    potassium chloride 10 mEq/100 mL IVPB (Peripheral Line)  10 mEq  IntraVENous PRN       Exam:  Vitals:    04/17/24 0815 04/17/24 0830 04/17/24 0838 04/17/24 0845   BP:  (!) 125/57     Pulse: 62 70 64 65   Resp: 22 30 22 28   Temp:       TempSrc:       SpO2: 96% 95% 96% 99%   Weight:       Height:             Intake/Output Summary (Last 24 hours) at 4/17/2024 0946  Last data filed at 4/17/2024 0636  Gross per 24 hour   Intake 1132.51 ml   Output 1430 ml   Net -297.49 ml         Gen: comfortable , NAD  CV: S1, S2  Lungs: Coarse bilaterally,  Extem: Trace edema upper extremities        Labs  Recent Labs     04/15/24  0358 04/16/24  0611 04/17/24  0528   WBC 21.1* 10.1 7.7   HGB 8.6* 8.9* 8.9*   HCT 28.8* 29.3* 29.0*    176 146*       Recent Labs     04/15/24  2051 04/16/24  0611 04/17/24  0528    140 138   K 4.3 3.4* 2.8*    104 103   CO2 25 29 30   BUN 30* 29* 20           Problem List:  Patient Active Problem List    Diagnosis Date Noted    Sepsis ([facility]) 04/13/2024    Acute kidney injury ([facility]) 04/13/2024    Hypotension 04/13/2024    Acute pulmonary edema ([facility]) 04/15/2024    Pleural effusion, left 04/15/2024    Pleural effusion, right 04/15/2024    Bradycardia on ECG 04/13/2024    Atherosclerosis of coronary artery bypass graft of native heart without angina pectoris 04/13/2024    Diastolic heart failure, unspecified HF chronicity ([facility]) 04/04/2024    Pneumonia of both lungs due to infectious organism 03/23/2024    Hypervolemia 03/23/2024    Agitation 03/23/2024    Septicemia ([facility]) 03/23/2024    Septic shock ([facility]) 03/21/2024    Acute hypoxic respiratory failure ([facility]) 09/24/2023    Coronary artery disease involving coronary bypass graft of native heart with unstable angina pectoris ([facility]) 09/24/2023    PAD (peripheral artery disease) ([facility]) 07/26/2023    Dyspnea on exertion 06/05/2023    Retention of urine 06/05/2023    Yeast dermatitis 06/05/2023    DDD (degenerative disc disease), lumbar 06/01/2023    Seborrheic keratoses 01/20/2023    Major depressive disorder,

## 2024-04-17 NOTE — INTERDISCIPLINARY ROUNDS
Multi-D Rounds/Checklist (leapfrog):  Lines: can any be removed?: None    External Urinary Catheter (Active)          DVT Prophylaxis: Ordered- heparin  Vent: N/A  Nutrition Ordered/appropriate: Ordered  Can antibiotics or other drugs be stopped? No  Inpat Anti-Infectives (From admission, onward)      None              Consults needed: None  A: Is pain control adequate? (has PRNs? Stop drip?) Yes  B: Sedation break and SBT? N/A  C: Is sedation choice appropriate? N/A  D: Delirium/CAM-ICU? No  E: Mobility goals/appropriateness? Yes  F: Family update and plan? Daughter is surrogate decision maker and is being updated daily by primary attending and nursing staff.    Amanda Pires, APRN - CNP

## 2024-04-18 LAB
ACID FAST STN SPEC: NEGATIVE
ANION GAP SERPL CALC-SCNC: 2 MMOL/L (ref 2–11)
BACTERIA SPEC CULT: NORMAL
BASOPHILS # BLD: 0.1 K/UL (ref 0–0.2)
BASOPHILS NFR BLD: 1 % (ref 0–2)
BUN SERPL-MCNC: 14 MG/DL (ref 8–23)
CALCIUM SERPL-MCNC: 7.5 MG/DL (ref 8.3–10.4)
CHLORIDE SERPL-SCNC: 104 MMOL/L (ref 103–113)
CO2 SERPL-SCNC: 31 MMOL/L (ref 21–32)
CREAT SERPL-MCNC: 1 MG/DL (ref 0.6–1)
DIFFERENTIAL METHOD BLD: ABNORMAL
EOSINOPHIL # BLD: 0.2 K/UL (ref 0–0.8)
EOSINOPHIL NFR BLD: 2 % (ref 0.5–7.8)
ERYTHROCYTE [DISTWIDTH] IN BLOOD BY AUTOMATED COUNT: 15 % (ref 11.9–14.6)
FERRITIN SERPL-MCNC: 263 NG/ML (ref 8–388)
FOLATE SERPL-MCNC: 10.2 NG/ML (ref 3.1–17.5)
GLUCOSE SERPL-MCNC: 121 MG/DL (ref 65–100)
GRAM STN SPEC: NORMAL
GRAM STN SPEC: NORMAL
HCT VFR BLD AUTO: 28.3 % (ref 35.8–46.3)
HGB BLD-MCNC: 8.6 G/DL (ref 11.7–15.4)
IMM GRANULOCYTES # BLD AUTO: 0.4 K/UL (ref 0–0.5)
IMM GRANULOCYTES NFR BLD AUTO: 5 % (ref 0–5)
IRON SATN MFR SERPL: 9 %
IRON SERPL-MCNC: 20 UG/DL (ref 35–150)
LYMPHOCYTES # BLD: 1.4 K/UL (ref 0.5–4.6)
LYMPHOCYTES NFR BLD: 16 % (ref 13–44)
MAGNESIUM SERPL-MCNC: 1.2 MG/DL (ref 1.8–2.4)
MAGNESIUM SERPL-MCNC: 3.2 MG/DL (ref 1.8–2.4)
MCH RBC QN AUTO: 26.3 PG (ref 26.1–32.9)
MCHC RBC AUTO-ENTMCNC: 30.4 G/DL (ref 31.4–35)
MCV RBC AUTO: 86.5 FL (ref 82–102)
MONOCYTES # BLD: 1.1 K/UL (ref 0.1–1.3)
MONOCYTES NFR BLD: 13 % (ref 4–12)
MYCOBACTERIUM SPEC QL CULT: NORMAL
NEUTS SEG # BLD: 5.4 K/UL (ref 1.7–8.2)
NEUTS SEG NFR BLD: 63 % (ref 43–78)
NRBC # BLD: 0 K/UL (ref 0–0.2)
PLATELET # BLD AUTO: 140 K/UL (ref 150–450)
PMV BLD AUTO: 10.4 FL (ref 9.4–12.3)
POTASSIUM SERPL-SCNC: 3.3 MMOL/L (ref 3.5–5.1)
POTASSIUM SERPL-SCNC: 3.3 MMOL/L (ref 3.5–5.1)
RBC # BLD AUTO: 3.27 M/UL (ref 4.05–5.2)
SERVICE CMNT-IMP: NORMAL
SODIUM SERPL-SCNC: 137 MMOL/L (ref 136–146)
SPECIMEN PREPARATION: NORMAL
SPECIMEN SOURCE: NORMAL
TIBC SERPL-MCNC: 220 UG/DL (ref 250–450)
VIT B12 SERPL-MCNC: >6000 PG/ML (ref 193–986)
WBC # BLD AUTO: 8.4 K/UL (ref 4.3–11.1)

## 2024-04-18 PROCEDURE — 83540 ASSAY OF IRON: CPT

## 2024-04-18 PROCEDURE — 83550 IRON BINDING TEST: CPT

## 2024-04-18 PROCEDURE — 2580000003 HC RX 258: Performed by: INTERNAL MEDICINE

## 2024-04-18 PROCEDURE — 6360000002 HC RX W HCPCS: Performed by: INTERNAL MEDICINE

## 2024-04-18 PROCEDURE — 82728 ASSAY OF FERRITIN: CPT

## 2024-04-18 PROCEDURE — 82607 VITAMIN B-12: CPT

## 2024-04-18 PROCEDURE — 6370000000 HC RX 637 (ALT 250 FOR IP): Performed by: INTERNAL MEDICINE

## 2024-04-18 PROCEDURE — 84132 ASSAY OF SERUM POTASSIUM: CPT

## 2024-04-18 PROCEDURE — 2700000000 HC OXYGEN THERAPY PER DAY

## 2024-04-18 PROCEDURE — 82746 ASSAY OF FOLIC ACID SERUM: CPT

## 2024-04-18 PROCEDURE — 36415 COLL VENOUS BLD VENIPUNCTURE: CPT

## 2024-04-18 PROCEDURE — 99232 SBSQ HOSP IP/OBS MODERATE 35: CPT | Performed by: INTERNAL MEDICINE

## 2024-04-18 PROCEDURE — 2500000003 HC RX 250 WO HCPCS: Performed by: INTERNAL MEDICINE

## 2024-04-18 PROCEDURE — 2140000000 HC CCU INTERMEDIATE R&B

## 2024-04-18 PROCEDURE — 94760 N-INVAS EAR/PLS OXIMETRY 1: CPT

## 2024-04-18 PROCEDURE — 94660 CPAP INITIATION&MGMT: CPT

## 2024-04-18 PROCEDURE — 94640 AIRWAY INHALATION TREATMENT: CPT

## 2024-04-18 PROCEDURE — 94761 N-INVAS EAR/PLS OXIMETRY MLT: CPT

## 2024-04-18 PROCEDURE — 85025 COMPLETE CBC W/AUTO DIFF WBC: CPT

## 2024-04-18 PROCEDURE — 80048 BASIC METABOLIC PNL TOTAL CA: CPT

## 2024-04-18 PROCEDURE — 83735 ASSAY OF MAGNESIUM: CPT

## 2024-04-18 RX ORDER — MAGNESIUM SULFATE IN WATER 40 MG/ML
4000 INJECTION, SOLUTION INTRAVENOUS ONCE
Status: COMPLETED | OUTPATIENT
Start: 2024-04-18 | End: 2024-04-18

## 2024-04-18 RX ORDER — POTASSIUM CHLORIDE 20 MEQ/1
40 TABLET, EXTENDED RELEASE ORAL ONCE
Status: COMPLETED | OUTPATIENT
Start: 2024-04-18 | End: 2024-04-18

## 2024-04-18 RX ORDER — MAGNESIUM SULFATE IN WATER 40 MG/ML
2000 INJECTION, SOLUTION INTRAVENOUS PRN
Status: CANCELLED | OUTPATIENT
Start: 2024-04-18

## 2024-04-18 RX ORDER — SPIRONOLACTONE 25 MG/1
25 TABLET ORAL DAILY
Status: DISCONTINUED | OUTPATIENT
Start: 2024-04-18 | End: 2024-04-19 | Stop reason: HOSPADM

## 2024-04-18 RX ADMIN — SODIUM CHLORIDE, PRESERVATIVE FREE 10 ML: 5 INJECTION INTRAVENOUS at 20:13

## 2024-04-18 RX ADMIN — AMIODARONE HYDROCHLORIDE 200 MG: 200 TABLET ORAL at 20:13

## 2024-04-18 RX ADMIN — POTASSIUM CHLORIDE 40 MEQ: 1500 TABLET, EXTENDED RELEASE ORAL at 15:14

## 2024-04-18 RX ADMIN — ANTI-FUNGAL POWDER MICONAZOLE NITRATE TALC FREE: 1.42 POWDER TOPICAL at 20:13

## 2024-04-18 RX ADMIN — ROSUVASTATIN CALCIUM 20 MG: 20 TABLET, COATED ORAL at 20:13

## 2024-04-18 RX ADMIN — APIXABAN 5 MG: 5 TABLET, FILM COATED ORAL at 20:13

## 2024-04-18 RX ADMIN — ALBUTEROL SULFATE 2.5 MG: 2.5 SOLUTION RESPIRATORY (INHALATION) at 20:25

## 2024-04-18 RX ADMIN — ALBUTEROL SULFATE 2.5 MG: 2.5 SOLUTION RESPIRATORY (INHALATION) at 15:47

## 2024-04-18 RX ADMIN — ALBUTEROL SULFATE 2.5 MG: 2.5 SOLUTION RESPIRATORY (INHALATION) at 11:11

## 2024-04-18 RX ADMIN — EMPAGLIFLOZIN 10 MG: 10 TABLET, FILM COATED ORAL at 08:15

## 2024-04-18 RX ADMIN — ALPRAZOLAM 0.5 MG: 0.5 TABLET ORAL at 21:41

## 2024-04-18 RX ADMIN — POTASSIUM CHLORIDE 10 MEQ: 7.46 INJECTION, SOLUTION INTRAVENOUS at 00:58

## 2024-04-18 RX ADMIN — HEPARIN SODIUM 5000 UNITS: 5000 INJECTION INTRAVENOUS; SUBCUTANEOUS at 05:39

## 2024-04-18 RX ADMIN — POTASSIUM CHLORIDE 10 MEQ: 7.46 INJECTION, SOLUTION INTRAVENOUS at 03:08

## 2024-04-18 RX ADMIN — HEPARIN SODIUM 5000 UNITS: 5000 INJECTION INTRAVENOUS; SUBCUTANEOUS at 14:00

## 2024-04-18 RX ADMIN — ASPIRIN 81 MG: 81 TABLET, COATED ORAL at 08:16

## 2024-04-18 RX ADMIN — CARVEDILOL 3.12 MG: 3.12 TABLET, FILM COATED ORAL at 16:20

## 2024-04-18 RX ADMIN — SODIUM CHLORIDE, PRESERVATIVE FREE 10 ML: 5 INJECTION INTRAVENOUS at 08:20

## 2024-04-18 RX ADMIN — CARVEDILOL 3.12 MG: 3.12 TABLET, FILM COATED ORAL at 08:16

## 2024-04-18 RX ADMIN — MAGNESIUM SULFATE HEPTAHYDRATE 4000 MG: 40 INJECTION, SOLUTION INTRAVENOUS at 12:31

## 2024-04-18 RX ADMIN — SPIRONOLACTONE 25 MG: 25 TABLET ORAL at 08:16

## 2024-04-18 RX ADMIN — AMIODARONE HYDROCHLORIDE 200 MG: 200 TABLET ORAL at 08:16

## 2024-04-18 RX ADMIN — ALBUTEROL SULFATE 2.5 MG: 2.5 SOLUTION RESPIRATORY (INHALATION) at 07:06

## 2024-04-18 ASSESSMENT — PAIN SCALES - GENERAL: PAINLEVEL_OUTOF10: 0

## 2024-04-18 NOTE — ICUWATCH
RRT Clinical Rounding Nurse Progress Report      SUBJECTIVE: Patient assessed secondary to transfer from critical care.      Vitals:    04/18/24 1114 04/18/24 1203 04/18/24 1547 04/18/24 1645   BP:  (!) 106/50  (!) 104/46   Pulse: 60 61 67 62   Resp: 17 17 20 20   Temp:  97.5 °F (36.4 °C)  97.7 °F (36.5 °C)   TempSrc:  Axillary  Axillary   SpO2: 97% 95% 95% 94%   Weight:       Height:            DETERIORATION INDEX SCORE: 39    ASSESSMENT:  Previous outreach assessment reviewed. Pt is resting in bed, alert and oriented to self, place and time. Currently on 3LNC with unlabored, regular breathing. Lung sounds have fine crackles in bases bilaterally. Pt does not report productive sputum. Denies CP, SOB. No additional concerns or complaints voiced at time of exam. VSS.    Pertinent lab work, vital signs and progress notes from today have been reviewed.    PLAN:  Will follow per RRT Clinical Rounding Program protocol.    Teofilo Barker RN  [facility]: [phone]  [facility]: [phone]

## 2024-04-18 NOTE — PROGRESS NOTES
No results for input(s): \"COVID19\" in the last 72 hours.    Current Meds:  Current Facility-Administered Medications   Medication Dose Route Frequency    spironolactone (ALDACTONE) tablet 25 mg  25 mg Oral Daily    empagliflozin (JARDIANCE) tablet 10 mg  10 mg Oral Daily    magnesium sulfate 4000 mg in 100 mL IVPB premix  4,000 mg IntraVENous Once    potassium chloride (KLOR-CON M) extended release tablet 40 mEq  40 mEq Oral Once    carvedilol (COREG) tablet 3.125 mg  3.125 mg Oral BID WC    amiodarone (CORDARONE) tablet 200 mg  200 mg Oral BID    ALPRAZolam (XANAX) tablet 0.5 mg  0.5 mg Oral BID PRN    medicated lip ointment (BLISTEX)   Topical PRN    aspirin EC tablet 81 mg  81 mg Oral Daily    rosuvastatin (CRESTOR) tablet 20 mg  20 mg Oral Nightly    sodium chloride flush 0.9 % injection 5-40 mL  5-40 mL IntraVENous 2 times per day    sodium chloride flush 0.9 % injection 5-40 mL  5-40 mL IntraVENous PRN    0.9 % sodium chloride infusion   IntraVENous PRN    ondansetron (ZOFRAN-ODT) disintegrating tablet 4 mg  4 mg Oral Q8H PRN    Or    ondansetron (ZOFRAN) injection 4 mg  4 mg IntraVENous Q6H PRN    polyethylene glycol (GLYCOLAX) packet 17 g  17 g Oral Daily PRN    acetaminophen (TYLENOL) tablet 650 mg  650 mg Oral Q6H PRN    Or    acetaminophen (TYLENOL) suppository 650 mg  650 mg Rectal Q6H PRN    albuterol (PROVENTIL) (2.5 MG/3ML) 0.083% nebulizer solution 2.5 mg  2.5 mg Nebulization Q4H WA RT    heparin (porcine) injection 5,000 Units  5,000 Units SubCUTAneous 3 times per day    sodium chloride flush 0.9 % injection 5-40 mL  5-40 mL IntraVENous PRN    0.9 % sodium chloride infusion   IntraVENous PRN    glucose chewable tablet 16 g  4 tablet Oral PRN    dextrose bolus 10% 125 mL  125 mL IntraVENous PRN    Or    dextrose bolus 10% 250 mL  250 mL IntraVENous PRN    Glucagon Emergency KIT 1 mg  1 mg SubCUTAneous PRN    dextrose 10 % infusion   IntraVENous Continuous PRN    potassium chloride 20 mEq/50 mL  IVPB (Central Line)  20 mEq IntraVENous PRN    Or    potassium chloride 10 mEq/100 mL IVPB (Peripheral Line)  10 mEq IntraVENous PRN       Signed:  Vamshi Boss MD    Part of this note may have been written by using a voice dictation software.  The note has been proof read but may still contain some grammatical/other typographical errors.

## 2024-04-18 NOTE — PROGRESS NOTES
TRANSFER - OUT REPORT:    Verbal report given to Michael REARDON on Vivien Zambrano  being transferred to [facility] for routine progression of patient care       Report consisted of patient's Situation, Background, Assessment and   Recommendations(SBAR).     Information from the following report(s) Nurse Handoff Report was reviewed with the receiving nurse.           Lines:   Peripheral IV 04/14/24 Anterior;Left Forearm (Active)   Site Assessment Clean, dry & intact 04/17/24 1930   Line Status Flushed;Capped;Blood return noted 04/17/24 1930   Line Care Connections checked and tightened;Cap changed 04/17/24 1930   Phlebitis Assessment No symptoms 04/17/24 1930   Infiltration Assessment 0 04/17/24 1930   Alcohol Cap Used Yes 04/17/24 1930   Dressing Status Clean, dry & intact 04/17/24 1930   Dressing Type Transparent 04/17/24 1930   Dressing Intervention New 04/14/24 1115        Opportunity for questions and clarification was provided.      Patient transported with:  Monitor, O2 @ 3lpm, and Registered Nurse

## 2024-04-18 NOTE — PROGRESS NOTES
[facility] Progress Note    Subjective:    Sitting up in bed.  Comfortable      ROS:  Denies CP, SOB, nausea/ vomitting     Objective:    Current Facility-Administered Medications   Medication Dose Route Frequency    spironolactone (ALDACTONE) tablet 25 mg  25 mg Oral Daily    empagliflozin (JARDIANCE) tablet 10 mg  10 mg Oral Daily    carvedilol (COREG) tablet 3.125 mg  3.125 mg Oral BID WC    amiodarone (CORDARONE) tablet 200 mg  200 mg Oral BID    ALPRAZolam (XANAX) tablet 0.5 mg  0.5 mg Oral BID PRN    medicated lip ointment (BLISTEX)   Topical PRN    aspirin EC tablet 81 mg  81 mg Oral Daily    rosuvastatin (CRESTOR) tablet 20 mg  20 mg Oral Nightly    sodium chloride flush 0.9 % injection 5-40 mL  5-40 mL IntraVENous 2 times per day    sodium chloride flush 0.9 % injection 5-40 mL  5-40 mL IntraVENous PRN    0.9 % sodium chloride infusion   IntraVENous PRN    ondansetron (ZOFRAN-ODT) disintegrating tablet 4 mg  4 mg Oral Q8H PRN    Or    ondansetron (ZOFRAN) injection 4 mg  4 mg IntraVENous Q6H PRN    polyethylene glycol (GLYCOLAX) packet 17 g  17 g Oral Daily PRN    acetaminophen (TYLENOL) tablet 650 mg  650 mg Oral Q6H PRN    Or    acetaminophen (TYLENOL) suppository 650 mg  650 mg Rectal Q6H PRN    albuterol (PROVENTIL) (2.5 MG/3ML) 0.083% nebulizer solution 2.5 mg  2.5 mg Nebulization Q4H WA RT    heparin (porcine) injection 5,000 Units  5,000 Units SubCUTAneous 3 times per day    sodium chloride flush 0.9 % injection 5-40 mL  5-40 mL IntraVENous PRN    0.9 % sodium chloride infusion   IntraVENous PRN    glucose chewable tablet 16 g  4 tablet Oral PRN    dextrose bolus 10% 125 mL  125 mL IntraVENous PRN    Or    dextrose bolus 10% 250 mL  250 mL IntraVENous PRN    Glucagon Emergency KIT 1 mg  1 mg SubCUTAneous PRN    dextrose 10 % infusion   IntraVENous Continuous PRN    potassium chloride 20 mEq/50 mL IVPB (Central Line)  20 mEq IntraVENous PRN    Or    potassium chloride 10 mEq/100 mL IVPB

## 2024-04-18 NOTE — PROGRESS NOTES
Pt currently on TPN. Not sure if it will be needed at [place]. Pt also on IV ABX. Awaiting PC consult. Not sure what pt will need at this time.  SW to remain available and follow up if any other needs arise RT notified patient transferred from ICU with order for BiPAP at night. RT states will review chart.

## 2024-04-18 NOTE — ICUWATCH
RRT Clinical Rounding Nurse Update    Vitals:    04/18/24 0811 04/18/24 1114 04/18/24 1203 04/18/24 1547   BP: (!) 101/46  (!) 106/50    Pulse: 62 60 61 67   Resp: 17 17 17 20   Temp: 98.2 °F (36.8 °C)  97.5 °F (36.4 °C)    TempSrc: Axillary  Axillary    SpO2: 96% 97% 95% 95%   Weight:       Height:          ASSESSMENT:  Previous outreach assessment was reviewed. There have been no significant changes since previous assessment. Education provided to patient and family about nightly BIPAP. Patient and family verbalized understanding. No needs or concerns expressed at this time.    PLAN:  Will follow per RRT Clinical Rounding Program protocol.    Brayan Bonner RN  [place]: [phone]     Speaking Coherently

## 2024-04-18 NOTE — ICUWATCH
RRT Clinical Rounding Nurse Progress Report      SUBJECTIVE: Patient assessed secondary to transfer from critical care.      Vitals:    04/17/24 2212 04/18/24 0052 04/18/24 0059 04/18/24 0306   BP:   (!) 98/49    Pulse: 66 62  58   Resp:  20     Temp:       TempSrc:       SpO2:  96%  98%   Weight:       Height:            DETERIORATION INDEX SCORE: 47    ASSESSMENT:  Pt is resting in bed, eyes closed and resting. Unlabored breathing on 2LNC. Bedside telemetry monitor showing NSR 60's at time of exam. VSS.    Pertinent lab work, vital signs and progress notes from today have been reviewed.    PLAN:  Will follow per RRT Clinical Rounding Program protocol.    Teofilo Barker RN  [facility]: [phone]  [facility]: [phone]

## 2024-04-18 NOTE — PLAN OF CARE
Problem: Discharge Planning  Goal: Discharge to home or other facility with appropriate resources  Outcome: Progressing  Flowsheets (Taken 4/17/2024 1930 by Alison Cramer, RN)  Discharge to home or other facility with appropriate resources: Identify barriers to discharge with patient and caregiver     Problem: Safety - Adult  Goal: Free from fall injury  Outcome: Progressing     Problem: Chronic Conditions and Co-morbidities  Goal: Patient's chronic conditions and co-morbidity symptoms are monitored and maintained or improved  Outcome: Progressing  Flowsheets (Taken 4/17/2024 1930 by Alison Cramer, RN)  Care Plan - Patient's Chronic Conditions and Co-Morbidity Symptoms are Monitored and Maintained or Improved: Monitor and assess patient's chronic conditions and comorbid symptoms for stability, deterioration, or improvement     Problem: Pain  Goal: Verbalizes/displays adequate comfort level or baseline comfort level  Outcome: Progressing  Flowsheets (Taken 4/17/2024 1930 by Alison Cramer, RN)  Verbalizes/displays adequate comfort level or baseline comfort level: Assess pain using appropriate pain scale     Problem: Skin/Tissue Integrity  Goal: Absence of new skin breakdown  Description: 1.  Monitor for areas of redness and/or skin breakdown  2.  Assess vascular access sites hourly  3.  Every 4-6 hours minimum:  Change oxygen saturation probe site  4.  Every 4-6 hours:  If on nasal continuous positive airway pressure, respiratory therapy assess nares and determine need for appliance change or resting period.  Outcome: Progressing

## 2024-04-18 NOTE — CARE COORDINATION
CM reviewed medical record and patient's status discussed in IDR.Patient transferred to Tele last evening for normal progression of care.   Patient admitted with bradycardia with minimal responsiveness 4/13. Patient admitted to ICU for Dopamine. Required intubation. Nephrology and Cardiology consults.  SFD admission with discharge 3/26 for treatment of pneumonia/sepsis.    Currently, patient on 2 lpm O2. BiPAP at night.  PT/OT recommending SNF.   CM met with patient, daughter and JOANN in room. Daughter reports they do not want rehab at discharge. CM will follow up with home health offer closer to discharge.

## 2024-04-18 NOTE — PROGRESS NOTES
(PFO3)  If any one of the 3 are positive, the pleural fluid is considered an exudate:  []  Pleural fluid protein greater than 3.0 g/dL (30 g/L)  []  Pleural fluid cholesterol greater than 55 mg/dL (1.424 mmol/L)  []  Pleural fluid LDH greater than 0.67 times the upper limit of the laboratory's normal serum LDH of 190 (=127).    Assessment and Plan:  (Medical Decision Making)   Impression: 83 y.o female admitted with cardiogenic shock, profound bradycardia suspected beta blocker overdose. Hospitalization complicated by pA-Fib, pulmonary edema, pleural effusion, and ANCA.     Principal Problem:    Bradycardia on ECG    Cardiogenic shock (HCC)  Plan: shock resolved; suspected beta blocker overdose; had episodes of tachycardia and low dose coreg restarted; cardiology following     Active Problems:    Sepsis (HCC)  Plan: antibiotics stopped in the setting of vomiting and diarrhea   WBC normal and afebrile; cultures remained negative     Acute kidney injury (HCC)  Plan: improved today with Cr 1.0; nephrology following     PAF (paroxysmal atrial fibrillation) (HCC)  Plan: on PO amio; now SR rates in 60s; on low dose coreg     Acute hypoxic respiratory failure (HCC)  Plan: on 2 lpm with sats >95%; could likely wean off O2; continue PT/OT   Will need STR. May need exercise oxymetry closer to discharge.     Atherosclerosis of coronary artery bypass graft of native heart without angina pectoris  Plan: severe CAD per cardiology with EF 45-50%    Acute pulmonary edema (HCC)  Plan: received doses of IV bumex with good response; negative 2L in 24 hours; still slightly net positive overall; CXR yesterday showed improvement; continue BIPAP nightly     Pleural effusion, left    Pleural effusion, right  Plan: s/p right thoracentesis with 800 ml transudate fluid removed on 4/15; s/p IV bumex       More than 50% of the time documented was spent in face-to-face contact with the patient and in the care of the patient on the floor/unit

## 2024-04-18 NOTE — PROGRESS NOTES
4 Eyes Skin Assessment     NAME:  Vivien Zambrano  YOB: 1940  MEDICAL RECORD NUMBER:  [id number]    The patient is being assessed for  Transfer to New Unit    I agree that at least one RN has performed a thorough Head to Toe Skin Assessment on the patient. ALL assessment sites listed below have been assessed.      Areas assessed by both nurses:    Head, Face, Ears, Shoulders, Back, Chest, Arms, Elbows, Hands, Sacrum. Buttock, Coccyx, Ischium, Legs. Feet and Heels, and Under Medical Devices         Does the Patient have a Wound? No noted wound(s) no open wounds noted. Abrasions noted on face. Scattered bruising/scars. Redness noted under folds/bilat groin/buttocks       Raman Prevention initiated by RN: Yes  Wound Care Orders initiated by RN: No    Pressure Injury (Stage 3,4, Unstageable, DTI, NWPT, and Complex wounds) if present, place Wound referral order by RN under : No    New Ostomies, if present place, Ostomy referral order under : No     Nurse 1 eSignature: Electronically signed by GENARO ARENAS RN on 4/18/24 at 4:00 AM EDT    **SHARE this note so that the co-signing nurse can place an eSignature**    Nurse 2 eSignature: Electronically signed by VARUN BARRERA RN on 4/18/24 at 4:02 AM EDT

## 2024-04-18 NOTE — PROGRESS NOTES
am  4/18/2024 6:56 AM    Admit Date: 4/12/2024    Admit Diagnosis: Bradycardia [R00.1]  Bradycardia on ECG [R00.1]  Sepsis with acute renal failure and septic shock, due to unspecified organism, unspecified acute renal failure type (HCC) [A41.9, R65.21, N17.9]      Subjective:    Patient : 83 y.o. female with CAD s/p CABG, primary hypertension, hyperlipidemia, paroxysmal atrial fibrillation, non-insulin-dependent diabetes, GERD, subclavian artery stenosis who presented to the emergency room on 4/13 with bradycardia and minimal responsiveness.  She had a recent hospitalization 321 until 3/26/2024 with pneumonia and sepsis.  Since discharge she has had poor appetite, antibiotics, progressive weakness, low urine output.  EMS found her to have sinus bradycardia in the 40s and she was treated with atropine without improvement followed by normal saline and 2 Amps of bicarb.  She was placed on dopamine with improvement in heart rate.      Had transient afib 4/16 and IV amio added and subsequently transitioned to PO. No afib since that time. Has been sinus crow at 61.    Objective:    BP (!) 102/49   Pulse 60   Temp 98.1 °F (36.7 °C)   Resp 18   Ht 1.575 m (5' 2\")   Wt 79.2 kg (174 lb 8 oz)   LMP  (LMP Unknown)   SpO2 96%   BMI 31.92 kg/m²     ROS:  General ROS: negative for - chills  Hematological and Lymphatic ROS: negative for - blood clots or jaundice  Respiratory ROS: positive for - shortness of breath  Cardiovascular ROS: negative for - chest pain  Gastrointestinal ROS: no abdominal pain, change in bowel habits, or black or bloody stools  Neurological ROS: no TIA or stroke symptoms    Physical Exam:      Physical Examination: General appearance - Appearance: Elderly but alert, well appearing, and in no distress.   Neck/lymph - supple, no significant adenopathy  Chest/CV - clear to auscultation, no wheezes, rales or rhonchi, symmetric air entry  Heart - S1 and S2 normal, no murmurs noted, no gallops noted,  Continue  -Some hypotension and bradycardia. Patient denies symptoms.  -Chadsvasc of 6, needs anticoagulation  -Mg goal 2.0, K 4.0, replete to goal.     Lab Results   Component Value Date    K 3.3 (L) 04/18/2024     Lab Results   Component Value Date/Time    MG 1.6 04/13/2024 12:21 AM         HFpEF  HFmrEF  04/12/24    ECHO (TTE) COMPLETE (PRN CONTRAST/BUBBLE/STRAIN/3D) 04/13/2024 10:15 AM (Final)    Interpretation Summary    Left Ventricle: Mildly reduced left ventricular systolic function with a visually estimated EF of 45 - 50%. Left ventricle is mildly dilated. Mildly increased wall thickness. There is moderate to severe hypokinesis of the basal to mid inferior/inferolateral wall. Grade II diastolic dysfunction with increased LAP.    Left Atrium: Left atrium is moderately dilated.    Image quality is technically difficult. Contrast used: Definity. Procedure performed with the patient in a supine position.    Signed by: Wilber Stallworth MD on 4/13/2024 10:15 AM  -Would benefit from SGLT2i, ACEi/ARB and MRA. Reasonable to delay initiation. However given A1C of 7.2, and adequate renal function could start SGLT2i (given A1C of 7.2) or MRA (consistent hypokalemia) now. Renal function appears near/at baseline, recovered from ANCA.    CAD s/p CABG  Subclavian a. Stenosis  PAD  -Continue asa, statin      Anemia  -Could conceivably be contributing to afib  -Does not appear to have had any workup for this. Will check iron studies, B12, folate        Qasim Carson  I have personally seen and evaluated the patient and reviewed the students note and agree with the following assessment and plan and findings. I was present for and observed the key components of this note.  Any appropriate additions or editing of the information have been done by me.    Bryson Greer MD, [facility]  Cardiology

## 2024-04-18 NOTE — ICUWATCH
RRT Clinical Rounding Nurse Progress Report      SUBJECTIVE: Called to assess patient secondary to transfer from critical care.      Vitals:    04/18/24 0341 04/18/24 0422 04/18/24 0714 04/18/24 0811   BP: (!) 102/49   (!) 101/46   Pulse: 60  64 62   Resp: 18 17 17   Temp: 98.1 °F (36.7 °C)   98.2 °F (36.8 °C)   TempSrc:    Axillary   SpO2: 96%  97% 96%   Weight:  79.2 kg (174 lb 8 oz)     Height:              ASSESSMENT:  On arrival to room, I found patient to be awake resting in bed. Patient is alert and oriented. Denies major pain or SOB. Respirations even and unlabored with bilateral lung sounds clear. Patient is on 2L NC with O2 saturation of 96%. No needs or concerns expressed at this time.    Patient discussed with primary RN, respiratory therapy, and [facility] NP.     PLAN:  VS, labs, and progress notes reviewed. No concern per primary RN. Will discharge from RRT Clinical Rounding Program per protocol. Please call if needed. Primary RN to call with concerns.    Brayan Bonner RN  [place]: [phone]

## 2024-04-19 VITALS
HEART RATE: 64 BPM | TEMPERATURE: 98.1 F | HEIGHT: 62 IN | RESPIRATION RATE: 17 BRPM | BODY MASS INDEX: 32.11 KG/M2 | OXYGEN SATURATION: 98 % | DIASTOLIC BLOOD PRESSURE: 74 MMHG | SYSTOLIC BLOOD PRESSURE: 90 MMHG | WEIGHT: 174.5 LBS

## 2024-04-19 LAB
ANION GAP SERPL CALC-SCNC: 4 MMOL/L (ref 2–11)
BASOPHILS # BLD: 0.1 K/UL (ref 0–0.2)
BASOPHILS NFR BLD: 1 % (ref 0–2)
BUN SERPL-MCNC: 11 MG/DL (ref 8–23)
CALCIUM SERPL-MCNC: 8.1 MG/DL (ref 8.3–10.4)
CHLORIDE SERPL-SCNC: 105 MMOL/L (ref 103–113)
CO2 SERPL-SCNC: 28 MMOL/L (ref 21–32)
CREAT SERPL-MCNC: 0.8 MG/DL (ref 0.6–1)
DIFFERENTIAL METHOD BLD: ABNORMAL
EOSINOPHIL # BLD: 0.2 K/UL (ref 0–0.8)
EOSINOPHIL NFR BLD: 2 % (ref 0.5–7.8)
ERYTHROCYTE [DISTWIDTH] IN BLOOD BY AUTOMATED COUNT: 15.5 % (ref 11.9–14.6)
FUNGAL CULT/SMEAR: NORMAL
FUNGUS SMEAR: NORMAL
GLUCOSE SERPL-MCNC: 149 MG/DL (ref 65–100)
HCT VFR BLD AUTO: 30.4 % (ref 35.8–46.3)
HGB BLD-MCNC: 9.2 G/DL (ref 11.7–15.4)
IMM GRANULOCYTES # BLD AUTO: 0.6 K/UL (ref 0–0.5)
IMM GRANULOCYTES NFR BLD AUTO: 6 % (ref 0–5)
LYMPHOCYTES # BLD: 1.7 K/UL (ref 0.5–4.6)
LYMPHOCYTES NFR BLD: 18 % (ref 13–44)
MCH RBC QN AUTO: 26.4 PG (ref 26.1–32.9)
MCHC RBC AUTO-ENTMCNC: 30.3 G/DL (ref 31.4–35)
MCV RBC AUTO: 87.1 FL (ref 82–102)
MONOCYTES # BLD: 1.5 K/UL (ref 0.1–1.3)
MONOCYTES NFR BLD: 16 % (ref 4–12)
NEUTS SEG # BLD: 5.3 K/UL (ref 1.7–8.2)
NEUTS SEG NFR BLD: 57 % (ref 43–78)
NRBC # BLD: 0.04 K/UL (ref 0–0.2)
PLATELET # BLD AUTO: 150 K/UL (ref 150–450)
PLATELET COMMENT: ADEQUATE
PMV BLD AUTO: 10.5 FL (ref 9.4–12.3)
POTASSIUM SERPL-SCNC: 3.7 MMOL/L (ref 3.5–5.1)
RBC # BLD AUTO: 3.49 M/UL (ref 4.05–5.2)
RBC MORPH BLD: ABNORMAL
SODIUM SERPL-SCNC: 137 MMOL/L (ref 136–146)
SPECIMEN PROCESSING: NORMAL
SPECIMEN SOURCE: NORMAL
SPECIMEN SOURCE: NORMAL
WBC # BLD AUTO: 9.4 K/UL (ref 4.3–11.1)
WBC MORPH BLD: ABNORMAL

## 2024-04-19 PROCEDURE — 94760 N-INVAS EAR/PLS OXIMETRY 1: CPT

## 2024-04-19 PROCEDURE — 6370000000 HC RX 637 (ALT 250 FOR IP): Performed by: INTERNAL MEDICINE

## 2024-04-19 PROCEDURE — 97535 SELF CARE MNGMENT TRAINING: CPT

## 2024-04-19 PROCEDURE — 6360000002 HC RX W HCPCS: Performed by: INTERNAL MEDICINE

## 2024-04-19 PROCEDURE — 94640 AIRWAY INHALATION TREATMENT: CPT

## 2024-04-19 PROCEDURE — 36415 COLL VENOUS BLD VENIPUNCTURE: CPT

## 2024-04-19 PROCEDURE — 97530 THERAPEUTIC ACTIVITIES: CPT

## 2024-04-19 PROCEDURE — 2700000000 HC OXYGEN THERAPY PER DAY

## 2024-04-19 PROCEDURE — 2580000003 HC RX 258: Performed by: INTERNAL MEDICINE

## 2024-04-19 PROCEDURE — 94660 CPAP INITIATION&MGMT: CPT

## 2024-04-19 PROCEDURE — 80048 BASIC METABOLIC PNL TOTAL CA: CPT

## 2024-04-19 PROCEDURE — 99231 SBSQ HOSP IP/OBS SF/LOW 25: CPT | Performed by: INTERNAL MEDICINE

## 2024-04-19 PROCEDURE — 85025 COMPLETE CBC W/AUTO DIFF WBC: CPT

## 2024-04-19 RX ORDER — CARVEDILOL 3.12 MG/1
3.12 TABLET ORAL 2 TIMES DAILY WITH MEALS
Qty: 60 TABLET | Refills: 3 | Status: SHIPPED | OUTPATIENT
Start: 2024-04-19 | End: 2024-04-23

## 2024-04-19 RX ORDER — AMIODARONE HYDROCHLORIDE 200 MG/1
200 TABLET ORAL DAILY
Status: CANCELLED | OUTPATIENT
Start: 2024-04-19

## 2024-04-19 RX ORDER — SPIRONOLACTONE 25 MG/1
25 TABLET ORAL DAILY
Qty: 30 TABLET | Refills: 3 | Status: SHIPPED | OUTPATIENT
Start: 2024-04-20 | End: 2024-04-23

## 2024-04-19 RX ORDER — ALBUTEROL SULFATE 2.5 MG/3ML
2.5 SOLUTION RESPIRATORY (INHALATION) EVERY 6 HOURS PRN
Qty: 120 EACH | Refills: 3 | Status: SHIPPED | OUTPATIENT
Start: 2024-04-19 | End: 2024-04-23

## 2024-04-19 RX ORDER — AMIODARONE HYDROCHLORIDE 200 MG/1
200 TABLET ORAL 2 TIMES DAILY
Qty: 60 TABLET | Refills: 0 | Status: SHIPPED | OUTPATIENT
Start: 2024-04-19 | End: 2024-04-23

## 2024-04-19 RX ADMIN — ASPIRIN 81 MG: 81 TABLET, COATED ORAL at 09:10

## 2024-04-19 RX ADMIN — SODIUM CHLORIDE, PRESERVATIVE FREE 10 ML: 5 INJECTION INTRAVENOUS at 09:10

## 2024-04-19 RX ADMIN — ANTI-FUNGAL POWDER MICONAZOLE NITRATE TALC FREE: 1.42 POWDER TOPICAL at 09:10

## 2024-04-19 RX ADMIN — APIXABAN 5 MG: 5 TABLET, FILM COATED ORAL at 09:10

## 2024-04-19 RX ADMIN — EMPAGLIFLOZIN 10 MG: 10 TABLET, FILM COATED ORAL at 09:10

## 2024-04-19 RX ADMIN — CARVEDILOL 3.12 MG: 3.12 TABLET, FILM COATED ORAL at 09:10

## 2024-04-19 RX ADMIN — ALBUTEROL SULFATE 2.5 MG: 2.5 SOLUTION RESPIRATORY (INHALATION) at 11:05

## 2024-04-19 RX ADMIN — AMIODARONE HYDROCHLORIDE 200 MG: 200 TABLET ORAL at 09:10

## 2024-04-19 RX ADMIN — SPIRONOLACTONE 25 MG: 25 TABLET ORAL at 09:10

## 2024-04-19 RX ADMIN — ALBUTEROL SULFATE 2.5 MG: 2.5 SOLUTION RESPIRATORY (INHALATION) at 07:45

## 2024-04-19 ASSESSMENT — PULMONARY FUNCTION TESTS
PEFR_L/MIN: 96
PEFR_L/MIN: 95

## 2024-04-19 ASSESSMENT — PAIN SCALES - GENERAL
PAINLEVEL_OUTOF10: 10
PAINLEVEL_OUTOF10: 0

## 2024-04-19 NOTE — DISCHARGE SUMMARY
Hospitalist Discharge Summary   Admit Date:  2024 11:58 PM   DC Note date: 2024  Name:  Vivien Zambrano   Age:  83 y.o.  Sex:  female  :  1940   MRN:  [id number]   Room:  [facility]  PCP:  Bradford Alanis MD    Presenting Complaint: Bradycardia and Hypotension     Initial Admission Diagnosis: Bradycardia [R00.1]  Bradycardia on ECG [R00.1]  Sepsis with acute renal failure and septic shock, due to unspecified organism, unspecified acute renal failure type (HCC) [A41.9, R65.21, N17.9]     Problem List for this Hospitalization (present on admission):    Principal Problem:    Cardiogenic shock (HCC)  Active Problems:    Sepsis (HCC)    Acute kidney injury (HCC)    Hypotension    Coronary atherosclerosis of native coronary artery    Subclavian arterial stenosis (HCC)    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 *      Hospital Course:  Vivien Zambrano is a 83 y.o. female with history of CAD s/p CABG, primary hypertension, hyperlipidemia, paroxysmal atrial fibrillation, non-insulin-dependent diabetes, GERD, subclavian artery stenosis who presented to the emergency room on  with bradycardia and minimal responsiveness. She had a recent hospitalization 3/21 until 3/26/2024 with pneumonia and sepsis. Since discharge she has had poor appetite, progressive weakness, low urine output. EMS found her to have sinus bradycardia in the 40s and she was treated with atropine without improvement followed by normal saline and 2 Amps of bicarb. She was placed on dopamine with improvement in heart rate.  She became increasingly less responsive  04/18/24  4:52 PM   Result Value Ref Range    Potassium 3.3 (L) 3.5 - 5.1 mmol/L   CBC with Auto Differential    Collection Time: 04/19/24  4:14 AM   Result Value Ref Range    WBC 9.4 4.3 - 11.1 K/uL    RBC 3.49 (L) 4.05 - 5.2 M/uL    Hemoglobin 9.2 (L) 11.7 - 15.4 g/dL    Hematocrit 30.4 (L) 35.8 - 46.3 %    MCV 87.1 82 - 102 FL    MCH 26.4 26.1 - 32.9 PG    MCHC 30.3 (L) 31.4 - 35.0 g/dL    RDW 15.5 (H) 11.9 - 14.6 %    Platelets 150 150 - 450 K/uL    MPV 10.5 9.4 - 12.3 FL    nRBC 0.04 0.0 - 0.2 K/uL    Neutrophils % 57 43 - 78 %    Lymphocytes % 18 13 - 44 %    Monocytes % 16 (H) 4.0 - 12.0 %    Eosinophils % 2 0.5 - 7.8 %    Basophils % 1 0.0 - 2.0 %    Immature Granulocytes % 6 (H) 0.0 - 5.0 %    Neutrophils Absolute 5.3 1.7 - 8.2 K/UL    Lymphocytes Absolute 1.7 0.5 - 4.6 K/UL    Monocytes Absolute 1.5 (H) 0.1 - 1.3 K/UL    Eosinophils Absolute 0.2 0.0 - 0.8 K/UL    Basophils Absolute 0.1 0.0 - 0.2 K/UL    Immature Granulocytes Absolute 0.6 (H) 0.0 - 0.5 K/UL    RBC Comment SLIGHT  ANISOCYTOSIS        WBC Comment Result Confirmed By Smear      Platelet Comment ADEQUATE      Differential Type AUTOMATED     Basic Metabolic Panel    Collection Time: 04/19/24  4:14 AM   Result Value Ref Range    Sodium 137 136 - 146 mmol/L    Potassium 3.7 3.5 - 5.1 mmol/L    Chloride 105 103 - 113 mmol/L    CO2 28 21 - 32 mmol/L    Anion Gap 4 2 - 11 mmol/L    Glucose 149 (H) 65 - 100 mg/dL    BUN 11 8 - 23 MG/DL    Creatinine 0.80 0.6 - 1.0 MG/DL    Est, Glom Filt Rate 74 >60 ml/min/1.73m2    Calcium 8.1 (L) 8.3 - 10.4 MG/DL       No results for input(s): \"COVID19\" in the last 72 hours.    Recent Vital Data:  Patient Vitals for the past 24 hrs:   Temp Pulse Resp BP SpO2   04/19/24 1134 -- -- -- 90/74 --   04/19/24 1133 98.1 °F (36.7 °C) 64 17 -- 98 %   04/19/24 1131 -- 64 -- (!) 84/71 99 %   04/19/24 0745 -- 68 16 -- 96 %   04/19/24 0715 97.7 °F (36.5 °C) 66 16 101/78 94 %   04/19/24 0534 97.3 °F (36.3 °C) 65 -- (!) 108/48 95 %

## 2024-04-19 NOTE — PROGRESS NOTES
Patient placed on V60 and connected to [place] Monitoring including Continuous Pulse Oximetry. V60 plugged into central monitoring system. Alarms are activated and nurse has been notified. Documentation completed.

## 2024-04-19 NOTE — PROGRESS NOTES
[facility] CARDIOLOGY PROGRESS NOTE           4/19/2024 7:27 AM    Admit Date: 4/12/2024      Subjective:   No chest pain or inc sob    Objective:      Vitals:    04/19/24 0027 04/19/24 0315 04/19/24 0534 04/19/24 0715   BP: (!) 104/53  (!) 108/48 101/78   Pulse: 61 63 65 66   Resp: 19 20  16   Temp: 97.5 °F (36.4 °C)  97.3 °F (36.3 °C) 97.7 °F (36.5 °C)   TempSrc:       SpO2: 100% 100% 95% 94%   Weight:       Height:           Physical Exam:  General- anxious  Neck- supple, no JVD  CV- soft hs's  Lung- clear bilaterally  Abd- soft, nontender, nondistended  Ext- no edema bilaterally.  Skin- warm and dry    Data Review:   Recent Labs     04/18/24  0339 04/18/24  0805 04/18/24  1652 04/19/24  0414     --   --  137   K 3.3*  --  3.3* 3.7   MG  --  1.2* 3.2*  --    BUN 14  --   --  11   WBC 8.4  --   --  9.4   HGB 8.6*  --   --  9.2*   HCT 28.3*  --   --  30.4*   *  --   --  150       Assessment/Plan:     Recurrent sepsis/ 9/2023, 3/2024 and now  Bradycardia on admit, resolved  HFpEF  Ascvd  Pad  Pleural effusion  Paf  New Diabetic?, A1c 7.2  Anemic  S/p Dexter  ///  Slow improvement.  Continue current rx       DARREN HARO MD  4/19/2024 7:27 AM

## 2024-04-19 NOTE — PROGRESS NOTES
ACUTE OCCUPATIONAL THERAPY GOALS:   (Developed with and agreed upon by patient and/or caregiver.)  1. Patient will complete lower body bathing and dressing with SBA and adaptive equipment as needed.   2. Patient will complete toileting with SBA.   3. Patient will tolerate 30 minutes of OT treatment with 1-2 rest breaks to increase activity tolerance for ADLs.   4. Patient will complete functional transfers with SBA and adaptive equipment as needed.   5. Patient will complete functional mobility for household distances with SBA and adaptive equipment as needed.  6. Patient will complete self-grooming while standing edge of sink with SBA and adaptive equipment as needed.     Timeframe: 7 visits     OCCUPATIONAL THERAPY: Daily Note AM   OT Visit Days: 2   Time In/Out  OT Charge Capture  Rehab Caseload Tracker  OT Orders    Vivien Zambrano is a 83 y.o. female   PRIMARY DIAGNOSIS: Cardiogenic shock (HCC)  Bradycardia [R00.1]  Bradycardia on ECG [R00.1]  Sepsis with acute renal failure and septic shock, due to unspecified organism, unspecified acute renal failure type (HCC) [A41.9, R65.21, N17.9]  Procedure(s) (LRB):  THORACENTESIS ULTRASOUND (Bilateral)  4 Days Post-Op  Inpatient: Payor: MEDICARE / Plan: MEDICARE PART A AND B / Product Type: *No Product type* /     ASSESSMENT:     REHAB RECOMMENDATIONS:   Recommendation to date pending progress:  Setting:  Short-term Rehab    Equipment:    To Be Determined     ASSESSMENT:  Ms. Zambrano continues to present with deficits in overall strength, activity tolerance, ADL performance, and functional mobility. Resting on 3L 02; alert. Mod A x 2 for all functional transfers including bed mobility, supine <> sit, and sit <> stand; fair - to poor dynamic standing balance with forward flexed posture, narrow KEENAN, short shuffled steps, and increased postural sway; standing tolerance also remains limited. Required mod A for UB bathing tasks while max A for LB bathing tasks  Comments   BASIC ADLs:              Upper Body   Bathing [] [] [] [] [] [] [x] [] [] []     Lower Body Bathing [] [] [] [] [] [] [] [x] [] []     Toileting [] [] [] [] [] [] [] [] [] []    Upper Body Dressing [] [] [] [] [] [x] [] [] [] []    Lower Body Dressing [] [] [] [] [] [] [] [] [] []    Feeding [] [] [] [] [] [] [] [] [] []    Grooming [] [] [] [] [] [] [] [] [] []    Personal Device Care [] [] [] [] [] [] [] [] [] []    Functional Mobility [] [] [] [] [] [] [x] [] [] [] X 2 x RW   I=Independent, Mod I=Modified Independent, S=Supervision/Setup, SBA=Standby Assistance, CGA=Contact Guard Assistance, Min=Minimal Assistance, Mod=Moderate Assistance, Max=Maximal Assistance, Total=Total Assistance, NT=Not Tested    BALANCE: Good Fair+ Fair Fair- Poor NT Comments   Sitting Static [] [] [x] [] [] []    Sitting Dynamic [] [] [] [x] [] []              Standing Static [] [] [] [x] [] []    Standing Dynamic [] [] [] [] [x] []        PLAN:     FREQUENCY/DURATION   OT Plan of Care: 3 times/week for duration of hospital stay or until stated goals are met, whichever comes first.    TREATMENT:     TREATMENT:   Self Care (23 minutes): Patient participated in upper body bathing, lower body bathing, toileting, upper body dressing, and grooming ADLs in unsupported sitting and standing with moderate visual, verbal, and tactile cueing to increase independence, decrease assistance required, increase activity tolerance, and increase safety awareness. Patient also participated in functional mobility, functional transfer, and energy conservation training to increase independence, decrease assistance required, increase activity tolerance, and increase safety awareness.     TREATMENT GRID:  N/A    AFTER TREATMENT PRECAUTIONS: Alarm Activated, Bed/Chair Locked, Call light within reach, Chair, Needs within reach, and RN notified    INTERDISCIPLINARY COLLABORATION:  RN/ PCT, PT/ PTA, and OT/ [place]    EDUCATION:       TOTAL TREATMENT

## 2024-04-19 NOTE — PROGRESS NOTES
safety awareness.  No progress noted today from last session.     SUBJECTIVE:   Ms. Zambrano states, \"Oh shut up\"     Social/Functional Lives With: Daughter  Home Equipment: Cane, Walker, rolling  Receives Help From: Family  ADL Assistance: Independent  Homemaking Assistance: Needs assistance  Homemaking Responsibilities: No  Ambulation Assistance: Needs assistance  Transfer Assistance: Needs assistance  Active : No  Patient's  Info: family  OBJECTIVE:     PAIN: VITALS / O2: PRECAUTION / LINES / DRAINS:   Pre Treatment: 0         Post Treatment: 10/10 (RN alerted) Vitals        Oxygen   3 L/min External Catheter and Telemetry     RESTRICTIONS/PRECAUTIONS:  Restrictions/Precautions  Restrictions/Precautions: Fall Risk  Restrictions/Precautions: Fall Risk     MOBILITY: I Mod I S SBA CGA Min Mod Max Total  NT x2 Comments:   Bed Mobility    Rolling [] [] [] [] [] [] [] [] [] [] []    Supine to Sit [] [] [] [] [] [] [x] [] [] [] [x]    Scooting [] [] [] [] [] [] [x] [] [] [] [x]    Sit to Supine [] [] [] [] [] [] [] [] [] [] []    Transfers    Sit to Stand [] [] [] [] [] [] [x] [] [] [] [x]    Bed to Chair [] [] [] [] [] [x] [x] [] [] [] [x]    Stand to Sit [] [] [] [] [] [] [x] [] [] [] [x]     [] [] [] [] [] [] [] [] [] [] []    I=Independent, Mod I=Modified Independent, S=Supervision, SBA=Standby Assistance, CGA=Contact Guard Assistance,   Min=Minimal Assistance, Mod=Moderate Assistance, Max=Maximal Assistance, Total=Total Assistance, NT=Not Tested    BALANCE: Good Fair+ Fair Fair- Poor NT Comments   Sitting Static [] [] [x] [] [] []    Sitting Dynamic [] [] [] [x] [] []              Standing Static [] [] [] [x] [] []    Standing Dynamic [] [] [] [] [x] []      GAIT: I Mod I S SBA CGA Min Mod Max Total  NT x2 Comments:   Level of Assistance [] [] [] [] [] [x] [x] [] [] [] [x]    Distance   2 feet    DME Rolling Walker    Gait Quality Decreased mignon , Decreased step clearance, and Decreased step length

## 2024-04-19 NOTE — ICUWATCH
RRT Clinical Rounding Nurse Update    Vitals:    04/18/24 2013 04/18/24 2118 04/18/24 2230 04/19/24 0027   BP: (!) 104/57 (!) 109/48  (!) 104/53   Pulse: 65 63 63 61   Resp:  16 24 19   Temp:  97.5 °F (36.4 °C)  97.5 °F (36.4 °C)   TempSrc:       SpO2:  94% 95% 100%   Weight:       Height:            DETERIORATION INDEX SCORE: 39    ASSESSMENT:  Previous outreach assessment was reviewed. There have been no significant changes since previous assessment. Pt has been resting on BiPAP for roughly 5 hours, tolerating well. No complaints from primary RN at this time. VSS. Electrolyte replacement.    PLAN:  Will discharge from RRT Clinical Rounding Program per protocol. Please call if needed.    Teofilo Barker RN  [facility]: [phone]  [facility]: [phone]

## 2024-04-19 NOTE — PROGRESS NOTES
Vivien Zambrano  Admission Date: 4/12/2024         Daily Progress Note: 4/19/2024    The patient's chart is reviewed and the patient is discussed with the staff.    Background: 83 y.o female with CAD s/p CABG, HTN, HLD, pA-fib, DM, GERD, subclavian artery stenosis who presented to the ER on 4/13 with bradycardia and minimal responsiveness. She had a recent hospitalization 3/21 - 3/26/with pneumonia and sepsis. Since discharge she has had poor appetite, antibiotics, progressive weakness, low urine output. EMS found her to have sinus bradycardia in the 40s and she was treated with atropine without improvement followed by normal saline and 2 Amps of bicarb. She was placed on dopamine with improvement in heart rate. In the emergency department she was reported to be responsive, but she was not during most of her ICU stay. Suspicion of beta blocker overdose. Required intubation and mechanical ventilation. Extubated 4/14.  Had episodes of vomiting and diarrhea, antibiotics stopped. Other complications include worsening renal failure, pulmonary edema, pleural effusion on right, tachycardia requiring restart of low dose beta blocker.   Subjective:   Pt lying in bed on 2L O2. Pt asleep but awakes easily. Pt reports that she did wear the hospital BiPAP last night. She admits that she hates the BiPAP and does not want one herself.     Current Facility-Administered Medications   Medication Dose Route Frequency    spironolactone (ALDACTONE) tablet 25 mg  25 mg Oral Daily    empagliflozin (JARDIANCE) tablet 10 mg  10 mg Oral Daily    apixaban (ELIQUIS) tablet 5 mg  5 mg Oral BID    miconazole (MICOTIN) 2 % powder   Topical BID    carvedilol (COREG) tablet 3.125 mg  3.125 mg Oral BID WC    amiodarone (CORDARONE) tablet 200 mg  200 mg Oral BID    ALPRAZolam (XANAX) tablet 0.5 mg  0.5 mg Oral BID PRN    medicated lip ointment (BLISTEX)   Topical PRN    aspirin EC tablet 81 mg  81 mg Oral Daily

## 2024-04-19 NOTE — PROGRESS NOTES
04/19/24 1214   Resting (Room Air)   SpO2 94   HR 64   Resting (On O2)   SpO2 97   HR 63   O2 Device Nasal cannula   O2 Flow Rate (l/min) 2 l/min     Pt states she is unable to walk.

## 2024-04-19 NOTE — PROGRESS NOTES
Patient's K at 1650 was 3.3. Patient received 40 mEq of Potassium oral at 1515. There is no PRN oral replacement ordered. Notified MALGORZATA Willson. PA said to check Potassium in the morning and no replacement right now.

## 2024-04-19 NOTE — CARE COORDINATION
04/19/24 1408   Services At/After Discharge   Transition of Care Consult (CM Consult) Home Health   Internal Home Health Yes   Services At/After Discharge PT;OT;Nursing services   [place] Resource Information Provided? No   Mode of Transport at Discharge   (family by car.)   Confirm Follow Up Transport Family   Condition of Participation: Discharge Planning   The Plan for Transition of Care is related to the following treatment goals: Home with home health to [facility] and aid to baseline function   The Patient and/or Patient Representative was provided with a Choice of Provider? Patient   The Patient and/Or Patient Representative agree with the Discharge Plan? Yes   Freedom of Choice list was provided with basic dialogue that supports the patient's individualized plan of care/goals, treatment preferences, and shares the quality data associated with the providers?  Yes     New DME-  O2 at 3 lpm ordered after O2 sat test qualified patient for exertional O2. CM asked for DME company preference. Informed no preference. CM sent order with required documentation to [facility]. CM placed portable O2 tank in patient's room and informed primary nurse.   [facility] order placed and referral sent with discharge date noted.  Family, daughter and JOANN to transport home by private car.

## 2024-04-19 NOTE — PROGRESS NOTES
Per RN, patient SpO2 dropped to 79% while moving from chair to bed.  RN placed pt on 3 lpm NC and SpO2 went up to 93%.

## 2024-04-20 ENCOUNTER — APPOINTMENT (OUTPATIENT)
Dept: GENERAL RADIOLOGY | Age: 84
DRG: 189 | End: 2024-04-20
Payer: MEDICARE

## 2024-04-20 ENCOUNTER — HOSPITAL ENCOUNTER (INPATIENT)
Age: 84
LOS: 1 days | DRG: 189 | End: 2024-04-22
Attending: EMERGENCY MEDICINE | Admitting: EMERGENCY MEDICINE
Payer: MEDICARE

## 2024-04-20 DIAGNOSIS — R09.02 HYPOXEMIA: ICD-10-CM

## 2024-04-20 DIAGNOSIS — J96.01 ACUTE RESPIRATORY FAILURE WITH HYPOXIA (HCC): ICD-10-CM

## 2024-04-20 DIAGNOSIS — I50.9 ACUTE ON CHRONIC CONGESTIVE HEART FAILURE, UNSPECIFIED HEART FAILURE TYPE (HCC): ICD-10-CM

## 2024-04-20 DIAGNOSIS — R06.03 RESPIRATORY DISTRESS: Primary | ICD-10-CM

## 2024-04-20 LAB
ALBUMIN SERPL-MCNC: 2.3 G/DL (ref 3.2–4.6)
ALBUMIN/GLOB SERPL: 0.5 (ref 0.4–1.6)
ALP SERPL-CCNC: 116 U/L (ref 50–136)
ALT SERPL-CCNC: 528 U/L (ref 12–65)
ANION GAP SERPL CALC-SCNC: 10 MMOL/L (ref 2–11)
ARTERIAL PATENCY WRIST A: POSITIVE
ARTERIAL PATENCY WRIST A: POSITIVE
AST SERPL-CCNC: 150 U/L (ref 15–37)
BASE DEFICIT BLDV-SCNC: 7.3 MMOL/L
BASE DEFICIT BLDV-SCNC: 7.5 MMOL/L
BASOPHILS # BLD: 0 K/UL (ref 0–0.2)
BASOPHILS NFR BLD: 0 % (ref 0–2)
BDY SITE: ABNORMAL
BDY SITE: ABNORMAL
BILIRUB SERPL-MCNC: 0.7 MG/DL (ref 0.2–1.1)
BUN SERPL-MCNC: 12 MG/DL (ref 8–23)
CALCIUM SERPL-MCNC: 8.7 MG/DL (ref 8.3–10.4)
CHLORIDE SERPL-SCNC: 107 MMOL/L (ref 103–113)
CO2 SERPL-SCNC: 21 MMOL/L (ref 21–32)
CREAT SERPL-MCNC: 1.2 MG/DL (ref 0.6–1)
DIFFERENTIAL METHOD BLD: ABNORMAL
EOSINOPHIL # BLD: 0.2 K/UL (ref 0–0.8)
EOSINOPHIL NFR BLD: 1 % (ref 0.5–7.8)
ERYTHROCYTE [DISTWIDTH] IN BLOOD BY AUTOMATED COUNT: 16.3 % (ref 11.9–14.6)
GAS FLOW.O2 O2 DELIVERY SYS: ABNORMAL
GAS FLOW.O2 O2 DELIVERY SYS: ABNORMAL
GLOBULIN SER CALC-MCNC: 4.2 G/DL (ref 2.8–4.5)
GLUCOSE SERPL-MCNC: 331 MG/DL (ref 65–100)
HCO3 BLDV-SCNC: 19.4 MMOL/L (ref 23–28)
HCO3 BLDV-SCNC: 20.9 MMOL/L (ref 23–28)
HCT VFR BLD AUTO: 36.6 % (ref 35.8–46.3)
HGB BLD-MCNC: 10.7 G/DL (ref 11.7–15.4)
IMM GRANULOCYTES # BLD AUTO: 1.8 K/UL (ref 0–0.5)
IMM GRANULOCYTES NFR BLD AUTO: 9 % (ref 0–5)
INSPIRATION.DURATION SETTING TIME VENT: 0.9 SEC
IPAP/PIP/HIGH PEEP: 17
LYMPHOCYTES # BLD: 5.5 K/UL (ref 0.5–4.6)
LYMPHOCYTES NFR BLD: 27 % (ref 13–44)
MAGNESIUM SERPL-MCNC: 1.9 MG/DL (ref 1.8–2.4)
MCH RBC QN AUTO: 26.1 PG (ref 26.1–32.9)
MCHC RBC AUTO-ENTMCNC: 29.2 G/DL (ref 31.4–35)
MCV RBC AUTO: 89.3 FL (ref 82–102)
MONOCYTES # BLD: 2.4 K/UL (ref 0.1–1.3)
MONOCYTES NFR BLD: 12 % (ref 4–12)
NEUTS SEG # BLD: 10.3 K/UL (ref 1.7–8.2)
NEUTS SEG NFR BLD: 51 % (ref 43–78)
NRBC # BLD: 0.1 K/UL (ref 0–0.2)
NT PRO BNP: ABNORMAL PG/ML
O2/TOTAL GAS SETTING VFR VENT: 100 %
PCO2 BLDV: 43.9 MMHG (ref 41–51)
PCO2 BLDV: 53.1 MMHG (ref 41–51)
PEEP RESPIRATORY: 8 CMH2O
PH BLDV: 7.2 (ref 7.32–7.42)
PH BLDV: 7.25 (ref 7.32–7.42)
PLATELET # BLD AUTO: 299 K/UL (ref 150–450)
PLATELET COMMENT: ADEQUATE
PMV BLD AUTO: 11.7 FL (ref 9.4–12.3)
PO2 BLDV: 39 MMHG
PO2 BLDV: 45 MMHG
POC FIO2: 12
POTASSIUM SERPL-SCNC: 4.7 MMOL/L (ref 3.5–5.1)
PRESSURE SUPPORT SETTING VENT: 8 CMH2O
PROT SERPL-MCNC: 6.5 G/DL (ref 6.3–8.2)
RBC # BLD AUTO: 4.1 M/UL (ref 4.05–5.2)
RBC MORPH BLD: ABNORMAL
RESPIRATORY RATE, POC: 30 (ref 5–40)
SAO2 % BLDV: 61 % (ref 65–88)
SAO2 % BLDV: 73.4 % (ref 65–88)
SERVICE CMNT-IMP: ABNORMAL
SODIUM SERPL-SCNC: 138 MMOL/L (ref 136–146)
SPECIMEN TYPE: ABNORMAL
SPECIMEN TYPE: ABNORMAL
TROPONIN I SERPL HS-MCNC: 1028.6 PG/ML (ref 0–14)
TROPONIN I SERPL HS-MCNC: 941.3 PG/ML (ref 0–14)
VENTILATION MODE VENT: ABNORMAL
WBC # BLD AUTO: 20.2 K/UL (ref 4.3–11.1)
WBC MORPH BLD: ABNORMAL

## 2024-04-20 PROCEDURE — 80053 COMPREHEN METABOLIC PANEL: CPT

## 2024-04-20 PROCEDURE — 83735 ASSAY OF MAGNESIUM: CPT

## 2024-04-20 PROCEDURE — 94660 CPAP INITIATION&MGMT: CPT

## 2024-04-20 PROCEDURE — 96365 THER/PROPH/DIAG IV INF INIT: CPT

## 2024-04-20 PROCEDURE — 71045 X-RAY EXAM CHEST 1 VIEW: CPT

## 2024-04-20 PROCEDURE — 99223 1ST HOSP IP/OBS HIGH 75: CPT | Performed by: EMERGENCY MEDICINE

## 2024-04-20 PROCEDURE — 6370000000 HC RX 637 (ALT 250 FOR IP): Performed by: EMERGENCY MEDICINE

## 2024-04-20 PROCEDURE — 83605 ASSAY OF LACTIC ACID: CPT

## 2024-04-20 PROCEDURE — 5A09457 ASSISTANCE WITH RESPIRATORY VENTILATION, 24-96 CONSECUTIVE HOURS, CONTINUOUS POSITIVE AIRWAY PRESSURE: ICD-10-PCS | Performed by: EMERGENCY MEDICINE

## 2024-04-20 PROCEDURE — 85025 COMPLETE CBC W/AUTO DIFF WBC: CPT

## 2024-04-20 PROCEDURE — 96374 THER/PROPH/DIAG INJ IV PUSH: CPT

## 2024-04-20 PROCEDURE — 99291 CRITICAL CARE FIRST HOUR: CPT

## 2024-04-20 PROCEDURE — 36600 WITHDRAWAL OF ARTERIAL BLOOD: CPT

## 2024-04-20 PROCEDURE — 96366 THER/PROPH/DIAG IV INF ADDON: CPT

## 2024-04-20 PROCEDURE — 6360000002 HC RX W HCPCS: Performed by: EMERGENCY MEDICINE

## 2024-04-20 PROCEDURE — 82803 BLOOD GASES ANY COMBINATION: CPT

## 2024-04-20 PROCEDURE — 94640 AIRWAY INHALATION TREATMENT: CPT

## 2024-04-20 PROCEDURE — 84484 ASSAY OF TROPONIN QUANT: CPT

## 2024-04-20 PROCEDURE — 87040 BLOOD CULTURE FOR BACTERIA: CPT

## 2024-04-20 PROCEDURE — 93005 ELECTROCARDIOGRAM TRACING: CPT | Performed by: EMERGENCY MEDICINE

## 2024-04-20 PROCEDURE — 84145 PROCALCITONIN (PCT): CPT

## 2024-04-20 PROCEDURE — 83880 ASSAY OF NATRIURETIC PEPTIDE: CPT

## 2024-04-20 RX ORDER — FUROSEMIDE 10 MG/ML
40 INJECTION INTRAMUSCULAR; INTRAVENOUS
Status: COMPLETED | OUTPATIENT
Start: 2024-04-20 | End: 2024-04-20

## 2024-04-20 RX ORDER — NITROGLYCERIN 20 MG/100ML
5-200 INJECTION INTRAVENOUS CONTINUOUS
Status: DISCONTINUED | OUTPATIENT
Start: 2024-04-20 | End: 2024-04-21

## 2024-04-20 RX ORDER — IPRATROPIUM BROMIDE AND ALBUTEROL SULFATE 2.5; .5 MG/3ML; MG/3ML
1 SOLUTION RESPIRATORY (INHALATION)
Status: COMPLETED | OUTPATIENT
Start: 2024-04-20 | End: 2024-04-20

## 2024-04-20 RX ADMIN — IPRATROPIUM BROMIDE AND ALBUTEROL SULFATE 1 DOSE: 2.5; .5 SOLUTION RESPIRATORY (INHALATION) at 21:39

## 2024-04-20 RX ADMIN — NITROGLYCERIN 5 MCG/MIN: 20 INJECTION INTRAVENOUS at 22:18

## 2024-04-20 RX ADMIN — FUROSEMIDE 40 MG: 10 INJECTION, SOLUTION INTRAMUSCULAR; INTRAVENOUS at 21:50

## 2024-04-20 ASSESSMENT — PAIN SCALES - GENERAL: PAINLEVEL_OUTOF10: 0

## 2024-04-21 ENCOUNTER — APPOINTMENT (OUTPATIENT)
Dept: NON INVASIVE DIAGNOSTICS | Age: 84
DRG: 189 | End: 2024-04-21
Payer: MEDICARE

## 2024-04-21 ENCOUNTER — APPOINTMENT (OUTPATIENT)
Dept: GENERAL RADIOLOGY | Age: 84
DRG: 189 | End: 2024-04-21
Payer: MEDICARE

## 2024-04-21 PROBLEM — I50.9 ACUTE ON CHRONIC CONGESTIVE HEART FAILURE (HCC): Status: ACTIVE | Noted: 2024-04-21

## 2024-04-21 PROBLEM — R09.02 HYPOXEMIA: Status: ACTIVE | Noted: 2024-04-21

## 2024-04-21 PROBLEM — I50.22 CHRONIC SYSTOLIC CONGESTIVE HEART FAILURE (HCC): Status: ACTIVE | Noted: 2024-04-21

## 2024-04-21 PROBLEM — J96.00 ACUTE RESPIRATORY FAILURE (HCC): Status: ACTIVE | Noted: 2024-04-21

## 2024-04-21 PROBLEM — R06.03 RESPIRATORY DISTRESS: Status: ACTIVE | Noted: 2024-04-21

## 2024-04-21 LAB
ALBUMIN SERPL-MCNC: 2.2 G/DL (ref 3.2–4.6)
ALBUMIN/GLOB SERPL: 0.6 (ref 0.4–1.6)
ALP SERPL-CCNC: 114 U/L (ref 50–136)
ALT SERPL-CCNC: 380 U/L (ref 12–65)
ANION GAP SERPL CALC-SCNC: 13 MMOL/L (ref 2–11)
ANION GAP SERPL CALC-SCNC: 5 MMOL/L (ref 2–11)
ARTERIAL PATENCY WRIST A: ABNORMAL
ARTERIAL PATENCY WRIST A: ABNORMAL
AST SERPL-CCNC: 106 U/L (ref 15–37)
BASE DEFICIT BLD-SCNC: 5.3 MMOL/L
BASE DEFICIT BLDV-SCNC: 0.6 MMOL/L
BASE DEFICIT BLDV-SCNC: 10.8 MMOL/L
BASOPHILS # BLD: 0.2 K/UL (ref 0–0.2)
BASOPHILS NFR BLD: 1 % (ref 0–2)
BDY SITE: ABNORMAL
BILIRUB SERPL-MCNC: 0.9 MG/DL (ref 0.2–1.1)
BUN SERPL-MCNC: 13 MG/DL (ref 8–23)
BUN SERPL-MCNC: 18 MG/DL (ref 8–23)
CALCIUM SERPL-MCNC: 8.2 MG/DL (ref 8.3–10.4)
CALCIUM SERPL-MCNC: 8.7 MG/DL (ref 8.3–10.4)
CHLORIDE SERPL-SCNC: 106 MMOL/L (ref 103–113)
CHLORIDE SERPL-SCNC: 107 MMOL/L (ref 103–113)
CO2 SERPL-SCNC: 18 MMOL/L (ref 21–32)
CO2 SERPL-SCNC: 27 MMOL/L (ref 21–32)
CREAT SERPL-MCNC: 1.1 MG/DL (ref 0.6–1)
CREAT SERPL-MCNC: 1.4 MG/DL (ref 0.6–1)
DIFFERENTIAL METHOD BLD: ABNORMAL
ECHO BSA: 1.86 M2
ECHO LV EDV A2C: 104 ML
ECHO LV EDV A4C: 114 ML
ECHO LV EDV INDEX A4C: 63 ML/M2
ECHO LV EDV NDEX A2C: 58 ML/M2
ECHO LV EJECTION FRACTION A2C: 25 %
ECHO LV EJECTION FRACTION A4C: 17 %
ECHO LV EJECTION FRACTION BIPLANE: 20 % (ref 55–100)
ECHO LV ESV A2C: 78 ML
ECHO LV ESV A4C: 95 ML
ECHO LV ESV INDEX A2C: 43 ML/M2
ECHO LV ESV INDEX A4C: 53 ML/M2
EKG ATRIAL RATE: 103 BPM
EKG ATRIAL RATE: 84 BPM
EKG DIAGNOSIS: NORMAL
EKG DIAGNOSIS: NORMAL
EKG P AXIS: 47 DEGREES
EKG P AXIS: 51 DEGREES
EKG P-R INTERVAL: 192 MS
EKG P-R INTERVAL: 228 MS
EKG Q-T INTERVAL: 380 MS
EKG Q-T INTERVAL: 418 MS
EKG QRS DURATION: 116 MS
EKG QRS DURATION: 129 MS
EKG QTC CALCULATION (BAZETT): 495 MS
EKG QTC CALCULATION (BAZETT): 497 MS
EKG R AXIS: 141 DEGREES
EKG R AXIS: 96 DEGREES
EKG T AXIS: 135 DEGREES
EKG T AXIS: 24 DEGREES
EKG VENTRICULAR RATE: 103 BPM
EKG VENTRICULAR RATE: 84 BPM
EOSINOPHIL # BLD: 0 K/UL (ref 0–0.8)
EOSINOPHIL NFR BLD: 0 % (ref 0.5–7.8)
ERYTHROCYTE [DISTWIDTH] IN BLOOD BY AUTOMATED COUNT: 16.3 % (ref 11.9–14.6)
GAS FLOW.O2 O2 DELIVERY SYS: ABNORMAL
GLOBULIN SER CALC-MCNC: 4 G/DL (ref 2.8–4.5)
GLUCOSE BLD STRIP.AUTO-MCNC: 188 MG/DL (ref 65–100)
GLUCOSE BLD STRIP.AUTO-MCNC: 244 MG/DL (ref 65–100)
GLUCOSE BLD STRIP.AUTO-MCNC: 252 MG/DL (ref 65–100)
GLUCOSE BLD STRIP.AUTO-MCNC: 334 MG/DL (ref 65–100)
GLUCOSE SERPL-MCNC: 204 MG/DL (ref 65–100)
GLUCOSE SERPL-MCNC: 281 MG/DL (ref 65–100)
HCO3 BLD-SCNC: 20.9 MMOL/L (ref 22–26)
HCO3 BLDV-SCNC: 15.7 MMOL/L (ref 23–28)
HCO3 BLDV-SCNC: 22 MMOL/L (ref 23–28)
HCT VFR BLD AUTO: 36.1 % (ref 35.8–46.3)
HGB BLD-MCNC: 10.1 G/DL (ref 11.7–15.4)
IMM GRANULOCYTES # BLD AUTO: 1.4 K/UL (ref 0–0.5)
IMM GRANULOCYTES NFR BLD AUTO: 6 % (ref 0–5)
IPAP/PIP/HIGH PEEP: 13
IPAP/PIP/HIGH PEEP: 14
LACTATE SERPL-SCNC: 10 MMOL/L (ref 0.4–2)
LACTATE SERPL-SCNC: 3.3 MMOL/L (ref 0.4–2)
LYMPHOCYTES # BLD: 1.9 K/UL (ref 0.5–4.6)
LYMPHOCYTES NFR BLD: 8 % (ref 13–44)
MAGNESIUM SERPL-MCNC: 1.8 MG/DL (ref 1.8–2.4)
MCH RBC QN AUTO: 26.2 PG (ref 26.1–32.9)
MCHC RBC AUTO-ENTMCNC: 28 G/DL (ref 31.4–35)
MCV RBC AUTO: 93.5 FL (ref 82–102)
MONOCYTES # BLD: 2.9 K/UL (ref 0.1–1.3)
MONOCYTES NFR BLD: 12 % (ref 4–12)
NEUTS SEG # BLD: 17.6 K/UL (ref 1.7–8.2)
NEUTS SEG NFR BLD: 73 % (ref 43–78)
NRBC # BLD: 0.09 K/UL (ref 0–0.2)
O2/TOTAL GAS SETTING VFR VENT: 100 %
O2/TOTAL GAS SETTING VFR VENT: 35 %
O2/TOTAL GAS SETTING VFR VENT: 45 %
PCO2 BLD: 42.6 MMHG (ref 35–45)
PCO2 BLDV: 29 MMHG (ref 41–51)
PCO2 BLDV: 36 MMHG (ref 41–51)
PH BLD: 7.3 (ref 7.35–7.45)
PH BLDV: 7.25 (ref 7.32–7.42)
PH BLDV: 7.49 (ref 7.32–7.42)
PLATELET # BLD AUTO: 245 K/UL (ref 150–450)
PLATELET COMMENT: ADEQUATE
PMV BLD AUTO: 10.8 FL (ref 9.4–12.3)
PO2 BLD: 93 MMHG (ref 75–100)
PO2 BLDV: 56 MMHG
PO2 BLDV: 74 MMHG
POTASSIUM SERPL-SCNC: 4.2 MMOL/L (ref 3.5–5.1)
POTASSIUM SERPL-SCNC: 4.6 MMOL/L (ref 3.5–5.1)
PRESSURE SUPPORT SETTING VENT: 4 CMH2O
PRESSURE SUPPORT SETTING VENT: 4 CMH2O
PROCALCITONIN SERPL-MCNC: 0.22 NG/ML (ref 0–0.49)
PROT SERPL-MCNC: 6.2 G/DL (ref 6.3–8.2)
RBC # BLD AUTO: 3.86 M/UL (ref 4.05–5.2)
RBC MORPH BLD: ABNORMAL
RBC MORPH BLD: ABNORMAL
RESPIRATORY RATE, POC: 32 (ref 5–40)
RESPIRATORY RATE, POC: 33 (ref 5–40)
SAO2 % BLD: 96.3 % (ref 95–98)
SAO2 % BLDV: 91.5 % (ref 65–88)
SAO2 % BLDV: 92.2 % (ref 65–88)
SERVICE CMNT-IMP: ABNORMAL
SODIUM SERPL-SCNC: 137 MMOL/L (ref 136–146)
SODIUM SERPL-SCNC: 139 MMOL/L (ref 136–146)
SPECIMEN TYPE: ABNORMAL
TROPONIN I SERPL HS-MCNC: 584.1 PG/ML (ref 0–14)
VENTILATION MODE VENT: ABNORMAL
VENTILATION MODE VENT: ABNORMAL
WBC # BLD AUTO: 24 K/UL (ref 4.3–11.1)
WBC MORPH BLD: ABNORMAL

## 2024-04-21 PROCEDURE — 93010 ELECTROCARDIOGRAM REPORT: CPT | Performed by: INTERNAL MEDICINE

## 2024-04-21 PROCEDURE — 6360000002 HC RX W HCPCS: Performed by: NURSE PRACTITIONER

## 2024-04-21 PROCEDURE — 6360000002 HC RX W HCPCS: Performed by: EMERGENCY MEDICINE

## 2024-04-21 PROCEDURE — 36415 COLL VENOUS BLD VENIPUNCTURE: CPT

## 2024-04-21 PROCEDURE — 80053 COMPREHEN METABOLIC PANEL: CPT

## 2024-04-21 PROCEDURE — 83605 ASSAY OF LACTIC ACID: CPT

## 2024-04-21 PROCEDURE — 51702 INSERT TEMP BLADDER CATH: CPT

## 2024-04-21 PROCEDURE — 81001 URINALYSIS AUTO W/SCOPE: CPT

## 2024-04-21 PROCEDURE — 85025 COMPLETE CBC W/AUTO DIFF WBC: CPT

## 2024-04-21 PROCEDURE — 6360000002 HC RX W HCPCS: Performed by: INTERNAL MEDICINE

## 2024-04-21 PROCEDURE — 93308 TTE F-UP OR LMTD: CPT | Performed by: INTERNAL MEDICINE

## 2024-04-21 PROCEDURE — 84484 ASSAY OF TROPONIN QUANT: CPT

## 2024-04-21 PROCEDURE — 71045 X-RAY EXAM CHEST 1 VIEW: CPT

## 2024-04-21 PROCEDURE — 93005 ELECTROCARDIOGRAM TRACING: CPT | Performed by: NURSE PRACTITIONER

## 2024-04-21 PROCEDURE — 36556 INSERT NON-TUNNEL CV CATH: CPT | Performed by: EMERGENCY MEDICINE

## 2024-04-21 PROCEDURE — 2500000003 HC RX 250 WO HCPCS: Performed by: INTERNAL MEDICINE

## 2024-04-21 PROCEDURE — C8924 2D TTE W OR W/O FOL W/CON,FU: HCPCS

## 2024-04-21 PROCEDURE — 37799 UNLISTED PX VASCULAR SURGERY: CPT

## 2024-04-21 PROCEDURE — 94640 AIRWAY INHALATION TREATMENT: CPT

## 2024-04-21 PROCEDURE — 6360000004 HC RX CONTRAST MEDICATION: Performed by: EMERGENCY MEDICINE

## 2024-04-21 PROCEDURE — 83735 ASSAY OF MAGNESIUM: CPT

## 2024-04-21 PROCEDURE — 2580000003 HC RX 258: Performed by: INTERNAL MEDICINE

## 2024-04-21 PROCEDURE — 76937 US GUIDE VASCULAR ACCESS: CPT

## 2024-04-21 PROCEDURE — C1751 CATH, INF, PER/CENT/MIDLINE: HCPCS

## 2024-04-21 PROCEDURE — 94660 CPAP INITIATION&MGMT: CPT

## 2024-04-21 PROCEDURE — 2700000000 HC OXYGEN THERAPY PER DAY

## 2024-04-21 PROCEDURE — 36620 INSERTION CATHETER ARTERY: CPT

## 2024-04-21 PROCEDURE — 6370000000 HC RX 637 (ALT 250 FOR IP): Performed by: EMERGENCY MEDICINE

## 2024-04-21 PROCEDURE — 36620 INSERTION CATHETER ARTERY: CPT | Performed by: EMERGENCY MEDICINE

## 2024-04-21 PROCEDURE — 82962 GLUCOSE BLOOD TEST: CPT

## 2024-04-21 PROCEDURE — 36592 COLLECT BLOOD FROM PICC: CPT

## 2024-04-21 PROCEDURE — 03HY32Z INSERTION OF MONITORING DEVICE INTO UPPER ARTERY, PERCUTANEOUS APPROACH: ICD-10-PCS | Performed by: EMERGENCY MEDICINE

## 2024-04-21 PROCEDURE — 2580000003 HC RX 258: Performed by: EMERGENCY MEDICINE

## 2024-04-21 PROCEDURE — 02HV33Z INSERTION OF INFUSION DEVICE INTO SUPERIOR VENA CAVA, PERCUTANEOUS APPROACH: ICD-10-PCS | Performed by: EMERGENCY MEDICINE

## 2024-04-21 PROCEDURE — 87154 CUL TYP ID BLD PTHGN 6+ TRGT: CPT

## 2024-04-21 PROCEDURE — 6370000000 HC RX 637 (ALT 250 FOR IP)

## 2024-04-21 PROCEDURE — 36556 INSERT NON-TUNNEL CV CATH: CPT

## 2024-04-21 PROCEDURE — 99291 CRITICAL CARE FIRST HOUR: CPT | Performed by: INTERNAL MEDICINE

## 2024-04-21 PROCEDURE — A4216 STERILE WATER/SALINE, 10 ML: HCPCS | Performed by: EMERGENCY MEDICINE

## 2024-04-21 PROCEDURE — 2500000003 HC RX 250 WO HCPCS: Performed by: EMERGENCY MEDICINE

## 2024-04-21 PROCEDURE — 82803 BLOOD GASES ANY COMBINATION: CPT

## 2024-04-21 PROCEDURE — 87205 SMEAR GRAM STAIN: CPT

## 2024-04-21 PROCEDURE — 2100000001 HC CVICU R&B

## 2024-04-21 PROCEDURE — 99223 1ST HOSP IP/OBS HIGH 75: CPT | Performed by: INTERNAL MEDICINE

## 2024-04-21 RX ORDER — IPRATROPIUM BROMIDE AND ALBUTEROL SULFATE 2.5; .5 MG/3ML; MG/3ML
1 SOLUTION RESPIRATORY (INHALATION) EVERY 4 HOURS PRN
Status: DISCONTINUED | OUTPATIENT
Start: 2024-04-21 | End: 2024-04-22

## 2024-04-21 RX ORDER — MAGNESIUM SULFATE IN WATER 40 MG/ML
2000 INJECTION, SOLUTION INTRAVENOUS PRN
Status: DISCONTINUED | OUTPATIENT
Start: 2024-04-21 | End: 2024-04-22

## 2024-04-21 RX ORDER — ACETAMINOPHEN 650 MG/1
650 SUPPOSITORY RECTAL EVERY 6 HOURS PRN
Status: DISCONTINUED | OUTPATIENT
Start: 2024-04-21 | End: 2024-04-22

## 2024-04-21 RX ORDER — HYDROCODONE BITARTRATE AND ACETAMINOPHEN 5; 325 MG/1; MG/1
1 TABLET ORAL 3 TIMES DAILY PRN
Status: DISCONTINUED | OUTPATIENT
Start: 2024-04-21 | End: 2024-04-22

## 2024-04-21 RX ORDER — NOREPINEPHRINE BITARTRATE 0.02 MG/ML
2-100 INJECTION, SOLUTION INTRAVENOUS CONTINUOUS
Status: DISCONTINUED | OUTPATIENT
Start: 2024-04-21 | End: 2024-04-22

## 2024-04-21 RX ORDER — INSULIN LISPRO 100 [IU]/ML
0-4 INJECTION, SOLUTION INTRAVENOUS; SUBCUTANEOUS NIGHTLY
Status: DISCONTINUED | OUTPATIENT
Start: 2024-04-21 | End: 2024-04-22

## 2024-04-21 RX ORDER — FUROSEMIDE 10 MG/ML
40 INJECTION INTRAMUSCULAR; INTRAVENOUS 2 TIMES DAILY
Status: DISCONTINUED | OUTPATIENT
Start: 2024-04-21 | End: 2024-04-22

## 2024-04-21 RX ORDER — INSULIN LISPRO 100 [IU]/ML
0-8 INJECTION, SOLUTION INTRAVENOUS; SUBCUTANEOUS
Status: DISCONTINUED | OUTPATIENT
Start: 2024-04-21 | End: 2024-04-22

## 2024-04-21 RX ORDER — MORPHINE SULFATE 4 MG/ML
5 INJECTION INTRAVENOUS EVERY 4 HOURS PRN
Status: DISCONTINUED | OUTPATIENT
Start: 2024-04-21 | End: 2024-04-22 | Stop reason: HOSPADM

## 2024-04-21 RX ORDER — MILRINONE LACTATE 0.2 MG/ML
.0625-.75 INJECTION, SOLUTION INTRAVENOUS CONTINUOUS
Status: DISCONTINUED | OUTPATIENT
Start: 2024-04-21 | End: 2024-04-22

## 2024-04-21 RX ORDER — ONDANSETRON 4 MG/1
4 TABLET, ORALLY DISINTEGRATING ORAL EVERY 8 HOURS PRN
Status: DISCONTINUED | OUTPATIENT
Start: 2024-04-21 | End: 2024-04-22 | Stop reason: HOSPADM

## 2024-04-21 RX ORDER — IBUPROFEN 600 MG/1
1 TABLET ORAL PRN
Status: DISCONTINUED | OUTPATIENT
Start: 2024-04-21 | End: 2024-04-22

## 2024-04-21 RX ORDER — DEXMEDETOMIDINE HYDROCHLORIDE 4 UG/ML
.1-1.5 INJECTION, SOLUTION INTRAVENOUS CONTINUOUS
Status: DISCONTINUED | OUTPATIENT
Start: 2024-04-21 | End: 2024-04-22

## 2024-04-21 RX ORDER — ASPIRIN 81 MG/1
81 TABLET, CHEWABLE ORAL DAILY
Status: DISCONTINUED | OUTPATIENT
Start: 2024-04-21 | End: 2024-04-22

## 2024-04-21 RX ORDER — DEXTROSE MONOHYDRATE 100 MG/ML
INJECTION, SOLUTION INTRAVENOUS CONTINUOUS PRN
Status: DISCONTINUED | OUTPATIENT
Start: 2024-04-21 | End: 2024-04-22

## 2024-04-21 RX ORDER — ENOXAPARIN SODIUM 100 MG/ML
40 INJECTION SUBCUTANEOUS DAILY
Status: DISCONTINUED | OUTPATIENT
Start: 2024-04-21 | End: 2024-04-22

## 2024-04-21 RX ORDER — AMIODARONE HYDROCHLORIDE 200 MG/1
200 TABLET ORAL DAILY
Status: DISCONTINUED | OUTPATIENT
Start: 2024-04-21 | End: 2024-04-22

## 2024-04-21 RX ORDER — SODIUM CHLORIDE 9 MG/ML
INJECTION, SOLUTION INTRAVENOUS PRN
Status: DISCONTINUED | OUTPATIENT
Start: 2024-04-21 | End: 2024-04-22 | Stop reason: HOSPADM

## 2024-04-21 RX ORDER — SODIUM CHLORIDE 0.9 % (FLUSH) 0.9 %
5-40 SYRINGE (ML) INJECTION EVERY 12 HOURS SCHEDULED
Status: DISCONTINUED | OUTPATIENT
Start: 2024-04-21 | End: 2024-04-22 | Stop reason: HOSPADM

## 2024-04-21 RX ORDER — IPRATROPIUM BROMIDE AND ALBUTEROL SULFATE 2.5; .5 MG/3ML; MG/3ML
SOLUTION RESPIRATORY (INHALATION)
Status: COMPLETED
Start: 2024-04-21 | End: 2024-04-21

## 2024-04-21 RX ORDER — POTASSIUM CHLORIDE 7.45 MG/ML
10 INJECTION INTRAVENOUS PRN
Status: DISCONTINUED | OUTPATIENT
Start: 2024-04-21 | End: 2024-04-22

## 2024-04-21 RX ORDER — DOBUTAMINE HYDROCHLORIDE 200 MG/100ML
2.5-1 INJECTION INTRAVENOUS CONTINUOUS
Status: DISCONTINUED | OUTPATIENT
Start: 2024-04-21 | End: 2024-04-22

## 2024-04-21 RX ORDER — POLYETHYLENE GLYCOL 3350 17 G/17G
17 POWDER, FOR SOLUTION ORAL DAILY PRN
Status: DISCONTINUED | OUTPATIENT
Start: 2024-04-21 | End: 2024-04-22

## 2024-04-21 RX ORDER — MORPHINE SULFATE 4 MG/ML
5 INJECTION INTRAVENOUS ONCE
Status: COMPLETED | OUTPATIENT
Start: 2024-04-21 | End: 2024-04-21

## 2024-04-21 RX ORDER — ONDANSETRON 2 MG/ML
4 INJECTION INTRAMUSCULAR; INTRAVENOUS EVERY 6 HOURS PRN
Status: DISCONTINUED | OUTPATIENT
Start: 2024-04-21 | End: 2024-04-22 | Stop reason: HOSPADM

## 2024-04-21 RX ORDER — SODIUM CHLORIDE 0.9 % (FLUSH) 0.9 %
5-40 SYRINGE (ML) INJECTION PRN
Status: DISCONTINUED | OUTPATIENT
Start: 2024-04-21 | End: 2024-04-22 | Stop reason: HOSPADM

## 2024-04-21 RX ORDER — ACETAMINOPHEN 325 MG/1
650 TABLET ORAL EVERY 6 HOURS PRN
Status: DISCONTINUED | OUTPATIENT
Start: 2024-04-21 | End: 2024-04-22

## 2024-04-21 RX ORDER — POTASSIUM CHLORIDE 20 MEQ/1
40 TABLET, EXTENDED RELEASE ORAL PRN
Status: DISCONTINUED | OUTPATIENT
Start: 2024-04-21 | End: 2024-04-22

## 2024-04-21 RX ADMIN — MORPHINE SULFATE 5 MG: 4 INJECTION INTRAVENOUS at 18:14

## 2024-04-21 RX ADMIN — SODIUM BICARBONATE 100 MEQ: 84 INJECTION, SOLUTION INTRAVENOUS at 20:21

## 2024-04-21 RX ADMIN — SODIUM CHLORIDE, PRESERVATIVE FREE 10 ML: 5 INJECTION INTRAVENOUS at 23:12

## 2024-04-21 RX ADMIN — EMPAGLIFLOZIN 10 MG: 10 TABLET, FILM COATED ORAL at 09:42

## 2024-04-21 RX ADMIN — SODIUM CHLORIDE 16 MCG/MIN: 9 INJECTION, SOLUTION INTRAVENOUS at 22:52

## 2024-04-21 RX ADMIN — MILRINONE LACTATE IN DEXTROSE 0.12 MCG/KG/MIN: 200 INJECTION, SOLUTION INTRAVENOUS at 23:11

## 2024-04-21 RX ADMIN — INSULIN LISPRO 4 UNITS: 100 INJECTION, SOLUTION INTRAVENOUS; SUBCUTANEOUS at 12:57

## 2024-04-21 RX ADMIN — FUROSEMIDE 40 MG: 10 INJECTION, SOLUTION INTRAMUSCULAR; INTRAVENOUS at 17:54

## 2024-04-21 RX ADMIN — DOBUTAMINE HYDROCHLORIDE 2.5 MCG/KG/MIN: 200 INJECTION INTRAVENOUS at 22:44

## 2024-04-21 RX ADMIN — MORPHINE SULFATE 5 MG: 4 INJECTION INTRAVENOUS at 19:34

## 2024-04-21 RX ADMIN — FAMOTIDINE 20 MG: 10 INJECTION, SOLUTION INTRAVENOUS at 09:42

## 2024-04-21 RX ADMIN — ASPIRIN 81 MG 81 MG: 81 TABLET ORAL at 09:42

## 2024-04-21 RX ADMIN — INSULIN LISPRO 4 UNITS: 100 INJECTION, SOLUTION INTRAVENOUS; SUBCUTANEOUS at 08:14

## 2024-04-21 RX ADMIN — ENOXAPARIN SODIUM 40 MG: 100 INJECTION SUBCUTANEOUS at 09:40

## 2024-04-21 RX ADMIN — SODIUM CHLORIDE 5 MCG/MIN: 9 INJECTION, SOLUTION INTRAVENOUS at 09:35

## 2024-04-21 RX ADMIN — INSULIN LISPRO 4 UNITS: 100 INJECTION, SOLUTION INTRAVENOUS; SUBCUTANEOUS at 01:53

## 2024-04-21 RX ADMIN — SODIUM BICARBONATE 100 MEQ: 84 INJECTION, SOLUTION INTRAVENOUS at 22:33

## 2024-04-21 RX ADMIN — IPRATROPIUM BROMIDE AND ALBUTEROL SULFATE 1 DOSE: .5; 3 SOLUTION RESPIRATORY (INHALATION) at 17:59

## 2024-04-21 RX ADMIN — MIDAZOLAM 2 MG: 1 INJECTION INTRAMUSCULAR; INTRAVENOUS at 21:48

## 2024-04-21 RX ADMIN — IPRATROPIUM BROMIDE AND ALBUTEROL SULFATE 1 DOSE: 2.5; .5 SOLUTION RESPIRATORY (INHALATION) at 17:59

## 2024-04-21 RX ADMIN — VANCOMYCIN HYDROCHLORIDE 2000 MG: 10 INJECTION, POWDER, LYOPHILIZED, FOR SOLUTION INTRAVENOUS at 22:47

## 2024-04-21 RX ADMIN — SODIUM CHLORIDE, PRESERVATIVE FREE 0.75 ML: 5 INJECTION INTRAVENOUS at 14:30

## 2024-04-21 RX ADMIN — SODIUM CHLORIDE, PRESERVATIVE FREE 10 ML: 5 INJECTION INTRAVENOUS at 09:42

## 2024-04-21 ASSESSMENT — PAIN SCALES - GENERAL
PAINLEVEL_OUTOF10: 0

## 2024-04-21 NOTE — ED NOTES
TRANSFER - OUT REPORT:    Verbal report given to CARON Stevens on Vivien Zambrano  being transferred to [facility] for routine progression of patient care       Report consisted of patient's Situation, Background, Assessment and   Recommendations(SBAR).     Information from the following report(s) Nurse Handoff Report, ED Encounter Summary, ED SBAR, Adult Overview, Intake/Output, MAR, Recent Results, Cardiac Rhythm Sinus Rhythm, Neuro Assessment, and Event Log was reviewed with the receiving nurse.    [place] Fall Assessment:    Presents to emergency department  because of falls (Syncope, seizure, or loss of consciousness): No  Age > 70: Yes  Altered Mental Status, Intoxication with alcohol or substance confusion (Disorientation, impaired judgment, poor safety awaremess, or inability to follow instructions): No  Impaired Mobility: Ambulates or transfers with assistive devices or assistance; Unable to ambulate or transer.: Yes  Nursing Judgement: Yes          Lines:   Peripheral IV 04/20/24 Distal;Left Forearm (Active)   Site Assessment Clean, dry & intact 04/20/24 2116   Line Status Flushed;Normal saline locked 04/20/24 2116   Phlebitis Assessment No symptoms 04/20/24 2116   Infiltration Assessment 0 04/20/24 2116   Dressing Status Clean, dry & intact 04/20/24 2116   Dressing Type Transparent 04/20/24 2116       Peripheral IV 04/20/24 Posterior;Right Wrist (Active)   Site Assessment Clean, dry & intact 04/20/24 2211   Line Status Blood return noted;Flushed;Normal saline locked 04/20/24 2211   Phlebitis Assessment No symptoms 04/20/24 2211   Infiltration Assessment 0 04/20/24 2211   Dressing Status Clean, dry & intact 04/20/24 2211   Dressing Type Transparent 04/20/24 2211        Opportunity for questions and clarification was provided.      Patient transported with:  Monitor, O2 @ Bipap with respiratory lpm, and Registered Nurse           Chloe Alves RN  04/21/24 0025

## 2024-04-21 NOTE — ED PROVIDER NOTES
Emergency Department Provider Note       PCP: Bradford Alanis MD   Age: 83 y.o.   Sex: female     DISPOSITION       No diagnosis found.    Medical Decision Making   Hypoxemic patient with respiratory distress discharged from hospital for cardiogenic shock.  Questionable history of asthma.  May be asthma exacerbation.  But concern for congestive heart failure.  Chest x-ray to check for pneumonia, effusion, pneumothorax.  Check EKG and serial troponins.  Nebulizer treatment.  Check ABG  11:34 PM  Patient was somewhat acidotic.  BiPAP continued.  Initial O2 saturations on BiPAP about 88%.  They are about 95% now.  Troponin elevated but decreased from hospitalization.  Chest x-ray without obvious pneumonia.  Does have some effusions.  No clear fluid overload but patient's BNP is much elevated compared to previous.  Patient was given diuretic.  Initial blood pressure about 150 systolic.  Placed on nitroglycerin drip patient feels more comfortable.  Leaning toward congestive heart failure over asthma exacerbation.  Doubt pulmonary embolism     1 or more acute illnesses that pose a threat to life or bodily function.   Drug therapy given requiring intensive monitoring for toxicity.  Discussion with external consultants.  Chronic medical problems impacting care include hypertension and congestive heart failure..    I independently ordered and reviewed each unique test.  I reviewed external records: provider visit note from outside specialist.  See HPI  The patients assessment required an independent historian: EMS and family.  The reason they were needed is  an altered level of consciousness and important historical information not provided by the patient.  I interpreted the X-rays chest x-ray without obvious infiltrates.  My Independent EKG Interpretation: sinus rhythm, no evidence of arrhythmia      ST Segments:Nonspecific ST segments - NO STEMI   Rate: 84  The patient was admitted and I have discussed patient management

## 2024-04-21 NOTE — ED TRIAGE NOTES
Pt presents to ED from home via [facility] c/o respiratory distress. Pt initial room air sat was 84%. Bilateral wheezing. Given duoneb in route - increased to 91%. Unable to maintain saturations - pt placed on CPAP in route.        Hx new onset of asthma. 125mg solumedrol, albuterol duoneb, 0.5 terbutaline.     Recently hospitalized with cardiogenic shock from 04/13-04/19

## 2024-04-21 NOTE — H&P
DETECTED        Chlamydophila Pneumonia PCR NOT DETECTED        Mycoplasma pneumo by PCR NOT DETECTED       Culture, Blood 1 [[id number]] Collected: 04/13/24 0038    Order Status: Completed Specimen: Blood Updated: 04/18/24 0749     Special Requests --        LEFT  Antecubital       Culture NO GROWTH 5 DAYS       Culture, Blood 1 [[id number]] Collected: 04/13/24 0021    Order Status: Completed Specimen: Blood Updated: 04/18/24 0749     Special Requests --        RIGHT  FOREARM       Culture NO GROWTH 5 DAYS             No results for input(s): \"COVID19\" in the last 72 hours.  Ventilator Settings Ideal body weight: 50.1 kg (110 lb 7.2 oz)  Adjusted ideal body weight: 61.6 kg (135 lb 13.9 oz)  Mode FIO2 Rate Tidal Volume Pressure        80 %                  Peak airway pressure:     Minute ventilation:    ABG:No results for input(s): \"PHAPOC\", \"VZX6LCFL\", \"IE9AZBN\", \"UBA6FPO\", \"BE\" in the last 72 hours.  Assessment and Plan:  (Medical Decision Making)   Impression: 83 y.o. female with HFpEF, CAD s/p CABG, HTN, HLD, paroxysmal atrial fibrillation, NIDDM, GERD and subclavian artery stenosis now with respiratory distress.    NEURO:   Sedation: none  Analgesia: none  Paralytics: none  CV:   Volume Status: clinically volume overloaded with pleural effusions, pulmonary edema, elevated BNP and LE edema. Lasix 40mg IV Q12h  NSTEMI: elevated troponins but level/decreasing. EKG unremarkable, likely due to resp distress  PULM:   Acute hypoxemic/hypercapneic respiratory failure:  Now on BIPAP, getting duonebs which are helping. Also on NTG drip (as BP tolerates) and lasix 40mg Q12h.  RENAL:  ANCA: Cr 1.20, increase from 0.80 24 hours ago. Will watch  Lactic acidosis: mildly elevated lactate, IV fluid bolus held due to resp distress and volume overload. Sepsis not suspected.  Electrolytes: replete as needed  GI:   Nutrition: NPO while on BIPAP  HEME:   Anemia: Hgb 10.7, at baseline since 3/22 though dropped significantly as was

## 2024-04-21 NOTE — CONSULTS
Nutrition Note:   Acknowledge Consult for Education: CHF diet education    Initial education to be completed by CHF Nurse Navigator. Will defer RD intervention at this time. Navigator will reconsult RD if additional diet education intervention needed.    LACY GOLDBERG, RD    
Will follow this patient for chf education once she is out of the critical care setting. Thank you for this consult.  
suspecting infectious source    Lactic acidosis:  Per primary team    ANCA: Resolving    HS Trop: 941.3  Likely demand ischemia with hypoxia.  Would complete limited echo for wall motion and EF.    Thank you very much for requesting a Cardiology Evaluation. We appreciate the opportunity to participate in this patient's care. We will follow along with above stated plan. This is initial management plan but please see attendings consult note for full plan of care.    Khoa Aguilar, APRN - CNP AGACNP-BC

## 2024-04-22 ENCOUNTER — APPOINTMENT (OUTPATIENT)
Dept: GENERAL RADIOLOGY | Age: 84
DRG: 189 | End: 2024-04-22
Payer: MEDICARE

## 2024-04-22 VITALS
OXYGEN SATURATION: 71 % | DIASTOLIC BLOOD PRESSURE: 24 MMHG | HEART RATE: 53 BPM | WEIGHT: 177.2 LBS | BODY MASS INDEX: 32.61 KG/M2 | RESPIRATION RATE: 39 BRPM | HEIGHT: 62 IN | SYSTOLIC BLOOD PRESSURE: 59 MMHG | TEMPERATURE: 97.3 F

## 2024-04-22 LAB
ACCESSION NUMBER, LLC1M: ABNORMAL
ACINETOBACTER CALCOAC BAUMANNII COMPLEX BY PCR: NOT DETECTED
ANION GAP SERPL CALC-SCNC: 12 MMOL/L (ref 2–11)
APPEARANCE UR: CLEAR
ARTERIAL PATENCY WRIST A: ABNORMAL
B FRAGILIS DNA BLD POS QL NAA+NON-PROBE: NOT DETECTED
BACTERIA URNS QL MICRO: 0 /HPF
BASE DEFICIT BLD-SCNC: 0.3 MMOL/L
BASOPHILS # BLD: 0.1 K/UL (ref 0–0.2)
BASOPHILS NFR BLD: 0 % (ref 0–2)
BDY SITE: ABNORMAL
BILIRUB UR QL: NEGATIVE
BIOFIRE TEST COMMENT: ABNORMAL
BUN SERPL-MCNC: 24 MG/DL (ref 8–23)
C ALBICANS DNA BLD POS QL NAA+NON-PROBE: NOT DETECTED
C AURIS DNA BLD POS QL NAA+NON-PROBE: NOT DETECTED
C GATTII+NEOFOR DNA BLD POS QL NAA+N-PRB: NOT DETECTED
C GLABRATA DNA BLD POS QL NAA+NON-PROBE: NOT DETECTED
C KRUSEI DNA BLD POS QL NAA+NON-PROBE: NOT DETECTED
C PARAP DNA BLD POS QL NAA+NON-PROBE: NOT DETECTED
C TROPICLS DNA BLD POS QL NAA+NON-PROBE: NOT DETECTED
CALCIUM SERPL-MCNC: 7.8 MG/DL (ref 8.3–10.4)
CASTS URNS QL MICRO: 0 /LPF
CHLORIDE SERPL-SCNC: 106 MMOL/L (ref 103–113)
CHOLEST SERPL-MCNC: 125 MG/DL
CO2 SERPL-SCNC: 24 MMOL/L (ref 21–32)
COLOR UR: ABNORMAL
CREAT SERPL-MCNC: 1.4 MG/DL (ref 0.6–1)
CRYSTALS URNS QL MICRO: 0 /LPF
DIFFERENTIAL METHOD BLD: ABNORMAL
E CLOAC COMP DNA BLD POS NAA+NON-PROBE: NOT DETECTED
E COLI DNA BLD POS QL NAA+NON-PROBE: NOT DETECTED
E FAECALIS DNA BLD POS QL NAA+NON-PROBE: NOT DETECTED
E FAECIUM DNA BLD POS QL NAA+NON-PROBE: NOT DETECTED
ENTEROBACTERALES DNA BLD POS NAA+N-PRB: NOT DETECTED
EOSINOPHIL # BLD: 0 K/UL (ref 0–0.8)
EOSINOPHIL NFR BLD: 0 % (ref 0.5–7.8)
EPI CELLS #/AREA URNS HPF: ABNORMAL /HPF
ERYTHROCYTE [DISTWIDTH] IN BLOOD BY AUTOMATED COUNT: 16.2 % (ref 11.9–14.6)
GAS FLOW.O2 O2 DELIVERY SYS: ABNORMAL
GLUCOSE BLD STRIP.AUTO-MCNC: 157 MG/DL (ref 65–100)
GLUCOSE BLD STRIP.AUTO-MCNC: 195 MG/DL (ref 65–100)
GLUCOSE SERPL-MCNC: 254 MG/DL (ref 65–100)
GLUCOSE UR STRIP.AUTO-MCNC: >1000 MG/DL
GP B STREP DNA BLD POS QL NAA+NON-PROBE: NOT DETECTED
HAEM INFLU DNA BLD POS QL NAA+NON-PROBE: NOT DETECTED
HCO3 BLD-SCNC: 23.5 MMOL/L (ref 22–26)
HCT VFR BLD AUTO: 28.5 % (ref 35.8–46.3)
HDLC SERPL-MCNC: 27 MG/DL (ref 40–60)
HDLC SERPL: 4.6
HGB BLD-MCNC: 8.3 G/DL (ref 11.7–15.4)
HGB UR QL STRIP: NEGATIVE
IMM GRANULOCYTES # BLD AUTO: 1 K/UL (ref 0–0.5)
IMM GRANULOCYTES NFR BLD AUTO: 4 % (ref 0–5)
K OXYTOCA DNA BLD POS QL NAA+NON-PROBE: NOT DETECTED
KETONES UR QL STRIP.AUTO: NEGATIVE MG/DL
KLEBSIELLA SP DNA BLD POS QL NAA+NON-PRB: NOT DETECTED
KLEBSIELLA SP DNA BLD POS QL NAA+NON-PRB: NOT DETECTED
L MONOCYTOG DNA BLD POS QL NAA+NON-PROBE: NOT DETECTED
LACTATE SERPL-SCNC: 12.8 MMOL/L (ref 0.4–2)
LACTATE SERPL-SCNC: 15.9 MMOL/L (ref 0.4–2)
LACTATE SERPL-SCNC: 5.8 MMOL/L (ref 0.4–2)
LACTATE SERPL-SCNC: 9.2 MMOL/L (ref 0.4–2)
LDLC SERPL CALC-MCNC: 73 MG/DL
LEUKOCYTE ESTERASE UR QL STRIP.AUTO: NEGATIVE
LYMPHOCYTES # BLD: 1.7 K/UL (ref 0.5–4.6)
LYMPHOCYTES NFR BLD: 7 % (ref 13–44)
MAGNESIUM SERPL-MCNC: 1.3 MG/DL (ref 1.8–2.4)
MAGNESIUM SERPL-MCNC: 3.8 MG/DL (ref 1.8–2.4)
MCH RBC QN AUTO: 25.7 PG (ref 26.1–32.9)
MCHC RBC AUTO-ENTMCNC: 29.1 G/DL (ref 31.4–35)
MCV RBC AUTO: 88.2 FL (ref 82–102)
MECA+MECC ISLT/SPM QL: DETECTED
MONOCYTES # BLD: 3.9 K/UL (ref 0.1–1.3)
MONOCYTES NFR BLD: 16 % (ref 4–12)
MUCOUS THREADS URNS QL MICRO: 0 /LPF
N MEN DNA BLD POS QL NAA+NON-PROBE: NOT DETECTED
NEUTS SEG # BLD: 18.4 K/UL (ref 1.7–8.2)
NEUTS SEG NFR BLD: 74 % (ref 43–78)
NITRITE UR QL STRIP.AUTO: NEGATIVE
NRBC # BLD: 0.05 K/UL (ref 0–0.2)
O2/TOTAL GAS SETTING VFR VENT: 80 %
OTHER OBSERVATIONS: ABNORMAL
P AERUGINOSA DNA BLD POS NAA+NON-PROBE: NOT DETECTED
PCO2 BLD: 34.2 MMHG (ref 35–45)
PH BLD: 7.45 (ref 7.35–7.45)
PH UR STRIP: 6.5 (ref 5–9)
PLATELET # BLD AUTO: 205 K/UL (ref 150–450)
PMV BLD AUTO: 10.4 FL (ref 9.4–12.3)
PO2 BLD: 69 MMHG (ref 75–100)
POTASSIUM SERPL-SCNC: 4 MMOL/L (ref 3.5–5.1)
PROCALCITONIN SERPL-MCNC: 1.53 NG/ML (ref 0–0.49)
PROT UR STRIP-MCNC: ABNORMAL MG/DL
PROTEUS SP DNA BLD POS QL NAA+NON-PROBE: NOT DETECTED
RBC # BLD AUTO: 3.23 M/UL (ref 4.05–5.2)
RBC #/AREA URNS HPF: ABNORMAL /HPF
RESISTANT GENE TARGETS: ABNORMAL
S AUREUS DNA BLD POS QL NAA+NON-PROBE: NOT DETECTED
S AUREUS+CONS DNA BLD POS NAA+NON-PROBE: DETECTED
S EPIDERMIDIS DNA BLD POS QL NAA+NON-PRB: DETECTED
S LUGDUNENSIS DNA BLD POS QL NAA+NON-PRB: NOT DETECTED
S MALTOPHILIA DNA BLD POS QL NAA+NON-PRB: NOT DETECTED
S MARCESCENS DNA BLD POS NAA+NON-PROBE: NOT DETECTED
S PNEUM DNA BLD POS QL NAA+NON-PROBE: NOT DETECTED
S PYO DNA BLD POS QL NAA+NON-PROBE: NOT DETECTED
SALMONELLA DNA BLD POS QL NAA+NON-PROBE: NOT DETECTED
SAO2 % BLD: 94.4 % (ref 95–98)
SERVICE CMNT-IMP: ABNORMAL
SODIUM SERPL-SCNC: 142 MMOL/L (ref 136–146)
SP GR UR REFRACTOMETRY: 1.02 (ref 1–1.02)
SPECIMEN TYPE: ABNORMAL
STREPTOCOCCUS DNA BLD POS NAA+NON-PROBE: NOT DETECTED
TRIGL SERPL-MCNC: 125 MG/DL (ref 35–150)
URINE CULTURE IF INDICATED: ABNORMAL
UROBILINOGEN UR QL STRIP.AUTO: 0.2 EU/DL (ref 0.2–1)
VLDLC SERPL CALC-MCNC: 25 MG/DL (ref 6–23)
WBC # BLD AUTO: 25.1 K/UL (ref 4.3–11.1)
WBC URNS QL MICRO: ABNORMAL /HPF
YEAST URNS QL MICRO: ABNORMAL

## 2024-04-22 PROCEDURE — 2500000003 HC RX 250 WO HCPCS: Performed by: NURSE PRACTITIONER

## 2024-04-22 PROCEDURE — 2580000003 HC RX 258: Performed by: INTERNAL MEDICINE

## 2024-04-22 PROCEDURE — 82803 BLOOD GASES ANY COMBINATION: CPT

## 2024-04-22 PROCEDURE — 6360000002 HC RX W HCPCS: Performed by: EMERGENCY MEDICINE

## 2024-04-22 PROCEDURE — 2700000000 HC OXYGEN THERAPY PER DAY

## 2024-04-22 PROCEDURE — 2580000003 HC RX 258: Performed by: EMERGENCY MEDICINE

## 2024-04-22 PROCEDURE — 2500000003 HC RX 250 WO HCPCS: Performed by: EMERGENCY MEDICINE

## 2024-04-22 PROCEDURE — 80061 LIPID PANEL: CPT

## 2024-04-22 PROCEDURE — 2500000003 HC RX 250 WO HCPCS: Performed by: INTERNAL MEDICINE

## 2024-04-22 PROCEDURE — 6370000000 HC RX 637 (ALT 250 FOR IP): Performed by: INTERNAL MEDICINE

## 2024-04-22 PROCEDURE — 80048 BASIC METABOLIC PNL TOTAL CA: CPT

## 2024-04-22 PROCEDURE — 37799 UNLISTED PX VASCULAR SURGERY: CPT

## 2024-04-22 PROCEDURE — 71045 X-RAY EXAM CHEST 1 VIEW: CPT

## 2024-04-22 PROCEDURE — 83735 ASSAY OF MAGNESIUM: CPT

## 2024-04-22 PROCEDURE — 85025 COMPLETE CBC W/AUTO DIFF WBC: CPT

## 2024-04-22 PROCEDURE — 2580000003 HC RX 258: Performed by: NURSE PRACTITIONER

## 2024-04-22 PROCEDURE — 82962 GLUCOSE BLOOD TEST: CPT

## 2024-04-22 PROCEDURE — 83605 ASSAY OF LACTIC ACID: CPT

## 2024-04-22 PROCEDURE — 6360000002 HC RX W HCPCS: Performed by: NURSE PRACTITIONER

## 2024-04-22 PROCEDURE — 6360000002 HC RX W HCPCS: Performed by: INTERNAL MEDICINE

## 2024-04-22 PROCEDURE — A4216 STERILE WATER/SALINE, 10 ML: HCPCS | Performed by: EMERGENCY MEDICINE

## 2024-04-22 PROCEDURE — 99291 CRITICAL CARE FIRST HOUR: CPT | Performed by: INTERNAL MEDICINE

## 2024-04-22 PROCEDURE — 84145 PROCALCITONIN (PCT): CPT

## 2024-04-22 RX ORDER — MORPHINE SULFATE 2 MG/ML
2 INJECTION, SOLUTION INTRAMUSCULAR; INTRAVENOUS
Status: DISCONTINUED | OUTPATIENT
Start: 2024-04-22 | End: 2024-04-22 | Stop reason: HOSPADM

## 2024-04-22 RX ORDER — NOREPINEPHRINE BITARTRATE 0.06 MG/ML
1-100 INJECTION, SOLUTION INTRAVENOUS CONTINUOUS
Status: DISCONTINUED | OUTPATIENT
Start: 2024-04-22 | End: 2024-04-22

## 2024-04-22 RX ORDER — MINERAL OIL AND WHITE PETROLATUM 150; 830 MG/G; MG/G
OINTMENT OPHTHALMIC PRN
Status: DISCONTINUED | OUTPATIENT
Start: 2024-04-22 | End: 2024-04-22 | Stop reason: HOSPADM

## 2024-04-22 RX ORDER — GLYCOPYRROLATE 0.2 MG/ML
0.1 INJECTION INTRAMUSCULAR; INTRAVENOUS EVERY 6 HOURS PRN
Status: DISCONTINUED | OUTPATIENT
Start: 2024-04-22 | End: 2024-04-22 | Stop reason: HOSPADM

## 2024-04-22 RX ADMIN — SODIUM CHLORIDE, PRESERVATIVE FREE 30 ML: 5 INJECTION INTRAVENOUS at 08:14

## 2024-04-22 RX ADMIN — MAGNESIUM SULFATE HEPTAHYDRATE 2000 MG: 40 INJECTION, SOLUTION INTRAVENOUS at 07:46

## 2024-04-22 RX ADMIN — ONDANSETRON 4 MG: 2 INJECTION INTRAMUSCULAR; INTRAVENOUS at 01:17

## 2024-04-22 RX ADMIN — WHITE PETROLATUM 57.7 %-MINERAL OIL 31.9 % EYE OINTMENT: at 13:28

## 2024-04-22 RX ADMIN — SODIUM CHLORIDE 55 MCG/MIN: 9 INJECTION, SOLUTION INTRAVENOUS at 12:43

## 2024-04-22 RX ADMIN — SODIUM CHLORIDE 35 MCG/MIN: 9 INJECTION, SOLUTION INTRAVENOUS at 05:43

## 2024-04-22 RX ADMIN — FAMOTIDINE 20 MG: 10 INJECTION, SOLUTION INTRAVENOUS at 08:10

## 2024-04-22 RX ADMIN — DEXMEDETOMIDINE 0.2 MCG/KG/HR: 100 INJECTION, SOLUTION INTRAVENOUS at 00:11

## 2024-04-22 RX ADMIN — DOBUTAMINE HYDROCHLORIDE 9 MCG/KG/MIN: 200 INJECTION INTRAVENOUS at 09:33

## 2024-04-22 RX ADMIN — ENOXAPARIN SODIUM 40 MG: 100 INJECTION SUBCUTANEOUS at 10:09

## 2024-04-22 RX ADMIN — VASOPRESSIN 0.01 UNITS/MIN: 0.2 INJECTION INTRAVENOUS at 09:09

## 2024-04-22 RX ADMIN — VASOPRESSIN 0.07 UNITS/MIN: 0.2 INJECTION INTRAVENOUS at 15:24

## 2024-04-22 RX ADMIN — PIPERACILLIN AND TAZOBACTAM 3375 MG: 3; .375 INJECTION, POWDER, LYOPHILIZED, FOR SOLUTION INTRAVENOUS; PARENTERAL at 15:56

## 2024-04-22 RX ADMIN — MORPHINE SULFATE 2 MG: 2 INJECTION, SOLUTION INTRAMUSCULAR; INTRAVENOUS at 16:58

## 2024-04-22 RX ADMIN — PIPERACILLIN AND TAZOBACTAM 3375 MG: 3; .375 INJECTION, POWDER, LYOPHILIZED, FOR SOLUTION INTRAVENOUS; PARENTERAL at 08:05

## 2024-04-22 RX ADMIN — MAGNESIUM SULFATE HEPTAHYDRATE 2000 MG: 40 INJECTION, SOLUTION INTRAVENOUS at 09:37

## 2024-04-22 RX ADMIN — SODIUM CHLORIDE 14 MCG/MIN: 9 INJECTION, SOLUTION INTRAVENOUS at 03:04

## 2024-04-22 RX ADMIN — WHITE PETROLATUM 57.7 %-MINERAL OIL 31.9 % EYE OINTMENT: at 15:27

## 2024-04-22 RX ADMIN — FUROSEMIDE 20 MG: 10 INJECTION, SOLUTION INTRAMUSCULAR; INTRAVENOUS at 08:10

## 2024-04-22 RX ADMIN — PIPERACILLIN AND TAZOBACTAM 4500 MG: 4; .5 INJECTION, POWDER, FOR SOLUTION INTRAVENOUS at 01:22

## 2024-04-22 RX ADMIN — SODIUM CHLORIDE 45 MCG/MIN: 9 INJECTION, SOLUTION INTRAVENOUS at 07:24

## 2024-04-22 NOTE — CARE COORDINATION
Patient currently in CV ICU. CM spoke to patient's daughter, Solange Zambrano, at [phone], to complete CM assessment.     Patient's demographic information and insurance information was confirmed. Patient's daughter and family lives with patient, in a 1 story house, in which patient has no steps to enter home. Patient is independent with ADLs and uses bedside commode, shower chair, walker, and wheelchair. Patient is current with [facility]. Patient's PCP information was confirmed and last PCP visit was 2 weeks ago. Patient's discharge plan will be based on patient's clinical progression.     No CM needs voiced or noted. CM will continue to follow patient for discharge planning needs.        04/22/24 0912   Service Assessment   Patient Orientation Alert and Oriented   Cognition Alert   History Provided By Child/Family   Primary Caregiver Self   Support Systems Children;Family Members   Patient's Healthcare Decision Maker is: Legal Next of Kin   PCP Verified by CM Yes  (confirmed PCP is Dr. Bradford Alanis)   Last Visit to PCP Within last 3 months  (last PCP visit was 2 weeks ago)   Prior Functional Level Independent in ADLs/IADLs   Current Functional Level Other (see comment)  (TBD by clinicals)   Can patient return to prior living arrangement Yes   Ability to make needs known: Good   Family able to assist with home care needs: Yes   Would you like for me to discuss the discharge plan with any other family members/significant others, and if so, who? Yes   Financial Resources Medicare;Other (Comment)  (Medicare Part A and B and [facility])   Community Resources None   CM/SW Referral Other (see comment)  (discharge planning)   Social/Functional History   Lives With Family;Daughter   Type of Home House   Home Layout One level   Home Access Level entry   Bathroom Shower/Tub Tub/Shower unit   Bathroom Toilet Standard   Bathroom Equipment Shower chair;Commode   Bathroom Accessibility Accessible   Home Equipment Walker,

## 2024-04-22 NOTE — INTERDISCIPLINARY ROUNDS
Multi-D Rounds/Checklist (leapfrog):  Lines: can any be removed?: None    Urinary Catheter 04/21/24 Jarquin (Active)     Arterial Line 04/21/24 Left Radial (Active)       CVC  04/21/24 Left Internal jugular (Active)     DVT Prophylaxis: Ordered  Vent: N/A  Nutrition Ordered/appropriate: Ordered  Can antibiotics or other drugs be stopped? Yes/End Date set Yes  Inpat Anti-Infectives (From admission, onward)       Start     Ordered Stop    04/22/24 0434  vancomycin (VANCOCIN) intermittent dosing (placeholder)  Other,   Rx Placeholder         04/22/24 0434 --    04/22/24 0400  piperacillin-tazobactam (ZOSYN) 3,375 mg in sodium chloride 0.9 % 50 mL IVPB (mini-bag)  3,375 mg,   IntraVENous,   EVERY 8 HOURS         04/21/24 2057 04/28/24 1239                  Consults needed: None  A: Is pain control adequate? (has PRNs? Stop drip?) Yes  B: Sedation break and SBT? N/A  C: Is sedation choice appropriate? N/A  D: Delirium/CAM-ICU? No  E: Mobility goals/appropriateness? Yes  F: Family update and plan? daughter is surrogate decision maker and is being updated daily by primary attending and nursing staff.    Kiki Paredes, NATO - CNP

## 2024-04-22 NOTE — DEATH NOTES
Death Note    Vivien Zambrano  Admission date:  2024    Admitting Diagnosis:  Acute respiratory failure (HCC) [J96.00]  Hypoxemia [R09.02]  Respiratory distress [R06.03]  Acute on chronic congestive heart failure, unspecified heart failure type (HCC) [I50.9]    Called to evaluate patient who was found by nursing to have .     On arrival patient was found to be unresponsive.    Physical exam was performed and the patient was noted to be apneic, asystolic, pupils were fixed and dilated, with absent occulocephalic reflex.    Patient pronounced dead at 1713 on 24.    Cause of death felt to be multisystem organ failure secondary to cardiogenic shock secondary to ischemic cardiomyopathy secondary to coronary artery disease.     Marie Almendarez MD

## 2024-04-22 NOTE — WOUND CARE
Patient seen for gluteal cleft fold fissures. There were 3 small splits less than 1cm long each. Will start zinc barrier cream at least twice daily. Seen with primary nurse in CVICU. Keep turned frequently. Will follow loosely, please call if needed.

## 2024-04-22 NOTE — PROGRESS NOTES
[facility] CARDIOLOGY PROGRESS NOTE           4/22/2024 9:07 AM    Admit Date: 4/20/2024         Subjective: Remains in septic shock with hypotension on pressors.  She has known LV dysfunction. On bipap. Continue to have a poor prognosis.    ROS:  Cardiovascular:  As noted above    Objective:      Vitals:    04/22/24 0600 04/22/24 0615 04/22/24 0752 04/22/24 0810   BP: (!) 86/53 (!) 82/46  (!) 86/45   Pulse: 80 (!) 101     Resp: 25 26     Temp:       TempSrc:       SpO2: (!) 85% (!) 72% 98%    Weight:       Height:           On telemetry:sr      Physical Exam:  General: Critically ill and minimally responsive  Neck: supple, no JVD  Heart: S1S2 with RRR without murmurs or gallops  Lungs: Clear throughout auscultation bilaterally without adventitious sounds  Abd: soft, nontender, nondistended, with good bowel sounds  Ext: 1-2+ edema bilaterally  Skin: warm and dry      Data Review:   Recent Labs     04/21/24  0335 04/21/24  2110 04/22/24  0328    137 142   K 4.2 4.6 4.0   MG 1.8  --  1.3*   BUN 13 18 24*   WBC  --  24.0* 25.1*   HGB  --  10.1* 8.3*   HCT  --  36.1 28.5*   PLT  --  245 205   CHOL  --   --  125   HDL  --   --  27*       No results for input(s): \"TNIPOC\" in the last 72 hours.    Invalid input(s): \"TROIQ\"          Assessment/Plan:     Principal Problem:    Acute respiratory failure (HCC)  Active Problems:    Type 2 diabetes mellitus with hyperglycemia, without long-term current use of insulin (HCC)    PAF (paroxysmal atrial fibrillation) ([facility])    Pleural effusion, left    Pleural effusion, right    Respiratory distress    Hypoxemia    Chronic systolic congestive heart failure (HCC)    Acute on chronic congestive heart failure ([facility])  Resolved Problems:    * No resolved hospital problems. *    A/P  1) Hypotension -this is likely septic shock is complicated by systolic heart failure.  2) LV dysfunction -she has known multivessel coronary artery disease that has not been 
   04/21/24 0042   NIV Type   $NIV $Daily Charge   NIV Started/Stopped On   Equipment Type V60   Mode Bilevel   Mask Type Full face mask   Mask Size Medium   Assessment   Respirations 22   Comfort Level Good   Using Accessory Muscles No   Mask Compliance Good   Skin Assessment Clean, dry, & intact   Settings/Measurements   PIP Observed 17 cm H20   IPAP 16 cmH20   CPAP/EPAP 8 cmH2O   Vt (Measured) 923 mL   Rate Ordered 18   Insp Rise Time (%) 3 %   FiO2  60 %   I Time/ I Time % 0.9 s   Minute Volume (L/min) 20.1 Liters   Mask Leak (lpm) 26 lpm   Patient's Home Machine No   Alarm Settings   Alarms On Y   Low Pressure (cmH2O) 5 cmH2O   High Pressure (cmH2O) 35 cmH2O   Apnea (secs) 20 secs   RR Low (bpm) 8   RR High (bpm) 50 br/min   Patient Observation   Observations admission to 107 from ER       
   04/21/24 1912   NIV Type   NIV Started/Stopped On   Equipment Type V60   Mode Bilevel   Mask Type Full face mask   Mask Size Small   Assessment   Pulse (!) 105   Respirations (!) 35   SpO2 92 %   Comfort Level Good   Using Accessory Muscles No   Mask Compliance Good   Skin Assessment Clean, dry, & intact   Settings/Measurements   PIP Observed 13 cm H20   IPAP 12 cmH20  (found on)   CPAP/EPAP 8 cmH2O  (found on)   Vt (Measured) 369 mL   Rate Ordered 16  (found on)   Insp Rise Time (%) 3 %   FiO2  35 %   I Time/ I Time % 0.9 s   Minute Volume (L/min) 13.1 Liters   Mask Leak (lpm) 16 lpm   Patient's Home Machine No   Alarm Settings   Alarms On Y   Low Pressure (cmH2O) 5 cmH2O   High Pressure (cmH2O) 35 cmH2O   Apnea (secs) 20 secs   RR Low (bpm) 8   RR High (bpm) 50 br/min       
  It is with  great BRYAN we pray for your family today:            \"Trust in the LORD with all your heart     and lean not on your own  understanding;    In all your ways submit to HIM,     and HE will make your paths straight.\"          May God's favor and peace be with you this day.            Chaplain Jennings -   [phone]            
  Order received to place PICC line in patient.  Patient's arm assessed for PICC placement.  No suitable veins noted in either arm via ultrasound machine; vessels are too small to accommodate   4 Fr picc catheter. RN notified.  Not a candidate for PICC placement at this time.  PICC order cancelled. See ultrasound image below.                  
  Terry Low/[facility] Critical Care Note:: 4/22/2024  Vivien Zambrano  Admission Date: 4/20/2024     Length of Stay: 1 days    Background: 83 y.o. female with a history of HFpEF, CAD s/p CABG, HTN, HLD, paroxysmal atrial fibrillation, NIDDM, GERD and subclavian artery stenosis presented to the ER via EMS for SOB. Recently admitted 3/21 to 3/26 for pneumonia and sepsis, readmitted here 4/12 for bradycardia and AMS requiring intubation. Was treated for possible beta blocker toxicity, extubated 4/14 to [facility]. Sepsis workup was negative, thoracentesis done with 800ml removed. Discharged 4/19 on home O2 3L NC. Per son and daughter, she seemed to be doing okay at home with O2 sats in the high 90s on O2 3L. This evening she got up/out of bed to use a bedside commode, became dyspneic and weak. She was unable to get back into bed, EMS was called. Per EMS (per record), sats were in the 70s on RA and pt was wheezing. Was given albuterol neb, terbutaline 0.5mg and solumedrol 125mg en route, placed on CPAP. VBG in ER upon arrival (unable to get ABG) 7.20/53/39/21. Was still wheezing when she arrived to the ER, was placed on BIPAP and given duoneb, lasix 40mg and NTG drip. Repeat VBG on BIPAP 7.25/43.9/45/19.4. Respiratory status improved with resolution of her wheezing. CXR showed bilateral pleural effusions. Initital lactate 3.3, Procalcitonin 0.22, glc 333, troponin 1028 > 941, , , white count 20.2. [facility] was consulted for admission.    Notable PMH:  has a past medical history of Acute respiratory failure with hypoxia ([facility]), Arrhythmia, Arthritis, CAD (coronary artery disease), Calculus of kidney, Carotid stenosis, Coronary atherosclerosis of native coronary artery, Diabetes ([facility]), Essential hypertension, GERD (gastroesophageal reflux disease), HLD (hyperlipidemia), Insomnia, Myocardial infarction due to demand ischemia ([facility]), Respiratory distress, Right ureteral stone, Sepsis ([facility]), 
 was alerted that PT was declining. PT appeared to be resting.  Family was present at bedside including Son. introduced self and offered support. Family expressed affection for PT.  offered comforting spiritual presence, active listening, and therapeutic communication. Family expressed great comfort in knowing PT will be in heaven. PT is Orthodox.  offered prayer ending with 23rd Psalm. Daughter and JOANN arrived.  offered hospitality, additional prayer and support.  thanked Family for sharing PT with  and let Family know they are in 's prayers.     Rev. Elizabeth Morgan M.Div.    
4 Eyes Skin Assessment     NAME:  Vivien Zambrano  YOB: 1940  MEDICAL RECORD NUMBER:  [id number]    The patient is being assessed for  Admission    I agree that at least one RN has performed a thorough Head to Toe Skin Assessment on the patient. ALL assessment sites listed below have been assessed.      Areas assessed by both nurses:    Head, Face, Ears, Shoulders, Back, Chest, Arms, Elbows, Hands, Sacrum. Buttock, Coccyx, Ischium, Legs. Feet and Heels, and Under Medical Devices         Does the Patient have a Wound? Yes wound(s) were present on assessment. LDA wound assessment was Initiated and completed by RN       Raman Prevention initiated by RN: Yes  Wound Care Orders initiated by RN: No    Pressure Injury (Stage 3,4, Unstageable, DTI, NWPT, and Complex wounds) if present, place Wound referral order by RN under : No    New Ostomies, if present place, Ostomy referral order under : No     Nurse 1 eSignature: Electronically signed by EDUARDO HAMILTON RN on 4/21/24 at 3:00 AM EDT    **SHARE this note so that the co-signing nurse can place an eSignature**    Nurse 2 eSignature: Electronically signed by Kristina Flores RN on 4/21/24 at 8:13 PM EDT   
All drips off, family at bedside  
Called to bedside by pt's nurse for increased WOB. Bedside echo just read as EF 20%, down from 45% eight days ago. Most recent VBG 7.25/36/74/15.7/92% on BIPAP 16/8 on FiO2 45%. Requiring increasing doses of Levo to maintain a MAP >65. Had long discussion with patient, she wants to be a DNI. Dr. Morgan at the bedside, discussed details of case with him. He will speak to the family, plan likely will be for dobutamine/dopamine and Levo, no intubation or cath lab.  
Critical Care Note:    Cc time 45 minutes (reviewing chart, discussing with Critical care team, patient and family)    Patient with worsening hypoxic resp failure and worsening shock this evening.  Echo reviewed and systolic function worsening with EF ~20%.  EF worsening from prior echo, trop remains flat.  She appears to have cardiogenic shock with possible underlying sepsis as well.  Discussed at length with the patient, family and critical care team at the bedside.    The patient expressed she DOES NOT want to be intubated and family is in agreement.  We will continue supportive measures with BiPAP, pressors and IV inotropic support only.  No mechanical LV support and she has known severe CAD.  No plan for cath.  Family in agreement.  We will follow closely here in the ICU.  Follow labs.    Prognosis is very guarded at this time.  We will follow.     Reinaldo Morgan D.O.  [facility] Cardiology  4/21/2024  10:50pm  
Dr Almendarez here to pronounce  
Dr Almendarez to bedside and serious talk with pt's daughter and boyfriend regarding pt's status. To remain DNI.  
Dr Almendarez to bedside and speaking with family  
Family is ready to proceed with comfort care. We will order meds. We will stop vasoactive meds first and then if stable after 20 minutes will remove BIPAP and place on 2L NC. We can use morphine and Ativan as needed for comfort. All questions from family answered.     Caesar Almendarez MD  
Notified Dr. Lisa that pt's arterial line not correlating with NIBP measurements in bilateral arms. Per MD, okay to titrate vasopressors using NIBP measurements at this time.   
Patient minimally responsive, notified MD  
Patient on BiPAP, continuous monitoring in place, staff made aware, will continue to monitor.   04/20/24 2140   NIV Type   $NIV $Daily Charge   Ventilator ID [id number]   NIV Started/Stopped On   Equipment Type V60   Mode Bilevel   Mask Type Full face mask   Mask Size Medium   Assessment   Comfort Level Good   Using Accessory Muscles Yes   Mask Compliance Good   Breath Sounds   Breath Sounds Bilateral Rales;Wheezing   Settings/Measurements   PIP Observed 17 cm H20   IPAP 16 cmH20   CPAP/EPAP 8 cmH2O   Vt (Measured) 508 mL   Rate Ordered 18   Insp Rise Time (%) 3 %   FiO2  100 %   Minute Volume (L/min) 16.9 Liters   Mask Leak (lpm) 0 lpm   Patient's Home Machine No   Alarm Settings   Alarms On Y   Low Pressure (cmH2O) 5 cmH2O   High Pressure (cmH2O) 35 cmH2O   Apnea (secs) 20 secs   RR Low (bpm) 8   RR High (bpm) 50 br/min       
Pt is deteriorating rapidly, grandson, niece, brother at bedside and updated. Grandson is calling Father and his father is calling his sister. Pt doesn't appear to be in any distress. With continued vasoactive support, SBP will not increase. All questions answered and family aware how grave things are.  
[facility] notified and they stated that they can't do any organ procurement with someone on bipap and that they need the time of death   
Spoke with daughter and now son. Confirmed DNR/DNI. We will see how she does over the next few hours with support, but if continues to worsen would suggest comfort.     Caesar Almendarez MD  
Spoke with entire family. They do not want the pt to suffer and are ready for comfort care. Discussed options and that Dr Almendarez would be made aware. He will be here soon. They don't want to do any other treatment including lab draws and blood sugars.  
TRANSFER - IN REPORT:    Verbal report received from CARON Corona on Vivien Zambrano  being received from ED for routine progression of patient care      Report consisted of patient's Situation, Background, Assessment and   Recommendations(SBAR).     Information from the following report(s) Nurse Handoff Report, ED Encounter Summary, and ED SBAR was reviewed with the receiving nurse.    Opportunity for questions and clarification was provided.      Assessment completed upon patient's arrival to unit and care assumed.    
VANCO NOTE RENAL INSUFFICIENCY PATIENT   [facility]   Pharmacy Pharmacokinetic Monitoring Service - Vancomycin    Consulting Provider: Andrews Lisa MD    Indication: Sepsis of Unknown Etiology  Target Concentration: Random level ? 20 mg/L  Day of Therapy: 1   Additional Antimicrobials: pip/tazo    Patient eligible for piperacillin-tazobactam to cefepime auto-substitution per P&T approved protocol? N/A    Pertinent Laboratory Values:   Wt Readings from Last 1 Encounters:   04/21/24 78.9 kg (174 lb)     Temp Readings from Last 1 Encounters:   04/21/24 97.5 °F (36.4 °C) (Axillary)     Recent Labs     04/20/24  2131 04/20/24  2222 04/20/24  2301 04/20/24  2343 04/21/24  0335 04/21/24  2110 04/21/24  2355 04/22/24  0328   BUN  --    < >  --   --  13 18  --  24*   CREATININE  --    < >  --   --  1.10* 1.40*  --  1.40*   WBC 20.2*  --   --   --   --  24.0*  --  25.1*   PROCAL  --   --  0.22  --   --   --   --   --    LACACIDPL  --   --   --    < >  --  10.0* 9.2* 5.8*    < > = values in this interval not displayed.       Lab Results   Component Value Date/Time    VANCORANDOM 11.0 04/14/2024 03:30 AM       MRSA Nasal Swab: N/A. Non-respiratory infection.    Assessment:  Date:  Dose/Freq Admin Times Level/Time:                                   Plan:  Concentration-guided dosing due to renal impairment  Start vancomycin 2000mg IV x 1  Vancomycin concentrations will be ordered as clinically appropriate   Pharmacy will continue to monitor patient and adjust therapy as indicated    Thank you for the consult,  Penny Hartley [facility]   
VANCO NOTE RENAL INSUFFICIENCY PATIENT   [facility]   Pharmacy Pharmacokinetic Monitoring Service - Vancomycin    Consulting Provider: Dr. Lisa  Indication: Sepsis of Unknown Etiology  Target Concentration: Random level ? 20 mg/L  Day of Therapy: 2  Additional Antimicrobials: pip/tazo    Patient eligible for piperacillin-tazobactam to cefepime auto-substitution per P&T approved protocol? N/A    Pertinent Laboratory Values:   Wt Readings from Last 1 Encounters:   04/22/24 80.4 kg (177 lb 3.2 oz)     Temp Readings from Last 1 Encounters:   04/22/24 98 °F (36.7 °C) (Axillary)     Recent Labs     04/20/24  2131 04/20/24  2222 04/20/24  2301 04/20/24  2343 04/21/24  0335 04/21/24  2110 04/21/24  2355 04/22/24  0328   BUN  --    < >  --   --  13 18  --  24*   CREATININE  --    < >  --   --  1.10* 1.40*  --  1.40*   WBC 20.2*  --   --   --   --  24.0*  --  25.1*   PROCAL  --   --  0.22  --   --   --   --   --    LACACIDPL  --   --   --    < >  --  10.0* 9.2* 5.8*    < > = values in this interval not displayed.       Lab Results   Component Value Date/Time    VANCORANDOM 11.0 04/14/2024 03:30 AM     MRSA Nasal Swab: N/A. Non-respiratory infection.    Assessment:  Date:  Dose/Freq Admin Times Level/Time:   4/21 2000 mg x 1 2247    4/22 4/23   Rd @ (04)                 Plan:  Concentration-guided dosing due to renal impairment  No additional vancomycin needed at this time  Vancomycin concentration ordered for 4/23 @ 0400    Pharmacy will continue to monitor patient and adjust therapy as indicated    Thank you for the consult,  Agnieszka Patel [facility]  
Miscellaneous sample Updated: 04/19/24 1736     Sample Site PLEURAL FLUID        Comment: RIGHT  1          Specimen Processing Concentration     Fungal Cult/Smear Other source received     Comment: (NOTE)  Performed At: [address] [id number]  Randy Wen MD Ph:[phone]         Fungus (Other) [[id number]] Collected: 04/15/24 1410    Order Status: Completed Specimen: Miscellaneous sample Updated: 04/19/24 1736     Source PENDING     Fungus Smear Comment        Comment: (NOTE)  KOH/Calcofluor preparation:  no fungus observed.  Performed At: [facility]  [address] [place] [id number]  Randy Wen MD Ph:[phone]         MRSA/Staph Aureus DNA [[id number]] Collected: 04/14/24 0935    Order Status: Completed Specimen: Nasal Swab Updated: 04/14/24 1332     MRSA by PCR Not detected        Comment: A positive test result does not necessarily indicate the presence of a viable organism. A positive result is indicative of the presence of SA or MRSA DNA.  The BD Max StaphSR assay is intended to aid in the prevention and control of MRSA and SA infections in healthcare settings.  It is not intended to diagnose MRSA or SA infections nor guide or monitor treatment for MRSA/SA infections. A negative result does not preclude nasal colonization.          SA by PCR Not detected       Respiratory Panel, Molecular, with COVID-19 (Restricted: peds pts or suitable admitted adults) [[id number]] Collected: 04/13/24 0144    Order Status: Completed Specimen: Nasopharyngeal Updated: 04/13/24 0256     Adenovirus by PCR NOT DETECTED        Coronavirus 229E by PCR NOT DETECTED        Coronavirus HKU1 by PCR NOT DETECTED        Coronavirus NL63 by PCR NOT DETECTED        Coronavirus OC43 by PCR NOT DETECTED        SARS-CoV-2, PCR NOT DETECTED        Human Metapneumovirus by PCR NOT DETECTED        Rhinovirus Enterovirus PCR NOT DETECTED        Influenza A by PCR NOT DETECTED

## 2024-04-22 NOTE — DISCHARGE SUMMARY
Death Summary    Vivien Zambrano  Admission date:  4/20/2024  Discharge date:  04/22/24    Admitting Diagnosis:    Acute respiratory failure (HCC) [J96.00]  Hypoxemia [R09.02]  Respiratory distress [R06.03]  Acute on chronic congestive heart failure, unspecified heart failure type (HCC) [I50.9]    Discharge Diagnosis:    Principal Problem:    Acute respiratory failure (HCC)  Active Problems:    Type 2 diabetes mellitus with hyperglycemia, without long-term current use of insulin (HCC)    PAF (paroxysmal atrial fibrillation) (HCC)    Pleural effusion, left    Pleural effusion, right    Respiratory distress    Hypoxemia    Chronic systolic congestive heart failure (HCC)    Acute on chronic congestive heart failure (HCC)  Resolved Problems:    * No resolved hospital problems. *    Consultants:   IP WOUND CARE NURSE CONSULT TO EVAL  IP CONSULT TO HEART FAILURE NURSE/COORDINATOR  IP CONSULT TO DIETITIAN  IP CONSULT TO CARDIOLOGY  IP CONSULT TO PHARMACY    Studies/Procedures: 04/20/24    ECHO (TTE) LIMITED (PRN CONTRAST/BUBBLE/STRAIN/3D) 04/21/2024  7:49 PM (Final)    Interpretation Summary    Left Ventricle: Severely reduced left ventricular systolic function with a visually estimated EF of 20 - 25%. Left ventricle is moderately dilated. Normal wall thickness. Severe global hypokinesis present.    Image quality is technically difficult. Contrast used: Definity. Technically difficult study.    Signed by: Reinaldo Morgan DO on 4/21/2024  7:49 PM   XR CHEST PORTABLE    Result Date: 4/21/2024  Chest X-ray INDICATION: Central line placement COMPARISON: April 21, 2024 TECHNIQUE: AP/PA view of the chest was obtained. FINDINGS: Redemonstration of diffuse interstitial and alveolar opacities throughout both lungs. Again seen are small bilateral pleural effusions. The tip of the left central line is projected over the right atrium. The cardiac silhouette is unchanged. Redemonstration of sternotomy wires..  The bony

## 2024-04-22 NOTE — PROCEDURES
PROCEDURE NOTE  ARTERIAL LINE PLACEMENT  04/21/24  10:41 PM    Indication: shock in need of hemodynamic monitoring    A time-out was completed verifying correct patient, procedure, site, positioning, and special equipment if applicable. Kristian’s test was performed to ensure adequate perfusion. The patient’s left wrist was prepped and draped in sterile fashion. 1% Lidocaine was used to anesthetize the area. A 20G Arrow arterial line was introduced into the left radial artery. The catheter was threaded over the guide wire and the needle was removed with appropriate pulsatile blood return. The catheter was then sutured in place to the skin and a sterile dressing applied. Perfusion to the extremity distal to the point of catheter insertion was checked and found to be adequate.     The patient tolerated the procedure well and there were no complications.    Andrews Lias MD

## 2024-04-23 ENCOUNTER — HOME CARE VISIT (OUTPATIENT)
Dept: HOME HEALTH SERVICES | Facility: HOME HEALTH | Age: 84
End: 2024-04-23
Payer: MEDICARE

## 2024-04-23 LAB
BACTERIA SPEC CULT: ABNORMAL
BACTERIA SPEC CULT: ABNORMAL
GRAM STN SPEC: ABNORMAL
SERVICE CMNT-IMP: ABNORMAL

## 2024-04-30 LAB
FUNGUS SMEAR: NORMAL
SPECIMEN SOURCE: NORMAL

## 2024-05-15 LAB
FUNGAL CULT/SMEAR: NORMAL
FUNGUS (MYCOLOGY) CULTURE: NEGATIVE
FUNGUS SMEAR: NORMAL
REFLEX TO ID: NORMAL
SPECIMEN PROCESSING: NORMAL
SPECIMEN SOURCE: NORMAL
SPECIMEN SOURCE: NORMAL

## 2024-06-02 LAB
ACID FAST STN SPEC: NEGATIVE
MYCOBACTERIUM SPEC QL CULT: NEGATIVE
SPECIMEN PREPARATION: NORMAL
SPECIMEN SOURCE: NORMAL

## (undated) DEVICE — Device

## (undated) DEVICE — 3M™ IOBAN™ 2 ANTIMICROBIAL INCISE DRAPE 6650EZ: Brand: IOBAN™ 2

## (undated) DEVICE — GUIDEWIRE VASC L150CM DIA0.035IN FLX END L7CM J 3MM PTFE

## (undated) DEVICE — REM POLYHESIVE ADULT PATIENT RETURN ELECTRODE: Brand: VALLEYLAB

## (undated) DEVICE — CAROTID ARTERY SHUNT KIT,RADIOPAQUE LINE, STRAIGHT: Brand: ARGYLE

## (undated) DEVICE — 2000CC GUARDIAN II: Brand: GUARDIAN

## (undated) DEVICE — SUT PROL 6-0 24IN BV1 DA BLU --

## (undated) DEVICE — SYR 10ML LUER LOK 1/5ML GRAD --

## (undated) DEVICE — CARDINAL HEALTH FLEXIBLE LIGHT HANDLE COVER: Brand: CARDINAL HEALTH

## (undated) DEVICE — APPLIER CLP L9.375IN APER 2.1MM CLS L3.8MM 20 SM TI CLP

## (undated) DEVICE — PVC URETHRAL CATHETER: Brand: DOVER

## (undated) DEVICE — SUT PROL 6-0 30IN C1 DA BLU --

## (undated) DEVICE — SUTURE VCRL SZ 2-0 L36IN ABSRB UD L36MM CT-1 1/2 CIR J945H

## (undated) DEVICE — NEEDLE HYPO 25GA L1.5IN BLU POLYPR HUB S STL REG BVL STR

## (undated) DEVICE — GEL US 20GM NONIRRITATING OVERWRAPPED FILE PCH TRNSMIT

## (undated) DEVICE — WIPE INSTR HIGH ABSORBENT FAST WICKING LINT FREE COUNT 20

## (undated) DEVICE — MAGNETIC DRAPE: Brand: DEVON

## (undated) DEVICE — DRAIN SURG W10MMXL20CM SIL FLAT HUBLESS W/ RADPQ STRP 3/4

## (undated) DEVICE — SUTURE PERMAHAND SZ 2-0 L18IN NONABSORBABLE BLK L26MM PS 1588H

## (undated) DEVICE — KENDALL SCD EXPRESS SLEEVES, KNEE LENGTH, MEDIUM: Brand: KENDALL SCD

## (undated) DEVICE — INTENDED TO BE USED TO OCCLUDE, RETRACT AND IDENTIFY ARTERIES, VEINS, TENDONS AND NERVES IN SURGICAL PROCEDURES: Brand: STERION®  VESSEL LOOP

## (undated) DEVICE — SUTURE NONABSORBABLE MONOFILAMENT 5-0 C-1 1X24 IN PROLENE 8725H

## (undated) DEVICE — SKIN MARKER,REGULAR TIP WITH RULER AND LABELS: Brand: DEVON

## (undated) DEVICE — SUTURE PROL SZ 6-0 L24IN NONABSORBABLE BLU L13MM C-1 3/8 8726H

## (undated) DEVICE — STANDARD HYPODERMIC NEEDLE,POLYPROPYLENE HUB: Brand: MONOJECT

## (undated) DEVICE — GUIDE NDL ST W/ 14 X 147CM TELESCOPICALLY FLD CIV FLX CVR

## (undated) DEVICE — SUTURE VCRL SZ 3-0 L27IN ABSRB UD L26MM SH 1/2 CIR J416H

## (undated) DEVICE — SUTURE PERMAHAND SZ 2-0 L12X18IN NONABSORBABLE BLK SILK A185H

## (undated) DEVICE — SUTURE PROL SZ 6-0 L24IN NONABSORBABLE BLU BV-1 L9.3MM 3/8 M8805

## (undated) DEVICE — INSULATED BLADE ELECTRODE: Brand: EDGE

## (undated) DEVICE — INTENDED FOR TISSUE SEPARATION, AND OTHER PROCEDURES THAT REQUIRE A SHARP SURGICAL BLADE TO PUNCTURE OR CUT.: Brand: BARD-PARKER SAFETY BLADES SIZE 11, STERILE

## (undated) DEVICE — SUTURE PERMA HND SZ 2 0 L18IN NONABSORBABLE BLK L19MM PS 2 583H

## (undated) DEVICE — SINGLE BASIN: Brand: CARDINAL HEALTH

## (undated) DEVICE — SOL ANTI-FOG 6ML MEDC -- MEDICHOICE - CONVERT TO 358427

## (undated) DEVICE — GOWN,REINFORCED,POLY,AURORA,XXLARGE,STR: Brand: MEDLINE

## (undated) DEVICE — UNIVERSAL DRAPES: Brand: MEDLINE INDUSTRIES, INC.

## (undated) DEVICE — CATHETER DIAG AD 5FR L100CM COR GRY HYDRPHLC NYL MPA 2 W/ 2

## (undated) DEVICE — SUTURE PERMAHAND SZ 3-0 L18IN NONABSORBABLE BLK SILK BRAID A184H

## (undated) DEVICE — APPLIER CLP L9.38IN M LIG TI DISP STR RNG HNDL LIGACLP

## (undated) DEVICE — RADIFOCUS GLIDEWIRE ADVANTAGE GUIDEWIRE: Brand: GLIDEWIRE ADVANTAGE

## (undated) DEVICE — STERILE POLYISOPRENE POWDER-FREE SURGICAL GLOVES: Brand: PROTEXIS

## (undated) DEVICE — DRAPE TWL SURG 16X26IN BLU ORB04] ALLCARE INC]

## (undated) DEVICE — INTENDED FOR TISSUE SEPARATION, AND OTHER PROCEDURES THAT REQUIRE A SHARP SURGICAL BLADE TO PUNCTURE OR CUT.: Brand: BARD-PARKER SAFETY BLADES SIZE 15, STERILE

## (undated) DEVICE — AGENT HEMSTAT W4XL4IN OXIDIZED REGENERATED CELOS ABSRB SFT

## (undated) DEVICE — CATHETER DIAG 5FR L100CM LUMN ID0.047IN JL4 CRV 0 SIDE H

## (undated) DEVICE — ELECTROSURGICAL SUCTION COAGULATOR 10FR

## (undated) DEVICE — SUT SLK 4-0 18IN TIE MP BLK --

## (undated) DEVICE — T AND A ORAL: Brand: MEDLINE INDUSTRIES, INC.

## (undated) DEVICE — TAPE UMB 1/8X30IN MP COT WHT --

## (undated) DEVICE — SUTURE MCRYL SZ 4-0 L27IN ABSRB UD L19MM PS-2 1/2 CIR PRIM Y426H

## (undated) DEVICE — SOLUTION IV 1000ML 0.9% SOD CHL

## (undated) DEVICE — INTENDED FOR TISSUE SEPARATION, AND OTHER PROCEDURES THAT REQUIRE A SHARP SURGICAL BLADE TO PUNCTURE OR CUT.: Brand: BARD-PARKER ® STAINLESS STEEL BLADES

## (undated) DEVICE — DISH PTRI STRL --

## (undated) DEVICE — APPLICATOR BNDG 1MM ADH PREMIERPRO EXOFIN

## (undated) DEVICE — PACKING 8004003 NEURAY 200PK 25X25MM: Brand: NEURAY ®

## (undated) DEVICE — BASIC SINGLE BASIN-LF: Brand: MEDLINE INDUSTRIES, INC.

## (undated) DEVICE — (D)PREP SKN CHLRAPRP APPL 26ML -- CONVERT TO ITEM 371833

## (undated) DEVICE — SUTURE PROL SZ 6-0 L30IN NONABSORBABLE BLU L9.3MM BV-1 3/8 M8709

## (undated) DEVICE — Z CONVERTED USE 2421973 CONTAINER SPEC 60/30ML 10% FRMLN POLYPR PREFIL

## (undated) DEVICE — HI-TORQUE VERSACORE MODIFIED J GUIDE WIRE SYSTEM 260 CM: Brand: HI-TORQUE VERSACORE

## (undated) DEVICE — BUTTON SWITCH PENCIL BLADE ELECTRODE, HOLSTER: Brand: EDGE

## (undated) DEVICE — MEDI-VAC YANKAUER SUCTION HANDLE W/BULBOUS TIP: Brand: CARDINAL HEALTH

## (undated) DEVICE — PINNACLE TIF INTRODUCER SHEATH: Brand: PINNACLE

## (undated) DEVICE — KIT THORCENT 8FR L5IN POLYUR W/ 18/22/25GA NDL 3 W STPCOCK

## (undated) DEVICE — TELFA NON-ADHERENT ABSORBENT DRESSING: Brand: TELFA

## (undated) DEVICE — CATHETER DIAG 5FR L100CM LUMN ID0.047IN JR4 CRV 0 SIDE H

## (undated) DEVICE — COVER LT HNDL FOR OR SURG LAMP